# Patient Record
Sex: FEMALE | Race: WHITE | NOT HISPANIC OR LATINO | Employment: OTHER | URBAN - METROPOLITAN AREA
[De-identification: names, ages, dates, MRNs, and addresses within clinical notes are randomized per-mention and may not be internally consistent; named-entity substitution may affect disease eponyms.]

---

## 2017-01-03 ENCOUNTER — OFFICE VISIT (OUTPATIENT)
Dept: ELECTROPHYSIOLOGY | Facility: CLINIC | Age: 72
End: 2017-01-03
Payer: MEDICARE

## 2017-01-03 ENCOUNTER — HOSPITAL ENCOUNTER (OUTPATIENT)
Dept: CARDIOLOGY | Facility: CLINIC | Age: 72
Discharge: HOME | End: 2017-01-03
Payer: MEDICARE

## 2017-01-03 VITALS
SYSTOLIC BLOOD PRESSURE: 118 MMHG | HEIGHT: 66 IN | BODY MASS INDEX: 29.23 KG/M2 | HEART RATE: 70 BPM | WEIGHT: 181.88 LBS | DIASTOLIC BLOOD PRESSURE: 64 MMHG

## 2017-01-03 DIAGNOSIS — I49.3 VENTRICULAR PREMATURE BEATS: ICD-10-CM

## 2017-01-03 DIAGNOSIS — I42.9 CARDIOMYOPATHY: ICD-10-CM

## 2017-01-03 DIAGNOSIS — I50.22 CHRONIC SYSTOLIC HEART FAILURE: ICD-10-CM

## 2017-01-03 DIAGNOSIS — I10 ESSENTIAL HYPERTENSION: ICD-10-CM

## 2017-01-03 DIAGNOSIS — R07.9 CHEST PAIN, UNSPECIFIED TYPE: ICD-10-CM

## 2017-01-03 DIAGNOSIS — R00.1 BRADYCARDIA: Primary | ICD-10-CM

## 2017-01-03 DIAGNOSIS — I34.0 NON-RHEUMATIC MITRAL REGURGITATION: ICD-10-CM

## 2017-01-03 PROCEDURE — 99214 OFFICE O/P EST MOD 30 MIN: CPT | Mod: S$GLB,,, | Performed by: NURSE PRACTITIONER

## 2017-01-03 PROCEDURE — 3074F SYST BP LT 130 MM HG: CPT | Mod: S$GLB,,, | Performed by: NURSE PRACTITIONER

## 2017-01-03 PROCEDURE — 99999 PR PBB SHADOW E&M-EST. PATIENT-LVL III: CPT | Mod: PBBFAC,,, | Performed by: NURSE PRACTITIONER

## 2017-01-03 PROCEDURE — 1160F RVW MEDS BY RX/DR IN RCRD: CPT | Mod: S$GLB,,, | Performed by: NURSE PRACTITIONER

## 2017-01-03 PROCEDURE — 93000 ELECTROCARDIOGRAM COMPLETE: CPT | Mod: S$GLB,,, | Performed by: INTERNAL MEDICINE

## 2017-01-03 PROCEDURE — 1126F AMNT PAIN NOTED NONE PRSNT: CPT | Mod: S$GLB,,, | Performed by: NURSE PRACTITIONER

## 2017-01-03 PROCEDURE — 3078F DIAST BP <80 MM HG: CPT | Mod: S$GLB,,, | Performed by: NURSE PRACTITIONER

## 2017-01-03 PROCEDURE — 1157F ADVNC CARE PLAN IN RCRD: CPT | Mod: S$GLB,,, | Performed by: NURSE PRACTITIONER

## 2017-01-03 PROCEDURE — 1159F MED LIST DOCD IN RCRD: CPT | Mod: S$GLB,,, | Performed by: NURSE PRACTITIONER

## 2017-01-03 RX ORDER — VERAPAMIL HYDROCHLORIDE 100 MG/1
120 CAPSULE, EXTENDED RELEASE ORAL DAILY
COMMUNITY
End: 2017-05-24

## 2017-01-03 NOTE — MR AVS SNAPSHOT
[facility]  [address]  [facility] [address]  Phone: [phone]  Fax: [phone]                  Rizwana Block   1/3/2017 4:00 PM   Office Visit    Description:  Female : 1945   Provider:  SYLVIA Fraire   Department:  [facility]           Diagnoses this Visit        Comments    Bradycardia    -  Primary     Ventricular premature beats         Cardiomyopathy         Chronic systolic heart failure         Non-rheumatic mitral regurgitation         Essential hypertension         Chest pain, unspecified type                To Do List           Future Appointments        Provider Department Dept Phone    2017 1:00 PM TREADMILL, NUCLEAR [facility] - Echo/Stress Lab [phone]    2017 3:30 PM HOLTER, EKG [facility] Arrhythmia [phone]    2/15/2017 2:45 PM EKG, APPT [facility] - [phone]    2/15/2017 3:20 PM Norbert Lemons MD [facility] - Arrhythmia [phone]      Goals (5 Years of Data)     None      Follow-Up and Disposition     Return in about 1 month (around 2/3/2017).      [facility] On Call     [facility] On Call Nurse Care Line -  Assistance  Registered nurses in the [facility] On Call Center provide clinical advisement, health education, appointment booking, and other advisory services.  Call for this free service at [phone].             Medications           Message regarding Medications     Verify the changes and/or additions to your medication regime listed below are the same as discussed with your clinician today.  If any of these changes or additions are incorrect, please notify your healthcare provider.             Verify that the below list of medications is an accurate representation of the medications you are currently taking.  If none reported, the list may be blank. If incorrect, please contact your healthcare provider. Carry this list with you in case of emergency.           Current Medications     enalapril  "(VASOTEC) 5 MG tablet Take one tablet daily.    furosemide (LASIX) 40 MG tablet Take 1 tablet (40 mg total) by mouth daily as needed (weight gain).    meloxicam (MOBIC) 15 MG tablet Take 15 mg by mouth once daily.    verapamil (VERELAN) 100 MG CPCT Take 120 mg by mouth once daily.    aspirin (ECOTRIN) 81 MG EC tablet Take 81 mg by mouth once daily.           Clinical Reference Information           Vital Signs - Last Recorded  Most recent update: 1/3/2017  4:10 PM by Pastor Jenkins MA    BP Pulse Ht Wt LMP BMI    118/64 70 5' 6" (1.676 m) 82.5 kg (181 lb 14.1 oz) (LMP Unknown) 29.36 kg/m2      Blood Pressure          Most Recent Value    BP  118/64      Allergies as of 1/3/2017     Dilaudid [Hydromorphone (Bulk)]    Morphine      Immunizations Administered on Date of Encounter - 1/3/2017     None      Orders Placed During Today's Visit      Normal Orders This Visit    Holter monitor - 24 hour     Nuclear Stress Test - Treadmill - Interpreted by Cardiology ([facility])       "

## 2017-01-03 NOTE — PROGRESS NOTES
"Subjective:    Patient ID:  Rizwana Block is a 71 y.o. female who presents for follow-up of Bradycardia.     Ms. Block is a patient of Dr. Araceli Mccurdy.     HPI     Ms. Block is a 71 year old female with a hx of PVCs s/p recent RFA, bradycardia, CM, HFrEF (EF 40-45%), non-rheumatic MR, and HTN. Ms. Block has been on long-term amiodarone for PVCs (inferior-posterior-septal PVC morphology). Her PVC burden was thought to have been related to her DCM. Her EF improved to the 50s after amiodarone use, and she was subsequently switched from amiodarone to Verapamil.     Ms. Block underwent an EPS and PVC RFA via a retrograde transaortic approach (09/27/16); EPS revealed frequent PVCs originating from the aortomitral continuity; effective lesions were at 12 o'clock level in the [place] view. At her last office visit, Ms. Block reported that on some days she has noted less frequent palpitations, but on others, she experienced the same amount as she had pre-procedure. A Holter monitor was placed, and she was instructed to re-start verapamil.     Since her last office visit, Ms. Block reports experiencing severe back pain radiating to her LUE initially then to her RUE and into her jaw with associated SOB, nausea and "claminess;" lasting several hours. This occurred when she was in the kitchen baking, and she had to stop what she was doing and lay down. Additionally, She has resumed Verapamil and did well initially with a reduction in her symptoms, but now reports experiencing increased palpitations over the last month or so, occurring daily.     Recent cardiac studies:  Echo (06/22/16) revealed an EF of 40-45%, trivial-to-mild TR, trivial TN, upper limit of normal LVE, and a PASP of 26 mmHg.   24-Hour Holter (06/22/16) revealed a 13% PVC burden; 481 couplets, 73 triplets and short VTNS -- the large majority of the PVCs were MM. The triplets are dimorphic.  48-Hour Holter (11/09/16) revealed SR w/HRs varying between  bpm; " average 72 bpm. There were very frequent polymorphic PVCs (totaling 9,632; averaging 200 per hour), 133 couplets, and no episodes of VT. There were very rare PACs (totaling 20; averaging less than 1 per hour) and no episodes of sustained SVT. There was 1 episode of dizziness reported corresponding w/SR at 95 bpm.     I reviewed today's ECG which demonstrated SR w/occasional PVCs at 70 bpm; , , and QTc 473.    Review of Systems   Constitution: Positive for chills, diaphoresis and malaise/fatigue. Negative for decreased appetite and weakness.   HENT: Negative for headaches and nosebleeds.    Eyes: Negative for blurred vision and double vision.   Cardiovascular: Positive for chest pain, dyspnea on exertion and palpitations. Negative for claudication, cyanosis, irregular heartbeat, leg swelling, near-syncope, orthopnea, paroxysmal nocturnal dyspnea and syncope.   Respiratory: Positive for shortness of breath. Negative for cough, hemoptysis, sputum production and wheezing.    Skin: Negative.    Musculoskeletal: Positive for back pain and stiffness.   Gastrointestinal: Positive for nausea. Negative for abdominal pain, hematemesis, hematochezia and vomiting.   Neurological: Negative for dizziness and light-headedness.   Psychiatric/Behavioral: Negative for altered mental status.        Objective:    Physical Exam   Constitutional: She is oriented to person, place, and time. She appears well-developed and well-nourished.   HENT:   Head: Normocephalic and atraumatic.   Eyes: Pupils are equal, round, and reactive to light.   Neck: Normal range of motion. Neck supple.   Cardiovascular: Normal rate, regular rhythm, normal heart sounds and intact distal pulses.    Pulmonary/Chest: Effort normal and breath sounds normal.   Abdominal: Soft. Bowel sounds are normal.   Musculoskeletal: Normal range of motion.   Neurological: She is alert and oriented to person, place, and time.   Skin: She is not diaphoretic.   Vitals  "reviewed.        Assessment:       1. Bradycardia    2. Ventricular premature beats    3. Cardiomyopathy    4. Chronic systolic heart failure    5. Non-rheumatic mitral regurgitation    6. Essential hypertension         Plan:       In summary, Ms. Block is a 71 y.o. female with a hx of  PVCs s/p recent RFA, bradycardia, CM, HFrEF (EF 40-45%), non-rheumatic MR, and HTN.  Ms. Block continues to experience symptomatic PVCs s/p recent PVC RFA. Ms. Block has recently experienced atypical CP which was associated w/SOB/STALLWORTH, LUE, left shoulder, and jaw radiation, nausea, and diaphoresis/"clamminess" which caused her to stop her activity; lasted several hours despite rest. Although she has had a normal LHC in the past, she is very concerned about this episode. Additionally, she is having increased palpitations as of late despite resuming verapamil.     NM Exercise Stress Test now to evaluate atypical CP and associated symptoms.   Repeat 24-Hour Holter to evaluate increased palpitations.   For now, continue current medication regimen.   Follow up in EP clinic w/Dr. Araceli Mccurdy in 1 month (per pt request), sooner as needed.     Thania Santos, MN, APRN, FNP-C             "

## 2017-01-06 ENCOUNTER — HOSPITAL ENCOUNTER (OUTPATIENT)
Dept: CARDIOLOGY | Facility: CLINIC | Age: 72
Discharge: HOME | End: 2017-01-06
Payer: MEDICARE

## 2017-01-06 DIAGNOSIS — R06.02 SOB (SHORTNESS OF BREATH): ICD-10-CM

## 2017-01-06 DIAGNOSIS — I49.3 PVC'S (PREMATURE VENTRICULAR CONTRACTIONS): ICD-10-CM

## 2017-01-06 DIAGNOSIS — I10 HTN (HYPERTENSION): ICD-10-CM

## 2017-01-06 DIAGNOSIS — I34.0 NON-RHEUMATIC MITRAL REGURGITATION: ICD-10-CM

## 2017-01-06 DIAGNOSIS — R07.9 CHEST PAIN: ICD-10-CM

## 2017-01-06 DIAGNOSIS — R07.9 CHEST PAIN: Primary | ICD-10-CM

## 2017-01-06 DIAGNOSIS — I50.9 CHF (CONGESTIVE HEART FAILURE): ICD-10-CM

## 2017-01-06 LAB
DIASTOLIC DYSFUNCTION: NO
MITRAL VALVE MOBILITY: NORMAL
RETIRED EF AND QEF - SEE NOTES: 45 (ref 55–65)

## 2017-01-06 PROCEDURE — 93351 STRESS TTE COMPLETE: CPT | Mod: S$GLB,,, | Performed by: INTERNAL MEDICINE

## 2017-01-06 PROCEDURE — 93321 DOPPLER ECHO F-UP/LMTD STD: CPT | Mod: S$GLB,,, | Performed by: INTERNAL MEDICINE

## 2017-01-16 ENCOUNTER — TELEPHONE (OUTPATIENT)
Dept: ELECTROPHYSIOLOGY | Facility: CLINIC | Age: 72
End: 2017-01-16

## 2017-01-16 NOTE — TELEPHONE ENCOUNTER
----- Message from Zoey Gu RN sent at 1/16/2017  1:33 PM CST -----  Contact: patient      ----- Message -----     From: Aisha Rojas     Sent: 1/13/2017   9:46 AM       To: Danielle ROGER Staff    Please call pt at [phone]. Need to discuss the ECHO test done on 01/06/17    Thank you

## 2017-01-16 NOTE — TELEPHONE ENCOUNTER
Left message for pt that I am waiting for result note, and will call her back as soon as I have information.

## 2017-01-17 ENCOUNTER — TELEPHONE (OUTPATIENT)
Dept: ELECTROPHYSIOLOGY | Facility: CLINIC | Age: 72
End: 2017-01-17

## 2017-01-17 NOTE — TELEPHONE ENCOUNTER
----- Message from SYLVIA Fraire sent at 1/16/2017  8:26 PM CST -----  Contact: patient  Madeline,     Please let Ms. Block know that the test showed no evidence of ischemia (blockage). In addition her Echo portion of the study showed a slightly improved EF (heart function) of 45% (43% last year) otherwise stable.     Thanks so much,     CM   ----- Message -----     From: Madeline Mcelroy RN     Sent: 1/16/2017   1:43 PM       To: SYLVIA Fraire    Please review result and advise  ----- Message -----     From: Zoey Gu RN     Sent: 1/16/2017   1:33 PM       To: Madeline Mcelroy RN        ----- Message -----     From: Aisha Rojas     Sent: 1/13/2017   9:46 AM       To: Danielle ROGER Staff    Please call pt at [phone]. Need to discuss the ECHO test done on 01/06/17    Thank you

## 2017-01-17 NOTE — TELEPHONE ENCOUNTER
Notified pt that recent nuclear stress test did not show any ischemia, and heart function stable. Understanding verbalized.

## 2017-02-06 DIAGNOSIS — R00.1 BRADYCARDIA: Primary | ICD-10-CM

## 2017-02-06 RX ORDER — FUROSEMIDE 40 MG/1
TABLET ORAL
Qty: 30 TABLET | Refills: 11 | Status: SHIPPED | OUTPATIENT
Start: 2017-02-06 | End: 2018-04-24 | Stop reason: SDUPTHER

## 2017-03-08 ENCOUNTER — TELEPHONE (OUTPATIENT)
Dept: INTERNAL MEDICINE | Facility: CLINIC | Age: 72
End: 2017-03-08

## 2017-03-08 ENCOUNTER — OFFICE VISIT (OUTPATIENT)
Dept: INTERNAL MEDICINE | Facility: CLINIC | Age: 72
End: 2017-03-08
Payer: MEDICARE

## 2017-03-08 VITALS
HEART RATE: 93 BPM | DIASTOLIC BLOOD PRESSURE: 85 MMHG | TEMPERATURE: 98 F | SYSTOLIC BLOOD PRESSURE: 125 MMHG | WEIGHT: 179.69 LBS | OXYGEN SATURATION: 96 % | BODY MASS INDEX: 28.88 KG/M2 | HEIGHT: 66 IN

## 2017-03-08 DIAGNOSIS — R11.2 NAUSEA AND VOMITING, INTRACTABILITY OF VOMITING NOT SPECIFIED, UNSPECIFIED VOMITING TYPE: ICD-10-CM

## 2017-03-08 DIAGNOSIS — R19.7 DIARRHEA, UNSPECIFIED TYPE: ICD-10-CM

## 2017-03-08 DIAGNOSIS — R53.81 MALAISE: Primary | ICD-10-CM

## 2017-03-08 DIAGNOSIS — E89.40 ASYMPTOMATIC POSTSURGICAL MENOPAUSE: ICD-10-CM

## 2017-03-08 DIAGNOSIS — Z12.31 ENCOUNTER FOR SCREENING MAMMOGRAM FOR MALIGNANT NEOPLASM OF BREAST: ICD-10-CM

## 2017-03-08 LAB
BILIRUB SERPL-MCNC: NEGATIVE MG/DL
BLOOD URINE, POC: NEGATIVE
COLOR, POC UA: YELLOW
CTP QC/QA: YES
FLUAV AG NPH QL: NEGATIVE
FLUBV AG NPH QL: NEGATIVE
GLUCOSE UR QL STRIP: NORMAL
KETONES UR QL STRIP: NEGATIVE
LEUKOCYTE ESTERASE URINE, POC: NEGATIVE
NITRITE, POC UA: NEGATIVE
PH, POC UA: 5
PROTEIN, POC: NEGATIVE
SPECIFIC GRAVITY, POC UA: 1.01
UROBILINOGEN, POC UA: NORMAL

## 2017-03-08 PROCEDURE — 3079F DIAST BP 80-89 MM HG: CPT | Mod: S$GLB,,, | Performed by: NURSE PRACTITIONER

## 2017-03-08 PROCEDURE — 87804 INFLUENZA ASSAY W/OPTIC: CPT | Mod: QW,S$GLB,, | Performed by: NURSE PRACTITIONER

## 2017-03-08 PROCEDURE — 1125F AMNT PAIN NOTED PAIN PRSNT: CPT | Mod: S$GLB,,, | Performed by: NURSE PRACTITIONER

## 2017-03-08 PROCEDURE — 99204 OFFICE O/P NEW MOD 45 MIN: CPT | Mod: 25,S$GLB,, | Performed by: NURSE PRACTITIONER

## 2017-03-08 PROCEDURE — 1159F MED LIST DOCD IN RCRD: CPT | Mod: S$GLB,,, | Performed by: NURSE PRACTITIONER

## 2017-03-08 PROCEDURE — 1157F ADVNC CARE PLAN IN RCRD: CPT | Mod: S$GLB,,, | Performed by: NURSE PRACTITIONER

## 2017-03-08 PROCEDURE — 81002 URINALYSIS NONAUTO W/O SCOPE: CPT | Mod: S$GLB,,, | Performed by: NURSE PRACTITIONER

## 2017-03-08 PROCEDURE — 3074F SYST BP LT 130 MM HG: CPT | Mod: S$GLB,,, | Performed by: NURSE PRACTITIONER

## 2017-03-08 PROCEDURE — 1160F RVW MEDS BY RX/DR IN RCRD: CPT | Mod: S$GLB,,, | Performed by: NURSE PRACTITIONER

## 2017-03-08 PROCEDURE — 99999 PR PBB SHADOW E&M-EST. PATIENT-LVL V: CPT | Mod: PBBFAC,,, | Performed by: NURSE PRACTITIONER

## 2017-03-08 RX ORDER — PNEUMOCOCCAL 13-VALENT CONJUGATE VACCINE 2.2; 2.2; 2.2; 2.2; 2.2; 4.4; 2.2; 2.2; 2.2; 2.2; 2.2; 2.2; 2.2 UG/.5ML; UG/.5ML; UG/.5ML; UG/.5ML; UG/.5ML; UG/.5ML; UG/.5ML; UG/.5ML; UG/.5ML; UG/.5ML; UG/.5ML; UG/.5ML; UG/.5ML
INJECTION, SUSPENSION INTRAMUSCULAR
COMMUNITY
Start: 2016-11-30 | End: 2017-05-24 | Stop reason: ALTCHOICE

## 2017-03-08 RX ORDER — ONDANSETRON 4 MG/1
4-8 TABLET, ORALLY DISINTEGRATING ORAL EVERY 8 HOURS PRN
Qty: 30 TABLET | Refills: 0 | Status: SHIPPED | OUTPATIENT
Start: 2017-03-08 | End: 2017-05-24

## 2017-03-08 RX ORDER — ONDANSETRON 4 MG/1
4 TABLET, ORALLY DISINTEGRATING ORAL
Status: COMPLETED | OUTPATIENT
Start: 2017-03-08 | End: 2017-03-08

## 2017-03-08 RX ADMIN — ONDANSETRON 4 MG: 4 TABLET, ORALLY DISINTEGRATING ORAL at 06:03

## 2017-03-08 NOTE — MR AVS SNAPSHOT
[facility] Internal Medicine  [address]  [place] [address]  Phone: [phone]  Fax: [phone]                  Rizwana Block   3/8/2017 5:30 PM   Office Visit    Description:  Female : 1945   Provider:  Christie Hopkins NP   Department:  [facility] - Internal Medicine           Reason for Visit     Diarrhea           Diagnoses this Visit        Comments    Malaise    -  Primary     Diarrhea, unspecified type         Nausea and vomiting, intractability of vomiting not specified, unspecified vomiting type         Asymptomatic postsurgical menopause         Encounter for screening mammogram for malignant neoplasm of breast                To Do List           Future Appointments        Provider Department Dept Phone    2017 3:20 PM Ines Jimenez MD [facility] [phone]      Goals (5 Years of Data)     None       These Medications        Disp Refills Start End    ondansetron (ZOFRAN-ODT) 4 MG TbDL 30 tablet 0 3/8/2017     Take 1-2 tablets (4-8 mg total) by mouth every 8 (eight) hours as needed. - Oral    Pharmacy: [address] #: [phone]         [facility] On Call     [facility] On Call Nurse [facility] -  Assistance  Registered nurses in the [facility] On Call Center provide clinical advisement, health education, appointment booking, and other advisory services.  Call for this free service at [phone].             Medications           Message regarding Medications     Verify the changes and/or additions to your medication regime listed below are the same as discussed with your clinician today.  If any of these changes or additions are incorrect, please notify your healthcare provider.        START taking these NEW medications        Refills    ondansetron (ZOFRAN-ODT) 4 MG TbDL 0    Sig: Take 1-2 tablets (4-8 mg total) by mouth every 8 (eight) hours as needed.    Class: Normal    Route: Oral      These  "medications were administered today        Dose Freq    ondansetron disintegrating tablet 4 mg 4 mg Clinic/HOD 1 time    Sig: Take 1 tablet (4 mg total) by mouth one time.    Class: Normal    Route: Oral           Verify that the below list of medications is an accurate representation of the medications you are currently taking.  If none reported, the list may be blank. If incorrect, please contact your healthcare provider. Carry this list with you in case of emergency.           Current Medications     aspirin (ECOTRIN) 81 MG EC tablet Take 81 mg by mouth once daily.    enalapril (VASOTEC) 5 MG tablet Take one tablet daily.    furosemide (LASIX) 40 MG tablet TAKE ONE TABLET BY MOUTH ONCE DAILY AS NEEDED FOR weight gain    meloxicam (MOBIC) 15 MG tablet Take 15 mg by mouth once daily.    verapamil (VERELAN) 100 MG CPCT Take 120 mg by mouth once daily.    FLUZONE HIGH-DOSE 2016-17, PF, 180 mcg/0.5 mL Syrg     ondansetron (ZOFRAN-ODT) 4 MG TbDL Take 1-2 tablets (4-8 mg total) by mouth every 8 (eight) hours as needed.    PREVNAR 13, PF, 0.5 mL Syrg            Clinical Reference Information           Your Vitals Were     BP Pulse Temp Height Weight Last Period    125/85 93 98.2 °F (36.8 °C) (Oral) 5' 6" (1.676 m) 81.5 kg (179 lb 10.8 oz) (LMP Unknown)    SpO2 BMI             96% 29 kg/m2         Blood Pressure          Most Recent Value    BP  125/85      Allergies as of 3/8/2017     Dilaudid [Hydromorphone (Bulk)]    Morphine      Immunizations Administered on Date of Encounter - 3/8/2017     None      Orders Placed During Today's Visit      Normal Orders This Visit    POCT Influenza A/B     POCT urine dipstick without microscope     Future Labs/Procedures Expected by Expires    Amylase  3/8/2017 5/7/2018    CBC auto differential  3/8/2017 5/7/2018    Comprehensive metabolic panel  3/8/2017 3/8/2018    CULTURE, STOOL  3/8/2017 3/8/2018    DXA Bone Density Spine And Hip  3/8/2017 3/8/2018    Lipase  3/8/2017 5/7/2018    " Mammo Digital Screening Bilat with CAD  3/8/2017 5/8/2018         3/8/2017  6:15 PM - Ivan Valentin LPN      Component Results     Component Value Flag Ref Range Units Status    Rapid Influenza A Ag Negative  Negative  Final    Rapid Influenza B Ag Negative  Negative  Final     Acceptable Yes    Final            Language Assistance Services     ATTENTION: Language assistance services are available, free of charge. Please call [phone].      ATENCIÓN: Si habla español, tiene a brito disposición servicios gratuitos de asistencia lingüística. Llame al [phone].     CHÚ Ý: N?u b?n nói Ti?ng Vi?t, có các d?ch v? h? tr? ngôn ng? mi?n phí dành cho b?n. G?i s? [phone].         [facility] complies with applicable Federal civil rights laws and does not discriminate on the basis of race, color, national origin, age, disability, or sex.

## 2017-03-09 NOTE — PROGRESS NOTES
Subjective:       Patient ID: Rizwana Block is a 71 y.o. female.    Chief Complaint: Diarrhea    Ms Block presents today for malaise, fatigue, nausea, vomiting and diarrhea.    On Benjamin Gras day she was at a party and began feeling badly.  She went home and threw up several times. She has not thrown up since, but she has had persistent nausea.  Every time she eats, she has loose stools, averaging 3-4 BMs daily. She has had ongoing fatigue and epigastric discomfort since then.     Diarrhea    This is a new problem. The current episode started 1 to 4 weeks ago. The problem occurs 2 to 4 times per day. The problem has been unchanged. The stool consistency is described as watery. The patient states that diarrhea does not awaken her from sleep. Associated symptoms include abdominal pain, headaches, myalgias and vomiting. Pertinent negatives include no arthralgias, bloating, chills, coughing, fever, increased  flatus, sweats, URI or weight loss. There are no known risk factors. She has tried nothing for the symptoms.     Review of Systems   Constitutional: Negative for chills, fever and weight loss.   HENT: Negative for facial swelling.    Eyes: Negative for visual disturbance.   Respiratory: Negative for cough.    Cardiovascular: Negative for chest pain, palpitations and leg swelling.   Gastrointestinal: Positive for abdominal pain, diarrhea, nausea and vomiting. Negative for abdominal distention, bloating, blood in stool and flatus.   Genitourinary: Negative for dysuria.   Musculoskeletal: Positive for myalgias. Negative for arthralgias.   Skin: Negative for rash.   Neurological: Positive for headaches.   Psychiatric/Behavioral: Negative for confusion.       Objective:      Physical Exam   Constitutional: She is oriented to person, place, and time. She appears well-developed and well-nourished. She appears ill. No distress.   HENT:   Head: Atraumatic.   Eyes: Right eye exhibits no discharge. Left eye exhibits no  discharge. No scleral icterus.   Neck: Normal range of motion. Neck supple.   Cardiovascular: Normal rate, regular rhythm and normal heart sounds.    Pulmonary/Chest: Effort normal and breath sounds normal. No respiratory distress. She has no wheezes. She has no rales.   Abdominal: Soft. Normal appearance and bowel sounds are normal. There is tenderness in the epigastric area.   Lymphadenopathy:     She has no cervical adenopathy.   Neurological: She is alert and oriented to person, place, and time.   Skin: Skin is warm and dry. She is not diaphoretic.   Psychiatric: She has a normal mood and affect. Her behavior is normal.   Nursing note and vitals reviewed.      Assessment:       1. Malaise    2. Diarrhea, unspecified type    3. Nausea and vomiting, intractability of vomiting not specified, unspecified vomiting type    4. Asymptomatic postsurgical menopause    5. Encounter for screening mammogram for malignant neoplasm of breast         Plan:   1. Malaise  - POCT Influenza A/B  - CBC auto differential; Future  - Comprehensive metabolic panel; Future  - POCT urine dipstick without microscope    2. Diarrhea, unspecified type  - Lipase; Future  - Amylase; Future  - CBC auto differential; Future  - Comprehensive metabolic panel; Future  - CULTURE, STOOL; Future    3. Nausea and vomiting, intractability of vomiting not specified, unspecified vomiting type  - POCT Influenza A/B  - Lipase; Future  - Amylase; Future  - CBC auto differential; Future  - Comprehensive metabolic panel; Future  - POCT urine dipstick without microscope  - ondansetron disintegrating tablet 4 mg; Take 1 tablet (4 mg total) by mouth one time.  - ondansetron (ZOFRAN-ODT) 4 MG TbDL; Take 1-2 tablets (4-8 mg total) by mouth every 8 (eight) hours as needed.  Dispense: 30 tablet; Refill: 0    4. Asymptomatic postsurgical menopause  - DXA Bone Density Spine And Hip; Future    5. Encounter for screening mammogram for malignant neoplasm of breast   -  Mammo Digital Screening Bilat with CAD; Future      Pt has been given instructions populated from [employer] database and has verbalized understanding of the after visit summary and information contained wherein.    Follow up with a primary care provider. May go to ER for acute shortness of breath, lightheadedness, fever, or any other emergent complaints or changes in condition.

## 2017-04-06 ENCOUNTER — TELEPHONE (OUTPATIENT)
Dept: INTERNAL MEDICINE | Facility: CLINIC | Age: 72
End: 2017-04-06

## 2017-04-06 NOTE — TELEPHONE ENCOUNTER
Spoke to pt. She was really upset with the change in providers and addressed to me she no longer will like to be seen. Of course I called back to apologize and asked if there were anything I can do she suggested I give her the name of the Dr. I was referring her over to. I then gave her Dr. Gamez name to her pt states she will call when she has calmed down to make an appt.    Pt sounded calmer and nicer and appreciated the call that was given after the 1st call.

## 2017-04-18 ENCOUNTER — PATIENT MESSAGE (OUTPATIENT)
Dept: ELECTROPHYSIOLOGY | Facility: CLINIC | Age: 72
End: 2017-04-18

## 2017-04-21 ENCOUNTER — HOSPITAL ENCOUNTER (OUTPATIENT)
Dept: RADIOLOGY | Facility: CLINIC | Age: 72
Discharge: HOME | End: 2017-04-21
Attending: NURSE PRACTITIONER
Payer: MEDICARE

## 2017-04-21 ENCOUNTER — HOSPITAL ENCOUNTER (OUTPATIENT)
Dept: RADIOLOGY | Facility: HOSPITAL | Age: 72
Discharge: HOME | End: 2017-04-21
Attending: NURSE PRACTITIONER
Payer: MEDICARE

## 2017-04-21 DIAGNOSIS — E89.40 ASYMPTOMATIC POSTSURGICAL MENOPAUSE: ICD-10-CM

## 2017-04-21 DIAGNOSIS — Z12.31 ENCOUNTER FOR SCREENING MAMMOGRAM FOR MALIGNANT NEOPLASM OF BREAST: ICD-10-CM

## 2017-04-21 PROCEDURE — 77080 DXA BONE DENSITY AXIAL: CPT | Mod: 26,,, | Performed by: INTERNAL MEDICINE

## 2017-04-21 PROCEDURE — 77080 DXA BONE DENSITY AXIAL: CPT | Mod: TC

## 2017-04-21 PROCEDURE — 77067 SCR MAMMO BI INCL CAD: CPT | Mod: 26,,, | Performed by: RADIOLOGY

## 2017-04-21 PROCEDURE — 77067 SCR MAMMO BI INCL CAD: CPT | Mod: TC

## 2017-04-28 ENCOUNTER — PATIENT MESSAGE (OUTPATIENT)
Dept: INTERNAL MEDICINE | Facility: CLINIC | Age: 72
End: 2017-04-28

## 2017-04-28 ENCOUNTER — TELEPHONE (OUTPATIENT)
Dept: ELECTROPHYSIOLOGY | Facility: CLINIC | Age: 72
End: 2017-04-28

## 2017-04-28 NOTE — TELEPHONE ENCOUNTER
----- Message from Aisha Rojas sent at 4/27/2017 10:26 AM CDT -----  Contact: patient  Please call pt at [phone]. Had blood in her urine this morning for the first time. Not bleeding at present. Could this have anything to do with her heart medication? Dr Araceli Mccurdy    Thank you

## 2017-04-28 NOTE — TELEPHONE ENCOUNTER
Left message for pt to return call. Reassured heart meds likely not cause for blood in urine, unless she were on blood thinner. Instructed to call back Monday.

## 2017-05-24 ENCOUNTER — OFFICE VISIT (OUTPATIENT)
Dept: INTERNAL MEDICINE | Facility: CLINIC | Age: 72
End: 2017-05-24
Payer: MEDICARE

## 2017-05-24 VITALS
HEART RATE: 87 BPM | DIASTOLIC BLOOD PRESSURE: 72 MMHG | WEIGHT: 179 LBS | BODY MASS INDEX: 27.13 KG/M2 | HEIGHT: 68 IN | OXYGEN SATURATION: 96 % | SYSTOLIC BLOOD PRESSURE: 118 MMHG

## 2017-05-24 DIAGNOSIS — I50.22 CHRONIC SYSTOLIC HEART FAILURE: ICD-10-CM

## 2017-05-24 DIAGNOSIS — R53.83 FATIGUE, UNSPECIFIED TYPE: ICD-10-CM

## 2017-05-24 DIAGNOSIS — Z78.0 POST-MENOPAUSE: ICD-10-CM

## 2017-05-24 DIAGNOSIS — I10 ESSENTIAL HYPERTENSION: ICD-10-CM

## 2017-05-24 DIAGNOSIS — R00.1 BRADYCARDIA: ICD-10-CM

## 2017-05-24 DIAGNOSIS — I42.9 CARDIOMYOPATHY, UNSPECIFIED TYPE: ICD-10-CM

## 2017-05-24 DIAGNOSIS — M89.9 DISORDER OF BONE: ICD-10-CM

## 2017-05-24 DIAGNOSIS — Z98.890 S/P COLONOSCOPY: ICD-10-CM

## 2017-05-24 DIAGNOSIS — Z79.899 OTHER LONG TERM (CURRENT) DRUG THERAPY: ICD-10-CM

## 2017-05-24 DIAGNOSIS — M85.88 OSTEOPENIA OF SPINE: ICD-10-CM

## 2017-05-24 DIAGNOSIS — Z00.00 ANNUAL PHYSICAL EXAM: Primary | ICD-10-CM

## 2017-05-24 PROCEDURE — 99999 PR PBB SHADOW E&M-EST. PATIENT-LVL III: CPT | Mod: PBBFAC,,, | Performed by: FAMILY MEDICINE

## 2017-05-24 PROCEDURE — 3074F SYST BP LT 130 MM HG: CPT | Mod: S$GLB,,, | Performed by: FAMILY MEDICINE

## 2017-05-24 PROCEDURE — 99397 PER PM REEVAL EST PAT 65+ YR: CPT | Mod: S$GLB,,, | Performed by: FAMILY MEDICINE

## 2017-05-24 PROCEDURE — 3078F DIAST BP <80 MM HG: CPT | Mod: S$GLB,,, | Performed by: FAMILY MEDICINE

## 2017-05-24 RX ORDER — VERAPAMIL HYDROCHLORIDE 120 MG/1
CAPSULE, EXTENDED RELEASE ORAL
COMMUNITY
Start: 2017-03-29 | End: 2017-06-12 | Stop reason: ALTCHOICE

## 2017-05-24 RX ORDER — ROSUVASTATIN CALCIUM 5 MG/1
5 TABLET, COATED ORAL DAILY
Qty: 90 TABLET | Refills: 3 | Status: SHIPPED | OUTPATIENT
Start: 2017-05-24 | End: 2017-09-26

## 2017-05-24 NOTE — PROGRESS NOTES
"Subjective:       Patient ID: Rizwana Block is a 71 y.o. female.    Chief Complaint: Establish Care     HPI 71-year-old female with PVCsstatus post radiofrequency ablation ,cardiomyopathy,CHF ejection fraction 45% (echo 1/2017), hypertension is here to establish care, she reports she continues to have palpitations and fatigue, last ablation done around nov 2/16 she thinks  GYN: followed  ascvd risk discussed 11%  Denies f/n/v/d, has occasional constipation relieved with prunes, denies cp, has palpitations often, worse when lying alexey, denies sob/urinary sx.  Sleeping ok, appetite good, mood good.  Does not do dedicated exercise, but stays very active    Review of Systems   Constitutional: Negative for activity change and unexpected weight change.   HENT: Negative for hearing loss and trouble swallowing.    Eyes: Negative for discharge and visual disturbance.   Respiratory: Negative for chest tightness and wheezing.    Cardiovascular: Positive for palpitations. Negative for chest pain.   Gastrointestinal: Negative for blood in stool, constipation, diarrhea and vomiting.   Endocrine: Negative for polyuria.   Genitourinary: Negative for difficulty urinating, dysuria, hematuria and menstrual problem.   Musculoskeletal: Positive for arthralgias. Negative for joint swelling.   Neurological: Negative for weakness and headaches.   Psychiatric/Behavioral: Negative for confusion and dysphoric mood.       Objective:      /72   Pulse 87   Ht 5' 8" (1.727 m)   Wt 81.2 kg (179 lb 0.2 oz)   LMP  (LMP Unknown)   SpO2 96%   BMI 27.22 kg/m²   Physical Exam   Constitutional: She appears well-developed and well-nourished.   HENT:   Head: Normocephalic and atraumatic.   Mouth/Throat: No oropharyngeal exudate.   Neck: Normal range of motion. Neck supple. No thyromegaly present.   Cardiovascular: Normal rate, regular rhythm and normal heart sounds.    Pulmonary/Chest: Effort normal and breath sounds normal. No respiratory " distress.   Abdominal: Soft. Bowel sounds are normal. She exhibits no distension. There is no tenderness.   Musculoskeletal: She exhibits no edema.   Lymphadenopathy:     She has no cervical adenopathy.   Neurological: She is alert.   Skin: Skin is warm and dry.   Psychiatric: She has a normal mood and affect.   Nursing note and vitals reviewed.      Assessment:       1. Annual physical exam    2. Cardiomyopathy, unspecified type    3. Essential hypertension    4. Chronic systolic heart failure    5. Fatigue, unspecified type    6. Bradycardia     7. Other long term (current) drug therapy     8. S/P colonoscopy    9. Osteopenia of spine    10. Post-menopause    11. Disorder of bone         Plan:   Rizwana was seen today for establish care.    Diagnoses and all orders for this visit:    Annual physical exam    Cardiomyopathy, unspecified type  -     rosuvastatin (CRESTOR) 5 MG tablet; Take 1 tablet (5 mg total) by mouth once daily.  -     Magnesium; Future    Essential hypertension  -     rosuvastatin (CRESTOR) 5 MG tablet; Take 1 tablet (5 mg total) by mouth once daily.  -     Comprehensive metabolic panel; Future  -     TSH; Future  -     Magnesium; Future    Chronic systolic heart failure  -     rosuvastatin (CRESTOR) 5 MG tablet; Take 1 tablet (5 mg total) by mouth once daily.    Fatigue, unspecified type  -     Iron and TIBC; Future  -     Ferritin; Future  -     Vitamin B12; Future  -     Reticulocytes; Future  -     TSH; Future  -     Magnesium; Future  -     Vitamin D; Future    Bradycardia   -     Iron and TIBC; Future  -     Ferritin; Future    Other long term (current) drug therapy   -     Vitamin B12; Future    S/P colonoscopy    Osteopenia of spine  -     Vitamin D; Future    Post-menopause  -     Vitamin D; Future    Disorder of bone   -     Vitamin D; Future

## 2017-06-01 ENCOUNTER — OFFICE VISIT (OUTPATIENT)
Dept: ELECTROPHYSIOLOGY | Facility: CLINIC | Age: 72
End: 2017-06-01
Payer: MEDICARE

## 2017-06-01 ENCOUNTER — HOSPITAL ENCOUNTER (OUTPATIENT)
Dept: CARDIOLOGY | Facility: CLINIC | Age: 72
Discharge: HOME | End: 2017-06-01
Payer: MEDICARE

## 2017-06-01 VITALS
HEART RATE: 75 BPM | DIASTOLIC BLOOD PRESSURE: 82 MMHG | HEIGHT: 68 IN | WEIGHT: 181.44 LBS | SYSTOLIC BLOOD PRESSURE: 135 MMHG | BODY MASS INDEX: 27.5 KG/M2

## 2017-06-01 DIAGNOSIS — E78.00 PURE HYPERCHOLESTEROLEMIA: ICD-10-CM

## 2017-06-01 DIAGNOSIS — I49.3 VENTRICULAR PREMATURE BEATS: Primary | ICD-10-CM

## 2017-06-01 DIAGNOSIS — I10 ESSENTIAL HYPERTENSION: ICD-10-CM

## 2017-06-01 DIAGNOSIS — R00.1 BRADYCARDIA: ICD-10-CM

## 2017-06-01 DIAGNOSIS — I42.8 OTHER CARDIOMYOPATHY: ICD-10-CM

## 2017-06-01 PROCEDURE — 1159F MED LIST DOCD IN RCRD: CPT | Mod: S$GLB,,, | Performed by: INTERNAL MEDICINE

## 2017-06-01 PROCEDURE — 1126F AMNT PAIN NOTED NONE PRSNT: CPT | Mod: S$GLB,,, | Performed by: INTERNAL MEDICINE

## 2017-06-01 PROCEDURE — 93000 ELECTROCARDIOGRAM COMPLETE: CPT | Mod: S$GLB,,, | Performed by: INTERNAL MEDICINE

## 2017-06-01 PROCEDURE — 99999 PR PBB SHADOW E&M-EST. PATIENT-LVL III: CPT | Mod: PBBFAC,,, | Performed by: INTERNAL MEDICINE

## 2017-06-01 PROCEDURE — 99215 OFFICE O/P EST HI 40 MIN: CPT | Mod: S$GLB,,, | Performed by: INTERNAL MEDICINE

## 2017-06-01 RX ORDER — AMOXICILLIN 500 MG
2 CAPSULE ORAL 2 TIMES DAILY
COMMUNITY
End: 2019-10-30

## 2017-06-01 RX ORDER — UBIDECARENONE 30 MG
10 CAPSULE ORAL DAILY
COMMUNITY

## 2017-06-01 NOTE — PROGRESS NOTES
Subjective:   Patient ID:  Rizwana Block is a 71 y.o. female     Chief complaint:Irregular Heart Beat      HPI  From my last note (9/16/16):  71 woman  Long term Rx with amio for PVCs (Inferior-posterior septal PVC morphology as seen on Holter (06/22/16) and ECG (06/01/16)).     that were thought to have caused DCM . Her EF has improved into the 50s after Rx and we decided to switch from amio to Varapamil,  await full amio washout and proceed with RFA as needed. She has schedule the procedure for next week and she is here with a pre-op 48 hr holter     Holter 9/16/16:  13% PVCs 481 couplets, 73 triplets and short VTNS -- the large majority of the PVCs are MM. The triplets are dimorphic.     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 She walks up and down stairs a lot -- at times a bit tired after 2 flights.   She does feel her PVCs but this does not bother her a lot.  She has been recently started on crestor and despite CoQ 10 supplements, she says that her leg cramps have been   I have reviewed the actual image of the ECG tracing obtained today and it shows NSR with normal intervals. No PVCs noted.    Current Outpatient Prescriptions   Medication Sig    aspirin (ECOTRIN) 81 MG EC tablet Take 81 mg by mouth once daily.    co-enzyme Q-10 30 mg capsule Take 200 mg by mouth once daily.    enalapril (VASOTEC) 5 MG tablet Take one tablet daily.    fish oil-omega-3 fatty acids 300-1,000 mg capsule Take 2 g by mouth once daily.    furosemide (LASIX) 40 MG tablet TAKE ONE TABLET BY MOUTH ONCE DAILY AS NEEDED FOR weight gain    rosuvastatin (CRESTOR) 5 MG tablet Take 1 tablet (5 mg total) by mouth once daily.    verapamil (VERELAN) 120 MG C24P      No current facility-administered medications for this visit.      Review of Systems   Constitution: Negative for decreased appetite, weakness,  weight gain and weight loss.   HENT: Negative for headaches and nosebleeds.    Eyes: Negative for blurred vision and visual disturbance.   Cardiovascular: Positive for chest pain and irregular heartbeat. Negative for claudication, cyanosis, dyspnea on exertion, leg swelling, near-syncope, orthopnea, palpitations, paroxysmal nocturnal dyspnea and syncope.   Respiratory: Negative for cough, shortness of breath and wheezing.    Endocrine: Negative for heat intolerance.   Skin: Negative for rash.   Musculoskeletal: Positive for muscle cramps. Negative for muscle weakness and myalgias.   Gastrointestinal: Positive for nausea. Negative for abdominal pain, anorexia, melena and vomiting.   Genitourinary: Negative for menorrhagia.   Neurological: Positive for light-headedness and numbness. Negative for excessive daytime sleepiness, dizziness, loss of balance, seizures and vertigo.        Numbness in left arm   Psychiatric/Behavioral: Negative for altered mental status and depression. The patient is not nervous/anxious.        Objective:   Physical Exam   Constitutional: She is oriented to person, place, and time. She appears well-developed and well-nourished.   HENT:   Head: Normocephalic and atraumatic.   Right Ear: External ear normal.   Left Ear: External ear normal.   Neck: Normal range of motion. Neck supple. No thyromegaly present.   Cardiovascular: Normal rate, regular rhythm, normal heart sounds and intact distal pulses.  Exam reveals no gallop, no S3, no S4, no friction rub, no midsystolic click and no opening snap.    No murmur heard.  Pulses:       Carotid pulses are 2+ on the right side, and 2+ on the left side.       Radial pulses are 2+ on the right side, and 2+ on the left side.        Dorsalis pedis pulses are 2+ on the right side, and 2+ on the left side.        Posterior tibial pulses are 2+ on the right side, and 2+ on the left side.   Pulmonary/Chest: Effort normal and breath sounds normal.   Abdominal:  "Soft. She exhibits no distension. There is no tenderness.   Musculoskeletal:        Right ankle: She exhibits no swelling.        Left ankle: She exhibits no swelling.        Right lower leg: She exhibits no swelling.        Left lower leg: She exhibits no swelling.   Neurological: She is alert and oriented to person, place, and time. She has normal strength. No cranial nerve deficit. Gait normal.   Skin: Skin is warm. No rash noted. No cyanosis. Nails show no clubbing.   Psychiatric: She has a normal mood and affect. Her speech is normal and behavior is normal. Thought content normal. Cognition and memory are normal.   Nursing note and vitals reviewed.    /82   Pulse 75   Ht 5' 8" (1.727 m)   Wt 82.3 kg (181 lb 7 oz)   LMP  (LMP Unknown)   BMI 27.59 kg/m²      Assessment:      1. Ventricular premature beats    2. Other cardiomyopathy    3. Essential hypertension    4. Pure hypercholesterolemia        Plan:      Orders Placed This Encounter   Procedures    Lipid panel     Standing Status:   Future     Standing Expiration Date:   7/31/2018    2D Echo w/ Color Flow Doppler     Standing Status:   Future     Standing Expiration Date:   6/1/2018     Return in about 1 year (around 6/1/2018), or if symptoms worsen or fail to improve.  Medications Discontinued During This Encounter   Medication Reason    meloxicam (MOBIC) 15 MG tablet Patient no longer taking     New Prescriptions    No medications on file     Modified Medications    No medications on file              "

## 2017-06-05 ENCOUNTER — HOSPITAL ENCOUNTER (OUTPATIENT)
Dept: CARDIOLOGY | Facility: CLINIC | Age: 72
Discharge: HOME | End: 2017-06-05
Payer: MEDICARE

## 2017-06-05 DIAGNOSIS — I49.3 VENTRICULAR PREMATURE BEATS: ICD-10-CM

## 2017-06-05 DIAGNOSIS — I42.8 OTHER CARDIOMYOPATHY: ICD-10-CM

## 2017-06-05 LAB
DIASTOLIC DYSFUNCTION: YES
ESTIMATED PA SYSTOLIC PRESSURE: 18.68
MITRAL VALVE REGURGITATION: ABNORMAL
RETIRED EF AND QEF - SEE NOTES: 35 (ref 55–65)

## 2017-06-05 PROCEDURE — 93306 TTE W/DOPPLER COMPLETE: CPT | Mod: S$GLB,,, | Performed by: INTERNAL MEDICINE

## 2017-06-08 ENCOUNTER — PATIENT MESSAGE (OUTPATIENT)
Dept: ELECTROPHYSIOLOGY | Facility: CLINIC | Age: 72
End: 2017-06-08

## 2017-06-12 ENCOUNTER — PATIENT MESSAGE (OUTPATIENT)
Dept: ELECTROPHYSIOLOGY | Facility: CLINIC | Age: 72
End: 2017-06-12

## 2017-06-12 DIAGNOSIS — I50.22 CHRONIC SYSTOLIC HEART FAILURE: Primary | ICD-10-CM

## 2017-06-13 RX ORDER — AMIODARONE HYDROCHLORIDE 200 MG/1
TABLET ORAL
Qty: 34 TABLET | Refills: 11 | OUTPATIENT
Start: 2017-06-13

## 2017-06-20 ENCOUNTER — TELEPHONE (OUTPATIENT)
Dept: ELECTROPHYSIOLOGY | Facility: CLINIC | Age: 72
End: 2017-06-20

## 2017-06-20 NOTE — TELEPHONE ENCOUNTER
----- Message from Aisha Rojas sent at 6/20/2017  2:48 PM CDT -----  Contact: patient  Please call pt at [phone]. She has a questions about taking Mobic 7.5 mg given by Dr Jacob Dunbar/Ortho. Can she take this medication? Dr Araceli Mccurdy    Thank you

## 2017-06-20 NOTE — TELEPHONE ENCOUNTER
Pt calling to see if she can take Mobic (anti-inflammatory) with cardiac illness. Advised she is only taking aspirin, so no interference with meds. Understanding verbalized.

## 2017-06-26 ENCOUNTER — PATIENT MESSAGE (OUTPATIENT)
Dept: ELECTROPHYSIOLOGY | Facility: CLINIC | Age: 72
End: 2017-06-26

## 2017-06-26 DIAGNOSIS — R00.2 PALPITATIONS: Primary | ICD-10-CM

## 2017-06-27 DIAGNOSIS — R00.2 PALPITATIONS: Primary | ICD-10-CM

## 2017-06-27 RX ORDER — AMIODARONE HYDROCHLORIDE 200 MG/1
200 TABLET ORAL DAILY
Qty: 30 TABLET | Refills: 11 | Status: SHIPPED | OUTPATIENT
Start: 2017-06-27 | End: 2018-08-02 | Stop reason: SDUPTHER

## 2017-06-27 RX ORDER — AMIODARONE HYDROCHLORIDE 200 MG/1
200 TABLET ORAL DAILY
Qty: 30 TABLET | Refills: 11 | Status: SHIPPED | OUTPATIENT
Start: 2017-06-27 | End: 2018-03-26

## 2017-07-03 ENCOUNTER — LAB VISIT (OUTPATIENT)
Dept: LAB | Facility: HOSPITAL | Age: 72
End: 2017-07-03
Attending: INTERNAL MEDICINE
Payer: MEDICARE

## 2017-07-03 DIAGNOSIS — E78.00 PURE HYPERCHOLESTEROLEMIA: ICD-10-CM

## 2017-07-03 LAB
CHOLEST/HDLC SERPL: 2.9 {RATIO}
HDL/CHOLESTEROL RATIO: 33.9 %
HDLC SERPL-MCNC: 221 MG/DL
HDLC SERPL-MCNC: 75 MG/DL
LDLC SERPL CALC-MCNC: 130.8 MG/DL
NONHDLC SERPL-MCNC: 146 MG/DL
TRIGL SERPL-MCNC: 76 MG/DL

## 2017-07-03 PROCEDURE — 80061 LIPID PANEL: CPT

## 2017-07-03 PROCEDURE — 36415 COLL VENOUS BLD VENIPUNCTURE: CPT

## 2017-07-13 ENCOUNTER — PATIENT MESSAGE (OUTPATIENT)
Dept: ELECTROPHYSIOLOGY | Facility: CLINIC | Age: 72
End: 2017-07-13

## 2017-07-25 ENCOUNTER — OFFICE VISIT (OUTPATIENT)
Dept: PODIATRY | Facility: CLINIC | Age: 72
End: 2017-07-25
Payer: MEDICARE

## 2017-07-25 VITALS — BODY MASS INDEX: 27.13 KG/M2 | HEIGHT: 68 IN | WEIGHT: 179 LBS

## 2017-07-25 DIAGNOSIS — M19.079 ARTHRITIS OF FOOT: ICD-10-CM

## 2017-07-25 DIAGNOSIS — M20.40 HAMMER TOE, UNSPECIFIED LATERALITY: ICD-10-CM

## 2017-07-25 DIAGNOSIS — B35.1 ONYCHOMYCOSIS DUE TO DERMATOPHYTE: Primary | ICD-10-CM

## 2017-07-25 PROCEDURE — 1125F AMNT PAIN NOTED PAIN PRSNT: CPT | Mod: S$GLB,,, | Performed by: PODIATRIST

## 2017-07-25 PROCEDURE — 1159F MED LIST DOCD IN RCRD: CPT | Mod: S$GLB,,, | Performed by: PODIATRIST

## 2017-07-25 PROCEDURE — 99203 OFFICE O/P NEW LOW 30 MIN: CPT | Mod: S$GLB,,, | Performed by: PODIATRIST

## 2017-07-25 PROCEDURE — 99999 PR PBB SHADOW E&M-EST. PATIENT-LVL III: CPT | Mod: PBBFAC,,, | Performed by: PODIATRIST

## 2017-07-25 NOTE — PROGRESS NOTES
"Subjective:      Patient ID: Rizwana Block is a 72 y.o. female.    Chief Complaint: Foot Pain ( side of the rt foot,it is doing  slightly better) and Nail Problem (lt great toe nail slight discomfort)    Rizwana is a 72 y.o. female who presents to the clinic for evaluation and treatment of feet. Rizwana has a past medical history of Arrhythmia; Chest pain (5/27/2016); CHF (congestive heart failure); and Hypertension. The patient's chief complaint is long, thick toenails, especially L great toe. Previously saw Dr. Moore who has been trimming her nails routinely. Here for assessment and nail trimming. No other major complaints regarding the feet. Has hx of R ankle "fusion" however still has motion in the ankle. Now has worsening flat foot on the R compared to the left. Inquiring about shoe options.     PCP: Alia Trejo MD    Date Last Seen by PCP:   Chief Complaint   Patient presents with    Foot Pain      side of the rt foot,it is doing  slightly better    Nail Problem     lt great toe nail slight discomfort       Current shoe gear:   flat shoes         Last encounter in this department: Visit date not found    No results found for: HGBA1C    Past Medical History:   Diagnosis Date    Arrhythmia     Chest pain 5/27/2016    3/2016: Began experience chest discomfort.    CHF (congestive heart failure)     Hypertension        Past Surgical History:   Procedure Laterality Date    ANKLE FUSION      right    bladder surgery      with mesh    BLEPHAROPLASTY W/ LASER      HAND SURGERY      benign cyst on left    HYSTERECTOMY      heavy bleeding    PATELLA FRACTURE SURGERY      right    TONSILLECTOMY         Family History   Problem Relation Age of Onset    Heart disease Mother     Early death Mother     Stroke Father     Heart disease Father     Early death Father     Cancer Maternal Aunt      bone    Cancer Maternal Uncle      esophageal    Heart disease Paternal Uncle     Heart disease Paternal " Grandfather        Social History     Social History    Marital status:      Spouse name: N/A    Number of children: N/A    Years of education: N/A     Social History Main Topics    Smoking status: Former Smoker     Packs/day: 0.25     Years: 24.00     Types: Cigarettes     Start date: 1/1/1961     Quit date: 1/1/1985    Smokeless tobacco: Never Used    Alcohol use 1.2 oz/week     2 Glasses of wine per week      Comment: socially    Drug use: No    Sexual activity: Not on file     Other Topics Concern    Not on file     Social History Narrative    No narrative on file       Current Outpatient Prescriptions   Medication Sig Dispense Refill    amiodarone (PACERONE) 200 MG Tab Take 1 tablet (200 mg total) by mouth once daily. 30 tablet 11    amiodarone (PACERONE) 200 MG Tab Take 1 tablet (200 mg total) by mouth once daily. 30 tablet 11    aspirin (ECOTRIN) 81 MG EC tablet Take 81 mg by mouth once daily.      co-enzyme Q-10 30 mg capsule Take 200 mg by mouth once daily.      enalapril (VASOTEC) 5 MG tablet Take one tablet daily. 90 tablet 3    fish oil-omega-3 fatty acids 300-1,000 mg capsule Take 2 g by mouth once daily.      furosemide (LASIX) 40 MG tablet TAKE ONE TABLET BY MOUTH ONCE DAILY AS NEEDED FOR weight gain 30 tablet 11    rosuvastatin (CRESTOR) 5 MG tablet Take 1 tablet (5 mg total) by mouth once daily. 90 tablet 3     No current facility-administered medications for this visit.        Review of patient's allergies indicates:   Allergen Reactions    Dilaudid [hydromorphone (bulk)]      Severe nausea/vomiting    Morphine      Severe nausea/vomiting       Review of Systems   Constitution: Negative for chills and fever.   Cardiovascular: Positive for leg swelling. Negative for chest pain and claudication.   Respiratory: Negative for cough and shortness of breath.    Skin: Positive for dry skin and nail changes.   Musculoskeletal: Positive for arthritis, joint pain and stiffness  (ankle R).   Gastrointestinal: Negative for nausea and vomiting.   Neurological: Negative for numbness and paresthesias.   Psychiatric/Behavioral: Negative for altered mental status.           Objective:      Physical Exam   Constitutional: She is oriented to person, place, and time. She appears well-developed and well-nourished.   HENT:   Head: Normocephalic.   Cardiovascular: Intact distal pulses.    Pulses:       Dorsalis pedis pulses are 2+ on the right side, and 2+ on the left side.        Posterior tibial pulses are 2+ on the right side, and 2+ on the left side.   CRT < 3 sec to tips of toes. No edema noted to b/l LE. No vericosities noted to b/l LEs.      Pulmonary/Chest: No respiratory distress.   Musculoskeletal:   Gastrocnemius equinus noted to b/l ankles with decreased DF noted on exam. MMT 5/5 in DF/PF/Inv/Ev resistance with no reproduction of pain in any direction. Passive range of motion of ankle and pedal joints is painless. Adequate pedal joint ROM.     Hypermobility noted to 1st ray b/l with near complete collapse of medial longitudinal arch b/l (R>L) with forefoot loading. Functional pes planus foot type b/l.     Semi-reducible hammertoe contractures noted to toes 2-4 b/l-asymptomatic.     Mild stiffness noted to R ankle compared to L however still gets 5-7 deg of DF in R ankle.     Palpable bony prominence noted to dorsal and dorsal medial 1st met cuneiform joint, mild, asymptomatic.    Neurological: She is alert and oriented to person, place, and time. She has normal strength. No sensory deficit.   Light touch, proprioception, and sharp/dull sensation are all intact bilaterally.      Skin: Skin is warm, dry and intact. No erythema.   No open lesions, lacerations or wounds noted. Nails are thickened, elongated, discolored yellow/brown with subungual debris and brittleness to R 1-5 and L 1-5. Interdigital spaces clean, dry and intact b/l. No erythema noted to b/l foot. Skin texture normal. Pedal  hair adequate. Skin temperature normal b/l foot.      Psychiatric: She has a normal mood and affect. Her behavior is normal. Judgment and thought content normal.   Vitals reviewed.            Assessment:       Encounter Diagnoses   Name Primary?    Onychomycosis due to dermatophyte Yes    Hammer toe, unspecified laterality     Arthritis of foot          Plan:       Rizwana was seen today for foot pain and nail problem.    Diagnoses and all orders for this visit:    Onychomycosis due to dermatophyte    Hammer toe, unspecified laterality  -     Ambulatory Referral to Rheumatology    Arthritis of foot  -     Ambulatory Referral to Rheumatology      I counseled the patient on her conditions, their implications and medical management.    With patient's permission, nails were aggressively reduced and debrided 1,2,3,4, 5 R and 1,2, 3,4,5 L and filed to their soft tissue attachment mechanically and with electric , removing all offending nail and debris. Patient tolerated this well and no blood was drawn. Patient reports relief following the procedure.     Patient is aware that routine trimming of nails/calluses will not be covered service per insurance and he/she will fall under Proc B if this service is desired. Patient verbalized understanding of this. He will RTC as needed for Proc B or any other pedal complaint.     Discussed application of Boy's vaporub on affected toenails for up to 1 year for improvement in appearance and fungal infection.     Long discussion with patient regarding appropriate, supportive and comfortable shoes. Recommended SAS/Easy Spirit style shoe brands with adequate arch supports to alleviate abnormal pressure and improve stability of foot while walking. Avoid flat shoes and barefoot walking as these will exacerbate or worsen symptoms.     Discussed regular and routine moisturizer to skin of both feet to help improve dry skin. Advised to apply twice daily until resolution of symptoms.  Avoid between toes.     RTC 6-12 weeks for Proc B, sooner PRN

## 2017-08-02 ENCOUNTER — PATIENT MESSAGE (OUTPATIENT)
Dept: ELECTROPHYSIOLOGY | Facility: CLINIC | Age: 72
End: 2017-08-02

## 2017-08-24 ENCOUNTER — OFFICE VISIT (OUTPATIENT)
Dept: PODIATRY | Facility: CLINIC | Age: 72
End: 2017-08-24
Payer: MEDICARE

## 2017-08-24 VITALS
WEIGHT: 179 LBS | SYSTOLIC BLOOD PRESSURE: 119 MMHG | HEIGHT: 68 IN | DIASTOLIC BLOOD PRESSURE: 83 MMHG | HEART RATE: 68 BPM | BODY MASS INDEX: 27.13 KG/M2

## 2017-08-24 DIAGNOSIS — Z41.1 ENCOUNTER FOR COSMETIC PROCEDURE: ICD-10-CM

## 2017-08-24 DIAGNOSIS — B35.1 ONYCHOMYCOSIS DUE TO DERMATOPHYTE: Primary | ICD-10-CM

## 2017-08-24 PROCEDURE — 99499 UNLISTED E&M SERVICE: CPT | Mod: S$GLB,,, | Performed by: PODIATRIST

## 2017-08-24 PROCEDURE — 17999 UNLISTD PX SKN MUC MEMB SUBQ: CPT | Mod: CSM,,, | Performed by: PODIATRIST

## 2017-08-24 PROCEDURE — 99999 PR PBB SHADOW E&M-EST. PATIENT-LVL III: CPT | Mod: PBBFAC,,, | Performed by: PODIATRIST

## 2017-08-24 NOTE — PROGRESS NOTES
Patient presents to the clinic for non-covered routine foot care. Patient is not a high risk foot care patient. Patient understands this is not typically a covered service and patient is aware of responsibility of payment. Pedal pulses are palpable. No know risk factors requiring routine foot care. Nails are elongated and thickened on feet. Diagnosis is onychauxis. Nails were reduced and trimmed bilaterally. Patient tolerated well.

## 2017-09-13 ENCOUNTER — PATIENT MESSAGE (OUTPATIENT)
Dept: ELECTROPHYSIOLOGY | Facility: CLINIC | Age: 72
End: 2017-09-13

## 2017-09-13 DIAGNOSIS — I50.22 CHRONIC SYSTOLIC HEART FAILURE: ICD-10-CM

## 2017-09-13 RX ORDER — ENALAPRIL MALEATE 5 MG/1
TABLET ORAL
Qty: 30 TABLET | Refills: 0 | Status: SHIPPED | OUTPATIENT
Start: 2017-09-13 | End: 2017-09-13 | Stop reason: SDUPTHER

## 2017-09-13 RX ORDER — ENALAPRIL MALEATE 5 MG/1
TABLET ORAL
Qty: 30 TABLET | Refills: 11 | Status: SHIPPED | OUTPATIENT
Start: 2017-09-13 | End: 2018-03-26 | Stop reason: ALTCHOICE

## 2017-09-26 ENCOUNTER — OFFICE VISIT (OUTPATIENT)
Dept: PODIATRY | Facility: CLINIC | Age: 72
End: 2017-09-26
Payer: MEDICARE

## 2017-09-26 VITALS
SYSTOLIC BLOOD PRESSURE: 134 MMHG | BODY MASS INDEX: 27.05 KG/M2 | DIASTOLIC BLOOD PRESSURE: 87 MMHG | HEIGHT: 68 IN | WEIGHT: 178.5 LBS | HEART RATE: 79 BPM

## 2017-09-26 DIAGNOSIS — B35.1 ONYCHOMYCOSIS DUE TO DERMATOPHYTE: Primary | ICD-10-CM

## 2017-09-26 PROCEDURE — 99999 PR PBB SHADOW E&M-EST. PATIENT-LVL II: CPT | Mod: PBBFAC,,, | Performed by: PODIATRIST

## 2017-09-26 PROCEDURE — 17999 UNLISTD PX SKN MUC MEMB SUBQ: CPT | Mod: CSM,,, | Performed by: PODIATRIST

## 2017-09-26 PROCEDURE — 99499 UNLISTED E&M SERVICE: CPT | Mod: CSM,,, | Performed by: PODIATRIST

## 2017-09-26 NOTE — PROGRESS NOTES
Patient presents to the clinic for non-covered routine foot care. Patient is not a high risk foot care patient. Patient understands this is not typically a covered service and patient is aware of responsibility of payment. Pedal pulses are palpable. No know risk factors requiring routine foot care. Nails are elongated and thickened on feet. Diagnosis is onychauxis. Nails were reduced and trimmed bilaterally. Patient tolerated well.     RTC 5 weeks Proc B, sooner PRN

## 2017-10-23 ENCOUNTER — PATIENT MESSAGE (OUTPATIENT)
Dept: ELECTROPHYSIOLOGY | Facility: CLINIC | Age: 72
End: 2017-10-23

## 2017-10-31 ENCOUNTER — OFFICE VISIT (OUTPATIENT)
Dept: PODIATRY | Facility: CLINIC | Age: 72
End: 2017-10-31
Payer: MEDICARE

## 2017-10-31 ENCOUNTER — PATIENT MESSAGE (OUTPATIENT)
Dept: PODIATRY | Facility: CLINIC | Age: 72
End: 2017-10-31

## 2017-10-31 VITALS
HEART RATE: 63 BPM | HEIGHT: 68 IN | BODY MASS INDEX: 26.98 KG/M2 | WEIGHT: 178 LBS | SYSTOLIC BLOOD PRESSURE: 148 MMHG | DIASTOLIC BLOOD PRESSURE: 88 MMHG

## 2017-10-31 DIAGNOSIS — B35.1 ONYCHOMYCOSIS DUE TO DERMATOPHYTE: Primary | ICD-10-CM

## 2017-10-31 DIAGNOSIS — L84 CORN OR CALLUS: ICD-10-CM

## 2017-10-31 PROCEDURE — 17999 UNLISTD PX SKN MUC MEMB SUBQ: CPT | Mod: CSM,,, | Performed by: PODIATRIST

## 2017-10-31 PROCEDURE — 99999 PR PBB SHADOW E&M-EST. PATIENT-LVL III: CPT | Mod: PBBFAC,,, | Performed by: PODIATRIST

## 2017-10-31 PROCEDURE — 99499 UNLISTED E&M SERVICE: CPT | Mod: CSM,,, | Performed by: PODIATRIST

## 2017-10-31 RX ORDER — KETOCONAZOLE 20 MG/G
CREAM TOPICAL
Qty: 15 G | Refills: 3 | Status: SHIPPED | OUTPATIENT
Start: 2017-10-31 | End: 2017-11-14

## 2017-11-06 ENCOUNTER — PATIENT MESSAGE (OUTPATIENT)
Dept: ELECTROPHYSIOLOGY | Facility: CLINIC | Age: 72
End: 2017-11-06

## 2017-11-17 ENCOUNTER — PATIENT MESSAGE (OUTPATIENT)
Dept: PODIATRY | Facility: CLINIC | Age: 72
End: 2017-11-17

## 2017-12-05 ENCOUNTER — OFFICE VISIT (OUTPATIENT)
Dept: PODIATRY | Facility: CLINIC | Age: 72
End: 2017-12-05
Payer: MEDICARE

## 2017-12-05 VITALS
SYSTOLIC BLOOD PRESSURE: 150 MMHG | HEART RATE: 64 BPM | WEIGHT: 178 LBS | BODY MASS INDEX: 26.98 KG/M2 | DIASTOLIC BLOOD PRESSURE: 96 MMHG | HEIGHT: 68 IN

## 2017-12-05 DIAGNOSIS — B35.1 ONYCHOMYCOSIS DUE TO DERMATOPHYTE: Primary | ICD-10-CM

## 2017-12-05 PROCEDURE — 17999 UNLISTD PX SKN MUC MEMB SUBQ: CPT | Mod: CSM,,, | Performed by: PODIATRIST

## 2017-12-05 PROCEDURE — 99999 PR PBB SHADOW E&M-EST. PATIENT-LVL III: CPT | Mod: PBBFAC,,, | Performed by: PODIATRIST

## 2017-12-05 PROCEDURE — 99499 UNLISTED E&M SERVICE: CPT | Mod: CSM,,, | Performed by: PODIATRIST

## 2017-12-05 NOTE — PROGRESS NOTES
Patient presents to the clinic for non-covered routine foot care. Patient is not a high risk foot care patient. Patient understands this is not typically a covered service and patient is aware of responsibility of payment. Pedal pulses are palpable. No know risk factors requiring routine foot care. Nails are elongated and thickened on feet. Diagnosis is onychomycosis. Nails were reduced and trimmed bilaterally. Patient tolerated well.     RTC 5 weeks Proc B, sooner PRN

## 2018-01-19 ENCOUNTER — OFFICE VISIT (OUTPATIENT)
Dept: INTERNAL MEDICINE | Facility: CLINIC | Age: 73
End: 2018-01-19
Payer: MEDICARE

## 2018-01-19 ENCOUNTER — LAB VISIT (OUTPATIENT)
Dept: LAB | Facility: HOSPITAL | Age: 73
End: 2018-01-19
Attending: INTERNAL MEDICINE
Payer: MEDICARE

## 2018-01-19 VITALS
OXYGEN SATURATION: 95 % | HEIGHT: 68 IN | WEIGHT: 179.56 LBS | BODY MASS INDEX: 27.21 KG/M2 | DIASTOLIC BLOOD PRESSURE: 70 MMHG | HEART RATE: 74 BPM | SYSTOLIC BLOOD PRESSURE: 120 MMHG

## 2018-01-19 DIAGNOSIS — Z12.31 SCREENING MAMMOGRAM, ENCOUNTER FOR: ICD-10-CM

## 2018-01-19 DIAGNOSIS — I10 ESSENTIAL HYPERTENSION: ICD-10-CM

## 2018-01-19 DIAGNOSIS — M85.88 OSTEOPENIA OF SPINE: ICD-10-CM

## 2018-01-19 DIAGNOSIS — I49.3 VENTRICULAR PREMATURE BEATS: ICD-10-CM

## 2018-01-19 DIAGNOSIS — Z00.00 ROUTINE GENERAL MEDICAL EXAMINATION AT A HEALTH CARE FACILITY: Primary | ICD-10-CM

## 2018-01-19 DIAGNOSIS — E55.9 VITAMIN D DEFICIENCY DISEASE: ICD-10-CM

## 2018-01-19 DIAGNOSIS — I50.22 CHRONIC SYSTOLIC HEART FAILURE: ICD-10-CM

## 2018-01-19 DIAGNOSIS — E53.8 VITAMIN B12 DEFICIENCY: ICD-10-CM

## 2018-01-19 DIAGNOSIS — Z12.11 SCREENING FOR MALIGNANT NEOPLASM OF COLON: ICD-10-CM

## 2018-01-19 DIAGNOSIS — Z91.89 GYN EXAM FOR HIGH-RISK MEDICARE PATIENT: ICD-10-CM

## 2018-01-19 LAB
25(OH)D3+25(OH)D2 SERPL-MCNC: 23 NG/ML
ALBUMIN SERPL BCP-MCNC: 3.9 G/DL
ALP SERPL-CCNC: 69 U/L
ALT SERPL W/O P-5'-P-CCNC: 13 U/L
ANION GAP SERPL CALC-SCNC: 9 MMOL/L
AST SERPL-CCNC: 17 U/L
BASOPHILS # BLD AUTO: 0.02 K/UL
BASOPHILS NFR BLD: 0.3 %
BILIRUB SERPL-MCNC: 0.3 MG/DL
BUN SERPL-MCNC: 32 MG/DL
CALCIUM SERPL-MCNC: 9.1 MG/DL
CHLORIDE SERPL-SCNC: 103 MMOL/L
CO2 SERPL-SCNC: 28 MMOL/L
CREAT SERPL-MCNC: 0.9 MG/DL
DIFFERENTIAL METHOD: ABNORMAL
EOSINOPHIL # BLD AUTO: 0.1 K/UL
EOSINOPHIL NFR BLD: 1.9 %
ERYTHROCYTE [DISTWIDTH] IN BLOOD BY AUTOMATED COUNT: 14.2 %
EST. GFR  (AFRICAN AMERICAN): >60 ML/MIN/1.73 M^2
EST. GFR  (NON AFRICAN AMERICAN): >60 ML/MIN/1.73 M^2
GLUCOSE SERPL-MCNC: 87 MG/DL
HCT VFR BLD AUTO: 38 %
HGB BLD-MCNC: 12.1 G/DL
LYMPHOCYTES # BLD AUTO: 2.3 K/UL
LYMPHOCYTES NFR BLD: 33.9 %
MCH RBC QN AUTO: 30.3 PG
MCHC RBC AUTO-ENTMCNC: 31.8 G/DL
MCV RBC AUTO: 95 FL
MONOCYTES # BLD AUTO: 0.6 K/UL
MONOCYTES NFR BLD: 8.4 %
NEUTROPHILS # BLD AUTO: 3.7 K/UL
NEUTROPHILS NFR BLD: 55.4 %
PLATELET # BLD AUTO: 292 K/UL
PMV BLD AUTO: 11.2 FL
POTASSIUM SERPL-SCNC: 4.5 MMOL/L
PROT SERPL-MCNC: 6.9 G/DL
RBC # BLD AUTO: 4 M/UL
SODIUM SERPL-SCNC: 140 MMOL/L
TSH SERPL DL<=0.005 MIU/L-ACNC: 1.45 UIU/ML
VIT B12 SERPL-MCNC: 255 PG/ML
WBC # BLD AUTO: 6.76 K/UL

## 2018-01-19 PROCEDURE — 82607 VITAMIN B-12: CPT

## 2018-01-19 PROCEDURE — 99397 PER PM REEVAL EST PAT 65+ YR: CPT | Mod: S$GLB,,, | Performed by: INTERNAL MEDICINE

## 2018-01-19 PROCEDURE — 80053 COMPREHEN METABOLIC PANEL: CPT

## 2018-01-19 PROCEDURE — 84443 ASSAY THYROID STIM HORMONE: CPT

## 2018-01-19 PROCEDURE — 85025 COMPLETE CBC W/AUTO DIFF WBC: CPT

## 2018-01-19 PROCEDURE — 82306 VITAMIN D 25 HYDROXY: CPT

## 2018-01-19 PROCEDURE — 99999 PR PBB SHADOW E&M-EST. PATIENT-LVL III: CPT | Mod: PBBFAC,,, | Performed by: INTERNAL MEDICINE

## 2018-01-19 PROCEDURE — 36415 COLL VENOUS BLD VENIPUNCTURE: CPT

## 2018-01-19 RX ORDER — CALCIUM CARBONATE 600 MG
1200 TABLET ORAL 2 TIMES DAILY WITH MEALS
COMMUNITY
End: 2019-11-07

## 2018-01-19 RX ORDER — CHOLECALCIFEROL (VITAMIN D3) 25 MCG
5000 TABLET ORAL DAILY
COMMUNITY
End: 2018-11-26 | Stop reason: SDUPTHER

## 2018-01-19 NOTE — PROGRESS NOTES
Chief Complaint: [facility]    HPI: this is a 72year old woman who presents to [facility] for her annual exam.  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 She does have skipped beats. She takes lasix 40 mg every other day for fluid retention. She takes enalapril 5 mg daily for HTN. She has a slight cough. No chest pain, shortness of breath.  She has some mild CHF with an EF of 35% on echo 6/2017 and diastolic dysfunction.     Past Medical History:   Diagnosis Date    Arrhythmia     Chest pain 5/27/2016    3/2016: Began experience chest discomfort.    CHF (congestive heart failure)     Hypertension     Osteopenia     Reclast infusion 10/5/2010 and 10/20/2011       Past Surgical History:   Procedure Laterality Date    ANKLE FUSION      right    bladder surgery      with mesh    BLEPHAROPLASTY W/ LASER      HAND SURGERY      benign cyst on left    HYSTERECTOMY      heavy bleeding    PATELLA FRACTURE SURGERY      right    TONSILLECTOMY       Meds and allergies: updated on EPIC    Social History:    Family History:    Review of Systems:  General: No fever, chills, night sweats, weight loss, fatigue  HEENT: No visual changes, hearing loss, sinus congestion, sore throat, post nasal drip. She needs cataract surgery. She sees Dr Lopez.   Resp:  No cough, shortness of breath, dyspnea on exertion  Cardiovascular:  No chest pain, palpitations, orthopnea, PND, edema  GI: No vomiting, constipation, diarrhea, melana, bloody stools, heartburn. Occasional nausea  : No dysuria, hematuria, incontinence  Musculoskeletal: She has some achiness in her joints. She has arthritis of the hands.  She sees orthopedics at [facility]. She fell on 1/16/18 on her buttocks.  She thinks she has bruised a bone in her buttocks. Sitting is uncomfortable. Pain is worse with climbing steps on the left buttocks.   Neuro: No headaches, numbness  Endocrine: No polydipsia, polyuria, hot or  "cold intolerance.  Heme: No anemia, easy bruising.  Skin: No rashes, hairloss.  Psych: No anxiety, depression, or insomnia  Breasts: No abnormalities  GYN: No vaginal dryness, bleeding    Physical exam:  /70   Pulse 74   Ht 5' 8" (1.727 m)   Wt 81.4 kg (179 lb 9 oz)   LMP  (LMP Unknown)   SpO2 95%   BMI 27.30 kg/m²     General: alert, oriented x 3, no apparent distress.  Affect normal  HEENT: Conjunctivae: anicteric, PERRL, EOMI, TM clear, Oralpharynx clear  Neck: supple, no thyroid enlargement, no cervical lymphadenopathy  Resp: effort normal, lungs clear bilaterally  CV: Regular rate and rhythm without murmurs, gallops or rubs, no lower extremity edema, no carotid bruits, 2+ posterior tibial pulses  Abdomen: soft, non-distended, non-tender, bowel sounds present, no hepatosplenomegaly.  Breasts: No abnormalities seen, no nodules palpated, no axillary lymphadenopathy    Assessment/Plan:    Annual exam - discussed diet, exercise and safety issues.  HTN - controlled  arrythmia - controlled  Osteopenia - BMD due 4/2019  MMG 4/17 - ordered  Colonoscoy normal 4/2019 due 4/2019 - [employer]Kit was given to patient on 1/19/2018 1:46 PM   Labs today.      "

## 2018-01-23 ENCOUNTER — OFFICE VISIT (OUTPATIENT)
Dept: PODIATRY | Facility: CLINIC | Age: 73
End: 2018-01-23
Payer: MEDICARE

## 2018-01-23 VITALS
BODY MASS INDEX: 27.43 KG/M2 | DIASTOLIC BLOOD PRESSURE: 82 MMHG | WEIGHT: 181 LBS | HEART RATE: 66 BPM | HEIGHT: 68 IN | SYSTOLIC BLOOD PRESSURE: 132 MMHG

## 2018-01-23 DIAGNOSIS — B35.1 ONYCHOMYCOSIS DUE TO DERMATOPHYTE: Primary | ICD-10-CM

## 2018-01-23 PROCEDURE — 99499 UNLISTED E&M SERVICE: CPT | Mod: S$GLB,,, | Performed by: PODIATRIST

## 2018-01-23 PROCEDURE — 99999 PR PBB SHADOW E&M-EST. PATIENT-LVL III: CPT | Mod: PBBFAC,,, | Performed by: PODIATRIST

## 2018-01-23 PROCEDURE — 17999 UNLISTD PX SKN MUC MEMB SUBQ: CPT | Mod: CSM,S$GLB,, | Performed by: PODIATRIST

## 2018-02-15 ENCOUNTER — TELEPHONE (OUTPATIENT)
Dept: INTERNAL MEDICINE | Facility: CLINIC | Age: 73
End: 2018-02-15

## 2018-02-15 DIAGNOSIS — E53.8 VITAMIN B12 DEFICIENCY: Primary | ICD-10-CM

## 2018-02-15 NOTE — TELEPHONE ENCOUNTER
----- Message from Carolina Figueroa sent at 2/15/2018  3:00 PM CST -----  Contact: self/[phone]  Pt would like to know if b12 shot will help with deficiency.

## 2018-02-16 ENCOUNTER — CLINICAL SUPPORT (OUTPATIENT)
Dept: INTERNAL MEDICINE | Facility: CLINIC | Age: 73
End: 2018-02-16
Payer: MEDICARE

## 2018-02-16 DIAGNOSIS — E53.8 B12 DEFICIENCY: Primary | ICD-10-CM

## 2018-02-16 PROCEDURE — 96372 THER/PROPH/DIAG INJ SC/IM: CPT | Mod: S$GLB,,, | Performed by: INTERNAL MEDICINE

## 2018-02-16 RX ORDER — CYANOCOBALAMIN 1000 UG/ML
1000 INJECTION, SOLUTION INTRAMUSCULAR; SUBCUTANEOUS
Status: COMPLETED | OUTPATIENT
Start: 2018-02-16 | End: 2018-02-16

## 2018-02-16 RX ADMIN — CYANOCOBALAMIN 1000 MCG: 1000 INJECTION, SOLUTION INTRAMUSCULAR; SUBCUTANEOUS at 02:02

## 2018-02-16 NOTE — TELEPHONE ENCOUNTER
Spoke with patient request to come in today for the b12 and request instruction here after to give herself the inj. We could have Dr Gutierrez send a rx to her pharmacy Monday when she returns

## 2018-02-17 RX ORDER — CYANOCOBALAMIN 1000 UG/ML
INJECTION, SOLUTION INTRAMUSCULAR; SUBCUTANEOUS
Qty: 10 ML | Refills: 3 | Status: SHIPPED | OUTPATIENT
Start: 2018-02-17 | End: 2018-11-26 | Stop reason: SDUPTHER

## 2018-02-19 ENCOUNTER — TELEPHONE (OUTPATIENT)
Dept: INTERNAL MEDICINE | Facility: CLINIC | Age: 73
End: 2018-02-19

## 2018-02-20 ENCOUNTER — PATIENT MESSAGE (OUTPATIENT)
Dept: INTERNAL MEDICINE | Facility: CLINIC | Age: 73
End: 2018-02-20

## 2018-02-23 ENCOUNTER — CLINICAL SUPPORT (OUTPATIENT)
Dept: INTERNAL MEDICINE | Facility: CLINIC | Age: 73
End: 2018-02-23
Payer: MEDICARE

## 2018-02-23 DIAGNOSIS — E53.8 VITAMIN B12 DEFICIENCY: Primary | ICD-10-CM

## 2018-02-23 PROCEDURE — 96372 THER/PROPH/DIAG INJ SC/IM: CPT | Mod: S$GLB,,, | Performed by: INTERNAL MEDICINE

## 2018-02-23 RX ORDER — CYANOCOBALAMIN 1000 UG/ML
1000 INJECTION, SOLUTION INTRAMUSCULAR; SUBCUTANEOUS WEEKLY
Status: SHIPPED | OUTPATIENT
Start: 2018-02-23 | End: 2018-03-23

## 2018-02-23 RX ADMIN — CYANOCOBALAMIN 1000 MCG: 1000 INJECTION, SOLUTION INTRAMUSCULAR; SUBCUTANEOUS at 02:02

## 2018-03-01 ENCOUNTER — OFFICE VISIT (OUTPATIENT)
Dept: PODIATRY | Facility: CLINIC | Age: 73
End: 2018-03-01
Payer: MEDICARE

## 2018-03-01 ENCOUNTER — TELEPHONE (OUTPATIENT)
Dept: INTERNAL MEDICINE | Facility: CLINIC | Age: 73
End: 2018-03-01

## 2018-03-01 VITALS
WEIGHT: 179 LBS | DIASTOLIC BLOOD PRESSURE: 80 MMHG | BODY MASS INDEX: 27.13 KG/M2 | HEIGHT: 68 IN | SYSTOLIC BLOOD PRESSURE: 116 MMHG | HEART RATE: 72 BPM

## 2018-03-01 DIAGNOSIS — L84 CORN OR CALLUS: ICD-10-CM

## 2018-03-01 DIAGNOSIS — B35.1 ONYCHOMYCOSIS DUE TO DERMATOPHYTE: Primary | ICD-10-CM

## 2018-03-01 PROCEDURE — 17999 UNLISTD PX SKN MUC MEMB SUBQ: CPT | Mod: CSM,S$GLB,, | Performed by: PODIATRIST

## 2018-03-01 PROCEDURE — 99499 UNLISTED E&M SERVICE: CPT | Mod: S$GLB,,, | Performed by: PODIATRIST

## 2018-03-01 PROCEDURE — 99999 PR PBB SHADOW E&M-EST. PATIENT-LVL II: CPT | Mod: PBBFAC,,, | Performed by: PODIATRIST

## 2018-03-01 RX ORDER — MELOXICAM 7.5 MG/1
TABLET ORAL
COMMUNITY
Start: 2017-11-30 | End: 2018-03-26

## 2018-03-01 RX ORDER — KETOCONAZOLE 20 MG/G
CREAM TOPICAL
COMMUNITY
Start: 2018-01-08 | End: 2018-03-26

## 2018-03-01 RX ORDER — AMOXICILLIN 500 MG/1
CAPSULE ORAL
COMMUNITY
Start: 2017-12-19 | End: 2018-03-26

## 2018-03-01 NOTE — TELEPHONE ENCOUNTER
----- Message from Glory Mcarthur sent at 3/1/2018  8:14 AM CST -----  Contact: self/[phone]  Pt called in regards to can she come and get her b12 shot right now instead of tomorrow.      Please advise

## 2018-03-02 ENCOUNTER — CLINICAL SUPPORT (OUTPATIENT)
Dept: INTERNAL MEDICINE | Facility: CLINIC | Age: 73
End: 2018-03-02
Payer: MEDICARE

## 2018-03-02 ENCOUNTER — TELEPHONE (OUTPATIENT)
Dept: INTERNAL MEDICINE | Facility: CLINIC | Age: 73
End: 2018-03-02

## 2018-03-02 PROCEDURE — 96372 THER/PROPH/DIAG INJ SC/IM: CPT | Mod: S$GLB,,, | Performed by: INTERNAL MEDICINE

## 2018-03-02 RX ADMIN — CYANOCOBALAMIN 1000 MCG: 1000 INJECTION, SOLUTION INTRAMUSCULAR; SUBCUTANEOUS at 11:03

## 2018-03-02 NOTE — TELEPHONE ENCOUNTER
Pt came in for B12 injection today, she would like to know should she be taking iron supplements since she is anemic?    Please advise

## 2018-03-04 NOTE — TELEPHONE ENCOUNTER
Last blood counts were fine. She is not anemic. She can take a multivitamin with iron if she wishes

## 2018-03-05 ENCOUNTER — PATIENT MESSAGE (OUTPATIENT)
Dept: INTERNAL MEDICINE | Facility: CLINIC | Age: 73
End: 2018-03-05

## 2018-03-09 ENCOUNTER — CLINICAL SUPPORT (OUTPATIENT)
Dept: INTERNAL MEDICINE | Facility: CLINIC | Age: 73
End: 2018-03-09
Payer: MEDICARE

## 2018-03-09 PROCEDURE — 96372 THER/PROPH/DIAG INJ SC/IM: CPT | Mod: S$GLB,,, | Performed by: INTERNAL MEDICINE

## 2018-03-09 RX ADMIN — CYANOCOBALAMIN 1000 MCG: 1000 INJECTION, SOLUTION INTRAMUSCULAR; SUBCUTANEOUS at 03:03

## 2018-03-20 ENCOUNTER — TELEPHONE (OUTPATIENT)
Dept: ELECTROPHYSIOLOGY | Facility: CLINIC | Age: 73
End: 2018-03-20

## 2018-03-20 NOTE — TELEPHONE ENCOUNTER
Pt needing cataract surgery on 4/17/18, and requesting to see Dr. Lemons before then to get cardiac clearance. Advised pt that he will be out from 3/26/18 to 4/16/18. Instructed she should seek clearance from General Cardiology. Pt has been seen there before. Given phone number to clinic to call and arrange appt.

## 2018-03-20 NOTE — TELEPHONE ENCOUNTER
----- Message from Aisha Rojas sent at 3/20/2018  3:00 PM CDT -----  Contact: patient  Please call pt at [phone]. Patient would like to know if Dr Araceli Mccurdy will give her a cardiac clearance or does she need to schedule with a general cardiologist?     Thank you

## 2018-03-26 ENCOUNTER — OFFICE VISIT (OUTPATIENT)
Dept: CARDIOLOGY | Facility: CLINIC | Age: 73
End: 2018-03-26
Payer: MEDICARE

## 2018-03-26 ENCOUNTER — HOSPITAL ENCOUNTER (OUTPATIENT)
Dept: CARDIOLOGY | Facility: CLINIC | Age: 73
Discharge: HOME | End: 2018-03-26
Payer: MEDICARE

## 2018-03-26 ENCOUNTER — TELEPHONE (OUTPATIENT)
Dept: CARDIOLOGY | Facility: CLINIC | Age: 73
End: 2018-03-26

## 2018-03-26 VITALS
DIASTOLIC BLOOD PRESSURE: 74 MMHG | HEIGHT: 68 IN | SYSTOLIC BLOOD PRESSURE: 117 MMHG | WEIGHT: 176.13 LBS | HEART RATE: 71 BPM | OXYGEN SATURATION: 94 % | BODY MASS INDEX: 26.69 KG/M2

## 2018-03-26 DIAGNOSIS — I10 ESSENTIAL HYPERTENSION: ICD-10-CM

## 2018-03-26 DIAGNOSIS — R00.2 PALPITATION: Primary | ICD-10-CM

## 2018-03-26 DIAGNOSIS — R00.2 PALPITATION: ICD-10-CM

## 2018-03-26 DIAGNOSIS — I50.22 CHRONIC SYSTOLIC HEART FAILURE: ICD-10-CM

## 2018-03-26 DIAGNOSIS — I34.0 NON-RHEUMATIC MITRAL REGURGITATION: ICD-10-CM

## 2018-03-26 DIAGNOSIS — I42.8 OTHER CARDIOMYOPATHY: ICD-10-CM

## 2018-03-26 DIAGNOSIS — E78.00 PURE HYPERCHOLESTEROLEMIA: ICD-10-CM

## 2018-03-26 DIAGNOSIS — Z01.810 PREOP CARDIOVASCULAR EXAM: Primary | ICD-10-CM

## 2018-03-26 PROCEDURE — 99999 PR PBB SHADOW E&M-EST. PATIENT-LVL IV: CPT | Mod: PBBFAC,,, | Performed by: NURSE PRACTITIONER

## 2018-03-26 PROCEDURE — 3078F DIAST BP <80 MM HG: CPT | Mod: CPTII,S$GLB,, | Performed by: NURSE PRACTITIONER

## 2018-03-26 PROCEDURE — 93000 ELECTROCARDIOGRAM COMPLETE: CPT | Mod: S$GLB,,, | Performed by: INTERNAL MEDICINE

## 2018-03-26 PROCEDURE — 99214 OFFICE O/P EST MOD 30 MIN: CPT | Mod: S$GLB,,, | Performed by: NURSE PRACTITIONER

## 2018-03-26 PROCEDURE — 3074F SYST BP LT 130 MM HG: CPT | Mod: CPTII,S$GLB,, | Performed by: NURSE PRACTITIONER

## 2018-03-26 RX ORDER — CARVEDILOL 3.12 MG/1
3.12 TABLET ORAL 2 TIMES DAILY WITH MEALS
Qty: 60 TABLET | Refills: 11 | Status: SHIPPED | OUTPATIENT
Start: 2018-03-26 | End: 2018-06-04

## 2018-03-26 RX ORDER — MELOXICAM 15 MG/1
15 TABLET ORAL DAILY
COMMUNITY
End: 2018-11-26

## 2018-03-26 NOTE — LETTER
2018    Rizwana Block  [address]  [facility] [address]             [facility] - Cardiology  [address]  [place] [address]  Phone: [phone] Patient: Rizwana Block  : 1945  Date of Last Office Visit: 3/26/2018      Ms. Block has been evaluated for cardiac risk stratification prior to catarcat surgery. Patient has a Revised Cardiac Risk Index (RCRI) score of 1 with a 0.9% risk for major cardiac event.     She can achieve 4 METS.  Pt does not require further cardiac evaluation prior to undergoing cataract surgery.  Pt should remain on beta-blockers throughout the entire parag-procedure time period.  Aspirin therapy does not need to be held but  should be restarted as soon as safely possible if held. The remaining cardiac meds can be held as needed but should also be restarted after the procedure. These recommendations follow the most current Guideline on Perioperative Cardiovascular Evaluation and Management of Patients Undergoing Noncardiac Surgery released by the ACC/AHA. (JACC 2014.07.944).      If you have any questions regarding the above, please contact my office at [phone].    Sincerely,        INES Howard, NP-C  Consult Cardiology

## 2018-03-26 NOTE — TELEPHONE ENCOUNTER
----- Message from Eli Kapadia sent at 3/26/2018  9:00 AM CDT -----  Regarding: EKG ORDER  Please put in EKG order, thanks. Eli [phone]

## 2018-03-26 NOTE — PATIENT INSTRUCTIONS
No additional cardiac testing required prior to cataract procedure.  Stop enalapril. Start 1/2 tablet entresto 24-26 tomorrow (Tuesday) night.   Continue 1/2 tablet twice daily for 2 weeks. Then increase to full tablet twice daily.  Start carvedilol 3.125mg twice daily.  Blood test in 2 weeks.  Follow up with Dr. Mabry in 10-12 weeks.

## 2018-03-26 NOTE — PROGRESS NOTES
Ms. Block is a former patient of Dr. Go/Dr. Rubio and was last seen in [facility] 6/20/2016.      Subjective:   Patient ID:  Rizwana Block is a 72 y.o. female who presents for follow-up of Pre-op Exam (cataract surgery)  .   HPI:    Ms. Block is a 71yo female with a history of frequent PVCs (s/p RFA 9/27/2016), cardiomyopathy (EF 35%), HFrEF, mild MR, and HTN here for cardiac risk stratification prior to cataract surgery.   She reports feeling generally fatigued but denies CP or SOB. She continues to feel her PVCs especially at night but they are not as frequent as before. She says that she has nightly PND but says this is chronic for her.  Ms. Block denies dizziness, syncope, leg edema, claudication, orthopnea. She works out with a  twice a week, walking on the treadmill for about 20 minutes at a time. She is also up and down the stairs frequently working at her job and does not need to stop.     She is taking ASA 81mg, amiodarone 200mg, enalapril 5mg daily, and lasix 40mg PRN (takes about every other day). LDL is 130 on 7/3/2017. BPs are controlled. Creatinine is 0.9. K is 4.5. Hepatic transaminases are within normal limits. Her BB was stopped secondary to bradycardia in 2016.     Recent Cardiac Tests:    2D Echo (6/5/2017):  CONCLUSIONS     1 - Moderately depressed left ventricular systolic function (EF 35-40%).     2 - Impaired LV relaxation, normal LAP (grade 1 diastolic dysfunction).     3 - Normal right ventricular systolic function .     4 - The estimated PA systolic pressure is 19 mmHg.     5 - Trivial to mild mitral regurgitation.     6 - Mild left atrial enlargement.     Exercise Stress Echo (1/6/2017):  CONCLUSIONS     1 - Upper limit of normal left ventricular enlargement.     2 - Mildly depressed left ventricular systolic function (EF 45-50%).     3 - Normal left ventricular diastolic function.     4 - Normal right ventricular systolic function .   Positive stress echocardiographic  study demonstrating an ischemic response involving the mid anteroseptum, mid anterolateral wall. Non-specific finding demonstrating failure to augment involving the basal anteroseptum, mid inferolateral wall, basal   inferolateral wall, apical lateral wall, anterior wall, basal inferior wall which may be associated with ischemia; clinical correlation required.   The specificity of stress echo may be reduced by resting regional wall motion abnormalities.Although the study suggests CAD, it should be noted that no significant CAD was reported on recent coronary angiogram.    Current Outpatient Prescriptions   Medication Sig    amiodarone (PACERONE) 200 MG Tab Take 1 tablet (200 mg total) by mouth once daily.    aspirin (ECOTRIN) 81 MG EC tablet Take 81 mg by mouth once daily.    calcium carbonate (OS-HENNA) 600 mg calcium (1,500 mg) Tab Take 1,200 mg by mouth 2 (two) times daily with meals.     co-enzyme Q-10 30 mg capsule Take 10 mg by mouth once daily.     cyanocobalamin (VITAMIN B-12) 1,000 mcg/mL injection 1 ml IM weekly for 4 weeks then monthly.  Dispense #10 25 gague 1/2 inch needles and #10 1 ml syringes    enalapril (VASOTEC) 5 MG tablet Take one tablet daily.    fish oil-omega-3 fatty acids 300-1,000 mg capsule Take 2 g by mouth 2 (two) times daily.     furosemide (LASIX) 40 MG tablet TAKE ONE TABLET BY MOUTH ONCE DAILY AS NEEDED FOR weight gain    meloxicam (MOBIC) 15 MG tablet Take 15 mg by mouth once daily.    MULTIVITAMIN WITH MINERALS (HAIR,SKIN AND NAILS ORAL) Take by mouth once daily.    VIT A/VIT C/VIT E/ZINC/COPPER (PRESERVISION AREDS ORAL) Take by mouth 2 (two) times daily.    vitamin D 1000 units Tab Take 5,000 Units by mouth once daily.      No current facility-administered medications for this visit.      Review of Systems   Constitution: Negative for malaise/fatigue.   Eyes: Negative for blurred vision.   Cardiovascular: Positive for palpitations and paroxysmal nocturnal dyspnea.  "Negative for chest pain, claudication, dyspnea on exertion, irregular heartbeat, leg swelling, orthopnea and syncope.   Respiratory: Positive for cough. Negative for shortness of breath.    Hematologic/Lymphatic: Negative for bleeding problem.   Skin: Negative for rash.   Musculoskeletal: Negative for myalgias.   Gastrointestinal: Negative for abdominal pain, constipation, diarrhea and nausea.   Genitourinary: Negative for hematuria.   Neurological: Negative for headaches, loss of balance and numbness.   Psychiatric/Behavioral: Negative for altered mental status.   Allergic/Immunologic: Negative for persistent infections.     Objective:     Right Arm BP - Sittin/70 (18 0905)  Left Arm BP - Sittin/74 (18)    /74 (BP Location: Left arm, Patient Position: Sitting, BP Method: X-Large (Automatic))   Pulse 71   Ht 5' 7.5" (1.715 m)   Wt 79.9 kg (176 lb 2.4 oz)   LMP  (LMP Unknown)   SpO2 (!) 94%   BMI 27.18 kg/m²     Physical Exam   Constitutional: She is oriented to person, place, and time. She appears well-developed and well-nourished.   HENT:   Head: Normocephalic.   Nose: Nose normal.   Eyes: Pupils are equal, round, and reactive to light.   Neck: No JVD present. Carotid bruit is not present.   Cardiovascular: Normal rate, regular rhythm, S1 normal and S2 normal.  Exam reveals no gallop.    No murmur heard.  Pulses:       Carotid pulses are 2+ on the right side, and 2+ on the left side.       Radial pulses are 2+ on the right side, and 2+ on the left side.        Dorsalis pedis pulses are 2+ on the right side, and 2+ on the left side.   Pulmonary/Chest: Breath sounds normal. No respiratory distress.   Abdominal: Soft. Bowel sounds are normal. She exhibits no distension. There is no tenderness.   Musculoskeletal: Normal range of motion. She exhibits no edema.   Neurological: She is alert and oriented to person, place, and time.   Skin: Skin is warm and dry. No erythema. "   Psychiatric: She has a normal mood and affect. Her speech is normal and behavior is normal.   Nursing note and vitals reviewed.    Lab Results   Component Value Date     01/19/2018    K 4.5 01/19/2018    MG 2.1 06/02/2016     01/19/2018    CO2 28 01/19/2018    BUN 32 (H) 01/19/2018    CREATININE 0.9 01/19/2018    GLU 87 01/19/2018    AST 17 01/19/2018    ALT 13 01/19/2018    ALBUMIN 3.9 01/19/2018    PROT 6.9 01/19/2018    BILITOT 0.3 01/19/2018    WBC 6.76 01/19/2018    HGB 12.1 01/19/2018    HCT 38.0 01/19/2018    MCV 95 01/19/2018     01/19/2018    TSH 1.454 01/19/2018    CHOL 221 (H) 07/03/2017    HDL 75 07/03/2017    LDLCALC 130.8 07/03/2017    TRIG 76 07/03/2017         Recent Labs  Lab 06/01/16  0142 09/16/16  1344   INR 1.0 0.9      Test(s) Reviewed  I have reviewed the following in detail:  [] Stress test   [] Angiography   [] Echocardiogram   [x] Labs   [x] Other:  EKG       Assessment:         1. Preop cardiovascular exam. Ms. Block has been evaluated for cardiac risk stratification prior to catarcat surgery. Patient has a Revised Cardiac Risk Index (RCRI) score of 1 with a 0.9% risk for major cardiac event. EKG shows some new T wave inversion but LHC in 6/2016 showed normal coronary arteries and patient has no symptoms.   She can achieve 4 METS.  Pt does not require further cardiac evaluation prior to undergoing cataract surgery.  Pt should remain on beta-blockers throughout the entire parag-procedure time period.  Aspirin therapy does not need to be held but  should be restarted as soon as safely possible if held. The remaining cardiac meds can be held as needed but should also be restarted after the procedure. These recommendations follow the most current Guideline on Perioperative Cardiovascular Evaluation and Management of Patients Undergoing Noncardiac Surgery released by the ACC/AHA. (JACC 2014.07.944).        2. Chronic systolic heart failure. NYHA I-II. Patient currently  compensated. Will switch lisinopril to entresto and add carvedilol for more optimal management of cardiomyopathy.      3. Other cardiomyopathy. EF 35-40%. See above.      4. Essential hypertension. Controlled.      5. Pure hypercholesterolemia. .8. No CAD.      6. Non-rheumatic mitral regurgitation. Trivial-mild per echo.      Plan:     Rizwana was seen today for pre-op exam.    Diagnoses and all orders for this visit:    Preop cardiovascular exam    Chronic systolic heart failure  -     sacubitril-valsartan (ENTRESTO) 24-26 mg per tablet; Take 1 tablet by mouth 2 (two) times daily.  -     carvedilol (COREG) 3.125 MG tablet; Take 1 tablet (3.125 mg total) by mouth 2 (two) times daily with meals.    Other cardiomyopathy  -     sacubitril-valsartan (ENTRESTO) 24-26 mg per tablet; Take 1 tablet by mouth 2 (two) times daily.  -     carvedilol (COREG) 3.125 MG tablet; Take 1 tablet (3.125 mg total) by mouth 2 (two) times daily with meals.  -     Basic metabolic panel; Future; Expected date: 04/09/2018    Essential hypertension    Pure hypercholesterolemia    Non-rheumatic mitral regurgitation    Palpitation  -     SCHEDULED EKG 12-LEAD (to Muse)    No additional cardiac testing required prior to cataract procedure.  Stop enalapril. Start 1/2 tablet entresto 24-26 tomorrow (Tuesday) night.   Continue 1/2 tablet twice daily for 2 weeks. Then increase to full tablet twice daily.  Start carvedilol 3.125mg twice daily.  Blood test in 2 weeks.  Follow up with Dr. Mabry in 10-12 weeks.     Follow-up in about 10 weeks (around 6/4/2018).    ------------------------------------------------------------------    INES Howard, NP-C  Consult Cardiology

## 2018-03-27 ENCOUNTER — PATIENT MESSAGE (OUTPATIENT)
Dept: CARDIOLOGY | Facility: CLINIC | Age: 73
End: 2018-03-27

## 2018-03-29 ENCOUNTER — TELEPHONE (OUTPATIENT)
Dept: PHARMACY | Facility: CLINIC | Age: 73
End: 2018-03-29

## 2018-03-29 NOTE — TELEPHONE ENCOUNTER
Good Afternoon.    The prior authorization for Mrs. Block's Entresto prescription has been approved through 3/29/2020.    The pharmacy has been notified and will inform the patient.  If there are any additional questions please contact the pharmacy at, [phone].    Thanks.    Abida Hester CpT.   Patient Care Advocate  [facility]  [email or web address]

## 2018-04-05 ENCOUNTER — OFFICE VISIT (OUTPATIENT)
Dept: PODIATRY | Facility: CLINIC | Age: 73
End: 2018-04-05
Payer: MEDICARE

## 2018-04-05 VITALS
WEIGHT: 176 LBS | DIASTOLIC BLOOD PRESSURE: 77 MMHG | SYSTOLIC BLOOD PRESSURE: 114 MMHG | HEIGHT: 68 IN | HEART RATE: 66 BPM | BODY MASS INDEX: 26.67 KG/M2

## 2018-04-05 DIAGNOSIS — B35.1 ONYCHOMYCOSIS DUE TO DERMATOPHYTE: Primary | ICD-10-CM

## 2018-04-05 PROCEDURE — 17999 UNLISTD PX SKN MUC MEMB SUBQ: CPT | Mod: CSM,S$GLB,, | Performed by: PODIATRIST

## 2018-04-05 PROCEDURE — 99499 UNLISTED E&M SERVICE: CPT | Mod: S$GLB,,, | Performed by: PODIATRIST

## 2018-04-05 PROCEDURE — 99999 PR PBB SHADOW E&M-EST. PATIENT-LVL III: CPT | Mod: PBBFAC,,, | Performed by: PODIATRIST

## 2018-04-09 ENCOUNTER — LAB VISIT (OUTPATIENT)
Dept: LAB | Facility: HOSPITAL | Age: 73
End: 2018-04-09
Payer: MEDICARE

## 2018-04-09 DIAGNOSIS — I42.8 OTHER CARDIOMYOPATHY: ICD-10-CM

## 2018-04-09 LAB
ANION GAP SERPL CALC-SCNC: 4 MMOL/L
BUN SERPL-MCNC: 26 MG/DL
CALCIUM SERPL-MCNC: 9.4 MG/DL
CHLORIDE SERPL-SCNC: 104 MMOL/L
CO2 SERPL-SCNC: 34 MMOL/L
CREAT SERPL-MCNC: 0.9 MG/DL
EST. GFR  (AFRICAN AMERICAN): >60 ML/MIN/1.73 M^2
EST. GFR  (NON AFRICAN AMERICAN): >60 ML/MIN/1.73 M^2
GLUCOSE SERPL-MCNC: 102 MG/DL
POTASSIUM SERPL-SCNC: 4.4 MMOL/L
SODIUM SERPL-SCNC: 142 MMOL/L

## 2018-04-09 PROCEDURE — 36415 COLL VENOUS BLD VENIPUNCTURE: CPT

## 2018-04-09 PROCEDURE — 80048 BASIC METABOLIC PNL TOTAL CA: CPT

## 2018-04-13 ENCOUNTER — CLINICAL SUPPORT (OUTPATIENT)
Dept: INTERNAL MEDICINE | Facility: CLINIC | Age: 73
End: 2018-04-13
Payer: MEDICARE

## 2018-04-13 DIAGNOSIS — E53.8 B12 DEFICIENCY: Primary | ICD-10-CM

## 2018-04-13 PROCEDURE — 96372 THER/PROPH/DIAG INJ SC/IM: CPT | Mod: S$GLB,,, | Performed by: INTERNAL MEDICINE

## 2018-04-13 RX ORDER — CYANOCOBALAMIN 1000 UG/ML
1000 INJECTION, SOLUTION INTRAMUSCULAR; SUBCUTANEOUS
Status: SHIPPED | OUTPATIENT
Start: 2018-04-13

## 2018-04-13 RX ADMIN — CYANOCOBALAMIN 1000 MCG: 1000 INJECTION, SOLUTION INTRAMUSCULAR at 02:04

## 2018-04-23 ENCOUNTER — LAB VISIT (OUTPATIENT)
Dept: LAB | Facility: HOSPITAL | Age: 73
End: 2018-04-23
Attending: INTERNAL MEDICINE
Payer: MEDICARE

## 2018-04-23 DIAGNOSIS — Z12.11 SCREENING FOR MALIGNANT NEOPLASM OF COLON: ICD-10-CM

## 2018-04-23 LAB — HEMOCCULT STL QL IA: NEGATIVE

## 2018-04-23 PROCEDURE — 82274 ASSAY TEST FOR BLOOD FECAL: CPT

## 2018-04-24 ENCOUNTER — HOSPITAL ENCOUNTER (OUTPATIENT)
Dept: RADIOLOGY | Facility: HOSPITAL | Age: 73
Discharge: HOME | End: 2018-04-24
Attending: INTERNAL MEDICINE
Payer: MEDICARE

## 2018-04-24 VITALS — WEIGHT: 176 LBS | BODY MASS INDEX: 26.67 KG/M2 | HEIGHT: 68 IN

## 2018-04-24 DIAGNOSIS — Z12.31 SCREENING MAMMOGRAM, ENCOUNTER FOR: ICD-10-CM

## 2018-04-24 PROCEDURE — 77067 SCR MAMMO BI INCL CAD: CPT | Mod: 26,,, | Performed by: RADIOLOGY

## 2018-04-24 PROCEDURE — 77063 BREAST TOMOSYNTHESIS BI: CPT | Mod: 26,,, | Performed by: RADIOLOGY

## 2018-04-24 PROCEDURE — 77067 SCR MAMMO BI INCL CAD: CPT | Mod: TC

## 2018-04-24 RX ORDER — FUROSEMIDE 40 MG/1
TABLET ORAL
Qty: 30 TABLET | Refills: 11 | Status: SHIPPED | OUTPATIENT
Start: 2018-04-24 | End: 2019-04-29 | Stop reason: SDUPTHER

## 2018-05-10 ENCOUNTER — OFFICE VISIT (OUTPATIENT)
Dept: PODIATRY | Facility: CLINIC | Age: 73
End: 2018-05-10
Payer: MEDICARE

## 2018-05-10 VITALS
BODY MASS INDEX: 26.67 KG/M2 | HEART RATE: 67 BPM | SYSTOLIC BLOOD PRESSURE: 129 MMHG | DIASTOLIC BLOOD PRESSURE: 85 MMHG | HEIGHT: 68 IN | WEIGHT: 176 LBS

## 2018-05-10 DIAGNOSIS — L84 CORN OR CALLUS: ICD-10-CM

## 2018-05-10 DIAGNOSIS — B35.1 ONYCHOMYCOSIS DUE TO DERMATOPHYTE: Primary | ICD-10-CM

## 2018-05-10 PROCEDURE — 17999 UNLISTD PX SKN MUC MEMB SUBQ: CPT | Mod: CSM,S$GLB,, | Performed by: PODIATRIST

## 2018-05-10 PROCEDURE — 99999 PR PBB SHADOW E&M-EST. PATIENT-LVL III: CPT | Mod: PBBFAC,,, | Performed by: PODIATRIST

## 2018-05-10 PROCEDURE — 99499 UNLISTED E&M SERVICE: CPT | Mod: S$GLB,,, | Performed by: PODIATRIST

## 2018-05-11 ENCOUNTER — CLINICAL SUPPORT (OUTPATIENT)
Dept: INTERNAL MEDICINE | Facility: CLINIC | Age: 73
End: 2018-05-11
Payer: MEDICARE

## 2018-05-11 ENCOUNTER — TELEPHONE (OUTPATIENT)
Dept: INTERNAL MEDICINE | Facility: CLINIC | Age: 73
End: 2018-05-11

## 2018-05-11 DIAGNOSIS — M79.643 PAIN OF HAND, UNSPECIFIED LATERALITY: Primary | ICD-10-CM

## 2018-05-11 PROCEDURE — 96372 THER/PROPH/DIAG INJ SC/IM: CPT | Mod: S$GLB,,, | Performed by: INTERNAL MEDICINE

## 2018-05-11 RX ADMIN — CYANOCOBALAMIN 1000 MCG: 1000 INJECTION, SOLUTION INTRAMUSCULAR at 02:05

## 2018-05-11 NOTE — TELEPHONE ENCOUNTER
Pt having thumb pain, she would like to know who she can see for this. PCP or Dr. Javier. Please advise

## 2018-05-14 NOTE — TELEPHONE ENCOUNTER
She should see the hand clinic in orthopedics at [facility]. Referral placed. Will need xray prior

## 2018-05-25 ENCOUNTER — OFFICE VISIT (OUTPATIENT)
Dept: ORTHOPEDICS | Facility: CLINIC | Age: 73
End: 2018-05-25
Payer: MEDICARE

## 2018-05-25 ENCOUNTER — HOSPITAL ENCOUNTER (OUTPATIENT)
Dept: RADIOLOGY | Facility: OTHER | Age: 73
Discharge: HOME | End: 2018-05-25
Attending: INTERNAL MEDICINE
Payer: MEDICARE

## 2018-05-25 VITALS
RESPIRATION RATE: 18 BRPM | BODY MASS INDEX: 26.67 KG/M2 | HEIGHT: 68 IN | HEART RATE: 60 BPM | SYSTOLIC BLOOD PRESSURE: 161 MMHG | WEIGHT: 176 LBS | DIASTOLIC BLOOD PRESSURE: 93 MMHG

## 2018-05-25 DIAGNOSIS — G56.01 ACUTE CARPAL TUNNEL SYNDROME OF RIGHT WRIST: Primary | ICD-10-CM

## 2018-05-25 DIAGNOSIS — M65.311 TRIGGER THUMB OF RIGHT HAND: ICD-10-CM

## 2018-05-25 DIAGNOSIS — M79.643 PAIN OF HAND, UNSPECIFIED LATERALITY: ICD-10-CM

## 2018-05-25 DIAGNOSIS — G56.02 LEFT CARPAL TUNNEL SYNDROME: ICD-10-CM

## 2018-05-25 PROCEDURE — 99999 PR PBB SHADOW E&M-EST. PATIENT-LVL III: CPT | Mod: PBBFAC,,, | Performed by: PLASTIC SURGERY

## 2018-05-25 PROCEDURE — 3077F SYST BP >= 140 MM HG: CPT | Mod: CPTII,S$GLB,, | Performed by: PLASTIC SURGERY

## 2018-05-25 PROCEDURE — 20526 THER INJECTION CARP TUNNEL: CPT | Mod: 59,RT,S$GLB, | Performed by: PLASTIC SURGERY

## 2018-05-25 PROCEDURE — 73130 X-RAY EXAM OF HAND: CPT | Mod: 26,50,, | Performed by: RADIOLOGY

## 2018-05-25 PROCEDURE — 73130 X-RAY EXAM OF HAND: CPT | Mod: 50,TC,FY

## 2018-05-25 PROCEDURE — 20550 NJX 1 TENDON SHEATH/LIGAMENT: CPT | Mod: F5,S$GLB,, | Performed by: PLASTIC SURGERY

## 2018-05-25 PROCEDURE — 3080F DIAST BP >= 90 MM HG: CPT | Mod: CPTII,S$GLB,, | Performed by: PLASTIC SURGERY

## 2018-05-25 PROCEDURE — 99203 OFFICE O/P NEW LOW 30 MIN: CPT | Mod: 25,S$GLB,, | Performed by: PLASTIC SURGERY

## 2018-05-25 RX ORDER — LIDOCAINE HYDROCHLORIDE 10 MG/ML
1 INJECTION, SOLUTION EPIDURAL; INFILTRATION; INTRACAUDAL; PERINEURAL
Status: COMPLETED | OUTPATIENT
Start: 2018-05-25 | End: 2018-05-25

## 2018-05-25 RX ORDER — TRIAMCINOLONE ACETONIDE 40 MG/ML
40 INJECTION, SUSPENSION INTRA-ARTICULAR; INTRAMUSCULAR
Status: COMPLETED | OUTPATIENT
Start: 2018-05-25 | End: 2018-05-25

## 2018-05-25 RX ADMIN — TRIAMCINOLONE ACETONIDE 40 MG: 40 INJECTION, SUSPENSION INTRA-ARTICULAR; INTRAMUSCULAR at 03:05

## 2018-05-25 RX ADMIN — LIDOCAINE HYDROCHLORIDE 10 MG: 10 INJECTION, SOLUTION EPIDURAL; INFILTRATION; INTRACAUDAL; PERINEURAL at 03:05

## 2018-05-25 NOTE — PROGRESS NOTES
HISTORY OF PRESENT ILLNESS:  Ms. Block is a 72-year-old right-hand dominant   female, who presents today with a 4-week history of right hand pain.  The   patient complains of occasional pain throughout the thumb and fingers within the   median distribution.  She also complains of pain at the tip of the small   finger.  She states that this has slowly begun to occur over the last several   weeks.  She also has nocturnal symptoms and occasional numbness and tingling.    She denies any recent history of trauma and she has no other complaints.    Past Medical History:   Diagnosis Date    Arrhythmia     Chest pain 5/27/2016    3/2016: Began experience chest discomfort.    CHF (congestive heart failure)     Hypertension     Osteopenia     Reclast infusion 10/5/2010 and 10/20/2011       Past Surgical History:   Procedure Laterality Date    ANKLE FUSION      right    bladder surgery      with mesh    BLEPHAROPLASTY W/ LASER      HAND SURGERY      benign cyst on left    HYSTERECTOMY      heavy bleeding    PATELLA FRACTURE SURGERY      right- plate in tibial plateau    TONSILLECTOMY         Social History     Social History    Marital status:      Spouse name: N/A    Number of children: N/A    Years of education: N/A     Occupational History    Not on file.     Social History Main Topics    Smoking status: Former Smoker     Packs/day: 0.25     Years: 24.00     Types: Cigarettes     Start date: 1/1/1961     Quit date: 1/1/1985    Smokeless tobacco: Never Used    Alcohol use 3.0 oz/week     5 Glasses of wine per week      Comment: socially    Drug use: No    Sexual activity: Not on file     Other Topics Concern    Not on file     Social History Narrative    . 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 [employer] . She is an emigrant from [place].       Current Outpatient Prescriptions on File Prior to Visit   Medication Sig Dispense Refill    amiodarone (PACERONE) 200 MG Tab Take 1 tablet (200 mg  total) by mouth once daily. 30 tablet 11    aspirin (ECOTRIN) 81 MG EC tablet Take 81 mg by mouth once daily.      calcium carbonate (OS-HENNA) 600 mg calcium (1,500 mg) Tab Take 1,200 mg by mouth 2 (two) times daily with meals.       carvedilol (COREG) 3.125 MG tablet Take 1 tablet (3.125 mg total) by mouth 2 (two) times daily with meals. 60 tablet 11    co-enzyme Q-10 30 mg capsule Take 10 mg by mouth once daily.       cyanocobalamin (VITAMIN B-12) 1,000 mcg/mL injection 1 ml IM weekly for 4 weeks then monthly.  Dispense #10 25 gague 1/2 inch needles and #10 1 ml syringes 10 mL 3    fish oil-omega-3 fatty acids 300-1,000 mg capsule Take 2 g by mouth 2 (two) times daily.       furosemide (LASIX) 40 MG tablet TAKE ONE TABLET BY MOUTH ONCE DAILY AS NEEDED FOR weight gain 30 tablet 11    meloxicam (MOBIC) 15 MG tablet Take 15 mg by mouth once daily.      MULTIVITAMIN WITH MINERALS (HAIR,SKIN AND NAILS ORAL) Take by mouth once daily.      sacubitril-valsartan (ENTRESTO) 24-26 mg per tablet Take 1 tablet by mouth 2 (two) times daily. 60 tablet 2    VIT A/VIT C/VIT E/ZINC/COPPER (PRESERVISION AREDS ORAL) Take by mouth 2 (two) times daily.      vitamin D 1000 units Tab Take 5,000 Units by mouth once daily.        Current Facility-Administered Medications on File Prior to Visit   Medication Dose Route Frequency Provider Last Rate Last Dose    cyanocobalamin injection 1,000 mcg  1,000 mcg Intramuscular Q30 Days Sri Gutierrez MD   1,000 mcg at 05/11/18 1401       Review of patient's allergies indicates:   Allergen Reactions    Dilaudid [hydromorphone (bulk)]      Severe nausea/vomiting    Morphine      Severe nausea/vomiting       Review of Systems:  Constitutional: no fever or chills  ENT: no nasal congestion or sore throat  Respiratory: no cough or shortness of breath  Cardiovascular: no chest pain or palpitations  Gastrointestinal: no nausea or vomiting  Genitourinary: no hematuria or  "dysuria  Integument/Breast: no rash or pruritis  Hematologic/Lymphatic: no easy bruising or lymphadenopathy  Musculoskeletal: see HPI  Neurological: no seizures or tremors  Behavioral/Psych: no auditory or visual hallucinations      PHYSICAL EXAM    Vitals:    05/25/18 1329   BP: (!) 161/93   Pulse: 60   Resp: 18   Weight: 79.8 kg (176 lb)   Height: 5' 8" (1.727 m)   PainSc:   3   PainLoc: Hand         PHYSICAL EXAMINATION:    GENERAL:  No acute distress, alert and oriented x3, cooperative, well nourished.  LUNGS:  Nonlabored on room air.  CARDIOVASCULAR:  Distal pulses intact.  Good capillary refill.  No clubbing,   cyanosis or edema.  MUSCULOSKELETAL:  Right upper extremity, positive Tinel's and Durkan sign over   the carpal tunnel.  She has tenderness over the A1 pulley at the base of the   thumb.  No active triggering.  There are mild deformities of the MCP and IP   joints of the right thumb as well as the DIP joint of the small finger.  She is   able to make a composite fist with full flexion, extension, supination and   pronation of the wrist and normal sensation in the median, radial and ulnar   distributions.  Left upper extremity, positive Tinel's and Durkan sign over the   carpal tunnel.  Full active range of motion of all digits at all joints.  She is   neurovascularly intact to median, radial and ulnar distributions.    RADIOLOGY IMPRESSION:    EXAMINATION:  XR HAND COMPLETE 3 VIEWS BILATERAL    CLINICAL HISTORY:  . Pain in unspecified hand    TECHNIQUE:  PA, lateral, and oblique views of both hands were performed.    COMPARISON:  None    FINDINGS:  No acute fractures.  No radiopaque foreign bodies.  Scattered interphalangeal osteoarthritis.  This is most notable at the 1st IP and 3rd, 4th, and 5th DIP joints on the right and 2nd and 5th DIP joints on the left.   Impression       No acute fracture.  Interphalangeal joint osteoarthritis.         PROCEDURE:  1. I have explained the risks, benefits, and " alternatives of the procedure in detail.  The patient voices understanding and all questions have been answered.  The patient agrees to proceed as planned. After a sterile prep of the skin the right carpal tunnel is injected from the volar approach using a 25 gauge needle with a combination of 1cc 1% plain xylocaine and 40 mg of Kenalog.  The patient is cautioned and immediate relief of pain is secondary to the local anesthetic and will be temporary.  After the anesthetic wears off there may be a increase in pain that may last for a few hours or a few days and they should use ice to help alleviate this flair up of pain.     2.  PROCEDURE:  I have explained the risks, benefits, and alternatives of the procedure in detail.  The patient voices understanding and all questions have been answered. So after I performed a sterile prep of the skin, the level of there A-1 pulley of the right thumb is injected using a 25 gauge needle from the volar approach with a  combination of 1cc 1% plain Lidocaine and 20 mg of Kenalog.  The patient is cautioned and immediate relief of pain is secondary to the local anesthetic and will be temporary.  After the anesthetic wears off there may be a increase in pain that may last for a few hours or a few days and they should use ice to help alleviate this flair up of pain.         ASSESSMENT:  1.  Right hand pain.  2.  Right carpal tunnel syndrome.  3.  Right trigger thumb.  4.  Left carpal tunnel syndrome.    PLAN:  I discussed the pathophysiology, progression and treatment of carpal   tunnel and trigger digits with the patient in detail.  We discussed conservative   treatment options including a corticosteroid injection, which she received   today in both the right carpal tunnel and right trigger thumb.  We further   discussed obtaining an EMG to assess the severity and chronicity of her carpal   tunnel.  I discussed seeing her back in several weeks after she obtains the EMG   to discuss the  results.  All questions and concerns were addressed prior to   discharge.      [facility]  dd: 05/25/2018 16:05:59 (CDT)  td: 05/26/2018 03:30:34 (CDT)  Doc ID   #[id number]  Job ID #[id number]    CC:       Dictation Confirmation Code:  [id number]  Morgan Lopez Jr. MD  05/25/2018  3:05 PM

## 2018-05-25 NOTE — LETTER
May 25, 2018      Sri Gutierrez MD  [address]  [facility] [address]           [facility]  [address], [address]  [facility] [address]  Phone: [phone]          Patient: Rizwana Block   MR Number: [id number]   YOB: 1945   Date of Visit: 5/25/2018       Dear Dr. Sri Gutierrez:    Thank you for referring Rizwana Block to me for evaluation. Attached you will find relevant portions of my assessment and plan of care.    If you have questions, please do not hesitate to call me. I look forward to following Rizwana Block along with you.    Sincerely,    Morgan Lopez Jr., MD    Enclosure  CC:  No Recipients    If you would like to receive this communication electronically, please contact [email or web address] or [phone] to request more information on [employer] access.    For providers and/or their staff who would like to refer a patient to [facility], please contact us through our one-stop-shop provider referral line, [facility], at [phone].    If you feel you have received this communication in error or would no longer like to receive these types of communications, please e-mail [email or web address]

## 2018-06-04 ENCOUNTER — OFFICE VISIT (OUTPATIENT)
Dept: CARDIOLOGY | Facility: CLINIC | Age: 73
End: 2018-06-04
Payer: MEDICARE

## 2018-06-04 VITALS
HEART RATE: 67 BPM | DIASTOLIC BLOOD PRESSURE: 70 MMHG | BODY MASS INDEX: 27.27 KG/M2 | HEIGHT: 67 IN | WEIGHT: 173.75 LBS | SYSTOLIC BLOOD PRESSURE: 121 MMHG

## 2018-06-04 DIAGNOSIS — Z98.890 S/P RADIOFREQUENCY ABLATION OPERATION FOR ARRHYTHMIA: ICD-10-CM

## 2018-06-04 DIAGNOSIS — Z01.810 PREOP CARDIOVASCULAR EXAM: Primary | ICD-10-CM

## 2018-06-04 DIAGNOSIS — I34.0 NON-RHEUMATIC MITRAL REGURGITATION: ICD-10-CM

## 2018-06-04 DIAGNOSIS — I49.3 PVC (PREMATURE VENTRICULAR CONTRACTION): ICD-10-CM

## 2018-06-04 DIAGNOSIS — I42.8 NON-ISCHEMIC CARDIOMYOPATHY: ICD-10-CM

## 2018-06-04 DIAGNOSIS — Z86.79 S/P RADIOFREQUENCY ABLATION OPERATION FOR ARRHYTHMIA: ICD-10-CM

## 2018-06-04 PROCEDURE — 3078F DIAST BP <80 MM HG: CPT | Mod: CPTII,S$GLB,, | Performed by: INTERNAL MEDICINE

## 2018-06-04 PROCEDURE — 99214 OFFICE O/P EST MOD 30 MIN: CPT | Mod: S$GLB,,, | Performed by: INTERNAL MEDICINE

## 2018-06-04 PROCEDURE — 99999 PR PBB SHADOW E&M-EST. PATIENT-LVL III: CPT | Mod: PBBFAC,,, | Performed by: INTERNAL MEDICINE

## 2018-06-04 PROCEDURE — 3074F SYST BP LT 130 MM HG: CPT | Mod: CPTII,S$GLB,, | Performed by: INTERNAL MEDICINE

## 2018-06-04 RX ORDER — CARVEDILOL 6.25 MG/1
6.25 TABLET ORAL 2 TIMES DAILY WITH MEALS
Qty: 120 TABLET | Refills: 3 | Status: SHIPPED | OUTPATIENT
Start: 2018-06-04 | End: 2019-03-13 | Stop reason: SDUPTHER

## 2018-06-04 NOTE — PROGRESS NOTES
Subjective:   Patient ID:  Rizwana Block is a 72 y.o. female who presents for follow-up of Pre-op Exam (10 weeks fu, cataract surgery) and Chronic systolic heart failure.    Ms. Block is a 73yo female with a history of frequent PVCs (s/p RFA 9/27/2016), cardiomyopathy (EF 35%), HFrEF, mild MR, and HTN here for cardiac risk stratification prior to cataract surgery.   She reports feeling generally fatigued but denies CP or SOB. She continues to feel her PVCs especially at night but they are not as frequent as before. She says that she has nightly PND but says this is chronic for her.  Ms. Block denies dizziness, syncope, leg edema, claudication, orthopnea. She works out with a  twice a week, walking on the treadmill for about 20 minutes at a time. She is also up and down the stairs frequently working at her job and does not need to stop.     She is taking ASA 81mg, amiodarone 200mg, enalapril 5mg daily, and lasix 40mg PRN (takes about every other day). LDL is 130 on 7/3/2017. BPs are controlled. Creatinine is 0.9. K is 4.5. Hepatic transaminases are within normal limits. Her BB was stopped secondary to bradycardia in 2016.      Recent Cardiac Tests:     2D Echo (6/5/2017):  CONCLUSIONS     1 - Moderately depressed left ventricular systolic function (EF 35-40%).     2 - Impaired LV relaxation, normal LAP (grade 1 diastolic dysfunction).     3 - Normal right ventricular systolic function .     4 - The estimated PA systolic pressure is 19 mmHg.     5 - Trivial to mild mitral regurgitation.     6 - Mild left atrial enlargement.      Exercise Stress Echo (1/6/2017):  CONCLUSIONS     1 - Upper limit of normal left ventricular enlargement.     2 - Mildly depressed left ventricular systolic function (EF 45-50%).     3 - Normal left ventricular diastolic function.     4 - Normal right ventricular systolic function .   Positive stress echocardiographic study demonstrating an ischemic response involving the mid  "anteroseptum, mid anterolateral wall. Non-specific finding demonstrating failure to augment involving the basal anteroseptum, mid inferolateral wall, basal   inferolateral wall, apical lateral wall, anterior wall, basal inferior wall which may be associated with ischemia; clinical correlation required.   The specificity of stress echo may be reduced by resting regional wall motion abnormalities.Although the study suggests CAD, it should be noted that no significant CAD was reported on recent coronary angiogram.           Patient Active Problem List   Diagnosis    Ventricular premature beats    Hammertoe    Palpitations    Costochondritis    Heart failure, systolic    Precordial pain    Bradycardia    Cardiomyopathy    Non-rheumatic mitral regurgitation    Essential hypertension    S/P colonoscopy    Osteopenia of spine    Pure hypercholesterolemia     /70 (BP Location: Left arm, Patient Position: Sitting, BP Method: X-Large (Automatic))   Pulse 67   Ht 5' 7.25" (1.708 m)   Wt 78.8 kg (173 lb 11.6 oz)   LMP  (LMP Unknown)   BMI 27.01 kg/m²   Body mass index is 27.01 kg/m².  CrCl cannot be calculated (Patient's most recent lab result is older than the maximum 7 days allowed.).    Lab Results   Component Value Date     04/09/2018    K 4.4 04/09/2018     04/09/2018    CO2 34 (H) 04/09/2018    BUN 26 (H) 04/09/2018    CREATININE 0.9 04/09/2018     04/09/2018    MG 2.1 06/02/2016    AST 17 01/19/2018    ALT 13 01/19/2018    ALBUMIN 3.9 01/19/2018    PROT 6.9 01/19/2018    BILITOT 0.3 01/19/2018    WBC 6.76 01/19/2018    HGB 12.1 01/19/2018    HCT 38.0 01/19/2018    MCV 95 01/19/2018     01/19/2018    INR 0.9 09/16/2016    TSH 1.454 01/19/2018    CHOL 221 (H) 07/03/2017    HDL 75 07/03/2017    LDLCALC 130.8 07/03/2017    TRIG 76 07/03/2017       Current Outpatient Prescriptions   Medication Sig    amiodarone (PACERONE) 200 MG Tab Take 1 tablet (200 mg total) by mouth once " daily.    aspirin (ECOTRIN) 81 MG EC tablet Take 81 mg by mouth once daily.    calcium carbonate (OS-HENNA) 600 mg calcium (1,500 mg) Tab Take 1,200 mg by mouth 2 (two) times daily with meals.     co-enzyme Q-10 30 mg capsule Take 10 mg by mouth once daily.     cyanocobalamin (VITAMIN B-12) 1,000 mcg/mL injection 1 ml IM weekly for 4 weeks then monthly.  Dispense #10 25 gague 1/2 inch needles and #10 1 ml syringes    fish oil-omega-3 fatty acids 300-1,000 mg capsule Take 2 g by mouth 2 (two) times daily.     furosemide (LASIX) 40 MG tablet TAKE ONE TABLET BY MOUTH ONCE DAILY AS NEEDED FOR weight gain    meloxicam (MOBIC) 15 MG tablet Take 15 mg by mouth once daily.    MULTIVITAMIN WITH MINERALS (HAIR,SKIN AND NAILS ORAL) Take by mouth once daily.    sacubitril-valsartan (ENTRESTO) 24-26 mg per tablet Take 1 tablet by mouth 2 (two) times daily. (Patient taking differently: Take 0.5 tablets by mouth 2 (two) times daily. )    VIT A/VIT C/VIT E/ZINC/COPPER (PRESERVISION AREDS ORAL) Take by mouth 2 (two) times daily.    vitamin D 1000 units Tab Take 5,000 Units by mouth once daily.     carvedilol (COREG) 6.25 MG tablet Take 1 tablet (6.25 mg total) by mouth 2 (two) times daily with meals.     Current Facility-Administered Medications   Medication    cyanocobalamin injection 1,000 mcg       Review of Systems   Constitution: Negative for malaise/fatigue.   Eyes: Negative for blurred vision.   Cardiovascular: Positive for palpitations and paroxysmal nocturnal dyspnea. Negative for chest pain, claudication, dyspnea on exertion, irregular heartbeat, leg swelling, orthopnea and syncope.   Respiratory: Positive for cough. Negative for shortness of breath.    Hematologic/Lymphatic: Negative for bleeding problem.   Skin: Negative for rash.   Musculoskeletal: Negative for myalgias.   Gastrointestinal: Negative for abdominal pain, constipation, diarrhea and nausea.   Genitourinary: Negative for hematuria.   Neurological:  Negative for headaches, loss of balance and numbness.   Psychiatric/Behavioral: Negative for altered mental status.   Allergic/Immunologic: Negative for persistent infections.       Objective:   Physical Exam   Constitutional: She is oriented to person, place, and time. She appears well-developed and well-nourished. No distress.   HENT:   Head: Normocephalic and atraumatic.   Nose: Nose normal.   Mouth/Throat: Oropharynx is clear and moist. No oropharyngeal exudate.   Eyes: Conjunctivae and EOM are normal. Pupils are equal, round, and reactive to light. Right eye exhibits no discharge. Left eye exhibits no discharge. No scleral icterus.   Neck: Normal range of motion. Neck supple. No JVD present. No tracheal deviation present. No thyromegaly present.   Cardiovascular: Normal rate, regular rhythm, normal heart sounds and intact distal pulses.  Exam reveals no gallop and no friction rub.    No murmur heard.  Pulmonary/Chest: Effort normal and breath sounds normal. No stridor. No respiratory distress. She has no wheezes. She has no rales. She exhibits no tenderness.   Abdominal: Soft. Bowel sounds are normal. She exhibits no distension and no mass. There is no tenderness. There is no rebound and no guarding.   Musculoskeletal: Normal range of motion. She exhibits no edema or tenderness.   Lymphadenopathy:     She has no cervical adenopathy.   Neurological: She is alert and oriented to person, place, and time. She has normal reflexes. No cranial nerve deficit. She exhibits normal muscle tone. Coordination normal.   Skin: Skin is warm. No rash noted. She is not diaphoretic. No erythema. No pallor.   Psychiatric: She has a normal mood and affect. Her behavior is normal. Judgment and thought content normal.       Assessment:     1. Preop cardiovascular exam    2. Non-ischemic cardiomyopathy    3. PVC (premature ventricular contraction)    4. Non-rheumatic mitral regurgitation    5. S/P radiofrequency ablation operation for  arrhythmia        Plan:   Rizwana was seen today for pre-op exam and chronic systolic heart failure..    Diagnoses and all orders for this visit:    Preop cardiovascular exam    Non-ischemic cardiomyopathy  -     2D Echo w/ Color Flow Doppler; Future    PVC (premature ventricular contraction)    Non-rheumatic mitral regurgitation    S/P radiofrequency ablation operation for arrhythmia    Other orders  -     carvedilol (COREG) 6.25 MG tablet; Take 1 tablet (6.25 mg total) by mouth 2 (two) times daily with meals.      Patient is low risk and can proceed with cataract surgery.  uptitrate coreg. Increase Enteresto next visit  Re evaluate ECHO  Patient to discuss with Dr. Arriaga when to stop amiodarone.     Counseled on importance of heart healthy diet low in saturated and trans fat and salt as well gradually starting a regular aerobic exercise regimen with goal of 30min 5x/week. Recommend BP diary. Call if systolic BP > 130 mmHg on checking repeatedly  All meds reviewed and are appropriate  Patient is compliant with her medications.

## 2018-06-04 NOTE — PROGRESS NOTES
"Subjective:   Patient ID:  Rizwana Block is a 72 y.o. female who presents for follow-up of Pre-op Exam (10 weeks fu, cataract surgery) and Chronic systolic heart failure.  ERROR    Patient Active Problem List   Diagnosis    Ventricular premature beats    Hammertoe    Palpitations    Costochondritis    Heart failure, systolic    Precordial pain    Bradycardia    Cardiomyopathy    Non-rheumatic mitral regurgitation    Essential hypertension    S/P colonoscopy    Osteopenia of spine    Pure hypercholesterolemia     /70 (BP Location: Left arm, Patient Position: Sitting, BP Method: X-Large (Automatic))   Pulse 67   Ht 5' 7.25" (1.708 m)   Wt 78.8 kg (173 lb 11.6 oz)   LMP  (LMP Unknown)   BMI 27.01 kg/m²   Body mass index is 27.01 kg/m².  CrCl cannot be calculated (Patient's most recent lab result is older than the maximum 7 days allowed.).    Lab Results   Component Value Date     04/09/2018    K 4.4 04/09/2018     04/09/2018    CO2 34 (H) 04/09/2018    BUN 26 (H) 04/09/2018    CREATININE 0.9 04/09/2018     04/09/2018    MG 2.1 06/02/2016    AST 17 01/19/2018    ALT 13 01/19/2018    ALBUMIN 3.9 01/19/2018    PROT 6.9 01/19/2018    BILITOT 0.3 01/19/2018    WBC 6.76 01/19/2018    HGB 12.1 01/19/2018    HCT 38.0 01/19/2018    MCV 95 01/19/2018     01/19/2018    INR 0.9 09/16/2016    TSH 1.454 01/19/2018    CHOL 221 (H) 07/03/2017    HDL 75 07/03/2017    LDLCALC 130.8 07/03/2017    TRIG 76 07/03/2017       Current Outpatient Prescriptions   Medication Sig    amiodarone (PACERONE) 200 MG Tab Take 1 tablet (200 mg total) by mouth once daily.    aspirin (ECOTRIN) 81 MG EC tablet Take 81 mg by mouth once daily.    calcium carbonate (OS-HENNA) 600 mg calcium (1,500 mg) Tab Take 1,200 mg by mouth 2 (two) times daily with meals.     carvedilol (COREG) 3.125 MG tablet Take 1 tablet (3.125 mg total) by mouth 2 (two) times daily with meals.    co-enzyme Q-10 30 mg capsule Take " 10 mg by mouth once daily.     cyanocobalamin (VITAMIN B-12) 1,000 mcg/mL injection 1 ml IM weekly for 4 weeks then monthly.  Dispense #10 25 gague 1/2 inch needles and #10 1 ml syringes    fish oil-omega-3 fatty acids 300-1,000 mg capsule Take 2 g by mouth 2 (two) times daily.     furosemide (LASIX) 40 MG tablet TAKE ONE TABLET BY MOUTH ONCE DAILY AS NEEDED FOR weight gain    meloxicam (MOBIC) 15 MG tablet Take 15 mg by mouth once daily.    MULTIVITAMIN WITH MINERALS (HAIR,SKIN AND NAILS ORAL) Take by mouth once daily.    sacubitril-valsartan (ENTRESTO) 24-26 mg per tablet Take 1 tablet by mouth 2 (two) times daily. (Patient taking differently: Take 0.5 tablets by mouth 2 (two) times daily. )    VIT A/VIT C/VIT E/ZINC/COPPER (PRESERVISION AREDS ORAL) Take by mouth 2 (two) times daily.    vitamin D 1000 units Tab Take 5,000 Units by mouth once daily.      Current Facility-Administered Medications   Medication    cyanocobalamin injection 1,000 mcg       ROS    Objective:   Physical Exam    Assessment:     No diagnosis found.    Plan:

## 2018-06-08 ENCOUNTER — CLINICAL SUPPORT (OUTPATIENT)
Dept: INTERNAL MEDICINE | Facility: CLINIC | Age: 73
End: 2018-06-08
Payer: MEDICARE

## 2018-06-08 PROCEDURE — 96372 THER/PROPH/DIAG INJ SC/IM: CPT | Mod: S$GLB,,, | Performed by: INTERNAL MEDICINE

## 2018-06-08 RX ADMIN — CYANOCOBALAMIN 1000 MCG: 1000 INJECTION, SOLUTION INTRAMUSCULAR at 03:06

## 2018-06-19 ENCOUNTER — HOSPITAL ENCOUNTER (OUTPATIENT)
Dept: CARDIOLOGY | Facility: CLINIC | Age: 73
Discharge: HOME | End: 2018-06-19
Attending: INTERNAL MEDICINE
Payer: MEDICARE

## 2018-06-19 DIAGNOSIS — I42.8 NON-ISCHEMIC CARDIOMYOPATHY: ICD-10-CM

## 2018-06-19 LAB
DIASTOLIC DYSFUNCTION: YES
ESTIMATED PA SYSTOLIC PRESSURE: 28.81
RETIRED EF AND QEF - SEE NOTES: 43 (ref 55–65)
TRICUSPID VALVE REGURGITATION: ABNORMAL

## 2018-06-19 PROCEDURE — 93306 TTE W/DOPPLER COMPLETE: CPT | Mod: S$GLB,,, | Performed by: INTERNAL MEDICINE

## 2018-06-20 NOTE — PROGRESS NOTES
plz let pt. Know that heart function is much improved plz continue to take your medications and follow up in 3 months

## 2018-06-21 ENCOUNTER — LAB VISIT (OUTPATIENT)
Dept: LAB | Facility: HOSPITAL | Age: 73
End: 2018-06-21
Attending: INTERNAL MEDICINE
Payer: MEDICARE

## 2018-06-21 ENCOUNTER — OFFICE VISIT (OUTPATIENT)
Dept: ELECTROPHYSIOLOGY | Facility: CLINIC | Age: 73
End: 2018-06-21
Payer: MEDICARE

## 2018-06-21 VITALS
BODY MASS INDEX: 27.37 KG/M2 | SYSTOLIC BLOOD PRESSURE: 104 MMHG | WEIGHT: 174.38 LBS | HEIGHT: 67 IN | DIASTOLIC BLOOD PRESSURE: 62 MMHG | HEART RATE: 67 BPM

## 2018-06-21 DIAGNOSIS — Z91.89 AT RISK FOR AMIODARONE TOXICITY WITH LONG TERM USE: ICD-10-CM

## 2018-06-21 DIAGNOSIS — Z79.899 AT RISK FOR AMIODARONE TOXICITY WITH LONG TERM USE: ICD-10-CM

## 2018-06-21 DIAGNOSIS — I49.3 VPB (VENTRICULAR PREMATURE BEAT): ICD-10-CM

## 2018-06-21 DIAGNOSIS — R00.2 PALPITATIONS: ICD-10-CM

## 2018-06-21 DIAGNOSIS — I49.3 VENTRICULAR PREMATURE BEATS: Primary | ICD-10-CM

## 2018-06-21 DIAGNOSIS — I34.0 NON-RHEUMATIC MITRAL REGURGITATION: ICD-10-CM

## 2018-06-21 DIAGNOSIS — I42.0 DILATED CARDIOMYOPATHY: ICD-10-CM

## 2018-06-21 DIAGNOSIS — I49.3 VENTRICULAR PREMATURE BEATS: ICD-10-CM

## 2018-06-21 DIAGNOSIS — I10 ESSENTIAL HYPERTENSION: ICD-10-CM

## 2018-06-21 PROCEDURE — 36415 COLL VENOUS BLD VENIPUNCTURE: CPT

## 2018-06-21 PROCEDURE — 3078F DIAST BP <80 MM HG: CPT | Mod: CPTII,S$GLB,, | Performed by: INTERNAL MEDICINE

## 2018-06-21 PROCEDURE — 99999 PR PBB SHADOW E&M-EST. PATIENT-LVL III: CPT | Mod: PBBFAC,,, | Performed by: INTERNAL MEDICINE

## 2018-06-21 PROCEDURE — 93000 ELECTROCARDIOGRAM COMPLETE: CPT | Mod: S$GLB,,, | Performed by: INTERNAL MEDICINE

## 2018-06-21 PROCEDURE — 3074F SYST BP LT 130 MM HG: CPT | Mod: CPTII,S$GLB,, | Performed by: INTERNAL MEDICINE

## 2018-06-21 PROCEDURE — 80299 QUANTITATIVE ASSAY DRUG: CPT

## 2018-06-21 PROCEDURE — 99215 OFFICE O/P EST HI 40 MIN: CPT | Mod: S$GLB,,, | Performed by: INTERNAL MEDICINE

## 2018-06-21 NOTE — PROGRESS NOTES
Subjective:   Patient ID:  Rizwana Block is a 72 y.o. female     Chief complaint:No chief complaint on file.      HPI  Background:  72 woman  Hx of PVCs s/p recent RFA, bradycardia, CM, HFrEF (EF 40-45%), non-rheumatic MR, and HTN. Ms. Block has been on long-term amiodarone for PVCs (inferior-posterior-septal PVC morphology). Her PVC burden was thought to have been related to her DCM. Her EF improved to the 50s after amiodarone use, and she was subsequently switched from amiodarone to Verapamil.      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  She later restarted with palps and some PVCs (albeit in lesser frequency than pre RFA) and Rx was added -- Verapamil first then back on amio     Echo (06/22/16) >>  EF of 40-45%, trivial-to-mild TR, trivial AZ, upper limit of normal LVE, and a PASP of 26 mmHg.   24-Hour Holter (06/22/16) revealed a 13% PVC burden; 481 couplets, 73 triplets and short VTNS -- the large majority of the PVCs were MM. The triplets are dimorphic.  She had an RFA on 9/27/16  >>  1.  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  Effective lesions were at 12 o'clock level in the Arabic view.  2.  Note that the ascending aorta and aortic arch appeared to be very unfolded (unusually so for the  patient's frame).  This may need to be evaluated further.  48-Hour Holter (11/09/16) revealed SR w/HRs varying between  bpm; average 72 bpm. There were very frequent polymorphic PVCs (totaling 9,632; averaging 200 per hour, ~5%), 133 couplets, and no episodes of VT. There were very rare PACs (totaling 20; averaging less than 1 per hour) and no episodes of sustained SVT. There was 1 episode of dizziness reported corresponding w/SR at 95 bpm.       Update since then:  Since the RFA she has been feeling a  lot better with more energy, working full time,, back to gardening and swimming etc  Echo on 6/19/18    1 - Mildly to moderately depressed left ventricular systolic function (EF 40-45%).     2 - Wall motion abnormalities.     3 - Impaired LV relaxation, normal LAP (grade 1 diastolic dysfunction).     4 - Low normal right ventricular systolic function .     5 - Trivial to mild tricuspid regurgitation.     6 - Trivial pulmonic regurgitation.     7 - Atrial septal aneurysm .     8 - The estimated PA systolic pressure is 29 mmHg.   I have reviewed the actual image of the ECG tracing obtained today and it shows NSR with normal intervals. No PVCs noted        Current Outpatient Prescriptions   Medication Sig    amiodarone (PACERONE) 200 MG Tab Take 1 tablet (200 mg total) by mouth once daily.    aspirin (ECOTRIN) 81 MG EC tablet Take 81 mg by mouth once daily.    calcium carbonate (OS-HENNA) 600 mg calcium (1,500 mg) Tab Take 1,200 mg by mouth 2 (two) times daily with meals.     carvedilol (COREG) 6.25 MG tablet Take 1 tablet (6.25 mg total) by mouth 2 (two) times daily with meals.    co-enzyme Q-10 30 mg capsule Take 10 mg by mouth once daily.     cyanocobalamin (VITAMIN B-12) 1,000 mcg/mL injection 1 ml IM weekly for 4 weeks then monthly.  Dispense #10 25 gague 1/2 inch needles and #10 1 ml syringes    fish oil-omega-3 fatty acids 300-1,000 mg capsule Take 2 g by mouth 2 (two) times daily.     furosemide (LASIX) 40 MG tablet TAKE ONE TABLET BY MOUTH ONCE DAILY AS NEEDED FOR weight gain    MULTIVITAMIN WITH MINERALS (HAIR,SKIN AND NAILS ORAL) Take by mouth once daily.    sacubitril-valsartan (ENTRESTO) 24-26 mg per tablet Take 1 tablet by mouth 2 (two) times daily. (Patient taking differently: Take 0.5 tablets by mouth 2 (two) times daily. )    VIT A/VIT C/VIT E/ZINC/COPPER (PRESERVISION AREDS ORAL) Take by mouth 2 (two) times daily.    vit C/E/Zn/coppr/lutein/zeaxan (PRESERVISION AREDS 2 ORAL) Take by mouth  once daily.    vitamin D 1000 units Tab Take 5,000 Units by mouth once daily.     meloxicam (MOBIC) 15 MG tablet Take 15 mg by mouth once daily.     Current Facility-Administered Medications   Medication    cyanocobalamin injection 1,000 mcg     Review of Systems   Constitution: Positive for malaise/fatigue and weight gain. Negative for decreased appetite, weakness and weight loss.   HENT: Negative for nosebleeds.    Eyes: Negative for blurred vision and visual disturbance.   Cardiovascular: Negative for chest pain, claudication, cyanosis, dyspnea on exertion, irregular heartbeat, leg swelling, near-syncope, orthopnea, palpitations, paroxysmal nocturnal dyspnea and syncope.   Respiratory: Negative for cough, shortness of breath and wheezing.    Endocrine: Negative for heat intolerance.   Skin: Negative for rash.   Musculoskeletal: Positive for joint pain. Negative for muscle weakness and myalgias.   Gastrointestinal: Negative for abdominal pain, anorexia, melena, nausea and vomiting.   Genitourinary: Negative for menorrhagia.   Neurological: Negative for excessive daytime sleepiness, dizziness, headaches, loss of balance, seizures and vertigo.   Psychiatric/Behavioral: Negative for altered mental status and depression. The patient is not nervous/anxious.        Objective:   Physical Exam   Constitutional: She is oriented to person, place, and time. She appears well-developed and well-nourished.   HENT:   Head: Normocephalic and atraumatic.   Right Ear: External ear normal.   Left Ear: External ear normal.   Neck: Normal range of motion. Neck supple. No thyromegaly present.   Cardiovascular: Normal rate, regular rhythm, normal heart sounds and intact distal pulses.  Exam reveals no gallop, no S3, no S4, no friction rub, no midsystolic click and no opening snap.    No murmur heard.  Pulses:       Carotid pulses are 2+ on the right side, and 2+ on the left side.       Radial pulses are 2+ on the right side, and 2+  "on the left side.        Dorsalis pedis pulses are 2+ on the right side, and 2+ on the left side.        Posterior tibial pulses are 2+ on the right side, and 2+ on the left side.   Pulmonary/Chest: Effort normal and breath sounds normal.   Abdominal: Soft. She exhibits no distension. There is no tenderness.   Musculoskeletal:        Right ankle: She exhibits no swelling.        Left ankle: She exhibits no swelling.        Right lower leg: She exhibits no swelling.        Left lower leg: She exhibits no swelling.   Neurological: She is alert and oriented to person, place, and time. She has normal strength. No cranial nerve deficit. Gait normal.   Skin: Skin is warm. No rash noted. No cyanosis. Nails show no clubbing.   Psychiatric: She has a normal mood and affect. Her speech is normal and behavior is normal. Thought content normal. Cognition and memory are normal.   Nursing note and vitals reviewed.    /62   Pulse 67   Ht 5' 7" (1.702 m)   Wt 79.1 kg (174 lb 6.1 oz)   LMP  (LMP Unknown)   BMI 27.31 kg/m²      Assessment:    Doing very well   Keep same  1. Ventricular premature beats    2. Dilated cardiomyopathy    3. Essential hypertension    4. Non-rheumatic mitral regurgitation    5. Palpitations    6. At risk for amiodarone toxicity with long term use        Plan:      Orders Placed This Encounter   Procedures    Amiodarone level     Standing Status:   Future     Number of Occurrences:   1     Standing Expiration Date:   8/20/2019    Holter monitor - 48 hour     Standing Status:   Future     Number of Occurrences:   1     Standing Expiration Date:   6/21/2019     Follow-up in about 6 months (around 12/21/2018).  There are no discontinued medications.  New Prescriptions    No medications on file     Modified Medications    No medications on file              "

## 2018-06-22 ENCOUNTER — CLINICAL SUPPORT (OUTPATIENT)
Dept: ELECTROPHYSIOLOGY | Facility: CLINIC | Age: 73
End: 2018-06-22
Attending: INTERNAL MEDICINE
Payer: MEDICARE

## 2018-06-22 DIAGNOSIS — I49.3 VENTRICULAR PREMATURE BEATS: ICD-10-CM

## 2018-06-22 PROBLEM — Z79.899 AT RISK FOR AMIODARONE TOXICITY WITH LONG TERM USE: Status: ACTIVE | Noted: 2018-06-22

## 2018-06-22 PROBLEM — Z91.89 AT RISK FOR AMIODARONE TOXICITY WITH LONG TERM USE: Status: ACTIVE | Noted: 2018-06-22

## 2018-06-22 LAB
AMIODARONE FLD-MCNC: 0.9 UG/ML (ref 1–3)
N-DESETHYL-AMIODARONE: 0.7 UG/ML

## 2018-06-22 PROCEDURE — 93224 XTRNL ECG REC UP TO 48 HRS: CPT | Mod: S$GLB,,, | Performed by: INTERNAL MEDICINE

## 2018-06-26 ENCOUNTER — TELEPHONE (OUTPATIENT)
Dept: ELECTROPHYSIOLOGY | Facility: CLINIC | Age: 73
End: 2018-06-26

## 2018-06-26 ENCOUNTER — TELEPHONE (OUTPATIENT)
Dept: INTERNAL MEDICINE | Facility: CLINIC | Age: 73
End: 2018-06-26

## 2018-06-26 DIAGNOSIS — N39.0 URINARY TRACT INFECTION WITHOUT HEMATURIA, SITE UNSPECIFIED: Primary | ICD-10-CM

## 2018-06-26 NOTE — TELEPHONE ENCOUNTER
----- Message from Flora Sanderson sent at 6/26/2018  8:38 AM CDT -----  Contact: self / [phone]  Patient is requesting a call back from the office in regards to a bladder infection she thinks she may have.  Please advise, thanks.

## 2018-06-26 NOTE — TELEPHONE ENCOUNTER
Notified pt that amio level within acceptable range. No changes to dosage. Understanding verbalized.    ----- Message from Norbert Lemons MD sent at 6/22/2018  7:30 PM CDT -----  All results are OK. Please see comments below- if any- and call patient.  In general you do not need to route back to me.

## 2018-06-27 ENCOUNTER — LAB VISIT (OUTPATIENT)
Dept: LAB | Facility: HOSPITAL | Age: 73
End: 2018-06-27
Attending: INTERNAL MEDICINE
Payer: MEDICARE

## 2018-06-27 DIAGNOSIS — N39.0 URINARY TRACT INFECTION WITHOUT HEMATURIA, SITE UNSPECIFIED: ICD-10-CM

## 2018-06-27 LAB
BACTERIA #/AREA URNS AUTO: ABNORMAL /HPF
BILIRUB UR QL STRIP: NEGATIVE
CLARITY UR REFRACT.AUTO: CLEAR
COLOR UR AUTO: YELLOW
GLUCOSE UR QL STRIP: NEGATIVE
HGB UR QL STRIP: NEGATIVE
HYALINE CASTS UR QL AUTO: 3 /LPF
KETONES UR QL STRIP: NEGATIVE
LEUKOCYTE ESTERASE UR QL STRIP: ABNORMAL
MICROSCOPIC COMMENT: ABNORMAL
NITRITE UR QL STRIP: NEGATIVE
PH UR STRIP: 5 [PH] (ref 5–8)
PROT UR QL STRIP: NEGATIVE
SP GR UR STRIP: 1.01 (ref 1–1.03)
SQUAMOUS #/AREA URNS AUTO: 1 /HPF
URN SPEC COLLECT METH UR: ABNORMAL
UROBILINOGEN UR STRIP-ACNC: NEGATIVE EU/DL
WBC #/AREA URNS AUTO: 1 /HPF (ref 0–5)

## 2018-06-27 PROCEDURE — 87086 URINE CULTURE/COLONY COUNT: CPT

## 2018-06-27 PROCEDURE — 81001 URINALYSIS AUTO W/SCOPE: CPT

## 2018-06-28 ENCOUNTER — PATIENT MESSAGE (OUTPATIENT)
Dept: INTERNAL MEDICINE | Facility: CLINIC | Age: 73
End: 2018-06-28

## 2018-06-28 DIAGNOSIS — I49.3 VPB (VENTRICULAR PREMATURE BEAT): Primary | ICD-10-CM

## 2018-06-28 LAB
BACTERIA UR CULT: NORMAL
BACTERIA UR CULT: NORMAL

## 2018-06-29 ENCOUNTER — PATIENT MESSAGE (OUTPATIENT)
Dept: INTERNAL MEDICINE | Facility: CLINIC | Age: 73
End: 2018-06-29

## 2018-06-29 ENCOUNTER — TELEPHONE (OUTPATIENT)
Dept: INTERNAL MEDICINE | Facility: CLINIC | Age: 73
End: 2018-06-29

## 2018-06-29 RX ORDER — SULFAMETHOXAZOLE AND TRIMETHOPRIM 800; 160 MG/1; MG/1
1 TABLET ORAL 2 TIMES DAILY
Qty: 14 TABLET | Refills: 0 | Status: SHIPPED | OUTPATIENT
Start: 2018-06-29 | End: 2018-07-06

## 2018-06-29 NOTE — TELEPHONE ENCOUNTER
----- Message from Alpa Servin sent at 6/29/2018  9:48 AM CDT -----  Contact: self/[phone]      ----- Message -----  From: Kellie Porter  Sent: 6/29/2018   9:41 AM  To: Matt Grier    .1 Patient would like to get medical advice.  Symptoms (please be specific): Possible UTI  How long has patient had these symptoms: couple of weeks  Pharmacy name and phone#:[facility], [place] - [address] [place] [address] [phone] (Phone) [phone] (Fax)  Any drug allergies: Morphine  Comments: Patient would like to get medical advice. Thank you

## 2018-06-29 NOTE — TELEPHONE ENCOUNTER
----- Message from Candida Brantley sent at 6/29/2018  7:52 AM CDT -----  Contact: Patient [phone]      ----- Message -----  From: Alpa Servin  Sent: 6/29/2018   7:04 AM  To: Matt CASPER Staff    Patient saw her lab results on her [facility] and would like to speak to you asap. Stated that some of them are elevated.    Please call and advise.    Thank You

## 2018-07-02 ENCOUNTER — TELEPHONE (OUTPATIENT)
Dept: UROGYNECOLOGY | Facility: CLINIC | Age: 73
End: 2018-07-02

## 2018-07-02 ENCOUNTER — PATIENT MESSAGE (OUTPATIENT)
Dept: INTERNAL MEDICINE | Facility: CLINIC | Age: 73
End: 2018-07-02

## 2018-07-02 DIAGNOSIS — R32 URINARY INCONTINENCE, UNSPECIFIED TYPE: Primary | ICD-10-CM

## 2018-07-02 NOTE — TELEPHONE ENCOUNTER
Spoke to pt consult appointment scheduled 7/25/2018 at [facility] location. Pt aware and verbalizes understanding

## 2018-07-02 NOTE — TELEPHONE ENCOUNTER
----- Message from Mana Sheehan sent at 7/2/2018  3:47 PM CDT -----  Contact: self  Pt is wanting to schedule a consult appt. She is being referred by Sri Gutierrez MD. The pt stated shes been feeling bad for a couple months she had a bladder mesh in place since 2014. other doctors have prescribed her medications that have not helped. The pt can be reached at [phone].

## 2018-07-03 ENCOUNTER — OFFICE VISIT (OUTPATIENT)
Dept: PODIATRY | Facility: CLINIC | Age: 73
End: 2018-07-03
Payer: MEDICARE

## 2018-07-03 ENCOUNTER — PATIENT MESSAGE (OUTPATIENT)
Dept: ADMINISTRATIVE | Facility: OTHER | Age: 73
End: 2018-07-03

## 2018-07-03 ENCOUNTER — TELEPHONE (OUTPATIENT)
Dept: INTERNAL MEDICINE | Facility: CLINIC | Age: 73
End: 2018-07-03

## 2018-07-03 VITALS
WEIGHT: 177 LBS | HEART RATE: 59 BPM | BODY MASS INDEX: 27.78 KG/M2 | DIASTOLIC BLOOD PRESSURE: 82 MMHG | SYSTOLIC BLOOD PRESSURE: 119 MMHG | HEIGHT: 67 IN

## 2018-07-03 DIAGNOSIS — B35.1 ONYCHOMYCOSIS DUE TO DERMATOPHYTE: Primary | ICD-10-CM

## 2018-07-03 PROCEDURE — 99499 UNLISTED E&M SERVICE: CPT | Mod: S$GLB,,, | Performed by: PODIATRIST

## 2018-07-03 PROCEDURE — 17999 UNLISTD PX SKN MUC MEMB SUBQ: CPT | Mod: CSM,S$GLB,, | Performed by: PODIATRIST

## 2018-07-03 PROCEDURE — 99999 PR PBB SHADOW E&M-EST. PATIENT-LVL III: CPT | Mod: PBBFAC,,, | Performed by: PODIATRIST

## 2018-07-03 NOTE — TELEPHONE ENCOUNTER
----- Message from Michelle Garrido sent at 7/2/2018  4:28 PM CDT -----  Contact: self/[phone]  Pt would like to speak with the nurse in regards to her appt date with the Urologist/Gynocologist appt on 07/25.

## 2018-07-03 NOTE — TELEPHONE ENCOUNTER
If she is having a lot of pain, she could always be seen in gyn urgent care clinic for evaluation in the next several days.

## 2018-07-05 RX ORDER — SULFAMETHOXAZOLE AND TRIMETHOPRIM 800; 160 MG/1; MG/1
1 TABLET ORAL 2 TIMES DAILY
Qty: 14 TABLET | Refills: 0 | Status: SHIPPED | OUTPATIENT
Start: 2018-07-05 | End: 2018-07-12

## 2018-07-11 ENCOUNTER — PATIENT MESSAGE (OUTPATIENT)
Dept: CARDIOLOGY | Facility: CLINIC | Age: 73
End: 2018-07-11

## 2018-07-13 ENCOUNTER — CLINICAL SUPPORT (OUTPATIENT)
Dept: INTERNAL MEDICINE | Facility: CLINIC | Age: 73
End: 2018-07-13
Payer: MEDICARE

## 2018-07-13 ENCOUNTER — TELEPHONE (OUTPATIENT)
Dept: ELECTROPHYSIOLOGY | Facility: CLINIC | Age: 73
End: 2018-07-13

## 2018-07-13 PROCEDURE — 96372 THER/PROPH/DIAG INJ SC/IM: CPT | Mod: S$GLB,,, | Performed by: INTERNAL MEDICINE

## 2018-07-13 RX ADMIN — CYANOCOBALAMIN 1000 MCG: 1000 INJECTION, SOLUTION INTRAMUSCULAR at 02:07

## 2018-07-13 NOTE — TELEPHONE ENCOUNTER
----- Message from Norbert Lemons MD sent at 6/29/2018  6:20 PM CDT -----  Please see comments below and call patient.  In general you do not need to route back to me.  Good PVC control   Keep same

## 2018-07-16 ENCOUNTER — PATIENT MESSAGE (OUTPATIENT)
Dept: ELECTROPHYSIOLOGY | Facility: CLINIC | Age: 73
End: 2018-07-16

## 2018-07-16 ENCOUNTER — PATIENT MESSAGE (OUTPATIENT)
Dept: INTERNAL MEDICINE | Facility: CLINIC | Age: 73
End: 2018-07-16

## 2018-07-16 DIAGNOSIS — N39.0 URINARY TRACT INFECTION WITHOUT HEMATURIA, SITE UNSPECIFIED: Primary | ICD-10-CM

## 2018-07-17 ENCOUNTER — LAB VISIT (OUTPATIENT)
Dept: LAB | Facility: HOSPITAL | Age: 73
End: 2018-07-17
Attending: INTERNAL MEDICINE
Payer: MEDICARE

## 2018-07-17 DIAGNOSIS — N39.0 URINARY TRACT INFECTION WITHOUT HEMATURIA, SITE UNSPECIFIED: ICD-10-CM

## 2018-07-17 LAB
BACTERIA #/AREA URNS AUTO: ABNORMAL /HPF
BILIRUB UR QL STRIP: NEGATIVE
CAOX CRY UR QL COMP ASSIST: ABNORMAL
CLARITY UR REFRACT.AUTO: ABNORMAL
COLOR UR AUTO: YELLOW
GLUCOSE UR QL STRIP: NEGATIVE
HGB UR QL STRIP: NEGATIVE
HYALINE CASTS UR QL AUTO: 4 /LPF
KETONES UR QL STRIP: NEGATIVE
LEUKOCYTE ESTERASE UR QL STRIP: ABNORMAL
MICROSCOPIC COMMENT: ABNORMAL
NITRITE UR QL STRIP: NEGATIVE
PH UR STRIP: 5 [PH] (ref 5–8)
PROT UR QL STRIP: NEGATIVE
RBC #/AREA URNS AUTO: 0 /HPF (ref 0–4)
SP GR UR STRIP: 1.02 (ref 1–1.03)
SQUAMOUS #/AREA URNS AUTO: 0 /HPF
URN SPEC COLLECT METH UR: ABNORMAL
UROBILINOGEN UR STRIP-ACNC: NEGATIVE EU/DL
WBC #/AREA URNS AUTO: 0 /HPF (ref 0–5)

## 2018-07-17 PROCEDURE — 87086 URINE CULTURE/COLONY COUNT: CPT

## 2018-07-17 PROCEDURE — 81001 URINALYSIS AUTO W/SCOPE: CPT

## 2018-07-18 ENCOUNTER — TELEPHONE (OUTPATIENT)
Dept: ORTHOPEDICS | Facility: CLINIC | Age: 73
End: 2018-07-18

## 2018-07-18 LAB — BACTERIA UR CULT: NORMAL

## 2018-07-18 NOTE — TELEPHONE ENCOUNTER
Phone call to patient to cancel appt on 7/20 for EMG results - states that inj helped and does not want surgical intervention at this time. Will call if needs another injection in the future. Understands that injections cannot continue indefinitely and also that the surgery is done under local/MAC if she gets to that point.

## 2018-07-19 ENCOUNTER — PATIENT MESSAGE (OUTPATIENT)
Dept: INTERNAL MEDICINE | Facility: CLINIC | Age: 73
End: 2018-07-19

## 2018-07-25 ENCOUNTER — TELEPHONE (OUTPATIENT)
Dept: UROGYNECOLOGY | Facility: CLINIC | Age: 73
End: 2018-07-25

## 2018-07-25 ENCOUNTER — INITIAL CONSULT (OUTPATIENT)
Dept: UROGYNECOLOGY | Facility: CLINIC | Age: 73
End: 2018-07-25
Payer: MEDICARE

## 2018-07-25 VITALS
BODY MASS INDEX: 27.64 KG/M2 | DIASTOLIC BLOOD PRESSURE: 68 MMHG | HEIGHT: 67 IN | SYSTOLIC BLOOD PRESSURE: 102 MMHG | WEIGHT: 176.13 LBS

## 2018-07-25 DIAGNOSIS — N94.9 VAGINAL BURNING: ICD-10-CM

## 2018-07-25 DIAGNOSIS — R30.0 DYSURIA: Primary | ICD-10-CM

## 2018-07-25 DIAGNOSIS — N95.2 VAGINAL ATROPHY: ICD-10-CM

## 2018-07-25 PROCEDURE — 87086 URINE CULTURE/COLONY COUNT: CPT

## 2018-07-25 PROCEDURE — 99999 PR PBB SHADOW E&M-EST. PATIENT-LVL III: CPT | Mod: PBBFAC,,, | Performed by: OBSTETRICS & GYNECOLOGY

## 2018-07-25 PROCEDURE — 3078F DIAST BP <80 MM HG: CPT | Mod: CPTII,S$GLB,, | Performed by: OBSTETRICS & GYNECOLOGY

## 2018-07-25 PROCEDURE — 99203 OFFICE O/P NEW LOW 30 MIN: CPT | Mod: S$GLB,,, | Performed by: OBSTETRICS & GYNECOLOGY

## 2018-07-25 PROCEDURE — 3074F SYST BP LT 130 MM HG: CPT | Mod: CPTII,S$GLB,, | Performed by: OBSTETRICS & GYNECOLOGY

## 2018-07-25 NOTE — TELEPHONE ENCOUNTER
----- Message from Minda Hernandez MD sent at 7/25/2018  2:15 PM CDT -----  Regarding: please call patient to make 2-3 month follow up appt  Can you please call patient to make 2-3 month follow up appt?  Last visit 7/25/18.  Thanks!

## 2018-07-25 NOTE — LETTER
July 30, 2018      Sri Gutierrez MD  [address]  [facility] [address]           [facility] [address] [place] [address]  Phone: [phone]          Patient: Rizwana Block   MR Number: [id number]   YOB: 1945   Date of Visit: 7/25/2018       Dear Dr. Sri Gutierrez:    Thank you for referring Rizwana Block to me for evaluation. Attached you will find relevant portions of my assessment and plan of care.    If you have questions, please do not hesitate to call me. I look forward to following Rizwana Block along with you.    Sincerely,    Minda Hernandez MD    Enclosure  CC:  No Recipients    If you would like to receive this communication electronically, please contact [email or web address] or [phone] to request more information on better. Link access.    For providers and/or their staff who would like to refer a patient to [facility], please contact us through our one-stop-shop provider referral line, [facility], at [phone].    If you feel you have received this communication in error or would no longer like to receive these types of communications, please e-mail [email or web address]

## 2018-07-25 NOTE — PROGRESS NOTES
[facility] [address] [place] [address]    Rizwana Block  [id number]  1945 July 30, 2018    Consulting Physician: Sri Gutierrez MD   GYN: Dr. Deng  Primary M.D.: Sri Gutierrez MD    Chief Complaint   Patient presents with    Dysuria       HPI:     Patient complains of burning on urination that started 4 months ago. She was diagnosed with UTI. Completed 2 rounds of Cipro, which did not help. She then switched to another antibiotic which did not work either. She was finally placed 2 rounds of sulfa antibiotic, which resolved infection. She felt ill and had fever about 101-102 for about month during first courses of abx. Despite tests showing cleared infection, mild-moderate burning on urination persists.  Had a mesh sling place in 2014. Working very well, but patient is wondering whether sling vs HF medications that could be causing the problem.      Since resolution of UTI, no frequency, urgency, hesitancy, and no incontinence (since sling).     1)  UI:  (--) MIGUELITO  (--) UUI   (--) pads Daytime frequency: Q 2 hours. (on diuretics) Nocturia: Yes: (occasionally 1/night)  (+) dysuria,  (--) hematuria,  (--) frequent UTIs.  (+) complete bladder emptying.     2)  POP:  Absent. (--) vaginal bleeding. (--) vaginal discharge. (+) sexually active.  (--) dyspareunia.   (--)  Vaginal dryness.  (--) vaginal estrogen use.     3)  BM:  (--) constipation/straining.  (--) chronic diarrhea. (--) hematochezia.  (--) fecal incontinence.  (--) fecal smearing/urgency.  (--) complete evacuation.      Past Medical History  Past Medical History:   Diagnosis Date    Arrhythmia     Chest pain 5/27/2016    3/2016: Began experience chest discomfort.    CHF (congestive heart failure)     Hypertension     Osteopenia     Reclast infusion 10/5/2010 and 10/20/2011   CHF:  History of frequent PVCs (s/p RFA 9/27/2016), cardiomyopathy (EF 35%), HFrEF, mild MR, and HTN.     --ECHO  :     1 - Mildly to moderately depressed left ventricular systolic function (EF 40-45%).     2 - Wall motion abnormalities.     3 - Impaired LV relaxation, normal LAP (grade 1 diastolic dysfunction).     4 - Low normal right ventricular systolic function .     5 - Trivial to mild tricuspid regurgitation.     6 - Trivial pulmonic regurgitation.     7 - Atrial septal aneurysm .     8 - The estimated PA systolic pressure is 29 mmHg.     Past Surgical History  Past Surgical History:   Procedure Laterality Date    ANKLE FUSION      right    bladder surgery      with mesh    BLEPHAROPLASTY W/ LASER      CATARACT EXTRACTION, BILATERAL      HAND SURGERY      benign cyst on left    HYSTERECTOMY      heavy bleeding    PATELLA FRACTURE SURGERY      right- plate in tibial plateau    TONSILLECTOMY        Hysterectomy: Yes   Date:  approx.  Indication: embedded  IUD.    Type: vaginal  Cervix present: unknown  Ovaries present: Yes   Other procedures at time of hysterectomy:  no    Past Ob History      x 4.  C/s x 0  Largest infant weight: 8 lb 9 oz  no FAVD. no episiotomy.      Gynecologic History  LMP: No LMP recorded (lmp unknown). Patient has had a hysterectomy.  Age of menarche: 13  Age of menopause: hysterectomy  Menstrual history: unknown  Pap test: post-hyst.  History of abnormal paps: No.  History of STIs:  No  Mammogram: Date of last: .  Result: Normal  Colonoscopy: Date of last: .  Result:  normal.  Repeat due:  .    DEXA:  Date of last: .  Result:           Osteopenia of the femoral neck and total hip. Previous BMD is unavailable for comparison due to software change.   FRAX calculation does not support treatment for osteoporosis    RECOMMENDATIONS:  1) Adequate calcium and Vitamin D therapy  2) Appropriate exercise  3) Consider repeat BMD in 2- 4 years.   Repeat due:  .     Family History  Family History   Problem Relation Age of Onset    Heart disease Mother 63     Stroke Father 49    Heart disease Father     Early death Father     Cancer Maternal Aunt         bone    Cancer Maternal Uncle         esophageal    Heart disease Paternal Uncle     SIDS Daughter     Breast cancer Neg Hx     Ovarian cancer Neg Hx     Cervical cancer Neg Hx     Endometrial cancer Neg Hx     Vaginal cancer Neg Hx       Colon CA: No  Breast CA: No  GYN CA: No   CA: No    Social History  History   Smoking Status    Former Smoker    Packs/day: 0.25    Years: 24.00    Types: Cigarettes    Start date: 1/1/1961    Quit date: 1/1/1985   Smokeless Tobacco    Never Used   .  Cessation date: 1980s.  PPD:  10 cigs/day  x  24 years.   History   Alcohol Use    3.0 oz/week    5 Glasses of wine per week     Comment: socially   .    History   Drug Use No   .  The patient is .  Resides in [place] [address].  Employment status: currently employed.    [employer] , works at [employer]    Allergies  Review of patient's allergies indicates:   Allergen Reactions    Dilaudid [hydromorphone (bulk)]      Severe nausea/vomiting    Morphine      Severe nausea/vomiting       Medications  Current Outpatient Prescriptions on File Prior to Visit   Medication Sig Dispense Refill    amiodarone (PACERONE) 200 MG Tab Take 1 tablet (200 mg total) by mouth once daily. 30 tablet 11    aspirin (ECOTRIN) 81 MG EC tablet Take 81 mg by mouth once daily.      calcium carbonate (OS-HENNA) 600 mg calcium (1,500 mg) Tab Take 1,200 mg by mouth 2 (two) times daily with meals.       carvedilol (COREG) 6.25 MG tablet Take 1 tablet (6.25 mg total) by mouth 2 (two) times daily with meals. 120 tablet 3    co-enzyme Q-10 30 mg capsule Take 10 mg by mouth once daily.       cyanocobalamin (VITAMIN B-12) 1,000 mcg/mL injection 1 ml IM weekly for 4 weeks then monthly.  Dispense #10 25 gague 1/2 inch needles and #10 1 ml syringes 10 mL 3    fish oil-omega-3 fatty acids 300-1,000 mg capsule Take 2 g by  "mouth 2 (two) times daily.       furosemide (LASIX) 40 MG tablet TAKE ONE TABLET BY MOUTH ONCE DAILY AS NEEDED FOR weight gain 30 tablet 11    meloxicam (MOBIC) 15 MG tablet Take 15 mg by mouth once daily.      MULTIVITAMIN WITH MINERALS (HAIR,SKIN AND NAILS ORAL) Take by mouth once daily.      sacubitril-valsartan (ENTRESTO) 24-26 mg per tablet Take 1 tablet by mouth 2 (two) times daily. (Patient taking differently: Take 0.5 tablets by mouth 2 (two) times daily. ) 60 tablet 2    VIT A/VIT C/VIT E/ZINC/COPPER (PRESERVISION AREDS ORAL) Take by mouth 2 (two) times daily.      vit C/E/Zn/coppr/lutein/zeaxan (PRESERVISION AREDS 2 ORAL) Take by mouth once daily.      vitamin D 1000 units Tab Take 5,000 Units by mouth once daily.        Current Facility-Administered Medications on File Prior to Visit   Medication Dose Route Frequency Provider Last Rate Last Dose    cyanocobalamin injection 1,000 mcg  1,000 mcg Intramuscular Q30 Days Sri Gutierrez MD   1,000 mcg at 07/13/18 1420       Review of Systems A 14 point ROS was reviewed with pertinent positives as noted above in the history of present illness.      Constitutional: negative  Eyes: negative  Endocrine: negative  Gastrointestinal: negative  Cardiovascular: negative  Respiratory: negative  Allergic/Immunologic: negative  Integumentary: negative  Psychiatric: negative  Musculoskeletal: negative   Ear/Nose/Throat: negative  Neurologic: negative  Genitourinary: SEE HPI  Hematologic/Lymphatic: negative   Breast: negative    Urogynecologic Exam  /68   Ht 5' 7" (1.702 m)   Wt 79.9 kg (176 lb 2.4 oz)   LMP  (LMP Unknown)   BMI 27.59 kg/m²     GENERAL APPEARANCE:  The patient is well-developed, well-nourished.  Neck:  Supple with no thyromegaly, no carotid bruits.  Heart:  Regular rate and rhythm, no murmurs, rubs or gallops.  Lungs:  Clear.  No CVA tenderness.  Abdomen:  Soft, nontender, nondistended, no hepatosplenomegaly.  Remainder of PE deferred, " as patient had to leave before appointment was complete.     Impression    1. Dysuria    2. Vaginal atrophy    3. Vaginal burning        Initial Plan  The patient was counseled regarding these issues. The patient was given a summary sheet containing each of these issues with possible options for evaluation and management. When appropriate, we also reviewed computer-generated diagrams specific to their diagnoses..  All questions were addressed to the patient's satisfaction.    1)  Burning with urination:  --patient had to leave before exam  --since UTI onset recently  --urine C&S today to make sure UTI cleared  --PVR: not measured; assess on RTC  --sling: not assessed; assess on RTC  --vaginal dryness: start vaginal estrogen (patient states she might have some dryness  --explained hyaline casts (non-specific, diuretic) and calcium oxalate crystals (pH); increase hydration (not hydrating well, was not given CHF fluid restriction)    2)  RTC 2-3 months to recheck and do exam.     Approximately 30 min were spent in consult, 100 % in discussion.     Thank you for requesting consultation of your patient.  I look forward to participating in their care.    Minda Hernandez  Female Pelvic Medicine and Reconstructive Surgery  [facility] [place], [place]

## 2018-07-26 ENCOUNTER — TELEPHONE (OUTPATIENT)
Dept: UROGYNECOLOGY | Facility: CLINIC | Age: 73
End: 2018-07-26

## 2018-07-27 LAB — BACTERIA UR CULT: NO GROWTH

## 2018-07-30 ENCOUNTER — TELEPHONE (OUTPATIENT)
Dept: UROGYNECOLOGY | Facility: CLINIC | Age: 73
End: 2018-07-30

## 2018-07-30 PROBLEM — N95.2 VAGINAL ATROPHY: Status: ACTIVE | Noted: 2018-07-30

## 2018-07-30 PROBLEM — N94.9 VAGINAL BURNING: Status: ACTIVE | Noted: 2018-07-30

## 2018-07-30 PROBLEM — R30.0 DYSURIA: Status: ACTIVE | Noted: 2018-07-30

## 2018-07-30 NOTE — TELEPHONE ENCOUNTER
----- Message from Minda Hernandez MD sent at 7/27/2018  8:27 PM CDT -----  Please let the patient know urine C&S was negative for infection.  Thanks!

## 2018-08-02 DIAGNOSIS — I42.9 CARDIOMYOPATHY, UNSPECIFIED TYPE: Primary | ICD-10-CM

## 2018-08-02 DIAGNOSIS — R00.2 PALPITATIONS: ICD-10-CM

## 2018-08-02 RX ORDER — AMIODARONE HYDROCHLORIDE 200 MG/1
200 TABLET ORAL DAILY
Qty: 30 TABLET | Refills: 6 | Status: SHIPPED | OUTPATIENT
Start: 2018-08-02 | End: 2019-03-13 | Stop reason: SDUPTHER

## 2018-08-08 ENCOUNTER — OFFICE VISIT (OUTPATIENT)
Dept: PODIATRY | Facility: CLINIC | Age: 73
End: 2018-08-08

## 2018-08-08 ENCOUNTER — PATIENT MESSAGE (OUTPATIENT)
Dept: PODIATRY | Facility: CLINIC | Age: 73
End: 2018-08-08

## 2018-08-08 VITALS
BODY MASS INDEX: 27.62 KG/M2 | HEIGHT: 67 IN | DIASTOLIC BLOOD PRESSURE: 78 MMHG | HEART RATE: 68 BPM | WEIGHT: 176 LBS | SYSTOLIC BLOOD PRESSURE: 121 MMHG

## 2018-08-08 DIAGNOSIS — B35.1 ONYCHOMYCOSIS DUE TO DERMATOPHYTE: ICD-10-CM

## 2018-08-08 DIAGNOSIS — L84 CORN OR CALLUS: Primary | ICD-10-CM

## 2018-08-08 PROCEDURE — 99499 UNLISTED E&M SERVICE: CPT | Mod: CSM,,, | Performed by: PODIATRIST

## 2018-08-08 PROCEDURE — 99999 PR PBB SHADOW E&M-EST. PATIENT-LVL III: CPT | Mod: PBBFAC,,, | Performed by: PODIATRIST

## 2018-08-08 PROCEDURE — 17999 UNLISTD PX SKN MUC MEMB SUBQ: CPT | Mod: CSM,,, | Performed by: PODIATRIST

## 2018-08-08 NOTE — PROGRESS NOTES
Patient presents to the clinic for non-covered routine foot care. Patient is not a high risk foot care patient. Patient understands this is not typically a covered service and patient is aware of responsibility of payment. Pedal pulses are palpable. No know risk factors requiring routine foot care. Nails are elongated and thickened on feet, calluses/corns present on both feet. Diagnosis is corn/callus and onychomycosis. Nails and calluses/corns were reduced and trimmed bilaterally. Patient tolerated well.     Gentian violet and lambs wool applied to R 4th interdigital space. Continue daily.     I warned patient of signs and symptoms of infection including redness, drainage, purulence, odor, streaking, fever, chills, etc and I advised her to seek medical attention (ER or urgent car) if these symptoms arise.     RTC 5 weeks Proc B, sooner PRN

## 2018-08-09 ENCOUNTER — TELEPHONE (OUTPATIENT)
Dept: PODIATRY | Facility: CLINIC | Age: 73
End: 2018-08-09

## 2018-08-09 DIAGNOSIS — M79.671 FOOT PAIN, BILATERAL: Primary | ICD-10-CM

## 2018-08-09 DIAGNOSIS — M79.672 FOOT PAIN, BILATERAL: Primary | ICD-10-CM

## 2018-08-09 NOTE — TELEPHONE ENCOUNTER
called patient gave patient rheumatology  clinic direct number I left a message no answer patient is aware that a referral is put in for patient

## 2018-08-09 NOTE — TELEPHONE ENCOUNTER
----- Message from Marychuy Moser DPM sent at 8/9/2018 11:55 AM CDT -----  This patient asked for a rheumatology referral. I just put this in. Can we help her schedule this. Thanks.

## 2018-08-10 ENCOUNTER — PATIENT MESSAGE (OUTPATIENT)
Dept: PODIATRY | Facility: CLINIC | Age: 73
End: 2018-08-10

## 2018-08-10 ENCOUNTER — TELEPHONE (OUTPATIENT)
Dept: WOUND CARE | Facility: CLINIC | Age: 73
End: 2018-08-10

## 2018-08-10 ENCOUNTER — CLINICAL SUPPORT (OUTPATIENT)
Dept: INTERNAL MEDICINE | Facility: CLINIC | Age: 73
End: 2018-08-10
Payer: MEDICARE

## 2018-08-10 DIAGNOSIS — I42.8 OTHER CARDIOMYOPATHY: ICD-10-CM

## 2018-08-10 DIAGNOSIS — I50.22 CHRONIC SYSTOLIC HEART FAILURE: ICD-10-CM

## 2018-08-10 PROCEDURE — 96372 THER/PROPH/DIAG INJ SC/IM: CPT | Mod: S$GLB,,, | Performed by: INTERNAL MEDICINE

## 2018-08-10 RX ADMIN — CYANOCOBALAMIN 1000 MCG: 1000 INJECTION, SOLUTION INTRAMUSCULAR at 02:08

## 2018-08-15 ENCOUNTER — TELEPHONE (OUTPATIENT)
Dept: CARDIOLOGY | Facility: CLINIC | Age: 73
End: 2018-08-15

## 2018-08-15 NOTE — TELEPHONE ENCOUNTER
----- Message from Jing Prabhakar sent at 8/15/2018  8:29 AM CDT -----  Contact: pt  Pt need clarification on her directions for Entresto 24-26 mg  LOV 6/4/18 Dr. Mabry    Thanks

## 2018-08-23 ENCOUNTER — OFFICE VISIT (OUTPATIENT)
Dept: WOUND CARE | Facility: CLINIC | Age: 73
End: 2018-08-23
Payer: MEDICARE

## 2018-08-23 ENCOUNTER — HOSPITAL ENCOUNTER (OUTPATIENT)
Dept: RADIOLOGY | Facility: HOSPITAL | Age: 73
Discharge: HOME | End: 2018-08-23
Attending: NURSE PRACTITIONER
Payer: MEDICARE

## 2018-08-23 VITALS
HEART RATE: 67 BPM | SYSTOLIC BLOOD PRESSURE: 140 MMHG | HEIGHT: 67 IN | BODY MASS INDEX: 27.57 KG/M2 | TEMPERATURE: 98 F | DIASTOLIC BLOOD PRESSURE: 86 MMHG

## 2018-08-23 DIAGNOSIS — M79.671 FOOT PAIN, RIGHT: Primary | ICD-10-CM

## 2018-08-23 DIAGNOSIS — S91.301A OPEN WOUND OF RIGHT FOOT, INITIAL ENCOUNTER: ICD-10-CM

## 2018-08-23 DIAGNOSIS — M79.671 FOOT PAIN, RIGHT: ICD-10-CM

## 2018-08-23 PROCEDURE — 3079F DIAST BP 80-89 MM HG: CPT | Mod: CPTII,S$GLB,, | Performed by: NURSE PRACTITIONER

## 2018-08-23 PROCEDURE — 99203 OFFICE O/P NEW LOW 30 MIN: CPT | Mod: S$GLB,,, | Performed by: NURSE PRACTITIONER

## 2018-08-23 PROCEDURE — 73630 X-RAY EXAM OF FOOT: CPT | Mod: TC,RT

## 2018-08-23 PROCEDURE — 73630 X-RAY EXAM OF FOOT: CPT | Mod: 26,RT,, | Performed by: RADIOLOGY

## 2018-08-23 PROCEDURE — 99999 PR PBB SHADOW E&M-EST. PATIENT-LVL III: CPT | Mod: PBBFAC,,, | Performed by: NURSE PRACTITIONER

## 2018-08-23 PROCEDURE — 3077F SYST BP >= 140 MM HG: CPT | Mod: CPTII,S$GLB,, | Performed by: NURSE PRACTITIONER

## 2018-08-23 NOTE — PROGRESS NOTES
Subjective:       Patient ID: Rizwana Block is a 73 y.o. female.    Chief Complaint: No chief complaint on file.    Patient presents to the clinic for a wound evaluation. She routinely seeks care from podiatry for nail care. During her last podiatry visit, she was noted to have a small wound to the right 4th and 5th interdigital space. Gentian violet was applied and lambs wool was recommended to the area daily for control of moisture to the area. She rates her pain as a four and voices concern that she has osteomyelitis. She feels swelling in the 4th toe and feels it has a bruised appearance.       Review of Systems   Constitutional: Negative.  Negative for chills, diaphoresis, fatigue and fever.   HENT: Negative.  Negative for ear pain, nosebleeds, sinus pain, sore throat and trouble swallowing.    Eyes: Negative.  Negative for pain and redness.   Respiratory: Negative.  Negative for cough, shortness of breath and wheezing.    Cardiovascular: Negative.  Negative for chest pain, palpitations and leg swelling.        Irregular rhythm  CHF   Gastrointestinal: Negative.  Negative for abdominal distention, abdominal pain, blood in stool, nausea and vomiting.   Endocrine: Negative.  Negative for polydipsia, polyphagia and polyuria.   Genitourinary: Negative.  Negative for flank pain and hematuria.   Musculoskeletal: Positive for arthralgias (hands, feet) and back pain. Negative for joint swelling and myalgias.   Skin: Positive for wound (Right toes).   Allergic/Immunologic: Negative.    Neurological: Negative.  Negative for seizures, facial asymmetry, weakness and headaches.   Hematological: Negative.  Negative for adenopathy. Does not bruise/bleed easily.   Psychiatric/Behavioral: Negative.  Negative for agitation, dysphoric mood and sleep disturbance. The patient is not nervous/anxious.        Objective:      Physical Exam   Constitutional: She is oriented to person, place, and time. Vital signs are normal. She  appears well-developed. No distress.   HENT:   Head: Normocephalic.   Mouth/Throat: Oropharynx is clear and moist.   Eyes: Conjunctivae, EOM and lids are normal. Pupils are equal, round, and reactive to light.   Neck: Trachea normal and normal range of motion. Carotid bruit is not present. No thyromegaly present.   Cardiovascular: Normal rate and intact distal pulses. An irregularly irregular rhythm present.   Murmur heard.   Systolic murmur is present.  Pulses:       Dorsalis pedis pulses are 2+ on the right side, and 2+ on the left side.        Posterior tibial pulses are 1+ on the right side, and 1+ on the left side.   Pulmonary/Chest: Effort normal and breath sounds normal. No respiratory distress. She has no wheezes. She has no rales.   Abdominal: Soft. Bowel sounds are normal. She exhibits no distension. There is no tenderness.   Musculoskeletal: Normal range of motion.        Feet:    Lymphadenopathy:     She has no cervical adenopathy.   Neurological: She is alert and oriented to person, place, and time.   Skin: Skin is warm and dry. Capillary refill takes 2 to 3 seconds. She is not diaphoretic. There is erythema.   Psychiatric: She has a normal mood and affect. Thought content normal.   Vitals reviewed.            Assessment:       1. Foot pain, right    2. Open wound of right foot, initial encounter        Plan:          Keep are between toes clean and dry  Wash with dove soap and water, dry well.   Apply a small piece of silver alginate between 4th and 5th toe to open wound  Change daily  Foot xray for foot pain and open wound

## 2018-08-23 NOTE — LETTER
August 23, 2018      Marychuy Moser DPM  [address]  [place] [address]           [facility]  [address]  [place] [address]  Phone: [phone]          Patient: Rizwana Block   MR Number: [id number]   YOB: 1945   Date of Visit: 8/23/2018       Dear Dr. Marychuy Moser:    Thank you for referring Rizwana Block to me for evaluation. Attached you will find relevant portions of my assessment and plan of care.    If you have questions, please do not hesitate to call me. I look forward to following Rizwana Block along with you.    Sincerely,    Laney Wood, ROSEY    Enclosure  CC:  No Recipients    If you would like to receive this communication electronically, please contact [email or web address] or [phone] to request more information on [employer] Link access.    For providers and/or their staff who would like to refer a patient to [facility], please contact us through our one-stop-shop provider referral line, [facility], at [phone].    If you feel you have received this communication in error or would no longer like to receive these types of communications, please e-mail [email or web address]

## 2018-08-23 NOTE — PATIENT INSTRUCTIONS
Keep are between toes clean and dry  Wash with dove soap and water, dry well.   Apply a small piece of silver alginate between 4th and 5th toe to open wound  Change daily  Foot xray to rule out osteomyelitits    Return to clinic or urgent care for signs and symptoms of infection including redness, drainage, purulence, odor, streaking, fever, chills    Follow up with podiatry as advised    Recognizing and Treating Wound Infection  Wounds can become infected with harmful bacteria. This prevents healing and increases the risk of scars. In some cases, the infection may spread to other parts of the body. And infection with the bacteria that cause tetanus can be fatal. Know what to watch for and get prompt treatment for infection.    Risk Factors  A wound is more likely to become infected if it:  · Results from a puncture, such as from a nail or piece of glass.  · Results from a human or animal bite.  · Isn't cleaned or treated within 8 hours.  · Occurs in your hand, foot, leg, armpit, or groin.  · Contains dirt or saliva.  · Heals very slowly.  · Occurs in a person with diabetes, alcoholism, or a compromised immune system.    Symptoms of Infection  Call your healthcare provider at the first sign of infection, such as:  · Yellow, yellow-green, or foul-smelling drainage from a wound  · Increased pain, swelling, or redness in or near a wound  · A change in the color or size of a wound  · Red streaks in the skin around the wound  · Fever  Preventing Wound Infection   Follow these steps to help keep wounds from developing infection:  · Wash the wound right away with soap and water.  · Apply a small amount of antibiotic ointment. You can buy this at the drugstore without a prescription.  · Cover wounds with a bandage or gauze dressing.  · Keep the wound clean and dry for the first 24 hours.  · Change the dressing daily using sterile gloves.    Treatment  Treatment is likely to depend on the type and extent of your infection.  Your healthcare provider may prescribe oral antibiotics to help fight bacteria. He or she may also flush the wound with an antibiotic solution or apply an antibiotic ointment. Sometimes an abscess (a pocket of pus) may form. In that case, the abscess will be opened and the fluid drained. You may need hospital care if the infection is very severe.  © 8265-3473 [employer], [address] [place] [address], [place] [address]. All rights reserved. This information is not intended as a substitute for professional medical care. Always follow your healthcare professional's instructions.

## 2018-08-24 ENCOUNTER — TELEPHONE (OUTPATIENT)
Dept: WOUND CARE | Facility: CLINIC | Age: 73
End: 2018-08-24

## 2018-09-12 ENCOUNTER — OFFICE VISIT (OUTPATIENT)
Dept: PODIATRY | Facility: CLINIC | Age: 73
End: 2018-09-12
Payer: MEDICARE

## 2018-09-12 VITALS
DIASTOLIC BLOOD PRESSURE: 79 MMHG | HEART RATE: 66 BPM | BODY MASS INDEX: 27.62 KG/M2 | HEIGHT: 67 IN | SYSTOLIC BLOOD PRESSURE: 112 MMHG | WEIGHT: 176 LBS

## 2018-09-12 DIAGNOSIS — B35.1 ONYCHOMYCOSIS DUE TO DERMATOPHYTE: ICD-10-CM

## 2018-09-12 DIAGNOSIS — L84 CORN OR CALLUS: Primary | ICD-10-CM

## 2018-09-12 PROCEDURE — 99213 OFFICE O/P EST LOW 20 MIN: CPT | Mod: PBBFAC | Performed by: PODIATRIST

## 2018-09-12 PROCEDURE — 99499 UNLISTED E&M SERVICE: CPT | Mod: S$PBB,,, | Performed by: PODIATRIST

## 2018-09-12 PROCEDURE — 17999 UNLISTD PX SKN MUC MEMB SUBQ: CPT | Mod: ,,, | Performed by: PODIATRIST

## 2018-09-12 PROCEDURE — 99999 PR PBB SHADOW E&M-EST. PATIENT-LVL III: CPT | Mod: PBBFAC,,, | Performed by: PODIATRIST

## 2018-09-12 NOTE — PROGRESS NOTES
Patient presents to the clinic for non-covered routine foot care. Patient is not a high risk foot care patient. Patient understands this is not typically a covered service and patient is aware of responsibility of payment. Pedal pulses are palpable. No know risk factors requiring routine foot care. Nails are elongated and thickened on feet, calluses/corns present on both feet. Diagnosis is corn/callus and onychomycosis. Nails and calluses/corns were reduced and trimmed bilaterally. Patient tolerated well.     Continue lambs wool as needed R 4th interdigital space. Continue daily.     I warned patient of signs and symptoms of infection including redness, drainage, purulence, odor, streaking, fever, chills, etc and I advised her to seek medical attention (ER or urgent car) if these symptoms arise.     RTC 5 weeks Proc B, sooner PRN

## 2018-09-14 ENCOUNTER — CLINICAL SUPPORT (OUTPATIENT)
Dept: INTERNAL MEDICINE | Facility: CLINIC | Age: 73
End: 2018-09-14
Payer: MEDICARE

## 2018-09-14 PROCEDURE — 96372 THER/PROPH/DIAG INJ SC/IM: CPT | Mod: PBBFAC

## 2018-09-14 RX ADMIN — CYANOCOBALAMIN 1000 MCG: 1000 INJECTION, SOLUTION INTRAMUSCULAR at 02:09

## 2018-09-20 DIAGNOSIS — I50.22 CHRONIC SYSTOLIC HEART FAILURE: ICD-10-CM

## 2018-09-20 DIAGNOSIS — I42.8 OTHER CARDIOMYOPATHY: ICD-10-CM

## 2018-09-20 NOTE — TELEPHONE ENCOUNTER
----- Message from Desi Joseph sent at 9/20/2018  8:41 AM CDT -----  Contact: Pt called   Pt need a refill on medication sacubitril-valsartan (ENTRESTO) 24-26 mg per tablet and send to [facility] [phone] (Phone)[phone] (Fax). Also pt has enuff for her last dose for tonight. Last visit 6/4/18 Dr. Mabry. Thank you.

## 2018-09-24 ENCOUNTER — HOSPITAL ENCOUNTER (OUTPATIENT)
Dept: RADIOLOGY | Facility: HOSPITAL | Age: 73
Discharge: HOME | End: 2018-09-24
Attending: INTERNAL MEDICINE
Payer: MEDICARE

## 2018-09-24 ENCOUNTER — IMMUNIZATION (OUTPATIENT)
Dept: INTERNAL MEDICINE | Facility: CLINIC | Age: 73
End: 2018-09-24
Payer: MEDICARE

## 2018-09-24 ENCOUNTER — OFFICE VISIT (OUTPATIENT)
Dept: INTERNAL MEDICINE | Facility: CLINIC | Age: 73
End: 2018-09-24
Payer: MEDICARE

## 2018-09-24 VITALS
SYSTOLIC BLOOD PRESSURE: 110 MMHG | BODY MASS INDEX: 28.16 KG/M2 | HEIGHT: 67 IN | HEART RATE: 60 BPM | DIASTOLIC BLOOD PRESSURE: 70 MMHG | WEIGHT: 179.38 LBS

## 2018-09-24 DIAGNOSIS — M25.521 RIGHT ELBOW PAIN: Primary | ICD-10-CM

## 2018-09-24 DIAGNOSIS — I42.0 DILATED CARDIOMYOPATHY: ICD-10-CM

## 2018-09-24 DIAGNOSIS — M25.521 RIGHT ELBOW PAIN: ICD-10-CM

## 2018-09-24 DIAGNOSIS — I10 ESSENTIAL HYPERTENSION: ICD-10-CM

## 2018-09-24 PROCEDURE — 1101F PT FALLS ASSESS-DOCD LE1/YR: CPT | Mod: CPTII,,, | Performed by: INTERNAL MEDICINE

## 2018-09-24 PROCEDURE — 99213 OFFICE O/P EST LOW 20 MIN: CPT | Mod: PBBFAC,25 | Performed by: INTERNAL MEDICINE

## 2018-09-24 PROCEDURE — 73080 X-RAY EXAM OF ELBOW: CPT | Mod: 26,RT,, | Performed by: RADIOLOGY

## 2018-09-24 PROCEDURE — 99214 OFFICE O/P EST MOD 30 MIN: CPT | Mod: S$PBB,,, | Performed by: INTERNAL MEDICINE

## 2018-09-24 PROCEDURE — 90662 IIV NO PRSV INCREASED AG IM: CPT | Mod: PBBFAC

## 2018-09-24 PROCEDURE — 3074F SYST BP LT 130 MM HG: CPT | Mod: CPTII,,, | Performed by: INTERNAL MEDICINE

## 2018-09-24 PROCEDURE — 73080 X-RAY EXAM OF ELBOW: CPT | Mod: TC,RT

## 2018-09-24 PROCEDURE — 99999 PR PBB SHADOW E&M-EST. PATIENT-LVL III: CPT | Mod: PBBFAC,,, | Performed by: INTERNAL MEDICINE

## 2018-09-24 PROCEDURE — 3078F DIAST BP <80 MM HG: CPT | Mod: CPTII,,, | Performed by: INTERNAL MEDICINE

## 2018-09-24 RX ORDER — VIT C/E/ZN/COPPR/LUTEIN/ZEAXAN 250MG-90MG
1000 CAPSULE ORAL DAILY
COMMUNITY

## 2018-09-24 NOTE — PROGRESS NOTES
"Chief Complaint: right elbow pain     HPI: this is a 72year old woman who presents to right elbow pain and tenderness for the last month. Area is swollen and she has a nodule in the area that she is worried about. She has tingling in the right hand at times. .  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  She is now taking coreg 6.25.mg twice daily and entresto 24/26 twice daily for her heart. She feels much better since starting ton the Entresto.  She no longer has skipped beats. She now takes lasix 40 mg very sporadically.  No chest pain, shortness of breath.  She has some mild CHF with an EF of 40-45% on echo 6/2018 and diastolic dysfunction.     She takes vitamin D 1000 units daily.      She gets a vitamin B12 injection monthly  And energy level is better.           Past Medical History:   Diagnosis Date    Arrhythmia      Chest pain 5/27/2016     3/2016: Began experience chest discomfort.    CHF (congestive heart failure)      Hypertension      Osteopenia       Reclast infusion 10/5/2010 and 10/20/2011               Past Surgical History:   Procedure Laterality Date    ANKLE FUSION         right    bladder surgery         with mesh    BLEPHAROPLASTY W/ LASER        HAND SURGERY         benign cyst on left    HYSTERECTOMY         heavy bleeding    PATELLA FRACTURE SURGERY         right    TONSILLECTOMY          Meds and allergies: updated on [facility]        Physical exam:  /70 (BP Location: Right arm, Patient Position: Sitting, BP Method: Medium (Manual))   Pulse 60   Ht 5' 7" (1.702 m)   Wt 81.4 kg (179 lb 6.4 oz)   LMP  (LMP Unknown) Comment: refused weight   BMI 28.10 kg/m²       General: alert, oriented x 3, no apparent distress.  Affect normal  HEENT: Conjunctivae: anicteric, PERRL, EOMI, TM clear, Oralpharynx clear  Neck: supple, no thyroid enlargement, no cervical lymphadenopathy  Resp: effort normal, lungs clear bilaterally  CV: Regular " rate and rhythm without murmurs, gallops or rubs, no lower extremity edema,  Calcified nodule of the lateral aspect of the right elbow     Assessment/Plan:  Right elbow nodule and tenderness - suspect calcified tendon.  Xray right elbow and to ortho  chf - controlled  HTN - controlled  arrythmia - controlled  Osteopenia - BMD due 4/2019  MMG 4/18  Colonoscoy normal 4/2019 due 4/2019 - [employer] negative 4/23/18     She is to let me know if no improvement or worsen.

## 2018-10-04 ENCOUNTER — OFFICE VISIT (OUTPATIENT)
Dept: ORTHOPEDICS | Facility: CLINIC | Age: 73
End: 2018-10-04
Payer: MEDICARE

## 2018-10-04 VITALS
SYSTOLIC BLOOD PRESSURE: 135 MMHG | HEIGHT: 67 IN | HEART RATE: 71 BPM | WEIGHT: 179.44 LBS | BODY MASS INDEX: 28.16 KG/M2 | DIASTOLIC BLOOD PRESSURE: 96 MMHG

## 2018-10-04 DIAGNOSIS — R22.31 ELBOW MASS, RIGHT: Primary | ICD-10-CM

## 2018-10-04 PROCEDURE — 99203 OFFICE O/P NEW LOW 30 MIN: CPT | Mod: S$PBB,,, | Performed by: ORTHOPAEDIC SURGERY

## 2018-10-04 PROCEDURE — 3080F DIAST BP >= 90 MM HG: CPT | Mod: CPTII,,, | Performed by: ORTHOPAEDIC SURGERY

## 2018-10-04 PROCEDURE — 3075F SYST BP GE 130 - 139MM HG: CPT | Mod: CPTII,,, | Performed by: ORTHOPAEDIC SURGERY

## 2018-10-04 PROCEDURE — 1101F PT FALLS ASSESS-DOCD LE1/YR: CPT | Mod: CPTII,,, | Performed by: ORTHOPAEDIC SURGERY

## 2018-10-04 PROCEDURE — 99213 OFFICE O/P EST LOW 20 MIN: CPT | Mod: PBBFAC | Performed by: ORTHOPAEDIC SURGERY

## 2018-10-04 PROCEDURE — 99999 PR PBB SHADOW E&M-EST. PATIENT-LVL III: CPT | Mod: PBBFAC,,, | Performed by: ORTHOPAEDIC SURGERY

## 2018-10-04 NOTE — PROGRESS NOTES
[facility]  History & Physical  Orthopedics    SUBJECTIVE:      Chief Complaint: Right elbow mass    Referring Provider: Self, Aaareferral     History of Present Illness:  Patient is a 73 y.o. ambidextrous female who presents today with complaints of right elbow mass with some associated mild pain.  The mass has enlarged since that time and is somewhat painful.    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    Onset of symptoms/DOI was 1 month ago.    Symptoms are aggravated by palpation.    Symptoms are alleviated by rest.    Symptoms consist of pain.    The patient rates their pain as a mild.    Attempted treatment(s) and/or interventions include anti-inflammatory medications.     The patient denies any fevers, chills, N/V, D/C and presents for evaluation.       Past Medical History:   Diagnosis Date    Arrhythmia     Chest pain 5/27/2016    3/2016: Began experience chest discomfort.    CHF (congestive heart failure)     Hypertension     Osteopenia     Reclast infusion 10/5/2010 and 10/20/2011     Past Surgical History:   Procedure Laterality Date    ABLATION N/A 9/27/2016    Performed by Norbert Lemons [facility] at [facility] CATH LAB    ANKLE FUSION      right    bladder surgery      with mesh    BLEPHAROPLASTY W/ LASER      CATARACT EXTRACTION, BILATERAL      CORRECTION, HAMMER TOE Right 8/30/2013    Performed by Hiren Moore DPM at [facility]    HAND SURGERY      benign cyst on left    HEART CATH-LEFT AND Right Heart cath Left 6/1/2016    Performed by Jet Jacinto MD at [facility] CATH LAB    HYSTERECTOMY      heavy bleeding    PATELLA FRACTURE SURGERY      right- plate in tibial plateau    TONSILLECTOMY       Review of patient's allergies indicates:   Allergen Reactions    Dilaudid [hydromorphone (bulk)]      Severe nausea/vomiting    Morphine      Severe nausea/vomiting     Social History     Social History Narrative    . Runs 's  law firm. Federal . She is an emigrant from [place].     Family History   Problem Relation Age of Onset    Heart disease Mother 63    Stroke Father 49    Heart disease Father     Early death Father     Cancer Maternal Aunt         bone    Cancer Maternal Uncle         esophageal    Heart disease Paternal Uncle     SIDS Daughter     Breast cancer Neg Hx     Ovarian cancer Neg Hx     Cervical cancer Neg Hx     Endometrial cancer Neg Hx     Vaginal cancer Neg Hx          Current Outpatient Medications:     amiodarone (PACERONE) 200 MG Tab, Take 1 tablet (200 mg total) by mouth once daily., Disp: 30 tablet, Rfl: 6    aspirin (ECOTRIN) 81 MG EC tablet, Take 81 mg by mouth once daily., Disp: , Rfl:     calcium carbonate (OS-HENNA) 600 mg calcium (1,500 mg) Tab, Take 1,200 mg by mouth 2 (two) times daily with meals. , Disp: , Rfl:     carvedilol (COREG) 6.25 MG tablet, Take 1 tablet (6.25 mg total) by mouth 2 (two) times daily with meals., Disp: 120 tablet, Rfl: 3    cholecalciferol, vitamin D3, (VITAMIN D3) 1,000 unit capsule, Take 1,000 Units by mouth once daily., Disp: , Rfl:     co-enzyme Q-10 30 mg capsule, Take 10 mg by mouth once daily. , Disp: , Rfl:     conjugated estrogens (PREMARIN) vaginal cream, Dime-sized amount with finger inside vagina (to knuckle)/around opening/inner lips.  Do nightly x 2 weeks, then 2x/week thereafter., Disp: 30 g, Rfl: 11    cyanocobalamin (VITAMIN B-12) 1,000 mcg/mL injection, 1 ml IM weekly for 4 weeks then monthly.  Dispense #10 25 gague 1/2 inch needles and #10 1 ml syringes, Disp: 10 mL, Rfl: 3    fish oil-omega-3 fatty acids 300-1,000 mg capsule, Take 2 g by mouth 2 (two) times daily. , Disp: , Rfl:     furosemide (LASIX) 40 MG tablet, TAKE ONE TABLET BY MOUTH ONCE DAILY AS NEEDED FOR weight gain, Disp: 30 tablet, Rfl: 11    meloxicam (MOBIC) 15 MG tablet, Take 15 mg by mouth once daily., Disp: , Rfl:     MULTIVITAMIN WITH MINERALS (HAIR,SKIN  "AND NAILS ORAL), Take by mouth once daily., Disp: , Rfl:     sacubitril-valsartan (ENTRESTO) 24-26 mg per tablet, Take 1 tablet by mouth 2 (two) times daily., Disp: 60 tablet, Rfl: 6    VIT A/VIT C/VIT E/ZINC/COPPER (PRESERVISION AREDS ORAL), Take by mouth 2 (two) times daily., Disp: , Rfl:     vit C/E/Zn/coppr/lutein/zeaxan (PRESERVISION AREDS 2 ORAL), Take by mouth once daily., Disp: , Rfl:     vitamin D 1000 units Tab, Take 5,000 Units by mouth once daily. , Disp: , Rfl:     Current Facility-Administered Medications:     cyanocobalamin injection 1,000 mcg, 1,000 mcg, Intramuscular, Q30 Days, Sri Gutierrez MD, 1,000 mcg at 09/14/18 1417      Review of Systems:  Constitutional: no fever or chills  Eyes: no visual changes  ENT: no nasal congestion or sore throat  Respiratory: no cough or shortness of breath  Cardiovascular: no chest pain  Gastrointestinal: no nausea or vomiting, tolerating diet  Musculoskeletal: arthralgias, myalgias, pain and mass    OBJECTIVE:      Vital Signs (Most Recent):  Vitals:    10/04/18 0814   BP: (!) 135/96   Pulse: 71   Weight: 81.4 kg (179 lb 7.3 oz)   Height: 5' 7" (1.702 m)     Body mass index is 28.11 kg/m².      Physical Exam:  Constitutional: The patient appears well-developed and well-nourished. No distress.   Head: Normocephalic and atraumatic.   Nose: Nose normal.   Eyes: Conjunctivae and EOM are normal.   Neck: No tracheal deviation present.   Cardiovascular: Normal rate and intact distal pulses.    Pulmonary/Chest: Effort normal. No respiratory distress.   Abdominal: There is no guarding.   Neurological: The patient is alert.   Psychiatric: The patient has a normal mood and affect.     Right Hand/Wrist Examination:    Observation/Inspection:  Swelling  mild    Deformity  none  Discoloration  none     Scars   none    Atrophy  None  Mass - ~2cm mass over lateral elbow, no overlying skin changes, mobile    HAND/WRIST EXAMINATION:  Finkelstein's Test   Neg  Snuff box " tenderness   Neg  Snow's Test    Neg  Hook of Hamate Tenderness  Neg  CMC grind    Mild pos  Circumduction test   Mild pos    Neurovascular Exam:  Digits WWP, brisk CR < 3s throughout  NVI motor/LTS to M/R/U nerves, radial pulse 2+  Tinel's Test - Carpal Tunnel  Neg  Tinel's Test - Cubital Tunnel  Neg  Phalen's Test    Neg  Median Nerve Compression Test Neg    ROM hand/wrist/elbow full    TENDERNESS / CREPITUS (T / C):           T / C        Medial epicondyle   - / -    Med. (Ulnar) collateral ligament  - / -    Flexor pronator Musculature   - / -   Biceps tendon    - / -   Head of radius    - / -    Lateral epicondyle   + / -    Extensor Musculature   - / -   Brachioradialis   - / -   Triceps tendon   - / -   Triceps muscle   - / -   Olecranon    - / -   Olecranon bursa   - / -   Cubital fossa    - / -   Anterior jointline   - / -   Radial tunnel    - / -             ROM: ('*' = with pain)    Right Elbow  AROM (PROM)     Extension   0 deg  (5 deg)   Flexion   145 deg (145 deg)         Pronation  90 deg  (90 deg)      Supination   80 deg  (80 deg)                 Left Elbow  AROM (PROM)     Extension   0 deg  (5 deg)   Flexion   145 deg (145 deg)         Pronation  90 deg  (90 deg)      Supination   80 deg  (80 deg)      ROM: ('*' = with pain)   Elbow Flexion:   5/5  Elbow Extension:  5/5    ELBOW EXAMINATION:  Tinel's (Percussion) Test - Cubital  neg  Tennis Elbow Test    +  Golfer's Elbow Test    neg  Resisted Long Finger Extension Pain +      Diagnostic Results:     Xray - no soft tissue calcification, no acute findings      ASSESSMENT/PLAN:      73 y.o. yo female with right elbow mass and lateral epicondylitis  1) Stretches for lateral epicondylitis advised  2) MRI right elbow for further evaluation of mass  3) NSAIDs prn pain  4) Does not want OT referral or tennis elbow strap today  5) F/U after MRI for reevaluation        Cameron Warner M.D.

## 2018-10-12 ENCOUNTER — CLINICAL SUPPORT (OUTPATIENT)
Dept: INTERNAL MEDICINE | Facility: CLINIC | Age: 73
End: 2018-10-12
Payer: MEDICARE

## 2018-10-12 ENCOUNTER — TELEPHONE (OUTPATIENT)
Dept: INTERNAL MEDICINE | Facility: CLINIC | Age: 73
End: 2018-10-12

## 2018-10-12 DIAGNOSIS — I10 ESSENTIAL HYPERTENSION: Primary | ICD-10-CM

## 2018-10-12 DIAGNOSIS — E55.9 VITAMIN D DEFICIENCY DISEASE: ICD-10-CM

## 2018-10-12 DIAGNOSIS — E53.8 VITAMIN B12 DEFICIENCY: ICD-10-CM

## 2018-10-12 PROCEDURE — 96372 THER/PROPH/DIAG INJ SC/IM: CPT | Mod: PBBFAC

## 2018-10-12 RX ADMIN — CYANOCOBALAMIN 1000 MCG: 1000 INJECTION, SOLUTION INTRAMUSCULAR at 02:10

## 2018-10-12 NOTE — TELEPHONE ENCOUNTER
Vitamin D level was mildly low at 23.   Vitamin D 1000 units once daily should be sufficient  Will recheck vitamin D level with her next routine blood work    She is due to see me in January with labs prior - please book

## 2018-10-12 NOTE — TELEPHONE ENCOUNTER
Pt came in for nurse visit, had question about vitamin D levels, remembers PCP saying something about vitamin D levels being low. Last blood work 1/2018. Thinks she takes 1000 vitamin D. Would like to know if repeat vitamin D lab needs to be done to check levels. Please advise

## 2018-10-24 ENCOUNTER — OFFICE VISIT (OUTPATIENT)
Dept: PODIATRY | Facility: CLINIC | Age: 73
End: 2018-10-24
Payer: MEDICARE

## 2018-10-24 VITALS
DIASTOLIC BLOOD PRESSURE: 77 MMHG | HEIGHT: 67 IN | WEIGHT: 179 LBS | SYSTOLIC BLOOD PRESSURE: 113 MMHG | BODY MASS INDEX: 28.09 KG/M2 | HEART RATE: 68 BPM

## 2018-10-24 DIAGNOSIS — B35.1 ONYCHOMYCOSIS DUE TO DERMATOPHYTE: ICD-10-CM

## 2018-10-24 DIAGNOSIS — L84 CORN OR CALLUS: ICD-10-CM

## 2018-10-24 PROCEDURE — 99999 PR PBB SHADOW E&M-EST. PATIENT-LVL III: CPT | Mod: PBBFAC,,, | Performed by: PODIATRIST

## 2018-10-24 PROCEDURE — 17999 UNLISTD PX SKN MUC MEMB SUBQ: CPT | Mod: CSM,,, | Performed by: PODIATRIST

## 2018-10-24 PROCEDURE — 99213 OFFICE O/P EST LOW 20 MIN: CPT | Mod: PBBFAC | Performed by: PODIATRIST

## 2018-10-24 NOTE — PROGRESS NOTES
Patient presents to the clinic for non-covered routine foot care. Patient is not a high risk foot care patient. Patient understands this is not typically a covered service and patient is aware of responsibility of payment. Pedal pulses are palpable. No know risk factors requiring routine foot care. Nails are elongated and thickened on feet, calluses/corns present on both feet. Diagnosis is corn/callus and onychomycosis. Nails and calluses/corns were reduced and trimmed bilaterally. Patient tolerated well.     RTC 5 weeks Proc B, sooner PRN

## 2018-10-29 ENCOUNTER — OFFICE VISIT (OUTPATIENT)
Dept: CARDIOLOGY | Facility: CLINIC | Age: 73
End: 2018-10-29
Payer: MEDICARE

## 2018-10-29 VITALS
DIASTOLIC BLOOD PRESSURE: 80 MMHG | BODY MASS INDEX: 28.58 KG/M2 | HEART RATE: 70 BPM | WEIGHT: 182.13 LBS | HEIGHT: 67 IN | SYSTOLIC BLOOD PRESSURE: 124 MMHG

## 2018-10-29 DIAGNOSIS — Z86.79 S/P RADIOFREQUENCY ABLATION OPERATION FOR ARRHYTHMIA: Primary | ICD-10-CM

## 2018-10-29 DIAGNOSIS — I34.0 NON-RHEUMATIC MITRAL REGURGITATION: ICD-10-CM

## 2018-10-29 DIAGNOSIS — I10 ESSENTIAL HYPERTENSION: ICD-10-CM

## 2018-10-29 DIAGNOSIS — I42.8 NON-ISCHEMIC CARDIOMYOPATHY: ICD-10-CM

## 2018-10-29 DIAGNOSIS — I49.3 VPB (VENTRICULAR PREMATURE BEAT): ICD-10-CM

## 2018-10-29 DIAGNOSIS — Z98.890 S/P RADIOFREQUENCY ABLATION OPERATION FOR ARRHYTHMIA: Primary | ICD-10-CM

## 2018-10-29 PROCEDURE — 3074F SYST BP LT 130 MM HG: CPT | Mod: CPTII,,, | Performed by: INTERNAL MEDICINE

## 2018-10-29 PROCEDURE — 99214 OFFICE O/P EST MOD 30 MIN: CPT | Mod: PBBFAC | Performed by: INTERNAL MEDICINE

## 2018-10-29 PROCEDURE — 99999 PR PBB SHADOW E&M-EST. PATIENT-LVL IV: CPT | Mod: PBBFAC,,, | Performed by: INTERNAL MEDICINE

## 2018-10-29 PROCEDURE — 99214 OFFICE O/P EST MOD 30 MIN: CPT | Mod: S$PBB,,, | Performed by: INTERNAL MEDICINE

## 2018-10-29 PROCEDURE — 1101F PT FALLS ASSESS-DOCD LE1/YR: CPT | Mod: CPTII,,, | Performed by: INTERNAL MEDICINE

## 2018-10-29 PROCEDURE — 3079F DIAST BP 80-89 MM HG: CPT | Mod: CPTII,,, | Performed by: INTERNAL MEDICINE

## 2018-10-29 NOTE — PROGRESS NOTES
"Subjective:   Patient ID:  Rizwana Block is a 73 y.o. female who presents for follow-up of Hypertension  Ms. Block is a 71yo female with a history of frequent PVCs (s/p RFA 9/27/2016), cardiomyopathy (EF 35%) now  normalized, HFrEF, mild MR, and HTN      Echo 2018:    1 - Mildly to moderately depressed left ventricular systolic function (EF 40-45%).     2 - Wall motion abnormalities.     3 - Impaired LV relaxation, normal LAP (grade 1 diastolic dysfunction).     4 - Low normal right ventricular systolic function .     5 - Trivial to mild tricuspid regurgitation.     6 - Trivial pulmonic regurgitation.     7 - Atrial septal aneurysm .     8 - The estimated PA systolic pressure is 29 mmHg.      HPI:   Feels well. No complaints  No chest pain, Orthopnea, PND of heart failure symptoms.   Denies palpitations or fluttering in the chest      Patient Active Problem List   Diagnosis    Ventricular premature beats    Hammertoe    Palpitations    Costochondritis    Heart failure, systolic    Precordial pain    Bradycardia    Cardiomyopathy    Non-rheumatic mitral regurgitation    Essential hypertension    S/P colonoscopy    Osteopenia of spine    Pure hypercholesterolemia    At risk for amiodarone toxicity with long term use    Dysuria    Vaginal atrophy    Vaginal burning    Open wound of right foot     /80   Pulse 70   Ht 5' 7" (1.702 m)   Wt 82.6 kg (182 lb 1.6 oz)   LMP  (LMP Unknown) Comment: refused weight   BMI 28.52 kg/m²   Body mass index is 28.52 kg/m².  CrCl cannot be calculated (Patient's most recent lab result is older than the maximum 7 days allowed.).    Lab Results   Component Value Date     04/09/2018    K 4.4 04/09/2018     04/09/2018    CO2 34 (H) 04/09/2018    BUN 26 (H) 04/09/2018    CREATININE 0.9 04/09/2018     04/09/2018    MG 2.1 06/02/2016    AST 17 01/19/2018    ALT 13 01/19/2018    ALBUMIN 3.9 01/19/2018    PROT 6.9 01/19/2018    BILITOT 0.3 " 01/19/2018    WBC 6.76 01/19/2018    HGB 12.1 01/19/2018    HCT 38.0 01/19/2018    MCV 95 01/19/2018     01/19/2018    INR 0.9 09/16/2016    TSH 1.454 01/19/2018    CHOL 221 (H) 07/03/2017    HDL 75 07/03/2017    LDLCALC 130.8 07/03/2017    TRIG 76 07/03/2017       Current Outpatient Medications   Medication Sig    amiodarone (PACERONE) 200 MG Tab Take 1 tablet (200 mg total) by mouth once daily.    aspirin (ECOTRIN) 81 MG EC tablet Take 81 mg by mouth once daily.    calcium carbonate (OS-HENNA) 600 mg calcium (1,500 mg) Tab Take 1,200 mg by mouth 2 (two) times daily with meals.     carvedilol (COREG) 6.25 MG tablet Take 1 tablet (6.25 mg total) by mouth 2 (two) times daily with meals.    cholecalciferol, vitamin D3, (VITAMIN D3) 1,000 unit capsule Take 1,000 Units by mouth once daily.    co-enzyme Q-10 30 mg capsule Take 10 mg by mouth once daily.     conjugated estrogens (PREMARIN) vaginal cream Dime-sized amount with finger inside vagina (to knuckle)/around opening/inner lips.  Do nightly x 2 weeks, then 2x/week thereafter.    cyanocobalamin (VITAMIN B-12) 1,000 mcg/mL injection 1 ml IM weekly for 4 weeks then monthly.  Dispense #10 25 gague 1/2 inch needles and #10 1 ml syringes    fish oil-omega-3 fatty acids 300-1,000 mg capsule Take 2 g by mouth 2 (two) times daily.     furosemide (LASIX) 40 MG tablet TAKE ONE TABLET BY MOUTH ONCE DAILY AS NEEDED FOR weight gain    MULTIVITAMIN WITH MINERALS (HAIR,SKIN AND NAILS ORAL) Take by mouth once daily.    sacubitril-valsartan (ENTRESTO) 24-26 mg per tablet Take 1 tablet by mouth 2 (two) times daily.    VIT A/VIT C/VIT E/ZINC/COPPER (PRESERVISION AREDS ORAL) Take by mouth 2 (two) times daily.    vit C/E/Zn/coppr/lutein/zeaxan (PRESERVISION AREDS 2 ORAL) Take by mouth once daily.    vitamin D 1000 units Tab Take 5,000 Units by mouth once daily.     meloxicam (MOBIC) 15 MG tablet Take 15 mg by mouth once daily.     Current Facility-Administered  Medications   Medication    cyanocobalamin injection 1,000 mcg       Review of Systems   Constitution: Positive for malaise/fatigue and weight gain. Negative for decreased appetite, weakness and weight loss.   HENT: Negative for nosebleeds.    Eyes: Negative for blurred vision and visual disturbance.   Cardiovascular: Negative for chest pain, claudication, cyanosis, dyspnea on exertion, irregular heartbeat, leg swelling, near-syncope, orthopnea, palpitations, paroxysmal nocturnal dyspnea and syncope.   Respiratory: Negative for cough, shortness of breath and wheezing.    Endocrine: Negative for heat intolerance.   Skin: Negative for rash.   Musculoskeletal: Positive for joint pain. Negative for muscle weakness and myalgias.   Gastrointestinal: Negative for abdominal pain, anorexia, melena, nausea and vomiting.   Genitourinary: Negative for menorrhagia.   Neurological: Negative for excessive daytime sleepiness, dizziness, headaches, loss of balance, seizures and vertigo.   Psychiatric/Behavioral: Negative for altered mental status and depression. The patient is not nervous/anxious.        Objective:   Physical Exam   Constitutional: She is oriented to person, place, and time. She appears well-developed and well-nourished.   HENT:   Head: Normocephalic and atraumatic.   Right Ear: External ear normal.   Left Ear: External ear normal.   Neck: Normal range of motion. Neck supple. No thyromegaly present.   Cardiovascular: Normal rate, regular rhythm, normal heart sounds and intact distal pulses. Exam reveals no gallop, no S3, no S4, no friction rub, no midsystolic click and no opening snap.   No murmur heard.  Pulses:       Carotid pulses are 2+ on the right side, and 2+ on the left side.       Radial pulses are 2+ on the right side, and 2+ on the left side.        Dorsalis pedis pulses are 2+ on the right side, and 2+ on the left side.        Posterior tibial pulses are 2+ on the right side, and 2+ on the left side.    Pulmonary/Chest: Effort normal and breath sounds normal.   Abdominal: Soft. She exhibits no distension. There is no tenderness.   Musculoskeletal:        Right ankle: She exhibits no swelling.        Left ankle: She exhibits no swelling.        Right lower leg: She exhibits no swelling.        Left lower leg: She exhibits no swelling.   Neurological: She is alert and oriented to person, place, and time. She has normal strength. No cranial nerve deficit. Gait normal.   Skin: Skin is warm. No rash noted. No cyanosis. Nails show no clubbing.   Psychiatric: She has a normal mood and affect. Her speech is normal and behavior is normal. Thought content normal. Cognition and memory are normal.   Nursing note and vitals reviewed.      Assessment:     1. S/P radiofrequency ablation operation for arrhythmia    2. Non-rheumatic mitral regurgitation    3. Essential hypertension    4. VPB (ventricular premature beat)    5. Non-ischemic cardiomyopathy        Plan:   Continue medical treatment. BP and HR well controlled. Per Dr. Arriaga amiodarone is to be continued. My plan is to increase Coreg and then entresto on the next visit.  Counseled on importance of heart healthy diet low in saturated and trans fat and salt as well gradually starting a regular aerobic exercise regimen with goal of 30min 5x/week. Recommend BP diary. Call if systolic BP > 130 mmHg on checking repeatedly  We discussed weight loss and balance diet and recommended AHA website on food sources and balance diet.     RTC 6 months.

## 2018-10-30 ENCOUNTER — PATIENT MESSAGE (OUTPATIENT)
Dept: ADMINISTRATIVE | Facility: OTHER | Age: 73
End: 2018-10-30

## 2018-11-08 ENCOUNTER — PATIENT MESSAGE (OUTPATIENT)
Dept: CARDIOLOGY | Facility: CLINIC | Age: 73
End: 2018-11-08

## 2018-11-13 ENCOUNTER — PATIENT MESSAGE (OUTPATIENT)
Dept: ORTHOPEDICS | Facility: CLINIC | Age: 73
End: 2018-11-13

## 2018-11-13 ENCOUNTER — TELEPHONE (OUTPATIENT)
Dept: ORTHOPEDICS | Facility: CLINIC | Age: 73
End: 2018-11-13

## 2018-11-13 NOTE — TELEPHONE ENCOUNTER
Attempted to reschedule MRI. Order . Informed patient that I will request reorder from Dr. Warner then call her to reschedule. Patient verbalized understanding.

## 2018-11-16 ENCOUNTER — CLINICAL SUPPORT (OUTPATIENT)
Dept: INTERNAL MEDICINE | Facility: CLINIC | Age: 73
End: 2018-11-16
Payer: MEDICARE

## 2018-11-16 ENCOUNTER — PATIENT MESSAGE (OUTPATIENT)
Dept: INTERNAL MEDICINE | Facility: CLINIC | Age: 73
End: 2018-11-16

## 2018-11-16 ENCOUNTER — PATIENT MESSAGE (OUTPATIENT)
Dept: ORTHOPEDICS | Facility: CLINIC | Age: 73
End: 2018-11-16

## 2018-11-16 PROCEDURE — 96372 THER/PROPH/DIAG INJ SC/IM: CPT | Mod: HCNC,S$GLB,, | Performed by: INTERNAL MEDICINE

## 2018-11-16 RX ADMIN — CYANOCOBALAMIN 1000 MCG: 1000 INJECTION, SOLUTION INTRAMUSCULAR at 02:11

## 2018-11-19 ENCOUNTER — PATIENT MESSAGE (OUTPATIENT)
Dept: PODIATRY | Facility: CLINIC | Age: 73
End: 2018-11-19

## 2018-11-19 ENCOUNTER — PATIENT OUTREACH (OUTPATIENT)
Dept: OTHER | Facility: OTHER | Age: 73
End: 2018-11-19

## 2018-11-19 DIAGNOSIS — R22.31 ELBOW MASS, RIGHT: Primary | ICD-10-CM

## 2018-11-19 NOTE — PROGRESS NOTES
Last 5 Patient Entered Readings                                      Current 30 Day Average: 132/84     Recent Readings 11/10/2018 11/10/2018 11/7/2018 11/6/2018 11/2/2018    SBP (mmHg) 155 161 147 136 133    DBP (mmHg) 84 90 87 85 77    Pulse 60 60 66 70 66            Digital Medicine: Health  Follow Up    Left voicemail to follow up with Mrs. Rizwana VILLAGRAN Umair.  Current BP average 132/84 mmHg is not at goal, <130/80.  Called to introduce myself to the patient as her HC and to review the HTN program with her.

## 2018-11-19 NOTE — PROGRESS NOTES
Last 5 Patient Entered Readings                                      Current 30 Day Average: 132/84     Recent Readings 11/10/2018 11/10/2018 11/7/2018 11/6/2018 11/2/2018    SBP (mmHg) 155 161 147 136 133    DBP (mmHg) 84 90 87 85 77    Pulse 60 60 66 70 66          Digital Medicine: Health  Introduction    Introduced Mrs. Rizwana Block to Digital Medicine. Discussed health  role and recommended lifestyle modifications.    Lifestyle Assessment:  Current Dietary Habits(i.e. low sodium, food labels, dining out): Patient and I spoke briefly about her low sodium diet but she stated that she tries to eat very healthy and eats a lot of veggies but her weakness is bread. She does read food labels when shopping and found a lower sodium wheat bread option. Will continue to discuss this with her and review brand swapping etc.   Exercise: Deferred - will discuss during our next call.   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 She stated that we can call any time of the day and if she does not answer, to [facility] and she will call back.   We reviewed as much information as we could before the patient had to hang up.     Patient has been taking her readings before taking her BP medication in the morning. I requested that she take her BP medication and then wait at least one hour or more before taking her BP reading. Patient will start taking her reading when she gets home from work. (She takes her medication in the morning with breakfast and before bed).     Patient mentioned that she does experience some heart palpitations throughout the day, mainly when her BP is higher then normal. She is thinking that it may be time for a medication adjustment.     Reviewed AHA/AACE recommendations:  Limit sodium intake to <2000mg/day  Perform 150 minutes of physical activity per week    Reviewed the importance of  self-monitoring, medication adherence, and that the health  can be used as a resource for lifestyle modifications to help reduce or maintain a healthy lifestyle.  Reviewed that the Digital Medicine team is not available for emergencies and instructed the patient to call 911 or [facility] ([phone] or [phone]) if one arises.

## 2018-11-19 NOTE — LETTER
Mayelin Pierre, PharmD  [address] [place] [address]     Dear Rizwana Block,    Welcome to the [facility]!           My name is Mayelin Pierre PharmD and I am your dedicated Digital Medicine clinician.  As an expert in medication management, I will help ensure that the medications you are taking continue to provide you with the intended benefits.        I am Brenna Shaffer and I will be your health  for the duration of the program.  My  job is to help you identify lifestyle changes to improve your blood pressure control.  We will talk about nutrition, exercise, and other ways that you may be able to adjust your current habits to better your health. Together, we will work to improve your overall health and encourage you to meet your goals for a healthier lifestyle.    What we expect from YOU:    You will need to take blood pressure readings multiple times a week and no less than one reading per week.   It is important that you take your measurements at different times during the day, when possible.     What you should expect from your Digital Medicine Care Team:   We will provide you with education about high blood pressure, including lifestyle changes that could help you to control your blood pressure.   We will review your weekly readings and provide you with monthly blood pressure progress reports after you have been in the program for more than 30 days.   We will send monthly progress reports on your blood pressure control to your physician so they can follow along with your progress as well.    You will be able to reach me by phone at [phone] or through your [facility] account by clicking my name under Care Team on the right side of the home screen.    I look forward to working with you to achieve your blood pressure goals!    Sincerely,    Mayelin Pierre PharmD  Your personal clinician    Please visit  [email or web address] to learn more about high blood pressure and what you can do lower your blood pressure.                                                                                           Rizwana Block  [address] [place] [id number]

## 2018-11-20 ENCOUNTER — PATIENT MESSAGE (OUTPATIENT)
Dept: ORTHOPEDICS | Facility: CLINIC | Age: 73
End: 2018-11-20

## 2018-11-20 ENCOUNTER — PATIENT MESSAGE (OUTPATIENT)
Dept: PODIATRY | Facility: CLINIC | Age: 73
End: 2018-11-20

## 2018-11-20 RX ORDER — GABAPENTIN 100 MG/1
100 CAPSULE ORAL NIGHTLY
Qty: 30 CAPSULE | Refills: 3 | Status: SHIPPED | OUTPATIENT
Start: 2018-11-20 | End: 2019-01-02 | Stop reason: SDUPTHER

## 2018-11-26 ENCOUNTER — PATIENT OUTREACH (OUTPATIENT)
Dept: OTHER | Facility: OTHER | Age: 73
End: 2018-11-26

## 2018-11-26 NOTE — PROGRESS NOTES
Last 5 Patient Entered Readings                                      Current 30 Day Average: 130/83     Recent Readings 11/24/2018 11/20/2018 11/20/2018 11/19/2018 11/10/2018    SBP (mmHg) 134 123 126 120 155    DBP (mmHg) 87 81 88 71 84    Pulse 70 76 76 69 60          Patient's BP average is slightly above goal of <130/80.     Patient denies s/s of hypotension (lightheadedness, dizziness, nausea, fatigue) associated with low readings. Instructed patient to inform me if this occurs, patient confirms understanding.      Patient denies s/s of hypertension (SOB, CP, severe headaches, changes in vision) associated with high readings. Instructed patient to go to the ED if BP > 180/110 and accompanied by hypertensive s/s, patient confirms understanding.    Completed pharmacist introduction call. Patient continues to work on proper measurement technique with regards to timing after medication and having time to rest. Pertinent PMH includes ventricular premature beats, palpitations, heart failure, bradycardia, cardiomyopathy and htn. Of note, she is followed by Jayme Lemons and Aileen in cardiology.    Will continue to monitor regularly. Will follow up in 4-6 weeks, sooner if BP begins to trend upward or downward.    Patient has my contact information and knows to call with any concerns or clinical changes.     Current HTN regimen:  Hypertension Medications             carvedilol (COREG) 6.25 MG tablet Take 1 tablet (6.25 mg total) by mouth 2 (two) times daily with meals.    furosemide (LASIX) 40 MG tablet TAKE ONE TABLET BY MOUTH ONCE DAILY AS NEEDED FOR weight gain    sacubitril-valsartan (ENTRESTO) 24-26 mg per tablet Take 1 tablet by mouth 2 (two) times daily.

## 2018-11-28 ENCOUNTER — OFFICE VISIT (OUTPATIENT)
Dept: PODIATRY | Facility: CLINIC | Age: 73
End: 2018-11-28
Payer: MEDICARE

## 2018-11-28 VITALS
SYSTOLIC BLOOD PRESSURE: 117 MMHG | BODY MASS INDEX: 28.56 KG/M2 | HEIGHT: 67 IN | HEART RATE: 65 BPM | DIASTOLIC BLOOD PRESSURE: 70 MMHG | WEIGHT: 182 LBS

## 2018-11-28 DIAGNOSIS — L60.3 ONYCHODYSTROPHY: Primary | ICD-10-CM

## 2018-11-28 DIAGNOSIS — L84 CORN OR CALLUS: ICD-10-CM

## 2018-11-28 PROCEDURE — 99499 UNLISTED E&M SERVICE: CPT | Mod: HCNC,,, | Performed by: PODIATRIST

## 2018-11-28 PROCEDURE — 99999 PR PBB SHADOW E&M-EST. PATIENT-LVL III: CPT | Mod: PBBFAC,HCNC,, | Performed by: PODIATRIST

## 2018-11-28 PROCEDURE — 17999 UNLISTD PX SKN MUC MEMB SUBQ: CPT | Mod: CSM,HCNC,S$GLB, | Performed by: PODIATRIST

## 2018-12-12 ENCOUNTER — PATIENT OUTREACH (OUTPATIENT)
Dept: OTHER | Facility: OTHER | Age: 73
End: 2018-12-12

## 2018-12-12 NOTE — PROGRESS NOTES
Last 5 Patient Entered Readings                                      Current 30 Day Average: 123/80     Recent Readings 12/10/2018 12/3/2018 11/27/2018 11/24/2018 11/20/2018    SBP (mmHg) 120 117 124 134 123    DBP (mmHg) 78 73 80 87 81    Pulse 58 65 72 70 76          Digital Medicine: Health  Follow Up    Lifestyle Modifications:    1.Dietary Modifications (Sodium intake <2,000mg/day, food labels, dining out): Deferred    2.Physical Activity: Patient works out with a  twice a week.     3.Medication Therapy: Patient has been compliant with the medication regimen. Patient is doing well on her current regimen. She denies symptoms/side effects.  Patient is still not waiting a full hour or more, after taking her BP medication, before taking her BP reading but she stated that she will continue to work on this. Right now, her morning reading is before her medication and after a shower.     4.Patient has the following medication side effects/concerns:   (Frequency/Alleviating factors/Precipitating factors, etc.)     Follow up with Mrs. Rizwana Block completed. No further questions or concerns. Will continue to follow up to achieve health goals.

## 2018-12-14 ENCOUNTER — CLINICAL SUPPORT (OUTPATIENT)
Dept: INTERNAL MEDICINE | Facility: CLINIC | Age: 73
End: 2018-12-14
Payer: MEDICARE

## 2018-12-14 PROCEDURE — 96372 THER/PROPH/DIAG INJ SC/IM: CPT | Mod: HCNC,S$GLB,, | Performed by: INTERNAL MEDICINE

## 2018-12-14 RX ADMIN — CYANOCOBALAMIN 1000 MCG: 1000 INJECTION, SOLUTION INTRAMUSCULAR at 02:12

## 2019-01-02 ENCOUNTER — OFFICE VISIT (OUTPATIENT)
Dept: PODIATRY | Facility: CLINIC | Age: 74
End: 2019-01-02
Payer: MEDICARE

## 2019-01-02 VITALS
HEART RATE: 62 BPM | DIASTOLIC BLOOD PRESSURE: 76 MMHG | WEIGHT: 182 LBS | SYSTOLIC BLOOD PRESSURE: 108 MMHG | BODY MASS INDEX: 28.56 KG/M2 | HEIGHT: 67 IN

## 2019-01-02 DIAGNOSIS — R20.2 PARESTHESIA OF BOTH LOWER EXTREMITIES: ICD-10-CM

## 2019-01-02 DIAGNOSIS — B35.1 ONYCHOMYCOSIS DUE TO DERMATOPHYTE: ICD-10-CM

## 2019-01-02 DIAGNOSIS — L84 CORN OR CALLUS: ICD-10-CM

## 2019-01-02 DIAGNOSIS — G60.9 IDIOPATHIC PERIPHERAL NEUROPATHY: Primary | ICD-10-CM

## 2019-01-02 PROCEDURE — 99499 NO LOS: ICD-10-PCS | Mod: HCNC,,, | Performed by: PODIATRIST

## 2019-01-02 PROCEDURE — 17999 PR NON-COVERED FOOT CARE: ICD-10-PCS | Mod: CSM,HCNC,S$GLB, | Performed by: PODIATRIST

## 2019-01-02 PROCEDURE — 17999 UNLISTD PX SKN MUC MEMB SUBQ: CPT | Mod: CSM,HCNC,S$GLB, | Performed by: PODIATRIST

## 2019-01-02 PROCEDURE — 99999 PR PBB SHADOW E&M-EST. PATIENT-LVL III: CPT | Mod: PBBFAC,HCNC,, | Performed by: PODIATRIST

## 2019-01-02 PROCEDURE — 99999 PR PBB SHADOW E&M-EST. PATIENT-LVL III: ICD-10-PCS | Mod: PBBFAC,HCNC,, | Performed by: PODIATRIST

## 2019-01-02 PROCEDURE — 99499 UNLISTED E&M SERVICE: CPT | Mod: HCNC,,, | Performed by: PODIATRIST

## 2019-01-02 RX ORDER — GABAPENTIN 100 MG/1
100 CAPSULE ORAL NIGHTLY
Qty: 30 CAPSULE | Refills: 6 | Status: SHIPPED | OUTPATIENT
Start: 2019-01-02 | End: 2019-08-13

## 2019-01-04 ENCOUNTER — TELEPHONE (OUTPATIENT)
Dept: ELECTROPHYSIOLOGY | Facility: CLINIC | Age: 74
End: 2019-01-04

## 2019-01-04 DIAGNOSIS — I42.9 CARDIOMYOPATHY, UNSPECIFIED TYPE: Primary | ICD-10-CM

## 2019-01-04 NOTE — TELEPHONE ENCOUNTER
----- Message from Adri Sánchez MA sent at 1/4/2019  3:58 PM CST -----  Please I need orders/Holer 48.hr/ Syed wiseman/  Norbert/  Cardiomyop/  thanks

## 2019-01-07 ENCOUNTER — TELEPHONE (OUTPATIENT)
Dept: INTERNAL MEDICINE | Facility: CLINIC | Age: 74
End: 2019-01-07

## 2019-01-07 ENCOUNTER — PATIENT MESSAGE (OUTPATIENT)
Dept: INTERNAL MEDICINE | Facility: CLINIC | Age: 74
End: 2019-01-07

## 2019-01-07 ENCOUNTER — OFFICE VISIT (OUTPATIENT)
Dept: INTERNAL MEDICINE | Facility: CLINIC | Age: 74
End: 2019-01-07
Payer: MEDICARE

## 2019-01-07 VITALS
SYSTOLIC BLOOD PRESSURE: 110 MMHG | HEIGHT: 67 IN | WEIGHT: 182.13 LBS | OXYGEN SATURATION: 97 % | TEMPERATURE: 99 F | DIASTOLIC BLOOD PRESSURE: 74 MMHG | BODY MASS INDEX: 28.58 KG/M2 | HEART RATE: 75 BPM

## 2019-01-07 DIAGNOSIS — J06.9 UPPER RESPIRATORY TRACT INFECTION, UNSPECIFIED TYPE: ICD-10-CM

## 2019-01-07 DIAGNOSIS — R05.9 COUGH: ICD-10-CM

## 2019-01-07 DIAGNOSIS — R52 GENERALIZED BODY ACHES: ICD-10-CM

## 2019-01-07 DIAGNOSIS — R50.9 FEVER, UNSPECIFIED FEVER CAUSE: ICD-10-CM

## 2019-01-07 LAB
INFLUENZA A, MOLECULAR: POSITIVE
INFLUENZA B, MOLECULAR: NEGATIVE
SPECIMEN SOURCE: ABNORMAL

## 2019-01-07 PROCEDURE — 99999 PR PBB SHADOW E&M-EST. PATIENT-LVL IV: CPT | Mod: PBBFAC,HCNC,, | Performed by: FAMILY MEDICINE

## 2019-01-07 PROCEDURE — 1101F PR PT FALLS ASSESS DOC 0-1 FALLS W/OUT INJ PAST YR: ICD-10-PCS | Mod: CPTII,HCNC,S$GLB, | Performed by: FAMILY MEDICINE

## 2019-01-07 PROCEDURE — 87502 INFLUENZA DNA AMP PROBE: CPT | Mod: HCNC

## 2019-01-07 PROCEDURE — 99213 PR OFFICE/OUTPT VISIT, EST, LEVL III, 20-29 MIN: ICD-10-PCS | Mod: HCNC,S$GLB,, | Performed by: FAMILY MEDICINE

## 2019-01-07 PROCEDURE — 3078F DIAST BP <80 MM HG: CPT | Mod: CPTII,HCNC,S$GLB, | Performed by: FAMILY MEDICINE

## 2019-01-07 PROCEDURE — 3078F PR MOST RECENT DIASTOLIC BLOOD PRESSURE < 80 MM HG: ICD-10-PCS | Mod: CPTII,HCNC,S$GLB, | Performed by: FAMILY MEDICINE

## 2019-01-07 PROCEDURE — 99213 OFFICE O/P EST LOW 20 MIN: CPT | Mod: HCNC,S$GLB,, | Performed by: FAMILY MEDICINE

## 2019-01-07 PROCEDURE — 3074F PR MOST RECENT SYSTOLIC BLOOD PRESSURE < 130 MM HG: ICD-10-PCS | Mod: CPTII,HCNC,S$GLB, | Performed by: FAMILY MEDICINE

## 2019-01-07 PROCEDURE — 1101F PT FALLS ASSESS-DOCD LE1/YR: CPT | Mod: CPTII,HCNC,S$GLB, | Performed by: FAMILY MEDICINE

## 2019-01-07 PROCEDURE — 3074F SYST BP LT 130 MM HG: CPT | Mod: CPTII,HCNC,S$GLB, | Performed by: FAMILY MEDICINE

## 2019-01-07 PROCEDURE — 99999 PR PBB SHADOW E&M-EST. PATIENT-LVL IV: ICD-10-PCS | Mod: PBBFAC,HCNC,, | Performed by: FAMILY MEDICINE

## 2019-01-07 NOTE — PROGRESS NOTES
"Subjective:      Patient ID: Rizwana Block is a 73 y.o. female.    Chief Complaint: Chest Congestion (x4 days); Sore Throat; Cough; Fatigue; Nasal Congestion; and Fever ("100.3")      HPI:  Rizwana Block is a 73 year old female with arrhythmia, congestive heart failure, hypertension, and osteopenia who presents to clinic today with a chief complaint of fever, sore throat, congestion, and cough.    Symptoms began approximately 3 days ago.  Began with sore throat, headache, and nasal congestion.  Developed cough, productive of yellow mucous.  Taking Claritin without significant improvement.  Endorses some difficulty swallowing; denies associated choking or vomiting.  States someone at her office is sick too.  Felt feverish and chilled around 11:30 AM, tempt to 101 F; took aspirin.  Had flu shot this season.  Endorses associated arthralgias and myalgias.  Appetite decreased as well; eating soup and toast.  Denies associated diarrhea.  Urinating normally.      Past Medical History:   Diagnosis Date    Arrhythmia     Chest pain 5/27/2016    3/2016: Began experience chest discomfort.    CHF (congestive heart failure)     Hypertension     Osteopenia     Reclast infusion 10/5/2010 and 10/20/2011       Past Surgical History:   Procedure Laterality Date    ABLATION N/A 9/27/2016    Performed by Norbert Lemons MD at [facility] CATH LAB    ANKLE FUSION      right    bladder surgery      with mesh    BLEPHAROPLASTY W/ LASER      CATARACT EXTRACTION, BILATERAL      CORRECTION, HAMMER TOE Right 8/30/2013    Performed by Hiren Moore DPM at [facility], [place], operated by [facility] OR    HAND SURGERY      benign cyst on left    HEART CATH-LEFT AND Right Heart cath Left 6/1/2016    Performed by Jet Jacinto MD at [facility] CATH LAB    HYSTERECTOMY      heavy bleeding    PATELLA FRACTURE SURGERY      right- plate in tibial plateau    TONSILLECTOMY         Family History   Problem Relation Age of Onset    Heart disease Mother 63    Stroke Father 49 "    Heart disease Father     Early death Father     Cancer Maternal Aunt         bone    Cancer Maternal Uncle         esophageal    Heart disease Paternal Uncle     SIDS Daughter     Breast cancer Neg Hx     Ovarian cancer Neg Hx     Cervical cancer Neg Hx     Endometrial cancer Neg Hx     Vaginal cancer Neg Hx        Social History     Socioeconomic History    Marital status:      Spouse name: None    Number of children: None    Years of education: None    Highest education level: None   Social Needs    Financial resource strain: None    Food insecurity - worry: None    Food insecurity - inability: None    Transportation needs - medical: None    Transportation needs - non-medical: None   Occupational History    None   Tobacco Use    Smoking status: Former Smoker     Packs/day: 0.25     Years: 24.00     Pack years: 6.00     Types: Cigarettes     Start date: 1961     Last attempt to quit: 1985     Years since quittin.0    Smokeless tobacco: Never Used   Substance and Sexual Activity    Alcohol use: Yes     Alcohol/week: 3.0 oz     Types: 5 Glasses of wine per week     Comment: socially    Drug use: No    Sexual activity: Yes     Partners: Male     Birth control/protection: None   Other Topics Concern    None   Social History Narrative    . 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 [employer] . She is an emigrant from [place].       Review of Systems   Constitutional: Positive for chills and fever. Negative for fatigue.   HENT: Positive for congestion, ear pain, rhinorrhea, sneezing, sore throat and trouble swallowing. Negative for ear discharge, hearing loss, nosebleeds and sinus pressure.    Eyes: Negative for pain and visual disturbance.   Respiratory: Positive for cough and wheezing. Negative for shortness of breath.    Cardiovascular: Negative for chest pain and palpitations.   Gastrointestinal: Negative for abdominal distention, abdominal pain, constipation,  "diarrhea, nausea and vomiting.   Genitourinary: Negative for decreased urine volume, difficulty urinating, dysuria, hematuria and urgency.   Musculoskeletal: Positive for arthralgias and myalgias.   Skin: Negative for color change and rash.   Neurological: Negative for dizziness, tremors, weakness, light-headedness, numbness and headaches.   Psychiatric/Behavioral: Negative for agitation, behavioral problems and confusion. The patient is not nervous/anxious.      Objective:     Vitals:    01/07/19 1351   BP: 110/74   BP Location: Left arm   Patient Position: Sitting   BP Method: Large (Manual)   Pulse: 75   Temp: 99.2 °F (37.3 °C)   TempSrc: Oral   SpO2: 97%   Weight: 82.6 kg (182 lb 1.6 oz)   Height: 5' 7" (1.702 m)       Physical Exam   Constitutional: She is oriented to person, place, and time. She appears well-developed and well-nourished.   HENT:   Head: Normocephalic and atraumatic.   Right Ear: Hearing, tympanic membrane, external ear and ear canal normal.   Left Ear: Hearing, tympanic membrane, external ear and ear canal normal.   Nose: Right sinus exhibits maxillary sinus tenderness. Right sinus exhibits no frontal sinus tenderness. Left sinus exhibits maxillary sinus tenderness. Left sinus exhibits no frontal sinus tenderness.   Mouth/Throat: Posterior oropharyngeal erythema present. No oropharyngeal exudate, posterior oropharyngeal edema or tonsillar abscesses.   Eyes: Conjunctivae and EOM are normal. Pupils are equal, round, and reactive to light.   Neck: Normal range of motion. Neck supple. No tracheal deviation present.   Cardiovascular: Normal rate, regular rhythm and normal heart sounds. Exam reveals no gallop and no friction rub.   No murmur heard.  Pulmonary/Chest: Effort normal and breath sounds normal. No respiratory distress. She has no wheezes. She has no rales.   Abdominal: Soft. Bowel sounds are normal. She exhibits no distension. There is no tenderness. There is no rebound and no guarding. "   Musculoskeletal: Normal range of motion. She exhibits no edema or deformity.   Lymphadenopathy:     She has cervical adenopathy.        Right cervical: Superficial cervical adenopathy present.        Left cervical: Superficial cervical adenopathy present.   Neurological: She is alert and oriented to person, place, and time. Coordination normal.   Skin: Skin is warm and dry.   Psychiatric: She has a normal mood and affect. Her behavior is normal.   Nursing note and vitals reviewed.     Assessment:      1. Upper respiratory tract infection, unspecified type    2. Fever, unspecified fever cause    3. Cough    4. Generalized body aches      Plan:   Rizwana was seen today for chest congestion, sore throat, cough, fatigue, nasal congestion and fever.    Diagnoses and all orders for this visit:    Upper respiratory tract infection, unspecified type        -     Recommended Mucinex to aid with mucolysis and expectoration.  Continue Claritin.  Tylenol PRN fevers/myalgias.  Counseled patient on the importance of adequate rest and hydration.  Call or return to clinic in 2-3 days if no improvement.    Fever, unspecified fever cause  -     POCT Influenza A/B Molecular    Cough  -     POCT Influenza A/B Molecular    Generalized body aches  -     POCT Influenza A/B Molecular

## 2019-01-07 NOTE — TELEPHONE ENCOUNTER
Good morning,   I am hoping there is medication that can be prescribed for the symptoms I am experiencing.   Heavy caugh, extremely sore throat (maybe exacerbated by the coughing), slight fever, neck  glands swollen   and unable to sleep.   I have been in bed since Friday feeling very run down.   Thank you,   Mechelle Block       Pt states that she has fever, and body aches. Offered an appointment, pt refused.

## 2019-01-07 NOTE — PROGRESS NOTES
Last 5 Patient Entered Readings                                      Current 30 Day Average: 127/77     Recent Readings 12/25/2018 12/13/2018 12/10/2018 12/3/2018 11/27/2018    SBP (mmHg) 131 130 120 117 124    DBP (mmHg) 81 71 78 73 80    Pulse 58 60 58 65 72          Chart reviewed. BP remains at goal. No medication recommendations at this time.

## 2019-01-07 NOTE — TELEPHONE ENCOUNTER
----- Message from Jovanni Acevedo sent at 1/7/2019 11:46 AM CST -----  Contact: Patient [phone]  Patient stating feels very bad and not sure if have a flu or what,    Requesting a call back to get some advise from the office.    Please call an advise  Thank you

## 2019-01-07 NOTE — TELEPHONE ENCOUNTER
----- Message from Ruben Stubbs MD sent at 1/7/2019  3:51 PM CST -----  Influenza A positive.  Please notify patient.  As stated at time of visit, outside of window for Tamiflu (this needs to be started within 48 hours of onset of symptoms).  Recommend adequate rest, hydration, Mucinex PRN, Tylenol PRN body aches/fevers.  Can use tea with honey as a natural cough supressent.  RTC in 7-10 days if no improvement.

## 2019-01-09 ENCOUNTER — OFFICE VISIT (OUTPATIENT)
Dept: ELECTROPHYSIOLOGY | Facility: CLINIC | Age: 74
End: 2019-01-09
Payer: MEDICARE

## 2019-01-09 ENCOUNTER — HOSPITAL ENCOUNTER (OUTPATIENT)
Dept: CARDIOLOGY | Facility: CLINIC | Age: 74
Discharge: HOME | End: 2019-01-09
Payer: MEDICARE

## 2019-01-09 VITALS
HEIGHT: 67 IN | SYSTOLIC BLOOD PRESSURE: 90 MMHG | DIASTOLIC BLOOD PRESSURE: 64 MMHG | HEART RATE: 65 BPM | BODY MASS INDEX: 27.94 KG/M2 | WEIGHT: 178 LBS

## 2019-01-09 DIAGNOSIS — I42.9 CARDIOMYOPATHY, UNSPECIFIED TYPE: ICD-10-CM

## 2019-01-09 DIAGNOSIS — I10 ESSENTIAL HYPERTENSION: ICD-10-CM

## 2019-01-09 DIAGNOSIS — Z91.89 AT RISK FOR AMIODARONE TOXICITY WITH LONG TERM USE: ICD-10-CM

## 2019-01-09 DIAGNOSIS — I49.3 VENTRICULAR PREMATURE BEATS: ICD-10-CM

## 2019-01-09 DIAGNOSIS — I50.22 CHRONIC SYSTOLIC HEART FAILURE: Primary | ICD-10-CM

## 2019-01-09 DIAGNOSIS — Z79.899 AT RISK FOR AMIODARONE TOXICITY WITH LONG TERM USE: ICD-10-CM

## 2019-01-09 PROCEDURE — 3074F SYST BP LT 130 MM HG: CPT | Mod: CPTII,HCNC,S$GLB, | Performed by: INTERNAL MEDICINE

## 2019-01-09 PROCEDURE — 99215 OFFICE O/P EST HI 40 MIN: CPT | Mod: HCNC,S$GLB,, | Performed by: INTERNAL MEDICINE

## 2019-01-09 PROCEDURE — 93005 ELECTROCARDIOGRAM TRACING: CPT | Mod: HCNC,S$GLB,, | Performed by: INTERNAL MEDICINE

## 2019-01-09 PROCEDURE — 3078F PR MOST RECENT DIASTOLIC BLOOD PRESSURE < 80 MM HG: ICD-10-PCS | Mod: CPTII,HCNC,S$GLB, | Performed by: INTERNAL MEDICINE

## 2019-01-09 PROCEDURE — 99999 PR PBB SHADOW E&M-EST. PATIENT-LVL III: ICD-10-PCS | Mod: PBBFAC,HCNC,, | Performed by: INTERNAL MEDICINE

## 2019-01-09 PROCEDURE — 93010 ELECTROCARDIOGRAM REPORT: CPT | Mod: HCNC,S$GLB,, | Performed by: INTERNAL MEDICINE

## 2019-01-09 PROCEDURE — 93005 RHYTHM STRIP: ICD-10-PCS | Mod: HCNC,S$GLB,, | Performed by: INTERNAL MEDICINE

## 2019-01-09 PROCEDURE — 1101F PT FALLS ASSESS-DOCD LE1/YR: CPT | Mod: CPTII,HCNC,S$GLB, | Performed by: INTERNAL MEDICINE

## 2019-01-09 PROCEDURE — 99999 PR PBB SHADOW E&M-EST. PATIENT-LVL III: CPT | Mod: PBBFAC,HCNC,, | Performed by: INTERNAL MEDICINE

## 2019-01-09 PROCEDURE — 3078F DIAST BP <80 MM HG: CPT | Mod: CPTII,HCNC,S$GLB, | Performed by: INTERNAL MEDICINE

## 2019-01-09 PROCEDURE — 93010 RHYTHM STRIP: ICD-10-PCS | Mod: HCNC,S$GLB,, | Performed by: INTERNAL MEDICINE

## 2019-01-09 PROCEDURE — 99215 PR OFFICE/OUTPT VISIT, EST, LEVL V, 40-54 MIN: ICD-10-PCS | Mod: HCNC,S$GLB,, | Performed by: INTERNAL MEDICINE

## 2019-01-09 PROCEDURE — 3074F PR MOST RECENT SYSTOLIC BLOOD PRESSURE < 130 MM HG: ICD-10-PCS | Mod: CPTII,HCNC,S$GLB, | Performed by: INTERNAL MEDICINE

## 2019-01-09 PROCEDURE — 1101F PR PT FALLS ASSESS DOC 0-1 FALLS W/OUT INJ PAST YR: ICD-10-PCS | Mod: CPTII,HCNC,S$GLB, | Performed by: INTERNAL MEDICINE

## 2019-01-09 NOTE — PROGRESS NOTES
Subjective:   Patient ID:  Rizwana Block is a 73 y.o. female     Chief complaint:Ventricular premature beats      HPI  Background as recorded in my last note ( ):  Background:  72 woman  Hx of PVCs s/p recent RFA, bradycardia, CM, HFrEF (EF 40-45%), non-rheumatic MR, and HTN. Ms. Block has been on long-term amiodarone for PVCs (inferior-posterior-septal PVC morphology). Her PVC burden was thought to have been related to her DCM. Her EF improved to the 50s after amiodarone use, and she was subsequently switched from amiodarone to Verapamil.      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  She later restarted with palps and some PVCs (albeit in lesser frequency than pre RFA) and Rx was added -- Verapamil first then back on amio      Echo (06/22/16) >>  EF of 40-45%, trivial-to-mild TR, trivial TX, upper limit of normal LVE, and a PASP of 26 mmHg.   24-Hour Holter (06/22/16) revealed a 13% PVC burden; 481 couplets, 73 triplets and short VTNS -- the large majority of the PVCs were MM. The triplets are dimorphic.  She had an RFA on 9/27/16  >>  1.  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  Effective lesions were at 12 o'clock level in the [place] view.  2.  Note that the ascending aorta and aortic arch appeared to be very unfolded (unusually so for the  patient's frame).  This may need to be evaluated further.    48-Hour Holter (11/09/16) revealed SR w/HRs varying between  bpm; average 72 bpm. There were very frequent polymorphic PVCs (totaling 9,632; averaging 200 per hour, ~5%), 133 couplets, and no episodes of VT. There were very rare PACs (totaling 20; averaging less than 1 per hour) and no episodes of sustained SVT. There was 1 episode of dizziness reported corresponding w/SR at 95 bpm.       Update  June 2018:  Since the RFA she has been feeling a lot better with more energy, working full time,, back to gardening and swimming etc  Echo on 6/19/18    1 - Mildly to moderately depressed left ventricular systolic function (EF 40-45%).     2 - Wall motion abnormalities.     3 - Impaired LV relaxation, normal LAP (grade 1 diastolic dysfunction).     4 - Low normal right ventricular systolic function .     5 - Trivial to mild tricuspid regurgitation.     6 - Trivial pulmonic regurgitation.     7 - Atrial septal aneurysm .     8 - The estimated PA systolic pressure is 29 mmHg.        Update since then:  She feels well -- her only c/o is hair loss -- she thinks it's the amio  Takes lasix when her rings get tight -- @ once a week or so.   I have reviewed the actual image of the ECG tracing obtained today and it shows NSR with normal intervals    Current Outpatient Medications   Medication Sig    amiodarone (PACERONE) 200 MG Tab Take 1 tablet (200 mg total) by mouth once daily.    aspirin (ECOTRIN) 81 MG EC tablet Take 81 mg by mouth once daily.    calcium carbonate (OS-HENNA) 600 mg calcium (1,500 mg) Tab Take 1,200 mg by mouth 2 (two) times daily with meals.     carvedilol (COREG) 6.25 MG tablet Take 1 tablet (6.25 mg total) by mouth 2 (two) times daily with meals.    cholecalciferol, vitamin D3, (VITAMIN D3) 1,000 unit capsule Take 1,000 Units by mouth once daily.    co-enzyme Q-10 30 mg capsule Take 10 mg by mouth once daily.     conjugated estrogens (PREMARIN) vaginal cream Dime-sized amount with finger inside vagina (to knuckle)/around opening/inner lips.  Do nightly x 2 weeks, then 2x/week thereafter.    fish oil-omega-3 fatty acids 300-1,000 mg capsule Take 2 g by mouth 2 (two) times daily.     furosemide (LASIX) 40 MG tablet TAKE ONE TABLET BY MOUTH ONCE DAILY AS NEEDED FOR weight gain    gabapentin (NEURONTIN) 100 MG capsule Take 1 capsule (100 mg total) by mouth every evening.    MULTIVITAMIN WITH  MINERALS (HAIR,SKIN AND NAILS ORAL) Take by mouth once daily.    sacubitril-valsartan (ENTRESTO) 24-26 mg per tablet Take 1 tablet by mouth 2 (two) times daily.    vit C/E/Zn/coppr/lutein/zeaxan (PRESERVISION AREDS 2 ORAL) Take by mouth once daily.     Current Facility-Administered Medications   Medication    cyanocobalamin injection 1,000 mcg     Review of Systems   Constitution: Positive for malaise/fatigue. Negative for decreased appetite, weakness, weight gain and weight loss.   HENT: Negative for nosebleeds.    Eyes: Negative for blurred vision and visual disturbance.   Cardiovascular: Negative for chest pain, claudication, cyanosis, dyspnea on exertion, irregular heartbeat, leg swelling, near-syncope, orthopnea, palpitations, paroxysmal nocturnal dyspnea and syncope.   Respiratory: Negative for cough, shortness of breath and wheezing.    Endocrine: Negative for heat intolerance.   Skin: Negative for rash.   Musculoskeletal: Negative for muscle weakness and myalgias.   Gastrointestinal: Negative for abdominal pain, anorexia, melena, nausea and vomiting.   Genitourinary: Negative for menorrhagia.   Neurological: Negative for excessive daytime sleepiness, dizziness, headaches, loss of balance, seizures and vertigo.   Psychiatric/Behavioral: Negative for altered mental status and depression. The patient is not nervous/anxious.        Objective:   Physical Exam   Constitutional: She is oriented to person, place, and time. She appears well-developed and well-nourished.   HENT:   Head: Normocephalic and atraumatic.   Right Ear: External ear normal.   Left Ear: External ear normal.   Neck: Normal range of motion. Neck supple. No thyromegaly present.   Cardiovascular: Normal rate, regular rhythm, normal heart sounds and intact distal pulses. Exam reveals no gallop, no S3, no S4, no friction rub, no midsystolic click and no opening snap.   No murmur heard.  Pulses:       Carotid pulses are 2+ on the right side, and  "2+ on the left side.       Radial pulses are 2+ on the right side, and 2+ on the left side.        Dorsalis pedis pulses are 2+ on the right side, and 2+ on the left side.        Posterior tibial pulses are 2+ on the right side, and 2+ on the left side.   Pulmonary/Chest: Effort normal and breath sounds normal.   Abdominal: Soft. She exhibits no distension. There is no tenderness.   Musculoskeletal:        Right ankle: She exhibits no swelling.        Left ankle: She exhibits no swelling.        Right lower leg: She exhibits no swelling.        Left lower leg: She exhibits no swelling.   Neurological: She is alert and oriented to person, place, and time. She has normal strength. No cranial nerve deficit. Gait normal.   Skin: Skin is warm. No rash noted. No cyanosis. Nails show no clubbing.   Psychiatric: She has a normal mood and affect. Her speech is normal and behavior is normal. Thought content normal. Cognition and memory are normal.   Nursing note and vitals reviewed.    BP 90/64   Pulse 65   Ht 5' 7" (1.702 m)   Wt 80.7 kg (178 lb)   LMP  (LMP Unknown) Comment: refused weight   BMI 27.88 kg/m²      Assessment:      1. Chronic systolic heart failure    2. Ventricular premature beats    3. Cardiomyopathy, unspecified type    4. Essential hypertension    5. At risk for amiodarone toxicity with long term use        Plan:      Orders Placed This Encounter   Procedures    Brain natriuretic peptide     Standing Status:   Future     Standing Expiration Date:   3/9/2020    Amiodarone level     Standing Status:   Future     Standing Expiration Date:   3/9/2020    Transthoracic echo (TTE) complete (Cupid Only)     Standing Status:   Future     Standing Expiration Date:   1/9/2020     Follow-up in about 6 months (around 7/9/2019), or if symptoms worsen or fail to improve.  There are no discontinued medications.     This SmartLink is deprecated. Use NukonaEDLIST instead to display the medication list for a patient.   "

## 2019-01-10 ENCOUNTER — HOSPITAL ENCOUNTER (OUTPATIENT)
Dept: CARDIOLOGY | Facility: CLINIC | Age: 74
Discharge: HOME | End: 2019-01-10
Attending: INTERNAL MEDICINE
Payer: MEDICARE

## 2019-01-10 ENCOUNTER — PATIENT MESSAGE (OUTPATIENT)
Dept: OTHER | Facility: OTHER | Age: 74
End: 2019-01-10

## 2019-01-10 VITALS
HEIGHT: 67 IN | SYSTOLIC BLOOD PRESSURE: 114 MMHG | DIASTOLIC BLOOD PRESSURE: 56 MMHG | WEIGHT: 178 LBS | BODY MASS INDEX: 27.94 KG/M2 | HEART RATE: 68 BPM

## 2019-01-10 DIAGNOSIS — Z91.89 AT RISK FOR AMIODARONE TOXICITY WITH LONG TERM USE: ICD-10-CM

## 2019-01-10 DIAGNOSIS — I42.9 CARDIOMYOPATHY, UNSPECIFIED TYPE: Primary | ICD-10-CM

## 2019-01-10 DIAGNOSIS — Z79.899 AT RISK FOR AMIODARONE TOXICITY WITH LONG TERM USE: ICD-10-CM

## 2019-01-10 LAB
ASCENDING AORTA: 3.61 CM
BSA FOR ECHO PROCEDURE: 1.95 M2
CV ECHO LV RWT: 0.4 CM
DOP CALC LVOT AREA: 3.05 CM2
DOP CALC LVOT DIAMETER: 1.97 CM
DOP CALC LVOT STROKE VOLUME: 46 CM3
DOP CALCLVOT PEAK VEL VTI: 15.1 CM
E WAVE DECELERATION TIME: 181.02 MSEC
E/A RATIO: 0.61
E/E' RATIO: 6.27
ECHO LV POSTERIOR WALL: 0.97 CM (ref 0.6–1.1)
FRACTIONAL SHORTENING: 38 % (ref 28–44)
INTERVENTRICULAR SEPTUM: 1.01 CM (ref 0.6–1.1)
IVRT: 0.1 MSEC
LA MAJOR: 5.31 CM
LA MINOR: 5.63 CM
LA WIDTH: 3.74 CM
LEFT ATRIUM SIZE: 4.3 CM
LEFT ATRIUM VOLUME INDEX: 38.8 ML/M2
LEFT ATRIUM VOLUME: 74.71 CM3
LEFT INTERNAL DIMENSION IN SYSTOLE: 2.98 CM (ref 2.1–4)
LEFT VENTRICLE DIASTOLIC VOLUME INDEX: 56.66 ML/M2
LEFT VENTRICLE DIASTOLIC VOLUME: 109.03 ML
LEFT VENTRICLE MASS INDEX: 88.1 G/M2
LEFT VENTRICLE SYSTOLIC VOLUME INDEX: 18 ML/M2
LEFT VENTRICLE SYSTOLIC VOLUME: 34.56 ML
LEFT VENTRICULAR INTERNAL DIMENSION IN DIASTOLE: 4.83 CM (ref 3.5–6)
LEFT VENTRICULAR MASS: 169.61 G
LV LATERAL E/E' RATIO: 4.7
LV SEPTAL E/E' RATIO: 9.4
MV PEAK A VEL: 0.77 M/S
MV PEAK E VEL: 0.47 M/S
PISA TR MAX VEL: 1.74 M/S
PULM VEIN S/D RATIO: 1.85
PV PEAK D VEL: 0.2 M/S
PV PEAK S VEL: 0.37 M/S
RA MAJOR: 4.89 CM
RA WIDTH: 3.05 CM
RETIRED EF AND QEF - SEE NOTES: 50 %
RIGHT VENTRICULAR END-DIASTOLIC DIMENSION: 3.41 CM
RV TISSUE DOPPLER FREE WALL SYSTOLIC VELOCITY 1 (APICAL 4 CHAMBER VIEW): 7.87 M/S
SINUS: 3.38 CM
STJ: 3.37 CM
TDI LATERAL: 0.1
TDI SEPTAL: 0.05
TDI: 0.08
TR MAX PG: 12.11 MMHG
TRICUSPID ANNULAR PLANE SYSTOLIC EXCURSION: 1.98 CM

## 2019-01-10 PROCEDURE — 93321 DOPPLER ECHO F-UP/LMTD STD: CPT | Mod: HCNC,S$GLB,, | Performed by: INTERNAL MEDICINE

## 2019-01-10 PROCEDURE — 93321 PR DOPPLER ECHO HEART,LIMITED,F/U: ICD-10-PCS | Mod: HCNC,S$GLB,, | Performed by: INTERNAL MEDICINE

## 2019-01-10 PROCEDURE — 93325 PR DOPPLER COLOR FLOW VELOCITY MAP: ICD-10-PCS | Mod: HCNC,S$GLB,, | Performed by: INTERNAL MEDICINE

## 2019-01-10 PROCEDURE — 93308 TTE F-UP OR LMTD: CPT | Mod: HCNC,S$GLB,, | Performed by: INTERNAL MEDICINE

## 2019-01-10 PROCEDURE — 93308 TRANSTHORACIC ECHO (TTE) COMPLETE (CUPID ONLY): ICD-10-PCS | Mod: HCNC,S$GLB,, | Performed by: INTERNAL MEDICINE

## 2019-01-10 PROCEDURE — 93325 DOPPLER ECHO COLOR FLOW MAPG: CPT | Mod: HCNC,S$GLB,, | Performed by: INTERNAL MEDICINE

## 2019-01-11 ENCOUNTER — LAB VISIT (OUTPATIENT)
Dept: LAB | Facility: HOSPITAL | Age: 74
End: 2019-01-11
Attending: INTERNAL MEDICINE
Payer: MEDICARE

## 2019-01-11 ENCOUNTER — CLINICAL SUPPORT (OUTPATIENT)
Dept: INTERNAL MEDICINE | Facility: CLINIC | Age: 74
End: 2019-01-11
Payer: MEDICARE

## 2019-01-11 DIAGNOSIS — E53.8 VITAMIN B12 DEFICIENCY: ICD-10-CM

## 2019-01-11 DIAGNOSIS — E55.9 VITAMIN D DEFICIENCY DISEASE: ICD-10-CM

## 2019-01-11 DIAGNOSIS — Z79.899 AT RISK FOR AMIODARONE TOXICITY WITH LONG TERM USE: ICD-10-CM

## 2019-01-11 DIAGNOSIS — I10 ESSENTIAL HYPERTENSION: ICD-10-CM

## 2019-01-11 DIAGNOSIS — Z91.89 AT RISK FOR AMIODARONE TOXICITY WITH LONG TERM USE: ICD-10-CM

## 2019-01-11 DIAGNOSIS — I50.22 CHRONIC SYSTOLIC HEART FAILURE: ICD-10-CM

## 2019-01-11 LAB
25(OH)D3+25(OH)D2 SERPL-MCNC: 63 NG/ML
ALBUMIN SERPL BCP-MCNC: 3.7 G/DL
ALP SERPL-CCNC: 58 U/L
ALT SERPL W/O P-5'-P-CCNC: 11 U/L
ANION GAP SERPL CALC-SCNC: 6 MMOL/L
AST SERPL-CCNC: 17 U/L
BASOPHILS # BLD AUTO: 0.02 K/UL
BASOPHILS NFR BLD: 0.4 %
BILIRUB SERPL-MCNC: 0.4 MG/DL
BNP SERPL-MCNC: 35 PG/ML
BUN SERPL-MCNC: 24 MG/DL
CALCIUM SERPL-MCNC: 9.2 MG/DL
CHLORIDE SERPL-SCNC: 104 MMOL/L
CHOLEST SERPL-MCNC: 198 MG/DL
CHOLEST/HDLC SERPL: 2.4 {RATIO}
CO2 SERPL-SCNC: 29 MMOL/L
CREAT SERPL-MCNC: 0.9 MG/DL
DIFFERENTIAL METHOD: ABNORMAL
EOSINOPHIL # BLD AUTO: 0.1 K/UL
EOSINOPHIL NFR BLD: 2.2 %
ERYTHROCYTE [DISTWIDTH] IN BLOOD BY AUTOMATED COUNT: 14.1 %
EST. GFR  (AFRICAN AMERICAN): >60 ML/MIN/1.73 M^2
EST. GFR  (NON AFRICAN AMERICAN): >60 ML/MIN/1.73 M^2
GLUCOSE SERPL-MCNC: 83 MG/DL
HCT VFR BLD AUTO: 39.7 %
HDLC SERPL-MCNC: 81 MG/DL
HDLC SERPL: 40.9 %
HGB BLD-MCNC: 12.2 G/DL
LDLC SERPL CALC-MCNC: 102.2 MG/DL
LYMPHOCYTES # BLD AUTO: 2.3 K/UL
LYMPHOCYTES NFR BLD: 42.2 %
MCH RBC QN AUTO: 30 PG
MCHC RBC AUTO-ENTMCNC: 30.7 G/DL
MCV RBC AUTO: 98 FL
MONOCYTES # BLD AUTO: 0.4 K/UL
MONOCYTES NFR BLD: 7.1 %
NEUTROPHILS # BLD AUTO: 2.6 K/UL
NEUTROPHILS NFR BLD: 47.9 %
NONHDLC SERPL-MCNC: 117 MG/DL
PLATELET # BLD AUTO: 223 K/UL
PMV BLD AUTO: 11.8 FL
POTASSIUM SERPL-SCNC: 4.4 MMOL/L
PROT SERPL-MCNC: 6.9 G/DL
RBC # BLD AUTO: 4.06 M/UL
SODIUM SERPL-SCNC: 139 MMOL/L
TRIGL SERPL-MCNC: 74 MG/DL
TSH SERPL DL<=0.005 MIU/L-ACNC: 1.51 UIU/ML
VIT B12 SERPL-MCNC: 699 PG/ML
WBC # BLD AUTO: 5.48 K/UL

## 2019-01-11 PROCEDURE — 96372 THER/PROPH/DIAG INJ SC/IM: CPT | Mod: HCNC,S$GLB,, | Performed by: INTERNAL MEDICINE

## 2019-01-11 PROCEDURE — 96372 PR INJECTION,THERAP/PROPH/DIAG2ST, IM OR SUBCUT: ICD-10-PCS | Mod: HCNC,S$GLB,, | Performed by: INTERNAL MEDICINE

## 2019-01-11 PROCEDURE — 80053 COMPREHEN METABOLIC PANEL: CPT | Mod: HCNC

## 2019-01-11 PROCEDURE — 36415 COLL VENOUS BLD VENIPUNCTURE: CPT | Mod: HCNC

## 2019-01-11 PROCEDURE — 85025 COMPLETE CBC W/AUTO DIFF WBC: CPT | Mod: HCNC

## 2019-01-11 PROCEDURE — 83880 ASSAY OF NATRIURETIC PEPTIDE: CPT | Mod: HCNC

## 2019-01-11 PROCEDURE — 80061 LIPID PANEL: CPT | Mod: HCNC

## 2019-01-11 PROCEDURE — 80299 QUANTITATIVE ASSAY DRUG: CPT | Mod: HCNC

## 2019-01-11 PROCEDURE — 84443 ASSAY THYROID STIM HORMONE: CPT | Mod: HCNC

## 2019-01-11 PROCEDURE — 82306 VITAMIN D 25 HYDROXY: CPT | Mod: HCNC

## 2019-01-11 PROCEDURE — 82607 VITAMIN B-12: CPT | Mod: HCNC

## 2019-01-11 RX ADMIN — CYANOCOBALAMIN 1000 MCG: 1000 INJECTION, SOLUTION INTRAMUSCULAR at 12:01

## 2019-01-12 ENCOUNTER — PATIENT MESSAGE (OUTPATIENT)
Dept: INTERNAL MEDICINE | Facility: CLINIC | Age: 74
End: 2019-01-12

## 2019-01-14 LAB
AMIODARONE SERPL-SCNC: 0.8 UG/ML (ref 1–3)
N-DESETHYL-AMIODARONE: 0.8 UG/ML

## 2019-01-15 RX ORDER — BENZONATATE 100 MG/1
100 CAPSULE ORAL 3 TIMES DAILY PRN
Qty: 30 CAPSULE | Refills: 0 | Status: SHIPPED | OUTPATIENT
Start: 2019-01-15 | End: 2019-01-25

## 2019-01-17 ENCOUNTER — TELEPHONE (OUTPATIENT)
Dept: ELECTROPHYSIOLOGY | Facility: CLINIC | Age: 74
End: 2019-01-17

## 2019-01-17 NOTE — TELEPHONE ENCOUNTER
I called patient to Relay lab results of Amiodarone & BNP .  Patient not home ,I left V/Message to call for those results.

## 2019-01-22 ENCOUNTER — TELEPHONE (OUTPATIENT)
Dept: ELECTROPHYSIOLOGY | Facility: CLINIC | Age: 74
End: 2019-01-22

## 2019-01-22 ENCOUNTER — PATIENT MESSAGE (OUTPATIENT)
Dept: PODIATRY | Facility: CLINIC | Age: 74
End: 2019-01-22

## 2019-01-22 NOTE — TELEPHONE ENCOUNTER
As per /Chanell ROBBINS  I called and spoke to patient , and relayed  Lab test results to her.  Patient verbalized understanding .  No further questions at this time.      Dionne mir                  Received: Yesterday   Message Contents   RIKKI Wilson MA          Previous Messages      ----- Message -----   From: Norbert Lemons MD   Sent: 1/16/2019  11:53 PM   To: Chanell Shukla RN     Please see comments below and call patient.   In general you do not need to route back to me.   This is OK    Result Notes for Amiodarone level     Notes recorded by Norbert Lemons MD on 1/16/2019 at 11:53 PM CST  Please see comments below and call patient.  In general you do not need to route back to me.  This is OK

## 2019-01-25 ENCOUNTER — PATIENT OUTREACH (OUTPATIENT)
Dept: OTHER | Facility: OTHER | Age: 74
End: 2019-01-25

## 2019-01-25 NOTE — PROGRESS NOTES
Last 5 Patient Entered Readings                                      Current 30 Day Average: 126/78     Recent Readings 1/20/2019 1/11/2019 12/25/2018 12/13/2018 12/10/2018    SBP (mmHg) 122 130 131 130 120    DBP (mmHg) 70 85 81 71 78    Pulse 61 66 58 60 58          Digital Medicine: Health  Follow Up    Lifestyle Modifications:    1.Dietary Modifications (Sodium intake <2,000mg/day, food labels, dining out): Patient continues monitoring her sodium intake. She is not adding salt to meals but she does eat a lot of bread. We reviewed the sodium content in bread and I encouraged her to continue reading food labels and make brand swaps. I will continue to review this with her.     2.Physical Activity: Patient plans to get back to the gym next week.     3.Medication Therapy: Patient has been compliant with the medication regimen. Patient is doing well on her current regimen. She denies symptoms/side effects. Her cardiologist did not see any reason to adjust her BP medication based off of her readings.  Patient started this past Sunday with taking her BP medication, waiting one full hour or more, and then taking her BP reading. Sunday is the only day she can do this accurately.     4.Patient has the following medication side effects/concerns:   (Frequency/Alleviating factors/Precipitating factors, etc.)     Follow up with Mrs. Rizwana VILLAGRAN Umair completed. No further questions or concerns. Will continue to follow up to achieve health goals.

## 2019-02-05 ENCOUNTER — PATIENT MESSAGE (OUTPATIENT)
Dept: OTHER | Facility: OTHER | Age: 74
End: 2019-02-05

## 2019-02-07 ENCOUNTER — PATIENT MESSAGE (OUTPATIENT)
Dept: OTHER | Facility: OTHER | Age: 74
End: 2019-02-07

## 2019-02-13 ENCOUNTER — OFFICE VISIT (OUTPATIENT)
Dept: PODIATRY | Facility: CLINIC | Age: 74
End: 2019-02-13
Payer: MEDICARE

## 2019-02-13 VITALS — WEIGHT: 178 LBS | BODY MASS INDEX: 27.94 KG/M2 | HEIGHT: 67 IN

## 2019-02-13 DIAGNOSIS — B35.1 DERMATOPHYTOSIS OF NAIL: Primary | ICD-10-CM

## 2019-02-13 PROCEDURE — 1101F PT FALLS ASSESS-DOCD LE1/YR: CPT | Mod: HCNC,CPTII,S$GLB, | Performed by: PODIATRIST

## 2019-02-13 PROCEDURE — 99999 PR PBB SHADOW E&M-EST. PATIENT-LVL II: CPT | Mod: PBBFAC,HCNC,, | Performed by: PODIATRIST

## 2019-02-13 PROCEDURE — 99999 PR PBB SHADOW E&M-EST. PATIENT-LVL II: ICD-10-PCS | Mod: PBBFAC,HCNC,, | Performed by: PODIATRIST

## 2019-02-13 PROCEDURE — 99214 OFFICE O/P EST MOD 30 MIN: CPT | Mod: HCNC,S$GLB,, | Performed by: PODIATRIST

## 2019-02-13 PROCEDURE — 1101F PR PT FALLS ASSESS DOC 0-1 FALLS W/OUT INJ PAST YR: ICD-10-PCS | Mod: HCNC,CPTII,S$GLB, | Performed by: PODIATRIST

## 2019-02-13 PROCEDURE — 99214 PR OFFICE/OUTPT VISIT, EST, LEVL IV, 30-39 MIN: ICD-10-PCS | Mod: HCNC,S$GLB,, | Performed by: PODIATRIST

## 2019-02-13 NOTE — PROGRESS NOTES
"Subjective:      Patient ID: Rizwana Block is a 73 y.o. female.    Chief Complaint: Ingrown Toenail (right great toe left great toe) and Foot Problem (Between 4th and 5th toe)    Rizwana is a 73 y.o. female who presents to the clinic for evaluation and treatment of feet. Rizwana has a past medical history of Arrhythmia, Chest pain (5/27/2016), CHF (congestive heart failure), Hypertension, and Osteopenia. The patient's chief complaint is long, thick toenails, especially L great toe. Previously saw Dr. Moore who has been trimming her nails routinely. Here for assessment and nail trimming. No other major complaints regarding the feet. Has hx of R ankle "fusion" however still has motion in the ankle.           PCP: Sri Gutierrez MD    Date Last Seen by PCP:   Chief Complaint   Patient presents with    Ingrown Toenail     right great toe left great toe    Foot Problem     Between 4th and 5th toe       Current shoe gear:   flat shoes         No results found for: HGBA1C    Past Medical History:   Diagnosis Date    Arrhythmia     Chest pain 5/27/2016    3/2016: Began experience chest discomfort.    CHF (congestive heart failure)     Hypertension     Osteopenia     Reclast infusion 10/5/2010 and 10/20/2011       Past Surgical History:   Procedure Laterality Date    ABLATION N/A 9/27/2016    Performed by Norbert Lemons MD at [facility] C[facility]    ANKLE FUSION      right    bladder surgery      with mesh    BLEPHAROPLASTY W/ LASER      CATARACT EXTRACTION, BILATERAL      CORRECTION, HAMMER TOE Right 8/30/2013    Performed by [facility]ren [facility] at [facility]    HAND SURGERY      benign cyst on left    HEART CATH-LEFT AND Right Heart cath Left 6/1/2016    Performed by Jet Jacinto MD at [facility]    HYSTERECTOMY      heavy bleeding    PATELLA FRACTURE SURGERY      right- plate in tibial plateau    TONSILLECTOMY         Family History   Problem Relation Age of Onset    Heart disease Mother 63    " Stroke Father 49    Heart disease Father     Early death Father     Cancer Maternal Aunt         bone    Cancer Maternal Uncle         esophageal    Heart disease Paternal Uncle     SIDS Daughter     Breast cancer Neg Hx     Ovarian cancer Neg Hx     Cervical cancer Neg Hx     Endometrial cancer Neg Hx     Vaginal cancer Neg Hx        Social History     Socioeconomic History    Marital status:      Spouse name: None    Number of children: None    Years of education: None    Highest education level: None   Social Needs    Financial resource strain: None    Food insecurity - worry: None    Food insecurity - inability: None    Transportation needs - medical: None    Transportation needs - non-medical: None   Occupational History    None   Tobacco Use    Smoking status: Former Smoker     Packs/day: 0.25     Years: 24.00     Pack years: 6.00     Types: Cigarettes     Start date: 1961     Last attempt to quit: 1985     Years since quittin.1    Smokeless tobacco: Never Used   Substance and Sexual Activity    Alcohol use: Yes     Alcohol/week: 3.0 oz     Types: 5 Glasses of wine per week     Comment: socially    Drug use: No    Sexual activity: Yes     Partners: Male     Birth control/protection: None   Other Topics Concern    None   Social History Narrative    . 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 [employer] . She is an emigrant from [place].       Current Outpatient Medications   Medication Sig Dispense Refill    amiodarone (PACERONE) 200 MG Tab Take 1 tablet (200 mg total) by mouth once daily. 30 tablet 6    aspirin (ECOTRIN) 81 MG EC tablet Take 81 mg by mouth once daily.      calcium carbonate (OS-HENNA) 600 mg calcium (1,500 mg) Tab Take 1,200 mg by mouth 2 (two) times daily with meals.       carvedilol (COREG) 6.25 MG tablet Take 1 tablet (6.25 mg total) by mouth 2 (two) times daily with meals. 120 tablet 3    cholecalciferol, vitamin D3, (VITAMIN D3) 1,000  "unit capsule Take 1,000 Units by mouth once daily.      co-enzyme Q-10 30 mg capsule Take 10 mg by mouth once daily.       conjugated estrogens (PREMARIN) vaginal cream Dime-sized amount with finger inside vagina (to knuckle)/around opening/inner lips.  Do nightly x 2 weeks, then 2x/week thereafter. 30 g 11    fish oil-omega-3 fatty acids 300-1,000 mg capsule Take 2 g by mouth 2 (two) times daily.       furosemide (LASIX) 40 MG tablet TAKE ONE TABLET BY MOUTH ONCE DAILY AS NEEDED FOR weight gain 30 tablet 11    gabapentin (NEURONTIN) 100 MG capsule Take 1 capsule (100 mg total) by mouth every evening. 30 capsule 6    MULTIVITAMIN WITH MINERALS (HAIR,SKIN AND NAILS ORAL) Take by mouth once daily.      sacubitril-valsartan (ENTRESTO) 24-26 mg per tablet Take 1 tablet by mouth 2 (two) times daily. 60 tablet 6    vit C/E/Zn/coppr/lutein/zeaxan (PRESERVISION AREDS 2 ORAL) Take by mouth once daily.       Current Facility-Administered Medications   Medication Dose Route Frequency Provider Last Rate Last Dose    cyanocobalamin injection 1,000 mcg  1,000 mcg Intramuscular Q30 Days Sri Gutierrez MD   1,000 mcg at 01/11/19 1201       Review of patient's allergies indicates:   Allergen Reactions    Dilaudid [hydromorphone (bulk)]      Severe nausea/vomiting    Morphine      Severe nausea/vomiting       Review of Systems   Constitution: Negative for chills and fever.   Cardiovascular: Positive for leg swelling. Negative for chest pain and claudication.   Respiratory: Negative for cough and shortness of breath.    Skin: Positive for dry skin and nail changes.   Musculoskeletal: Positive for arthritis, joint pain and stiffness (ankle R).   Gastrointestinal: Negative for nausea and vomiting.   Neurological: Negative for numbness and paresthesias.   Psychiatric/Behavioral: Negative for altered mental status.           Objective:         Vitals:    02/13/19 1604   Weight: 80.7 kg (178 lb)   Height: 5' 7" (1.702 m) "           Physical Exam   Constitutional: She is oriented to person, place, and time. She appears well-developed and well-nourished.   HENT:   Head: Normocephalic.   Cardiovascular: Intact distal pulses.    Pulses:       Dorsalis pedis pulses are 2+ on the right side, and 2+ on the left side.        Posterior tibial pulses are 2+ on the right side, and 2+ on the left side.   CRT < 3 sec to tips of toes. No edema noted to b/l LE. No vericosities noted to b/l LEs.      Pulmonary/Chest: No respiratory distress.   Musculoskeletal:   Gastrocnemius equinus noted to b/l ankles with decreased DF noted on exam. MMT 5/5 in DF/PF/Inv/Ev resistance with no reproduction of pain in any direction. Passive range of motion of ankle and pedal joints is painless. Adequate pedal joint ROM.     Hypermobility noted to 1st ray b/l with near complete collapse of medial longitudinal arch b/l (R>L) with forefoot loading. Functional pes planus foot type b/l.     Semi-reducible hammertoe contractures noted to toes 2-4 b/l-asymptomatic.     Mild stiffness noted to R ankle compared to L however still gets 5-7 deg of DF in R ankle.     Palpable bony prominence noted to dorsal and dorsal medial 1st met cuneiform joint, mild, asymptomatic.    Neurological: She is alert and oriented to person, place, and time. She has normal strength. No sensory deficit.   Light touch, proprioception, and sharp/dull sensation are all intact bilaterally.      Skin: Skin is warm, dry and intact. No erythema.   No open lesions, lacerations or wounds noted. Nails are thickened, elongated, discolored yellow/brown with subungual debris and brittleness to R 1-5 and L 1-5. Interdigital spaces clean, dry and intact b/l. No erythema noted to b/l foot. Skin texture normal. Pedal hair adequate. Skin temperature normal b/l foot.      Psychiatric: She has a normal mood and affect. Her behavior is normal. Judgment and thought content normal.   Vitals reviewed.             Assessment:       Encounter Diagnosis   Name Primary?    Dermatophytosis of nail Yes         Plan:       Rizwana was seen today for ingrown toenail and foot problem.    Diagnoses and all orders for this visit:    Dermatophytosis of nail      I counseled the patient on her conditions, their implications and medical management.  New to me.  Nails trimmed as a courtesy.  Consider vicks or tea tree oil for fungal nails.  Continue custom foot orthotics   follow up in 5 weeks Proc B.

## 2019-02-26 ENCOUNTER — PATIENT OUTREACH (OUTPATIENT)
Dept: OTHER | Facility: OTHER | Age: 74
End: 2019-02-26

## 2019-02-26 NOTE — PROGRESS NOTES
Last 5 Patient Entered Readings                                      Current 30 Day Average: 130/88     Recent Readings 2/17/2019 2/15/2019 2/10/2019 2/10/2019 2/4/2019    SBP (mmHg) 109 133 146 164 139    DBP (mmHg) 75 84 77 90 87    Pulse 70 75 56 59 61            Digital Medicine: Health  Follow Up    Left voicemail to follow up with Mrs. Rizwana VILLAGRAN Umair.  Current BP average 130/88 mmHg is not at goal, <130/80.

## 2019-02-26 NOTE — PROGRESS NOTES
Last 5 Patient Entered Readings                                      Current 30 Day Average: 130/88     Recent Readings 2/17/2019 2/15/2019 2/10/2019 2/10/2019 2/4/2019    SBP (mmHg) 109 133 146 164 139    DBP (mmHg) 75 84 77 90 87    Pulse 70 75 56 59 61          Digital Medicine: Health  Follow Up    Lifestyle Modifications:    1.Dietary Modifications (Sodium intake <2,000mg/day, food labels, dining out): Patient is working on maintaining a low sodium diet. She like Walnuts but gets the unsalted version. We did review sodium guidelines and the affects of highly salted foods. Patient is going to focus on her sodium intake more over the next few weeks to see if she can notice a specific culprit.     2.Physical Activity: Deferred    3.Medication Therapy: Patient has been compliant with the medication regimen. Patient is doing well on her current regimen. She did have a few questions about her BP medication so I will direct those questions to her PharmD, ACACIA Pierre.     4.Patient has the following medication side effects/concerns:   (Frequency/Alleviating factors/Precipitating factors, etc.)     Follow up with Mrs. Rizwana VILLAGRAN [place] completed. No further questions or concerns. Will continue to follow up to achieve health goals.

## 2019-02-28 ENCOUNTER — PATIENT OUTREACH (OUTPATIENT)
Dept: OTHER | Facility: OTHER | Age: 74
End: 2019-02-28

## 2019-02-28 NOTE — PROGRESS NOTES
Last 5 Patient Entered Readings                                      Current 30 Day Average: 127/85     Recent Readings 2/28/2019 2/28/2019 2/17/2019 2/15/2019 2/10/2019    SBP (mmHg) 107 114 109 133 146    DBP (mmHg) 66 76 75 84 77    Pulse 62 63 70 75 56          HPI:  Called patient to follow up. Patient endorses adherence to medication regimen. She has noticed decreased urine output. Patient denies hypotensive s/sx (lightheadedness, dizziness, nausea, fatigue); patient denies hypertensive s/sx (SOB, CP, severe headaches, changes in vision).     Assessment:  Reviewed recent readings. BP readings typically at goal. Average skewed by 1 outlier.    Plan:  Continue current medication regimen. Discussed slightly increasing water intake to monitor effect on urine, but reviewed importance of avoiding excess fluid intake with HF. I will continue to monitor regularly and will follow-up in 4 to 6 weeks, sooner if blood pressure begins to trend upward or downward.     Current medication regimen:  Hypertension Medications             carvedilol (COREG) 6.25 MG tablet Take 1 tablet (6.25 mg total) by mouth 2 (two) times daily with meals.    furosemide (LASIX) 40 MG tablet TAKE ONE TABLET BY MOUTH ONCE DAILY AS NEEDED FOR weight gain    sacubitril-valsartan (ENTRESTO) 24-26 mg per tablet Take 1 tablet by mouth 2 (two) times daily.          Patient denies having questions or concerns. Patient has my contact information and knows to call with any concerns or clinical changes.

## 2019-03-08 ENCOUNTER — CLINICAL SUPPORT (OUTPATIENT)
Dept: INTERNAL MEDICINE | Facility: CLINIC | Age: 74
End: 2019-03-08
Payer: MEDICARE

## 2019-03-08 PROCEDURE — 96372 PR INJECTION,THERAP/PROPH/DIAG2ST, IM OR SUBCUT: ICD-10-PCS | Mod: HCNC,S$GLB,, | Performed by: INTERNAL MEDICINE

## 2019-03-08 PROCEDURE — 96372 THER/PROPH/DIAG INJ SC/IM: CPT | Mod: HCNC,S$GLB,, | Performed by: INTERNAL MEDICINE

## 2019-03-08 RX ADMIN — CYANOCOBALAMIN 1000 MCG: 1000 INJECTION, SOLUTION INTRAMUSCULAR at 04:03

## 2019-03-13 DIAGNOSIS — R00.2 PALPITATIONS: ICD-10-CM

## 2019-03-13 DIAGNOSIS — I42.9 CARDIOMYOPATHY, UNSPECIFIED TYPE: ICD-10-CM

## 2019-03-13 RX ORDER — CARVEDILOL 6.25 MG/1
6.25 TABLET ORAL 2 TIMES DAILY WITH MEALS
Qty: 120 TABLET | Refills: 3 | Status: SHIPPED | OUTPATIENT
Start: 2019-03-13 | End: 2019-11-18 | Stop reason: SDUPTHER

## 2019-03-13 RX ORDER — AMIODARONE HYDROCHLORIDE 200 MG/1
TABLET ORAL
Qty: 30 TABLET | Refills: 6 | Status: SHIPPED | OUTPATIENT
Start: 2019-03-13 | End: 2019-10-17 | Stop reason: SDUPTHER

## 2019-03-27 ENCOUNTER — PATIENT MESSAGE (OUTPATIENT)
Dept: OTHER | Facility: OTHER | Age: 74
End: 2019-03-27

## 2019-03-27 ENCOUNTER — PATIENT OUTREACH (OUTPATIENT)
Dept: OTHER | Facility: OTHER | Age: 74
End: 2019-03-27

## 2019-03-27 NOTE — PROGRESS NOTES
"Last 5 Patient Entered Readings                                      Current 30 Day Average: 119/74     Recent Readings 3/10/2019 3/3/2019 2/28/2019 2/28/2019 2/17/2019    SBP (mmHg) 129 121 107 114 109    DBP (mmHg) 78 74 66 76 75    Pulse 60 67 62 63 70            Digital Medicine: Health  Follow Up    Sent [employer] message to follow up with Mrs. Rizwana Block.  Current BP average 119/74 mmHg is at goal, <130/80.  Requesting more BP readings.     *Patient messaged back the following:  "I wanted to let you know I am walking between 2 to 2.5 miles in the morning and working again with a  2 to 3 times per week. No bread.   I think blood pressure reading are better. Felling good. "    Encouraged her to continue submitted BP readings at least once per week and to keep up the great work with her exercise regimen.       "

## 2019-03-28 ENCOUNTER — OFFICE VISIT (OUTPATIENT)
Dept: PODIATRY | Facility: CLINIC | Age: 74
End: 2019-03-28
Payer: MEDICARE

## 2019-03-28 VITALS — BODY MASS INDEX: 27.94 KG/M2 | HEIGHT: 67 IN | WEIGHT: 178 LBS

## 2019-03-28 DIAGNOSIS — L60.0 INGROWN NAIL: Primary | ICD-10-CM

## 2019-03-28 PROCEDURE — 17999 UNLISTD PX SKN MUC MEMB SUBQ: CPT | Mod: CSM,HCNC,S$GLB, | Performed by: PODIATRIST

## 2019-03-28 PROCEDURE — 99999 PR PBB SHADOW E&M-EST. PATIENT-LVL III: ICD-10-PCS | Mod: PBBFAC,HCNC,, | Performed by: PODIATRIST

## 2019-03-28 PROCEDURE — 17999 PR NON-COVERED FOOT CARE: ICD-10-PCS | Mod: CSM,HCNC,S$GLB, | Performed by: PODIATRIST

## 2019-03-28 PROCEDURE — 99499 NO LOS: ICD-10-PCS | Mod: HCNC,,, | Performed by: PODIATRIST

## 2019-03-28 PROCEDURE — 99999 PR PBB SHADOW E&M-EST. PATIENT-LVL III: CPT | Mod: PBBFAC,HCNC,, | Performed by: PODIATRIST

## 2019-03-28 PROCEDURE — 99499 UNLISTED E&M SERVICE: CPT | Mod: HCNC,,, | Performed by: PODIATRIST

## 2019-03-28 NOTE — PROGRESS NOTES
"Vitals:    03/28/19 1602   Weight: 80.7 kg (178 lb)   Height: 5' 7" (1.702 m)       Problem List Items Addressed This Visit     None      Visit Diagnoses     Ingrown nail    -  Primary            Patient presents to the clinic for non-covered routine foot care. Patient is not a high risk foot care patient. Patient understands this is not typically a covered service and patient is aware of responsibility of payment. Pedal pulses are palpable. No know risk factors requiring routine foot care. Nails are elongated and ingrown.  Nails were reduced and trimmed bilaterally. Patient tolerated well.     RTC 2 weeks for ingrown nail procedure.           "

## 2019-03-29 ENCOUNTER — PATIENT MESSAGE (OUTPATIENT)
Dept: PODIATRY | Facility: CLINIC | Age: 74
End: 2019-03-29

## 2019-04-01 ENCOUNTER — OFFICE VISIT (OUTPATIENT)
Dept: CARDIOLOGY | Facility: CLINIC | Age: 74
End: 2019-04-01
Payer: MEDICARE

## 2019-04-01 ENCOUNTER — TELEPHONE (OUTPATIENT)
Dept: PODIATRY | Facility: CLINIC | Age: 74
End: 2019-04-01

## 2019-04-01 VITALS
WEIGHT: 180.31 LBS | BODY MASS INDEX: 28.3 KG/M2 | HEART RATE: 76 BPM | DIASTOLIC BLOOD PRESSURE: 74 MMHG | SYSTOLIC BLOOD PRESSURE: 128 MMHG | HEIGHT: 67 IN | OXYGEN SATURATION: 96 %

## 2019-04-01 DIAGNOSIS — M79.671 FOOT PAIN, BILATERAL: ICD-10-CM

## 2019-04-01 DIAGNOSIS — E78.00 HYPERCHOLESTEREMIA: ICD-10-CM

## 2019-04-01 DIAGNOSIS — M79.672 FOOT PAIN, BILATERAL: ICD-10-CM

## 2019-04-01 DIAGNOSIS — I10 ESSENTIAL HYPERTENSION: ICD-10-CM

## 2019-04-01 DIAGNOSIS — M20.42 HAMMER TOES OF BOTH FEET: Primary | ICD-10-CM

## 2019-04-01 DIAGNOSIS — Z98.890 S/P RADIOFREQUENCY ABLATION OPERATION FOR ARRHYTHMIA: ICD-10-CM

## 2019-04-01 DIAGNOSIS — E78.00 PURE HYPERCHOLESTEROLEMIA: ICD-10-CM

## 2019-04-01 DIAGNOSIS — I42.8 OTHER CARDIOMYOPATHY: Primary | ICD-10-CM

## 2019-04-01 DIAGNOSIS — I34.0 NON-RHEUMATIC MITRAL REGURGITATION: ICD-10-CM

## 2019-04-01 DIAGNOSIS — R20.2 PARESTHESIA OF BOTH LOWER EXTREMITIES: ICD-10-CM

## 2019-04-01 DIAGNOSIS — Z86.79 S/P RADIOFREQUENCY ABLATION OPERATION FOR ARRHYTHMIA: ICD-10-CM

## 2019-04-01 DIAGNOSIS — M20.41 HAMMER TOES OF BOTH FEET: Primary | ICD-10-CM

## 2019-04-01 DIAGNOSIS — I49.3 VENTRICULAR PREMATURE BEATS: ICD-10-CM

## 2019-04-01 PROCEDURE — 99214 OFFICE O/P EST MOD 30 MIN: CPT | Mod: HCNC,S$GLB,, | Performed by: INTERNAL MEDICINE

## 2019-04-01 PROCEDURE — 3078F PR MOST RECENT DIASTOLIC BLOOD PRESSURE < 80 MM HG: ICD-10-PCS | Mod: HCNC,CPTII,S$GLB, | Performed by: INTERNAL MEDICINE

## 2019-04-01 PROCEDURE — 1101F PR PT FALLS ASSESS DOC 0-1 FALLS W/OUT INJ PAST YR: ICD-10-PCS | Mod: HCNC,CPTII,S$GLB, | Performed by: INTERNAL MEDICINE

## 2019-04-01 PROCEDURE — 1101F PT FALLS ASSESS-DOCD LE1/YR: CPT | Mod: HCNC,CPTII,S$GLB, | Performed by: INTERNAL MEDICINE

## 2019-04-01 PROCEDURE — 99214 PR OFFICE/OUTPT VISIT, EST, LEVL IV, 30-39 MIN: ICD-10-PCS | Mod: HCNC,S$GLB,, | Performed by: INTERNAL MEDICINE

## 2019-04-01 PROCEDURE — 99999 PR PBB SHADOW E&M-EST. PATIENT-LVL IV: ICD-10-PCS | Mod: PBBFAC,HCNC,, | Performed by: INTERNAL MEDICINE

## 2019-04-01 PROCEDURE — 3078F DIAST BP <80 MM HG: CPT | Mod: HCNC,CPTII,S$GLB, | Performed by: INTERNAL MEDICINE

## 2019-04-01 PROCEDURE — 3074F SYST BP LT 130 MM HG: CPT | Mod: HCNC,CPTII,S$GLB, | Performed by: INTERNAL MEDICINE

## 2019-04-01 PROCEDURE — 99999 PR PBB SHADOW E&M-EST. PATIENT-LVL IV: CPT | Mod: PBBFAC,HCNC,, | Performed by: INTERNAL MEDICINE

## 2019-04-01 PROCEDURE — 3074F PR MOST RECENT SYSTOLIC BLOOD PRESSURE < 130 MM HG: ICD-10-PCS | Mod: HCNC,CPTII,S$GLB, | Performed by: INTERNAL MEDICINE

## 2019-04-01 RX ORDER — POTASSIUM CHLORIDE 600 MG/1
8 TABLET, FILM COATED, EXTENDED RELEASE ORAL ONCE
Qty: 90 TABLET | Refills: 3 | Status: SHIPPED | OUTPATIENT
Start: 2019-04-01 | End: 2019-04-01

## 2019-04-01 RX ORDER — ATORVASTATIN CALCIUM 10 MG/1
10 TABLET, FILM COATED ORAL DAILY
Qty: 30 TABLET | Refills: 6 | Status: SHIPPED | OUTPATIENT
Start: 2019-04-01 | End: 2019-11-18 | Stop reason: SDUPTHER

## 2019-04-01 RX ORDER — POTASSIUM CHLORIDE 600 MG/1
8 TABLET, FILM COATED, EXTENDED RELEASE ORAL ONCE
Qty: 1 TABLET | Refills: 0 | Status: SHIPPED | OUTPATIENT
Start: 2019-04-01 | End: 2019-04-01 | Stop reason: SDUPTHER

## 2019-04-01 NOTE — PROGRESS NOTES
"Subjective:   Patient ID:  Rizwana Block is a 73 y.o. female who presents for follow-up of Reduction in Urine Output and Results (ECHO)    Ms. Block is a 73yo female with a history of frequent PVCs (s/p RFA 9/27/2016), cardiomyopathy (EF 35%) now  normalized, HFrEF, mild MR, and HTN       Echo 2019:  ·   Low normal left ventricular systolic function. The estimated ejection fraction is 50%, Quantitative LVEF 49% by 2D measurement.  · Grade I (mild) left ventricular diastolic dysfunction consistent with impaired relaxation.  · Mild left atrial enlargement.  · Septal wall has abnormal motion consistent with left bundle branch block.  · Mild mitral sclerosis.  · Normal right ventricular systolic function.  · Normal left atrial pressure.     HPI:   Feels well. No complaints  No chest pain, Orthopnea, PND of heart failure symptoms.   Denies palpitations or fluttering in the chest  Patient is walking 2.5 miles a day.    Non smoker  Mother had MI at age 68- sudden death. Cousin had MI at 33. Grandfather had MI in 40s. Father had CVA.     The 10-year ASCVD risk score (Wittman PANFILO Jr., et al., 2013) is: 14.9%    Values used to calculate the score:      Age: 73 years      Sex: Female      Is Non- : No      Diabetic: No      Tobacco smoker: No      Systolic Blood Pressure: 128 mmHg      Is BP treated: Yes      HDL Cholesterol: 81 mg/dL      Total Cholesterol: 198 mg/dL      Patient Active Problem List   Diagnosis    Ventricular premature beats    Hammertoe    Palpitations    Costochondritis    Heart failure, systolic    Precordial pain    Bradycardia    Cardiomyopathy    Non-rheumatic mitral regurgitation    Essential hypertension    S/P colonoscopy    Osteopenia of spine    Pure hypercholesterolemia    At risk for amiodarone toxicity with long term use    Dysuria    Vaginal atrophy    Vaginal burning    Open wound of right foot     /74   Pulse 76   Ht 5' 7" (1.702 m)   Wt 81.8 " kg (180 lb 5.4 oz)   LMP  (LMP Unknown) Comment: refused weight   SpO2 96%   BMI 28.24 kg/m²   Body mass index is 28.24 kg/m².  CrCl cannot be calculated (Patient's most recent lab result is older than the maximum 7 days allowed.).    Lab Results   Component Value Date     01/11/2019    K 4.4 01/11/2019     01/11/2019    CO2 29 01/11/2019    BUN 24 (H) 01/11/2019    CREATININE 0.9 01/11/2019    GLU 83 01/11/2019    MG 2.1 06/02/2016    AST 17 01/11/2019    ALT 11 01/11/2019    ALBUMIN 3.7 01/11/2019    PROT 6.9 01/11/2019    BILITOT 0.4 01/11/2019    WBC 5.48 01/11/2019    HGB 12.2 01/11/2019    HCT 39.7 01/11/2019    MCV 98 01/11/2019     01/11/2019    INR 0.9 09/16/2016    TSH 1.509 01/11/2019    CHOL 198 01/11/2019    HDL 81 (H) 01/11/2019    LDLCALC 102.2 01/11/2019    TRIG 74 01/11/2019       Current Outpatient Medications   Medication Sig    amiodarone (PACERONE) 200 MG Tab TAKE ONE TABLET BY MOUTH ONCE DAILY    calcium carbonate (OS-HENNA) 600 mg calcium (1,500 mg) Tab Take 1,200 mg by mouth 2 (two) times daily with meals.     carvedilol (COREG) 6.25 MG tablet Take 1 tablet (6.25 mg total) by mouth 2 (two) times daily with meals.    cholecalciferol, vitamin D3, (VITAMIN D3) 1,000 unit capsule Take 1,000 Units by mouth once daily.    co-enzyme Q-10 30 mg capsule Take 10 mg by mouth once daily.     conjugated estrogens (PREMARIN) vaginal cream Dime-sized amount with finger inside vagina (to knuckle)/around opening/inner lips.  Do nightly x 2 weeks, then 2x/week thereafter.    fish oil-omega-3 fatty acids 300-1,000 mg capsule Take 2 g by mouth 2 (two) times daily.     furosemide (LASIX) 40 MG tablet TAKE ONE TABLET BY MOUTH ONCE DAILY AS NEEDED FOR weight gain    gabapentin (NEURONTIN) 100 MG capsule Take 1 capsule (100 mg total) by mouth every evening.    MULTIVITAMIN WITH MINERALS (HAIR,SKIN AND NAILS ORAL) Take by mouth once daily.    sacubitril-valsartan (ENTRESTO) 24-26 mg per  tablet Take 1 tablet by mouth 2 (two) times daily.    vit C/E/Zn/coppr/lutein/zeaxan (PRESERVISION AREDS 2 ORAL) Take by mouth once daily.    aspirin (ECOTRIN) 81 MG EC tablet Take 81 mg by mouth once daily.    atorvastatin (LIPITOR) 10 MG tablet Take 1 tablet (10 mg total) by mouth once daily.    potassium chloride (KLOR-CON) 8 MEQ TbSR Take 1 tablet (8 mEq total) by mouth once. for 1 dose     Current Facility-Administered Medications   Medication    cyanocobalamin injection 1,000 mcg       Review of Systems   Constitution: Negative for decreased appetite, malaise/fatigue, weight gain and weight loss.        Energy levels have improved   HENT: Negative for nosebleeds.    Eyes: Negative for blurred vision and visual disturbance.   Cardiovascular: Negative for chest pain, claudication, cyanosis, dyspnea on exertion, irregular heartbeat, leg swelling, near-syncope, orthopnea, palpitations, paroxysmal nocturnal dyspnea and syncope.   Respiratory: Negative for cough, shortness of breath and wheezing.    Endocrine: Negative for heat intolerance.   Skin: Negative for rash.   Musculoskeletal: Positive for joint pain. Negative for muscle weakness and myalgias.   Gastrointestinal: Negative for abdominal pain, anorexia, melena, nausea and vomiting.   Genitourinary: Negative for menorrhagia.   Neurological: Negative for excessive daytime sleepiness, dizziness, headaches, loss of balance, seizures, vertigo and weakness.   Psychiatric/Behavioral: Negative for altered mental status and depression. The patient is not nervous/anxious.        Objective:   Physical Exam   Constitutional: She is oriented to person, place, and time. She appears well-developed and well-nourished. No distress.   HENT:   Head: Normocephalic and atraumatic.   Nose: Nose normal.   Mouth/Throat: Oropharynx is clear and moist. No oropharyngeal exudate.   Eyes: Pupils are equal, round, and reactive to light. Conjunctivae and EOM are normal. Right eye  exhibits no discharge. Left eye exhibits no discharge. No scleral icterus.   Neck: Normal range of motion. Neck supple. No JVD present. No tracheal deviation present. No thyromegaly present.   Cardiovascular: Normal rate, regular rhythm, normal heart sounds and intact distal pulses. Exam reveals no gallop and no friction rub.   No murmur heard.  Pulmonary/Chest: Effort normal. No stridor. No respiratory distress. She has no wheezes. She has rales (few at the basis). She exhibits no tenderness.   Abdominal: Soft. Bowel sounds are normal. She exhibits no distension and no mass. There is no tenderness. There is no rebound and no guarding.   Musculoskeletal: Normal range of motion. She exhibits no edema or tenderness.   Lymphadenopathy:     She has no cervical adenopathy.   Neurological: She is alert and oriented to person, place, and time. She has normal reflexes. No cranial nerve deficit. She exhibits normal muscle tone. Coordination normal.   Skin: Skin is warm. No rash noted. She is not diaphoretic. No erythema. No pallor.   Psychiatric: She has a normal mood and affect. Her behavior is normal. Judgment and thought content normal.       Assessment:     1. Other cardiomyopathy    2. Hypercholesteremia    3. Essential hypertension    4. Non-rheumatic mitral regurgitation    5. Pure hypercholesterolemia    6. Ventricular premature beats    7. S/P radiofrequency ablation operation for arrhythmia        Plan:   Rizwana was seen today for reduction in urine output and results.    Diagnoses and all orders for this visit:    Other cardiomyopathy    Hypercholesteremia  -     Lipid panel; Future  -     Hepatic function panel; Future    Essential hypertension    Non-rheumatic mitral regurgitation    Pure hypercholesterolemia    Ventricular premature beats    S/P radiofrequency ablation operation for arrhythmia    Other orders  -     atorvastatin (LIPITOR) 10 MG tablet; Take 1 tablet (10 mg total) by mouth once daily.  -      Discontinue: potassium chloride (KLOR-CON) 8 MEQ TbSR; Take 1 tablet (8 mEq total) by mouth once. for 1 dose  -     potassium chloride (KLOR-CON) 8 MEQ TbSR; Take 1 tablet (8 mEq total) by mouth once. for 1 dose      Will talk to Abisamra if patient can stop amiodarone.   Continue medications with no change recommend take ASA due to family history.

## 2019-04-01 NOTE — TELEPHONE ENCOUNTER
Clarified Potassium Rx with  that the pt is to take 8 meq 1 tab QD .The pharmacist reports understanding of these instructions.

## 2019-04-01 NOTE — TELEPHONE ENCOUNTER
----- Message from Jing Prabhakar sent at 4/1/2019  4:35 PM CDT -----  Contact: [facility]   Pharmacist need to verify dosage on the pt Potassium Chloride 8 meq TBsr  LOV 4/1/49 Dr. Mabry    Thanks

## 2019-04-01 NOTE — TELEPHONE ENCOUNTER
----- Message from Karen Lou sent at 4/1/2019  2:36 PM CDT -----  Contact: pt  Name of Who is Calling: AMY MANLEY [[id number]]    What is the request in detail: Patient is calling to provide a fax number for prescription [phone]..... Please contact to further discuss and advise      Can the clinic reply by [facility]:     What [facility] [phone]    Spoke with patient informing her that orthotic orders were faxed.

## 2019-04-02 RX ORDER — POTASSIUM CHLORIDE 600 MG/1
TABLET, FILM COATED, EXTENDED RELEASE ORAL
Qty: 90 TABLET | Refills: 3 | Status: SHIPPED | OUTPATIENT
Start: 2019-04-02 | End: 2019-11-18 | Stop reason: SDUPTHER

## 2019-04-04 ENCOUNTER — TELEPHONE (OUTPATIENT)
Dept: PODIATRY | Facility: CLINIC | Age: 74
End: 2019-04-04

## 2019-04-05 ENCOUNTER — PATIENT MESSAGE (OUTPATIENT)
Dept: OTHER | Facility: OTHER | Age: 74
End: 2019-04-05

## 2019-04-05 ENCOUNTER — CLINICAL SUPPORT (OUTPATIENT)
Dept: INTERNAL MEDICINE | Facility: CLINIC | Age: 74
End: 2019-04-05
Payer: MEDICARE

## 2019-04-05 ENCOUNTER — PATIENT MESSAGE (OUTPATIENT)
Dept: CARDIOLOGY | Facility: CLINIC | Age: 74
End: 2019-04-05

## 2019-04-05 PROCEDURE — 96372 THER/PROPH/DIAG INJ SC/IM: CPT | Mod: HCNC,S$GLB,, | Performed by: INTERNAL MEDICINE

## 2019-04-05 PROCEDURE — 96372 PR INJECTION,THERAP/PROPH/DIAG2ST, IM OR SUBCUT: ICD-10-PCS | Mod: HCNC,S$GLB,, | Performed by: INTERNAL MEDICINE

## 2019-04-05 RX ADMIN — CYANOCOBALAMIN 1000 MCG: 1000 INJECTION, SOLUTION INTRAMUSCULAR at 02:04

## 2019-04-08 ENCOUNTER — PATIENT MESSAGE (OUTPATIENT)
Dept: ADMINISTRATIVE | Facility: OTHER | Age: 74
End: 2019-04-08

## 2019-04-11 ENCOUNTER — OFFICE VISIT (OUTPATIENT)
Dept: PODIATRY | Facility: CLINIC | Age: 74
End: 2019-04-11
Payer: MEDICARE

## 2019-04-11 VITALS — BODY MASS INDEX: 28.25 KG/M2 | HEIGHT: 67 IN | WEIGHT: 180 LBS

## 2019-04-11 DIAGNOSIS — L60.0 INGROWN NAIL: Primary | ICD-10-CM

## 2019-04-11 DIAGNOSIS — M79.675 PAIN IN TOES OF BOTH FEET: ICD-10-CM

## 2019-04-11 DIAGNOSIS — M79.674 PAIN IN TOES OF BOTH FEET: ICD-10-CM

## 2019-04-11 PROCEDURE — 99499 NO LOS: ICD-10-PCS | Mod: HCNC,S$GLB,, | Performed by: PODIATRIST

## 2019-04-11 PROCEDURE — 11750 PR REMOVAL OF NAIL BED: ICD-10-PCS | Mod: T5,HCNC,51,S$GLB | Performed by: PODIATRIST

## 2019-04-11 PROCEDURE — 99999 PR PBB SHADOW E&M-EST. PATIENT-LVL III: ICD-10-PCS | Mod: PBBFAC,HCNC,, | Performed by: PODIATRIST

## 2019-04-11 PROCEDURE — 99999 PR PBB SHADOW E&M-EST. PATIENT-LVL III: CPT | Mod: PBBFAC,HCNC,, | Performed by: PODIATRIST

## 2019-04-11 PROCEDURE — 11750 EXCISION NAIL&NAIL MATRIX: CPT | Mod: TA,HCNC,S$GLB, | Performed by: PODIATRIST

## 2019-04-11 PROCEDURE — 99499 UNLISTED E&M SERVICE: CPT | Mod: HCNC,S$GLB,, | Performed by: PODIATRIST

## 2019-04-11 PROCEDURE — 11750 EXCISION NAIL&NAIL MATRIX: CPT | Mod: T5,HCNC,51,S$GLB | Performed by: PODIATRIST

## 2019-04-11 NOTE — PROGRESS NOTES
Chief Complaint   Patient presents with    Ingrown Toenail     Precedure Bilateral Great               HPI:   Patient is a 73 y.o. female with complaints of painful toenail on the bilateral hallux.  The pain started on and off for a while.  Patient denies acute trauma to the toe.   Usually gets nail care but without significant lasting relief.   Home treatment:  clipping        Past Medical History:   Diagnosis Date    Arrhythmia     Chest pain 5/27/2016    3/2016: Began experience chest discomfort.    CHF (congestive heart failure)     Hypertension     Osteopenia     Reclast infusion 10/5/2010 and 10/20/2011       Current Outpatient Medications on File Prior to Visit   Medication Sig Dispense Refill    amiodarone (PACERONE) 200 MG Tab TAKE ONE TABLET BY MOUTH ONCE DAILY 30 tablet 6    aspirin (ECOTRIN) 81 MG EC tablet Take 81 mg by mouth once daily.      atorvastatin (LIPITOR) 10 MG tablet Take 1 tablet (10 mg total) by mouth once daily. 30 tablet 6    calcium carbonate (OS-HENNA) 600 mg calcium (1,500 mg) Tab Take 1,200 mg by mouth 2 (two) times daily with meals.       carvedilol (COREG) 6.25 MG tablet Take 1 tablet (6.25 mg total) by mouth 2 (two) times daily with meals. 120 tablet 3    cholecalciferol, vitamin D3, (VITAMIN D3) 1,000 unit capsule Take 1,000 Units by mouth once daily.      co-enzyme Q-10 30 mg capsule Take 10 mg by mouth once daily.       conjugated estrogens (PREMARIN) vaginal cream Dime-sized amount with finger inside vagina (to knuckle)/around opening/inner lips.  Do nightly x 2 weeks, then 2x/week thereafter. 30 g 11    fish oil-omega-3 fatty acids 300-1,000 mg capsule Take 2 g by mouth 2 (two) times daily.       furosemide (LASIX) 40 MG tablet TAKE ONE TABLET BY MOUTH ONCE DAILY AS NEEDED FOR weight gain 30 tablet 11    gabapentin (NEURONTIN) 100 MG capsule Take 1 capsule (100 mg total) by mouth every evening. 30 capsule 6    MULTIVITAMIN WITH MINERALS (HAIR,SKIN AND NAILS  ORAL) Take by mouth once daily.      potassium chloride (KLOR-CON) 8 MEQ TbSR Take 1 tab (8 meq) oral by mouth daily. 90 tablet 3    sacubitril-valsartan (ENTRESTO) 24-26 mg per tablet Take 1 tablet by mouth 2 (two) times daily. 60 tablet 6    vit C/E/Zn/coppr/lutein/zeaxan (PRESERVISION AREDS 2 ORAL) Take by mouth once daily.       Current Facility-Administered Medications on File Prior to Visit   Medication Dose Route Frequency Provider Last Rate Last Dose    cyanocobalamin injection 1,000 mcg  1,000 mcg Intramuscular Q30 Days Sri Gutierrez MD   1,000 mcg at 19 1413        ALLG:  Review of patient's allergies indicates:   Allergen Reactions    Dilaudid [hydromorphone (bulk)]      Severe nausea/vomiting    Morphine      Severe nausea/vomiting           Social History     Socioeconomic History    Marital status:      Spouse name: Not on file    Number of children: Not on file    Years of education: Not on file    Highest education level: Not on file   Occupational History    Not on file   Social Needs    Financial resource strain: Not on file    Food insecurity:     Worry: Not on file     Inability: Not on file    Transportation needs:     Medical: Not on file     Non-medical: Not on file   Tobacco Use    Smoking status: Former Smoker     Packs/day: 0.25     Years: 24.00     Pack years: 6.00     Types: Cigarettes     Start date: 1961     Last attempt to quit: 1985     Years since quittin.2    Smokeless tobacco: Never Used   Substance and Sexual Activity    Alcohol use: Yes     Alcohol/week: 3.0 oz     Types: 5 Glasses of wine per week     Comment: socially    Drug use: No    Sexual activity: Yes     Partners: Male     Birth control/protection: None   Lifestyle    Physical activity:     Days per week: Not on file     Minutes per session: Not on file    Stress: Not on file   Relationships    Social connections:     Talks on phone: Not on file     Gets together: Not  "on file     Attends Worship service: Not on file     Active member of club or organization: Not on file     Attends meetings of clubs or organizations: Not on file     Relationship status: Not on file   Other Topics Concern    Not on file   Social History Narrative    . 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 [employer] . She is an emigrant from [place].             ROS:  General ROS: negative for chills, fatigue or fever  Cardiovascular ROS: no chest pain or dyspnea on exertion  Musculoskeletal ROS: negative for - joint pain or joint stiffness.  Negative for loss of strength  Neuro ROS: Negative for syncope, numbness, or muscle weakness  Skin ROS: Negative for rash, itching or hair changes.  +Toenail changes      EXAM:   Vitals:    04/11/19 1445   Weight: 81.6 kg (180 lb)   Height: 5' 7" (1.702 m)       Bilateral LOWER EXTREMITY EXAM:    Vascular:  Dorsalis pedis and posterior tibial pulses are palpable. capillary refill time is within normal limits and toes are warm to touch.  There is  presence of digital hair.  There is  mild localized edema to the affected area.     Neurological:  Light touch, proprioception, and sharp/dull sensation are all intact.     Dermatological:  The toenail is incurvated, Medial border of the Bilateral hallux.    no erythema noted to the affected area.     Absent paronychia.     Absent abscess    Musculoskeletal:  Muscle strength is 5/5 in all groups .    hammer toes        ASSESSMENT/PLAN     Problem List Items Addressed This Visit     None      Visit Diagnoses     Ingrown nail    -  Primary    Relevant Orders    Nail Removal    Pain in toes of both feet        Relevant Orders    Nail Removal            I counseled the patient on the patient's  conditions, their implications and medical management.       Nail Removal  Date/Time: 4/11/2019 3:27 PM  Performed by: Gail Walker DPM  Authorized by: Gail Walker DPM     Consent Done?:  Yes (Written)    Location:  Left foot (bilateral " (LEFT AND RIGHT) hallux toe)  Location detail:  Left big toe  Anesthesia:  Local infiltration  Local anesthetic: lidocaine 1% without epinephrine and bupivacaine 0.5% without epinephrine  Anesthetic total (ml):  5  Preparation:  Skin prepped with Betadine    Amount removed:  Partial  Nail removed location: medial.  Wedge excision of skin of nail fold: No    Nail bed sutured?: No    Nail matrix removed:  Partial  Dressing applied:  Antibiotic ointment and dressing applied  Patient tolerance:  Patient tolerated the procedure well with no immediate complications     Bilateral hallux toenail, medial borders          Verbal and written post operative instructions were provided to the patient.     Patient to monitor for any adverse reactions and follow up in 10-14 days post op            Gail Walker DPM  NPI: [id number]         [facility] - PODIATRY  [address] [place] [id number]  Dept: [phone]  Dept Fax: [phone]

## 2019-04-16 DIAGNOSIS — I42.8 OTHER CARDIOMYOPATHY: ICD-10-CM

## 2019-04-16 DIAGNOSIS — I50.22 CHRONIC SYSTOLIC HEART FAILURE: ICD-10-CM

## 2019-04-16 NOTE — TELEPHONE ENCOUNTER
----- Message from Rosalee Álvarez sent at 4/16/2019 11:29 AM CDT -----  Contact: Self  .Rx Refill/Request     Is this a Refill or New Rx:  Refill if can change the directions as was was change to taking 1am & 1 pm. Double dosage. Thanks  Rx Name and Strength:  sacubitril-valsartan (ENTRESTO) 24-26 mg per tablet,   Preferred Pharmacy with phone number: [facility], [phone] Fax: [phone]   Communication Preference: [phone]  Additional Information: Dr Mabry

## 2019-04-17 ENCOUNTER — TELEPHONE (OUTPATIENT)
Dept: PODIATRY | Facility: CLINIC | Age: 74
End: 2019-04-17

## 2019-04-17 NOTE — TELEPHONE ENCOUNTER
----- Message from Gail Walker DPM sent at 4/17/2019 12:05 PM CDT -----  Contact: self@home  A mild pain is normal after a procedure.   Keep toe clean and dry.  Do not pick at the scab.  Wide toe box shoes.       ----- Message -----  From: Mita Colorado MA  Sent: 4/17/2019  10:50 AM  To: Gail Walker DPM    Ingrown was removed on 4/11/19. What would you like me to do. I can get her in for tomorrow if that's ok with you  ----- Message -----  From: Zoie Oconnor  Sent: 4/17/2019  10:05 AM  To: Denise Reynolds Staff    Needs Advice    Reason for call:patient states she is still having pain in her toe        Communication Preference:    Additional Information:    Spoke with patient about rescheduling her a sooner appt. Date didn't work out

## 2019-04-22 ENCOUNTER — PATIENT MESSAGE (OUTPATIENT)
Dept: ADMINISTRATIVE | Facility: OTHER | Age: 74
End: 2019-04-22

## 2019-04-23 ENCOUNTER — PATIENT MESSAGE (OUTPATIENT)
Dept: INTERNAL MEDICINE | Facility: CLINIC | Age: 74
End: 2019-04-23

## 2019-04-23 DIAGNOSIS — G47.30 SLEEP APNEA, UNSPECIFIED TYPE: Primary | ICD-10-CM

## 2019-04-24 ENCOUNTER — PATIENT MESSAGE (OUTPATIENT)
Dept: CARDIOLOGY | Facility: CLINIC | Age: 74
End: 2019-04-24

## 2019-04-25 ENCOUNTER — OFFICE VISIT (OUTPATIENT)
Dept: SLEEP MEDICINE | Facility: CLINIC | Age: 74
End: 2019-04-25
Payer: MEDICARE

## 2019-04-25 ENCOUNTER — PATIENT MESSAGE (OUTPATIENT)
Dept: SLEEP MEDICINE | Facility: CLINIC | Age: 74
End: 2019-04-25

## 2019-04-25 ENCOUNTER — OFFICE VISIT (OUTPATIENT)
Dept: PODIATRY | Facility: CLINIC | Age: 74
End: 2019-04-25
Payer: MEDICARE

## 2019-04-25 VITALS
DIASTOLIC BLOOD PRESSURE: 76 MMHG | BODY MASS INDEX: 28.58 KG/M2 | HEART RATE: 64 BPM | SYSTOLIC BLOOD PRESSURE: 113 MMHG | HEIGHT: 67 IN | WEIGHT: 182.13 LBS

## 2019-04-25 VITALS — WEIGHT: 182 LBS | HEIGHT: 67 IN | BODY MASS INDEX: 28.56 KG/M2

## 2019-04-25 DIAGNOSIS — G47.30 SLEEP APNEA, UNSPECIFIED TYPE: Primary | ICD-10-CM

## 2019-04-25 DIAGNOSIS — M79.671 RIGHT FOOT PAIN: ICD-10-CM

## 2019-04-25 DIAGNOSIS — Z09 FOLLOW-UP EXAM AFTER TREATMENT: Primary | ICD-10-CM

## 2019-04-25 DIAGNOSIS — B35.1 DERMATOPHYTOSIS OF NAIL: ICD-10-CM

## 2019-04-25 DIAGNOSIS — L60.0 INGROWN NAIL: ICD-10-CM

## 2019-04-25 PROCEDURE — 99213 OFFICE O/P EST LOW 20 MIN: CPT | Mod: 25,,, | Performed by: PODIATRIST

## 2019-04-25 PROCEDURE — 3074F PR MOST RECENT SYSTOLIC BLOOD PRESSURE < 130 MM HG: ICD-10-PCS | Mod: CPTII,,, | Performed by: PODIATRIST

## 2019-04-25 PROCEDURE — 99213 PR OFFICE/OUTPT VISIT, EST, LEVL III, 20-29 MIN: ICD-10-PCS | Mod: 25,,, | Performed by: PODIATRIST

## 2019-04-25 PROCEDURE — 17999 PR NON-COVERED FOOT CARE: ICD-10-PCS | Mod: CSM,S$GLB,, | Performed by: PODIATRIST

## 2019-04-25 PROCEDURE — 99203 OFFICE O/P NEW LOW 30 MIN: CPT | Mod: S$GLB,,, | Performed by: NURSE PRACTITIONER

## 2019-04-25 PROCEDURE — 3074F PR MOST RECENT SYSTOLIC BLOOD PRESSURE < 130 MM HG: ICD-10-PCS | Mod: CPTII,S$GLB,, | Performed by: NURSE PRACTITIONER

## 2019-04-25 PROCEDURE — 99999 PR PBB SHADOW E&M-EST. PATIENT-LVL II: CPT | Mod: PBBFAC,,, | Performed by: PODIATRIST

## 2019-04-25 PROCEDURE — 1101F PR PT FALLS ASSESS DOC 0-1 FALLS W/OUT INJ PAST YR: ICD-10-PCS | Mod: CPTII,S$GLB,, | Performed by: NURSE PRACTITIONER

## 2019-04-25 PROCEDURE — 99999 PR PBB SHADOW E&M-EST. PATIENT-LVL IV: ICD-10-PCS | Mod: PBBFAC,,, | Performed by: NURSE PRACTITIONER

## 2019-04-25 PROCEDURE — 1101F PT FALLS ASSESS-DOCD LE1/YR: CPT | Mod: CPTII,,, | Performed by: PODIATRIST

## 2019-04-25 PROCEDURE — 99999 PR PBB SHADOW E&M-EST. PATIENT-LVL II: ICD-10-PCS | Mod: PBBFAC,,, | Performed by: PODIATRIST

## 2019-04-25 PROCEDURE — 3078F PR MOST RECENT DIASTOLIC BLOOD PRESSURE < 80 MM HG: ICD-10-PCS | Mod: CPTII,S$GLB,, | Performed by: NURSE PRACTITIONER

## 2019-04-25 PROCEDURE — 99999 PR PBB SHADOW E&M-EST. PATIENT-LVL IV: CPT | Mod: PBBFAC,,, | Performed by: NURSE PRACTITIONER

## 2019-04-25 PROCEDURE — 3078F PR MOST RECENT DIASTOLIC BLOOD PRESSURE < 80 MM HG: ICD-10-PCS | Mod: CPTII,,, | Performed by: PODIATRIST

## 2019-04-25 PROCEDURE — 1101F PT FALLS ASSESS-DOCD LE1/YR: CPT | Mod: CPTII,S$GLB,, | Performed by: NURSE PRACTITIONER

## 2019-04-25 PROCEDURE — 3074F SYST BP LT 130 MM HG: CPT | Mod: CPTII,,, | Performed by: PODIATRIST

## 2019-04-25 PROCEDURE — 99203 PR OFFICE/OUTPT VISIT, NEW, LEVL III, 30-44 MIN: ICD-10-PCS | Mod: S$GLB,,, | Performed by: NURSE PRACTITIONER

## 2019-04-25 PROCEDURE — 1101F PR PT FALLS ASSESS DOC 0-1 FALLS W/OUT INJ PAST YR: ICD-10-PCS | Mod: CPTII,,, | Performed by: PODIATRIST

## 2019-04-25 PROCEDURE — 17999 UNLISTD PX SKN MUC MEMB SUBQ: CPT | Mod: CSM,S$GLB,, | Performed by: PODIATRIST

## 2019-04-25 PROCEDURE — 3074F SYST BP LT 130 MM HG: CPT | Mod: CPTII,S$GLB,, | Performed by: NURSE PRACTITIONER

## 2019-04-25 PROCEDURE — 3078F DIAST BP <80 MM HG: CPT | Mod: CPTII,,, | Performed by: PODIATRIST

## 2019-04-25 PROCEDURE — 3078F DIAST BP <80 MM HG: CPT | Mod: CPTII,S$GLB,, | Performed by: NURSE PRACTITIONER

## 2019-04-25 RX ORDER — ZOLPIDEM TARTRATE 5 MG/1
TABLET ORAL
Qty: 2 TABLET | Refills: 0 | Status: SHIPPED | OUTPATIENT
Start: 2019-04-25 | End: 2019-05-06 | Stop reason: SDUPTHER

## 2019-04-25 NOTE — PATIENT INSTRUCTIONS
Tonio or Zoey will contact you to schedule your sleep study. Their number is [phone] (ext 2). The [facility] is located on 7th floor of the [facility].    If you have not been contacted regarding scheduling your sleep test after one week from today, please notify me via [facility] Portal or by phone by calling [phone] (ext 1).     We will call you when the sleep study results are ready - if you have not heard from us by 2 weeks from the date of the study, please call [phone] (ext 1).    You are advised to abstain from driving should you feel sleepy or drowsy.

## 2019-04-25 NOTE — PROGRESS NOTES
Rizwana Block  was seen as a new patient at the request of  Sri Gutierrez MD for the evaluation of obstructive sleep apnea.    CHIEF COMPLAINT:  Snoring     04/25/219 JACQUELINE Abreu NP: Initial HISTORY OF PRESENT ILLNESS: Rizwana Block is a 73 y.o. female is here for sleep evaluation.      Patient complaints include: snoring, interrupted sleep, and air gasping  Reports difficulty falling and staying asleep. Sometimes takes up to one hour to fall asleep  Sleep is interrupted, typically after a couple of hours of sleep she wakes up and some nights unable to fall back asleep  Takes up to 30 mg of melatonin most nights; helps initiate sleep but not with fragmented sleep  Denies symptoms of restless legs or kicking during sleep. Takes gabapentin for nerve pain in feet.     Occupation: works in [employer] firm     [place] Sleepiness Scale score during initial sleep evaluation was 4.    SLEEP ROUTINE:    Bed partner:     Time to bed: 10 pm  Sleep onset latency:  Up to 1 hour     Disruptions or awakenings: 1-3  Wakeup time:  7 am  Perceived sleep quality:  Poor to fair          PAST MEDICAL HISTORY:    Active Ambulatory Problems     Diagnosis Date Noted    Ventricular premature beats 06/03/2013    Hammertoe 08/30/2013    Palpitations 07/31/2015    Costochondritis 03/16/2016    Heart failure, systolic 05/18/2016    Precordial pain 05/27/2016    Bradycardia 06/01/2016    Cardiomyopathy 06/01/2016    Non-rheumatic mitral regurgitation     Essential hypertension     S/P colonoscopy 05/24/2017    Osteopenia of spine 05/24/2017    Pure hypercholesterolemia 06/01/2017    At risk for amiodarone toxicity with long term use 06/22/2018    Dysuria 07/30/2018    Vaginal atrophy 07/30/2018    Vaginal burning 07/30/2018    Open wound of right foot 08/23/2018     Resolved Ambulatory Problems     Diagnosis Date Noted    Shortness of breath 03/16/2016    Unstable angina     PVC (premature ventricular  contraction) 2016     Past Medical History:   Diagnosis Date    Arrhythmia     Chest pain 2016    CHF (congestive heart failure)     Hypertension     Osteopenia                 PAST SURGICAL HISTORY:    Past Surgical History:   Procedure Laterality Date    ABLATION N/A 2016    [facility] C[facility]    ANKLE FUSION      right    bladder surgery      with mesh    BLEPHAROPLASTY W/ LASER      CATARACT EXTRACTION, BILATERAL      CORRECTION, HAMMER TOE Right 2013    [facility]    HAND SURGERY      benign cyst on left    HEART CATH-LEFT AND Right Heart cath Left 2016    [facility] by Jet Jacinto [facility] at [facility] CATH LAB    HYSTERECTOMY      heavy bleeding    PATELLA FRACTURE SURGERY      right- plate in tibial plateau    TONSILLECTOMY           FAMILY HISTORY:                Family History   Problem Relation Age of Onset    Heart disease Mother 63    Stroke Father 49    Heart disease Father     Early death Father     Cancer Maternal Aunt         bone    Cancer Maternal Uncle         esophageal    Heart disease Paternal Uncle     SIDS Daughter     Breast cancer Neg Hx     Ovarian cancer Neg Hx     Cervical cancer Neg Hx     Endometrial cancer Neg Hx     Vaginal cancer Neg Hx        SOCIAL HISTORY:          Tobacco:   Social History     Tobacco Use   Smoking Status Former Smoker    Packs/day: 0.25    Years: 24.00    Pack years: 6.00    Types: Cigarettes    Start date: 1961    Last attempt to quit: 1985    Years since quittin.3   Smokeless Tobacco Never Used       Alcohol use:    Social History     Substance and Sexual Activity   Alcohol Use Yes    Alcohol/week: 3.0 oz    Types: 5 Glasses of wine per week    Comment: socially                 ALLERGIES:    Review of patient's allergies indicates:   Allergen Reactions    Dilaudid [hydromorphone (bulk)]      Severe nausea/vomiting     Morphine      Severe nausea/vomiting       CURRENT MEDICATIONS:    Current Outpatient Medications   Medication Sig Dispense Refill    amiodarone (PACERONE) 200 MG Tab TAKE ONE TABLET BY MOUTH ONCE DAILY 30 tablet 6    aspirin (ECOTRIN) 81 MG EC tablet Take 81 mg by mouth once daily.      atorvastatin (LIPITOR) 10 MG tablet Take 1 tablet (10 mg total) by mouth once daily. 30 tablet 6    calcium carbonate (OS-HENNA) 600 mg calcium (1,500 mg) Tab Take 1,200 mg by mouth 2 (two) times daily with meals.       carvedilol (COREG) 6.25 MG tablet Take 1 tablet (6.25 mg total) by mouth 2 (two) times daily with meals. 120 tablet 3    cholecalciferol, vitamin D3, (VITAMIN D3) 1,000 unit capsule Take 1,000 Units by mouth once daily.      co-enzyme Q-10 30 mg capsule Take 10 mg by mouth once daily.       conjugated estrogens (PREMARIN) vaginal cream Dime-sized amount with finger inside vagina (to knuckle)/around opening/inner lips.  Do nightly x 2 weeks, then 2x/week thereafter. 30 g 11    fish oil-omega-3 fatty acids 300-1,000 mg capsule Take 2 g by mouth 2 (two) times daily.       furosemide (LASIX) 40 MG tablet TAKE ONE TABLET BY MOUTH ONCE DAILY AS NEEDED FOR weight gain 30 tablet 11    gabapentin (NEURONTIN) 100 MG capsule Take 1 capsule (100 mg total) by mouth every evening. 30 capsule 6    MULTIVITAMIN WITH MINERALS (HAIR,SKIN AND NAILS ORAL) Take by mouth once daily.      potassium chloride (KLOR-CON) 8 MEQ TbSR Take 1 tab (8 meq) oral by mouth daily. 90 tablet 3    sacubitril-valsartan (ENTRESTO) 24-26 mg per tablet Take 1 tablet by mouth 2 (two) times daily. 60 tablet 6    vit C/E/Zn/coppr/lutein/zeaxan (PRESERVISION AREDS 2 ORAL) Take by mouth once daily.       Current Facility-Administered Medications   Medication Dose Route Frequency Provider Last Rate Last Dose    cyanocobalamin injection 1,000 mcg  1,000 mcg Intramuscular Q30 Days Sri Gutierrez MD   1,000 mcg at 04/05/19 8999               "    REVIEW OF SYSTEMS:     Sleep related symptoms as per HPI.  CONST:Denies weight gain    HEENT: Denies sinus congestion  PULM: Denies dyspnea  CARD:  Denies palpitations   GI:  Denies acid reflux  : Denies polyuria  NEURO: Denies headaches  PSYCH: Denies mood disturbance  HEME: Denies anemia    Otherwise, a balance of 10 systems reviewed is negative.          PHYSICAL EXAM:  /76   Pulse 64   Ht 5' 7" (1.702 m)   Wt 82.6 kg (182 lb 1.6 oz)   LMP  (LMP Unknown) Comment: refused weight   BMI 28.52 kg/m²   GENERAL: Overweight development, well groomed  HEENT:  Conjunctivae are non-erythematous; Pupils equal, round, and reactive to light; Nose is symmetrical; Nasal mucosa is pink and moist; Septum is midline; Inferior turbinates are normal; Nasal airflow is normal; Posterior pharynx is pink; Modified Mallampati: IV; Posterior palate is normal; Tonsils surgically absent; Uvula is normal and pink;Tongue is normal; Dentition is fair; No TMJ tenderness; Jaw opening and protrusion without click and without discomfort.  NECK: Supple. Neck circumference is 12 inches. No thyromegaly. No palpable nodes.    SKIN: On face and neck: No abrasions, no rashes, no lesions.  No subcutaneous nodules are palpable.  RESPIRATORY: Chest is clear to auscultation.  Normal chest expansion and non-labored breathing at rest.  CARDIOVASCULAR: Normal S1, S2.  No murmurs, gallops or rubs. No carotid bruits bilaterally.  EXTREMITIES: No edema. No clubbing. No cyanosis. Station normal. Gait normal.        NEURO/PSYCH: Oriented to time, place and person. Normal attention span and concentration. Affect is full. Mood is normal.              01/10/2019 TTE  · Low normal left ventricular systolic function. The estimated ejection fraction is 50%, Quantitative LVEF 49% by 2D measurement.  · Grade I (mild) left ventricular diastolic dysfunction consistent with impaired relaxation.  · Mild left atrial enlargement.  · Septal wall has abnormal " motion consistent with left bundle branch block.  · Mild mitral sclerosis.  · Normal right ventricular systolic function.  Normal left atrial pressure.                                    ASSESSMENT:    Sleep apnea, unspecified. The patient symptomatically has snoring and air gasping with findings of crowded oral airway and elevated body mass index. Medical co-morbidities: cardiomyopathy, systemic HTN, and overweight BMI.  This warrants further investigation for possible obstructive sleep apnea.      PLAN:    Diagnostic: Polysomnogram. The nature of this procedure and its indication was discussed with the patient. Patient will be contacted after sleep study is done. Email results, RTC prn.   Reduce melatonin to 10 mg maximum nightly   Ambien 5 mg to be taken during sleep study to improve sleep efficiency. Proper use, desired effect, and side effects discussed.     Education: During our discussion today, we talked about the etiology of obstructive sleep apnea as well as the potential ramifications of untreated sleep apnea, which could include daytime sleepiness, hypertension, heart disease and/or stroke. We discussed potential treatment options, which could include weight loss, body positioning, continuous positive airway pressure (CPAP), OA, EPAP, or referral for surgical consideration.     Precautions: The patient was advised to abstain from driving should they feel sleepy  or drowsy.     Thank you for allowing me the opportunity to participate in the care of your patient.

## 2019-04-25 NOTE — LETTER
April 25, 2019      Sri Gutierrez MD  [address]  [facility] [address]           [facility] [place] [facility]  [address] [place] [address]  [facility] [address]  Phone: [phone]          Patient: Rizwana Block   MR Number: [id number]   YOB: 1945   Date of Visit: 4/25/2019       Dear Dr. Sri Gutierrez:    Thank you for referring Rizwana Block to me for evaluation. Attached you will find relevant portions of my assessment and plan of care.    If you have questions, please do not hesitate to call me. I look forward to following Rizwana Block along with you.    Sincerely,    Harvey Abreu, ROSEY    Enclosure  CC:  No Recipients    If you would like to receive this communication electronically, please contact [email or web address] or [phone] to request more information on [employer] access.    For providers and/or their staff who would like to refer a patient to [facility], please contact us through our one-stop-shop provider referral line, [facility], at [phone].    If you feel you have received this communication in error or would no longer like to receive these types of communications, please e-mail [email or web address]

## 2019-04-25 NOTE — PROGRESS NOTES
Chief Complaint   Patient presents with    Post-op Evaluation     Ingrown removal    Nail Care     PROC B       HPI:   73 y.o. female returns for follow up s/p ingrown toenail procedure - bilateral hallux .  Patient is healing well, no complaints. Has been keeping the toe covered as instructed.    Patient denies constitutional symptoms.   Also complains of chronic right medial foot pain.  She has custom foot orthotics and is getting them adjusted tomorrow ([place].         Past Medical History:   Diagnosis Date    Arrhythmia     Chest pain 5/27/2016    3/2016: Began experience chest discomfort.    CHF (congestive heart failure)     Hypertension     Osteopenia     Reclast infusion 10/5/2010 and 10/20/2011         Current Outpatient Medications on File Prior to Visit   Medication Sig Dispense Refill    amiodarone (PACERONE) 200 MG Tab TAKE ONE TABLET BY MOUTH ONCE DAILY 30 tablet 6    aspirin (ECOTRIN) 81 MG EC tablet Take 81 mg by mouth once daily.      atorvastatin (LIPITOR) 10 MG tablet Take 1 tablet (10 mg total) by mouth once daily. 30 tablet 6    calcium carbonate (OS-HENNA) 600 mg calcium (1,500 mg) Tab Take 1,200 mg by mouth 2 (two) times daily with meals.       carvedilol (COREG) 6.25 MG tablet Take 1 tablet (6.25 mg total) by mouth 2 (two) times daily with meals. 120 tablet 3    cholecalciferol, vitamin D3, (VITAMIN D3) 1,000 unit capsule Take 1,000 Units by mouth once daily.      co-enzyme Q-10 30 mg capsule Take 10 mg by mouth once daily.       conjugated estrogens (PREMARIN) vaginal cream Dime-sized amount with finger inside vagina (to knuckle)/around opening/inner lips.  Do nightly x 2 weeks, then 2x/week thereafter. 30 g 11    fish oil-omega-3 fatty acids 300-1,000 mg capsule Take 2 g by mouth 2 (two) times daily.       furosemide (LASIX) 40 MG tablet TAKE ONE TABLET BY MOUTH ONCE DAILY AS NEEDED FOR weight gain 30 tablet 11    gabapentin (NEURONTIN) 100 MG capsule Take 1 capsule (100  mg total) by mouth every evening. 30 capsule 6    MULTIVITAMIN WITH MINERALS (HAIR,SKIN AND NAILS ORAL) Take by mouth once daily.      potassium chloride (KLOR-CON) 8 MEQ TbSR Take 1 tab (8 meq) oral by mouth daily. 90 tablet 3    sacubitril-valsartan (ENTRESTO) 24-26 mg per tablet Take 1 tablet by mouth 2 (two) times daily. 60 tablet 6    vit C/E/Zn/coppr/lutein/zeaxan (PRESERVISION AREDS 2 ORAL) Take by mouth once daily.       Current Facility-Administered Medications on File Prior to Visit   Medication Dose Route Frequency Provider Last Rate Last Dose    cyanocobalamin injection 1,000 mcg  1,000 mcg Intramuscular Q30 Days Sri Gutierrez MD   1,000 mcg at 19 1413         Review of patient's allergies indicates:   Allergen Reactions    Dilaudid [hydromorphone (bulk)]      Severe nausea/vomiting    Morphine      Severe nausea/vomiting         Social History     Socioeconomic History    Marital status:      Spouse name: Not on file    Number of children: Not on file    Years of education: Not on file    Highest education level: Not on file   Occupational History    Not on file   Social Needs    Financial resource strain: Not on file    Food insecurity:     Worry: Not on file     Inability: Not on file    Transportation needs:     Medical: Not on file     Non-medical: Not on file   Tobacco Use    Smoking status: Former Smoker     Packs/day: 0.25     Years: 24.00     Pack years: 6.00     Types: Cigarettes     Start date: 1961     Last attempt to quit: 1985     Years since quittin.3    Smokeless tobacco: Never Used   Substance and Sexual Activity    Alcohol use: Yes     Alcohol/week: 3.0 oz     Types: 5 Glasses of wine per week     Comment: socially    Drug use: No    Sexual activity: Yes     Partners: Male     Birth control/protection: None   Lifestyle    Physical activity:     Days per week: Not on file     Minutes per session: Not on file    Stress: Not on file  "  Relationships    Social connections:     Talks on phone: Not on file     Gets together: Not on file     Attends Sabianism service: Not on file     Active member of club or organization: Not on file     Attends meetings of clubs or organizations: Not on file     Relationship status: Not on file   Other Topics Concern    Not on file   Social History Narrative    . 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 [employer] . She is an emigrant from [place].           O:   Vitals:    04/25/19 1501   Weight: 82.6 kg (182 lb)   Height: 5' 7" (1.702 m)      S/p  bilateral hallux ingrown toenail matrixectomy. minimal erythema;  no ascending cellulitis. no swelling. No drainage.  The site is healing well. No signs of infection. Pedal pulses are palpable. Range of motion intact. minimal tenderness to palpation.      right flat foot s/p arthrodesis (Dr. Rmoero at [facility] many years ago)  There is tenderness to palpation at the right medial foot at the navicular where the screws are  range of motion is limited      A/P:   1. Follow-up exam after treatment     2. Ingrown nail     3. Dermatophytosis of nail     4. Right foot pain          S/p bilateral hallux ingrown toenail chemical matrixectomy. Patient is healing well.  Return to regular activities as tolerated.     Nails trimmed (Proc B)    For right foot pain, we discussed treatment options including adjusting custom foot orthotics vs. Exostectomy and hardware removal.  She will reconsider.           "

## 2019-04-26 ENCOUNTER — PATIENT MESSAGE (OUTPATIENT)
Dept: PODIATRY | Facility: CLINIC | Age: 74
End: 2019-04-26

## 2019-04-27 DIAGNOSIS — L98.9 SKIN LESION OF FOOT: Primary | ICD-10-CM

## 2019-04-29 ENCOUNTER — PATIENT OUTREACH (OUTPATIENT)
Dept: OTHER | Facility: OTHER | Age: 74
End: 2019-04-29

## 2019-04-29 RX ORDER — FUROSEMIDE 40 MG/1
TABLET ORAL
Qty: 30 TABLET | Refills: 11 | Status: SHIPPED | OUTPATIENT
Start: 2019-04-29 | End: 2019-10-30

## 2019-04-29 NOTE — PROGRESS NOTES
Last 5 Patient Entered Readings                                      Current 30 Day Average: 112/71     Recent Readings 4/21/2019 4/13/2019 4/7/2019 4/7/2019 4/6/2019    SBP (mmHg) 121 114 116 107 102    DBP (mmHg) 81 78 75 68 61    Pulse 59 60 69 68 73            Digital Medicine: Health  Follow Up    Left voicemail to follow up with Mrs. Rizwana Block.  Current BP average 112/71 mmHg is at goal, <130/80.  Requesting 1-2 BP readings each week.

## 2019-05-01 ENCOUNTER — TELEPHONE (OUTPATIENT)
Dept: SLEEP MEDICINE | Facility: OTHER | Age: 74
End: 2019-05-01

## 2019-05-03 ENCOUNTER — LAB VISIT (OUTPATIENT)
Dept: LAB | Facility: HOSPITAL | Age: 74
End: 2019-05-03
Attending: INTERNAL MEDICINE
Payer: MEDICARE

## 2019-05-03 ENCOUNTER — TELEPHONE (OUTPATIENT)
Dept: CARDIOLOGY | Facility: CLINIC | Age: 74
End: 2019-05-03

## 2019-05-03 DIAGNOSIS — E78.00 HYPERCHOLESTEREMIA: ICD-10-CM

## 2019-05-03 LAB
ALBUMIN SERPL BCP-MCNC: 3.9 G/DL (ref 3.5–5.2)
ALP SERPL-CCNC: 65 U/L (ref 55–135)
ALT SERPL W/O P-5'-P-CCNC: 12 U/L (ref 10–44)
AST SERPL-CCNC: 17 U/L (ref 10–40)
BILIRUB DIRECT SERPL-MCNC: 0.2 MG/DL (ref 0.1–0.3)
BILIRUB SERPL-MCNC: 0.5 MG/DL (ref 0.1–1)
CHOLEST SERPL-MCNC: 168 MG/DL (ref 120–199)
CHOLEST/HDLC SERPL: 2 {RATIO} (ref 2–5)
HDLC SERPL-MCNC: 84 MG/DL (ref 40–75)
HDLC SERPL: 50 % (ref 20–50)
LDLC SERPL CALC-MCNC: 72.2 MG/DL (ref 63–159)
NONHDLC SERPL-MCNC: 84 MG/DL
PROT SERPL-MCNC: 6.7 G/DL (ref 6–8.4)
TRIGL SERPL-MCNC: 59 MG/DL (ref 30–150)

## 2019-05-03 PROCEDURE — 80076 HEPATIC FUNCTION PANEL: CPT

## 2019-05-03 PROCEDURE — 80061 LIPID PANEL: CPT

## 2019-05-03 PROCEDURE — 36415 COLL VENOUS BLD VENIPUNCTURE: CPT

## 2019-05-04 ENCOUNTER — HOSPITAL ENCOUNTER (OUTPATIENT)
Dept: SLEEP MEDICINE | Facility: OTHER | Age: 74
Discharge: HOME | End: 2019-05-04
Attending: NURSE PRACTITIONER
Payer: MEDICARE

## 2019-05-04 DIAGNOSIS — G47.30 SLEEP APNEA, UNSPECIFIED TYPE: ICD-10-CM

## 2019-05-04 DIAGNOSIS — G47.33 OSA (OBSTRUCTIVE SLEEP APNEA): ICD-10-CM

## 2019-05-04 PROCEDURE — 95810 POLYSOM 6/> YRS 4/> PARAM: CPT

## 2019-05-04 PROCEDURE — 95810 PR POLYSOMNOGRAPHY, 4 OR MORE: ICD-10-PCS | Mod: 26,,, | Performed by: PSYCHIATRY & NEUROLOGY

## 2019-05-04 PROCEDURE — 95810 POLYSOM 6/> YRS 4/> PARAM: CPT | Mod: 26,,, | Performed by: PSYCHIATRY & NEUROLOGY

## 2019-05-05 NOTE — PROGRESS NOTES
Rizwana Block to [facility] on 5/4/19 for an overnight baseline study. Pt educated on setup and CPAP and answered all of patients questions prior to the start of the study. Pt took and Ambien after the set up was done, around 22:45 pt requested to take a second Ambien stating that she was not feeling tired, and did not think the first one was working. Pt did sleep some supine, but did request to sleep on her side to help her fall asleep.     Soft snoring observed. Events observed, but pt did not meet criteria for CPAP treatment tonight.    EKG Lead II appears in a bradycardia rhythm with PAC's and frequent PVC's.    Post study information given to pt in AM.

## 2019-05-06 ENCOUNTER — PATIENT MESSAGE (OUTPATIENT)
Dept: SLEEP MEDICINE | Facility: CLINIC | Age: 74
End: 2019-05-06

## 2019-05-06 ENCOUNTER — TELEPHONE (OUTPATIENT)
Dept: CARDIOLOGY | Facility: CLINIC | Age: 74
End: 2019-05-06

## 2019-05-06 DIAGNOSIS — I42.9 CARDIOMYOPATHY, UNSPECIFIED TYPE: Primary | ICD-10-CM

## 2019-05-06 DIAGNOSIS — G47.30 SLEEP APNEA, UNSPECIFIED TYPE: ICD-10-CM

## 2019-05-06 RX ORDER — ZOLPIDEM TARTRATE 5 MG/1
TABLET ORAL
Qty: 2 TABLET | Refills: 0 | Status: SHIPPED | OUTPATIENT
Start: 2019-05-06 | End: 2019-08-13

## 2019-05-07 ENCOUNTER — PATIENT MESSAGE (OUTPATIENT)
Dept: OTHER | Facility: OTHER | Age: 74
End: 2019-05-07

## 2019-05-07 ENCOUNTER — PATIENT MESSAGE (OUTPATIENT)
Dept: SLEEP MEDICINE | Facility: CLINIC | Age: 74
End: 2019-05-07

## 2019-05-07 ENCOUNTER — PATIENT MESSAGE (OUTPATIENT)
Dept: CARDIOLOGY | Facility: CLINIC | Age: 74
End: 2019-05-07

## 2019-05-07 DIAGNOSIS — G47.33 OSA (OBSTRUCTIVE SLEEP APNEA): Primary | ICD-10-CM

## 2019-05-07 NOTE — PROCEDURES
"Dear Referring Provider,    The sleep study that you ordered is complete. You have ordered sleep LAB services to perform the sleep study for Rizwana Block.     Please find Sleep Study result in "Chart Review" under the "Media tab."     As the ordering provider, you are responsible for reviewing the results and implementing a treatment plan with your patient. If you need a Sleep Medicine provider to explain the sleep study findings and arrange treatment for the patient, please refer patient for consultation to our Sleep Clinic via [facility] with Ambulatory Consult Sleep.    To do that please place an order for an "Ambulatory Consult Sleep" - it will go to our clinic work queue for our Medical Assistant to contact the patient for an appointment.     For any questions, please contact our clinic staff at [phone] to talk to clinical staff.      "

## 2019-05-07 NOTE — TELEPHONE ENCOUNTER
05/04/2019  lb. The overall AHI was 28.0 with an oxygen talib of 73.0%. The supine AHI was 36.9 and the REM AHI was 62.9.     Plan:  APAP as this may better address varying degrees of GAURAV through out the night. If patient has difficulty with CPAP adherence or ongoing GAURAV symptoms despite CPAP adherence, then consider an in-lab titration sleep study in order to determine optimal fixed CPAP setting.

## 2019-05-09 ENCOUNTER — PATIENT MESSAGE (OUTPATIENT)
Dept: INTERNAL MEDICINE | Facility: CLINIC | Age: 74
End: 2019-05-09

## 2019-05-09 ENCOUNTER — PATIENT MESSAGE (OUTPATIENT)
Dept: SLEEP MEDICINE | Facility: CLINIC | Age: 74
End: 2019-05-09

## 2019-05-14 ENCOUNTER — TELEPHONE (OUTPATIENT)
Dept: SLEEP MEDICINE | Facility: OTHER | Age: 74
End: 2019-05-14

## 2019-05-22 ENCOUNTER — PATIENT OUTREACH (OUTPATIENT)
Dept: OTHER | Facility: OTHER | Age: 74
End: 2019-05-22

## 2019-05-22 NOTE — PROGRESS NOTES
Last 5 Patient Entered Readings                                      Current 30 Day Average: 99/67     Recent Readings 5/10/2019 5/8/2019 5/1/2019 4/21/2019 4/13/2019    SBP (mmHg) 80 98 120 121 114    DBP (mmHg) 52 70 79 81 78    Pulse 62 69 72 59 60          Left voicemail.  Follow up on low BP readings and GAURAV questionnaire.

## 2019-05-22 NOTE — PROGRESS NOTES
Last 5 Patient Entered Readings                                      Current 30 Day Average: 99/67     Recent Readings 5/22/2019 5/15/2019 5/10/2019 5/8/2019 5/1/2019    SBP (mmHg) 123 95 80 98 120    DBP (mmHg) 71 65 52 70 79    Pulse 62 64 62 69 72          Received call from patient's family member. [place] able to text but not make phone calls. She has been checking her BP. Reports reading today ~ 123/70 mmHg. She returns to [facility] 5/25. Follow up prior low BP readings and GAURAV questionnaires on follow up.

## 2019-05-27 ENCOUNTER — PATIENT MESSAGE (OUTPATIENT)
Dept: PODIATRY | Facility: CLINIC | Age: 74
End: 2019-05-27

## 2019-05-27 ENCOUNTER — PATIENT MESSAGE (OUTPATIENT)
Dept: SLEEP MEDICINE | Facility: CLINIC | Age: 74
End: 2019-05-27

## 2019-05-28 DIAGNOSIS — Z12.11 ENCOUNTER FOR FIT (FECAL IMMUNOCHEMICAL TEST) SCREENING: Primary | ICD-10-CM

## 2019-05-29 ENCOUNTER — PATIENT MESSAGE (OUTPATIENT)
Dept: SLEEP MEDICINE | Facility: CLINIC | Age: 74
End: 2019-05-29

## 2019-05-30 ENCOUNTER — OFFICE VISIT (OUTPATIENT)
Dept: PODIATRY | Facility: CLINIC | Age: 74
End: 2019-05-30
Payer: MEDICARE

## 2019-05-30 VITALS — WEIGHT: 182 LBS | HEIGHT: 67 IN | BODY MASS INDEX: 28.56 KG/M2

## 2019-05-30 DIAGNOSIS — L60.2 LONG TOENAIL: ICD-10-CM

## 2019-05-30 DIAGNOSIS — L60.0 INGROWN NAIL: Primary | ICD-10-CM

## 2019-05-30 PROCEDURE — 99999 PR PBB SHADOW E&M-EST. PATIENT-LVL III: CPT | Mod: PBBFAC,HCNC,, | Performed by: PODIATRIST

## 2019-05-30 PROCEDURE — 99499 UNLISTED E&M SERVICE: CPT | Mod: HCNC,,, | Performed by: PODIATRIST

## 2019-05-30 PROCEDURE — 99999 PR PBB SHADOW E&M-EST. PATIENT-LVL III: ICD-10-PCS | Mod: PBBFAC,HCNC,, | Performed by: PODIATRIST

## 2019-05-30 PROCEDURE — 99499 NO LOS: ICD-10-PCS | Mod: HCNC,,, | Performed by: PODIATRIST

## 2019-05-30 PROCEDURE — 17999 UNLISTD PX SKN MUC MEMB SUBQ: CPT | Mod: CSM,HCNC,S$GLB, | Performed by: PODIATRIST

## 2019-05-30 PROCEDURE — 17999 PR NON-COVERED FOOT CARE: ICD-10-PCS | Mod: CSM,HCNC,S$GLB, | Performed by: PODIATRIST

## 2019-05-30 NOTE — PROGRESS NOTES
"Vitals:    05/30/19 1442   Weight: 82.6 kg (182 lb)   Height: 5' 7" (1.702 m)       Problem List Items Addressed This Visit     None      Visit Diagnoses     Ingrown nail    -  Primary    Long toenail                Patient presents to the clinic for non-covered routine foot care. Patient is not a high risk foot care patient. Patient understands this is not typically a covered service and patient is aware of responsibility of payment. Pedal pulses are palpable. No know risk factors requiring routine foot care. Nails are elongated and ingrown.  Nails were reduced and trimmed bilaterally. Patient tolerated well.       RTC 5 weeks for proc B.   "

## 2019-06-03 ENCOUNTER — TELEPHONE (OUTPATIENT)
Dept: INTERNAL MEDICINE | Facility: CLINIC | Age: 74
End: 2019-06-03

## 2019-06-03 NOTE — TELEPHONE ENCOUNTER
----- Message from Mayelin Pierre PharmD sent at 6/3/2019  4:22 PM CDT -----  Mrs. Block has a b12 injection this Friday, 6/7. She is reporting fluid retention from increased sodium intake this past weekend and requesting a BP check and to have her lungs listened to at the appointment

## 2019-06-03 NOTE — PROGRESS NOTES
Last 5 Patient Entered Readings                                      Current 30 Day Average: 104/67     Recent Readings 5/24/2019 5/23/2019 5/22/2019 5/15/2019 5/10/2019    SBP (mmHg) 113 113 123 95 80    DBP (mmHg) 74 71 71 65 52    Pulse 71 78 62 64 62          HPI:  Called patient to follow up. Patient endorses adherence to medication regimen. Patient reports she did experience fatigue with low BP readings. Patient denies hypertensive s/sx (SOB, CP, severe headaches, changes in vision).      Patient reports fatigue today and endorses eating more sodium over the weekend. She reports taking a furosemide today and intends to take another one today. She recently returned from [facility] about 5/25.    She started using an APAP about 1 week ago for GAURAV. She intends to follow up with sleep clinic to find better fitting mask.    Assessment:  Reviewed recent readings. Per 2017 ACC/ AHA HTN guidelines (goal of BP < 130/80), current 30-day average needs to be addressed more thoroughly today.     Plan:  Continue current medication regimen. Per her request, sent message to Dr. Gutierrez's staff to request BP and lung/fluid check at upcoming b12 appointment 6/7. I will continue to monitor regularly and will follow-up in 6 to 8 weeks, sooner if blood pressure begins to trend upward or downward.     Current medication regimen:  Hypertension Medications             carvedilol (COREG) 6.25 MG tablet Take 1 tablet (6.25 mg total) by mouth 2 (two) times daily with meals.    furosemide (LASIX) 40 MG tablet TAKE ONE TABLET BY MOUTH ONCE DAILY AS NEEDED FOR weight gain    sacubitril-valsartan (ENTRESTO) 24-26 mg per tablet Take 1 tablet by mouth 2 (two) times daily.          Patient denies having questions or concerns. Patient has my contact information and knows to call with any concerns or clinical changes.

## 2019-06-04 NOTE — PROGRESS NOTES
Last 5 Patient Entered Readings                                      Current 30 Day Average: 104/67     Recent Readings 5/24/2019 5/23/2019 5/22/2019 5/15/2019 5/10/2019    SBP (mmHg) 113 113 123 95 80    DBP (mmHg) 74 71 71 65 52    Pulse 71 78 62 64 62          PharmD reached out to the patient yesterday. I will push my call back.

## 2019-06-07 ENCOUNTER — CLINICAL SUPPORT (OUTPATIENT)
Dept: INTERNAL MEDICINE | Facility: CLINIC | Age: 74
End: 2019-06-07
Payer: MEDICARE

## 2019-06-07 VITALS — DIASTOLIC BLOOD PRESSURE: 70 MMHG | HEART RATE: 57 BPM | SYSTOLIC BLOOD PRESSURE: 134 MMHG | OXYGEN SATURATION: 97 %

## 2019-06-07 PROCEDURE — 96372 PR INJECTION,THERAP/PROPH/DIAG2ST, IM OR SUBCUT: ICD-10-PCS | Mod: HCNC,S$GLB,, | Performed by: INTERNAL MEDICINE

## 2019-06-07 PROCEDURE — 99999 PR PBB SHADOW E&M-EST. PATIENT-LVL II: CPT | Mod: PBBFAC,HCNC,,

## 2019-06-07 PROCEDURE — 99999 PR PBB SHADOW E&M-EST. PATIENT-LVL II: ICD-10-PCS | Mod: PBBFAC,HCNC,,

## 2019-06-07 PROCEDURE — 96372 THER/PROPH/DIAG INJ SC/IM: CPT | Mod: HCNC,S$GLB,, | Performed by: INTERNAL MEDICINE

## 2019-06-07 RX ADMIN — CYANOCOBALAMIN 1000 MCG: 1000 INJECTION, SOLUTION INTRAMUSCULAR at 02:06

## 2019-06-07 NOTE — PROGRESS NOTES
Used 2 pt identifiers and reviewed allergies prior to giving B12 injection in R/arm per written order, then asked pt to wait 15 mins post injection for s/sx of adverse reactions.

## 2019-06-10 ENCOUNTER — LAB VISIT (OUTPATIENT)
Dept: LAB | Facility: HOSPITAL | Age: 74
End: 2019-06-10
Attending: INTERNAL MEDICINE
Payer: MEDICARE

## 2019-06-10 DIAGNOSIS — Z12.11 ENCOUNTER FOR FIT (FECAL IMMUNOCHEMICAL TEST) SCREENING: ICD-10-CM

## 2019-06-10 LAB — HEMOCCULT STL QL IA: NEGATIVE

## 2019-06-10 PROCEDURE — 82274 ASSAY TEST FOR BLOOD FECAL: CPT | Mod: HCNC

## 2019-06-17 ENCOUNTER — PATIENT MESSAGE (OUTPATIENT)
Dept: SLEEP MEDICINE | Facility: CLINIC | Age: 74
End: 2019-06-17

## 2019-06-17 DIAGNOSIS — G47.33 OSA (OBSTRUCTIVE SLEEP APNEA): Primary | ICD-10-CM

## 2019-06-26 ENCOUNTER — TELEPHONE (OUTPATIENT)
Dept: PODIATRY | Facility: CLINIC | Age: 74
End: 2019-06-26

## 2019-07-01 ENCOUNTER — OFFICE VISIT (OUTPATIENT)
Dept: PODIATRY | Facility: CLINIC | Age: 74
End: 2019-07-01
Payer: MEDICARE

## 2019-07-01 VITALS — HEIGHT: 67 IN | WEIGHT: 182 LBS | BODY MASS INDEX: 28.56 KG/M2

## 2019-07-01 DIAGNOSIS — L60.0 INGROWN NAIL: ICD-10-CM

## 2019-07-01 DIAGNOSIS — L60.2 LONG TOENAIL: Primary | ICD-10-CM

## 2019-07-01 PROCEDURE — 99499 NO LOS: ICD-10-PCS | Mod: ,,, | Performed by: PODIATRIST

## 2019-07-01 PROCEDURE — 99999 PR PBB SHADOW E&M-EST. PATIENT-LVL III: ICD-10-PCS | Mod: PBBFAC,,, | Performed by: PODIATRIST

## 2019-07-01 PROCEDURE — 99999 PR PBB SHADOW E&M-EST. PATIENT-LVL III: CPT | Mod: PBBFAC,,, | Performed by: PODIATRIST

## 2019-07-01 PROCEDURE — 17999 UNLISTD PX SKN MUC MEMB SUBQ: CPT | Mod: CSM,S$GLB,, | Performed by: PODIATRIST

## 2019-07-01 PROCEDURE — 99499 UNLISTED E&M SERVICE: CPT | Mod: ,,, | Performed by: PODIATRIST

## 2019-07-01 PROCEDURE — 17999 PR NON-COVERED FOOT CARE: ICD-10-PCS | Mod: CSM,S$GLB,, | Performed by: PODIATRIST

## 2019-07-01 NOTE — PROGRESS NOTES
"Vitals:    07/01/19 1453   Weight: 82.6 kg (182 lb)   Height: 5' 7" (1.702 m)       Long toenail    Ingrown nail        Patient presents to the clinic for non-covered routine foot care. Patient is not a high risk foot care patient. Patient understands this is not typically a covered service and patient is aware of responsibility of payment. Pedal pulses are palpable. No known risk factors requiring routine foot care.  nails  were reduced and trimmed bilaterally. Patient tolerated well.     follow up  4 weeks Proc B  "

## 2019-07-02 ENCOUNTER — PATIENT OUTREACH (OUTPATIENT)
Dept: OTHER | Facility: OTHER | Age: 74
End: 2019-07-02

## 2019-07-02 NOTE — PROGRESS NOTES
Last 5 Patient Entered Readings                                      Current 30 Day Average: 130/76     Recent Readings 7/1/2019 6/24/2019 6/12/2019 6/5/2019 5/24/2019    SBP (mmHg) 135 134 129 121 113    DBP (mmHg) 75 76 78 74 74    Pulse 62 59 60 62 71          Digital Medicine: Health  Follow Up    Lifestyle Modifications:    1.Dietary Modifications (Sodium intake <2,000mg/day, food labels, dining out): Deferred    2.Physical Activity: Deferred    3.Medication Therapy: Patient has been compliant with the medication regimen. Patient is doing well on her current BP medication regimen. She denies symptoms/side effects related to her BP.     4.Patient has the following medication side effects/concerns: None  (Frequency/Alleviating factors/Precipitating factors, etc.)     Patient is now wearing a CPAP every night to sleep.   She is going to see Dr. Lemons on the 11th to address some issues with chest pain that seems to happen randomly here and there.     Patient had a great time in [place] visiting her granddaughter who was studying abroad.     Follow up with Mrs. Stoutam SPARKLE Block completed. No further questions or concerns. Will continue to follow up to achieve health goals.

## 2019-07-09 DIAGNOSIS — I49.3 VENTRICULAR PREMATURE BEATS: Primary | ICD-10-CM

## 2019-07-10 ENCOUNTER — TELEPHONE (OUTPATIENT)
Dept: CARDIOLOGY | Facility: CLINIC | Age: 74
End: 2019-07-10

## 2019-07-10 NOTE — TELEPHONE ENCOUNTER
Attempted to return call to patient regarding appointments and weather noted she was rescheduling with another Staff member.
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
## 2019-07-18 ENCOUNTER — PATIENT OUTREACH (OUTPATIENT)
Dept: ADMINISTRATIVE | Facility: OTHER | Age: 74
End: 2019-07-18

## 2019-07-22 ENCOUNTER — OFFICE VISIT (OUTPATIENT)
Dept: PODIATRY | Facility: CLINIC | Age: 74
End: 2019-07-22
Payer: MEDICARE

## 2019-07-22 VITALS — HEIGHT: 67 IN | BODY MASS INDEX: 28.56 KG/M2 | WEIGHT: 182 LBS

## 2019-07-22 DIAGNOSIS — L60.2 LONG TOENAIL: Primary | ICD-10-CM

## 2019-07-22 DIAGNOSIS — L60.0 INGROWN NAIL: ICD-10-CM

## 2019-07-22 PROCEDURE — 99999 PR PBB SHADOW E&M-EST. PATIENT-LVL III: ICD-10-PCS | Mod: PBBFAC,HCNC,, | Performed by: PODIATRIST

## 2019-07-22 PROCEDURE — 17999 UNLISTD PX SKN MUC MEMB SUBQ: CPT | Mod: CSM,HCNC,S$GLB, | Performed by: PODIATRIST

## 2019-07-22 PROCEDURE — 99499 UNLISTED E&M SERVICE: CPT | Mod: HCNC,,, | Performed by: PODIATRIST

## 2019-07-22 PROCEDURE — 99499 NO LOS: ICD-10-PCS | Mod: HCNC,,, | Performed by: PODIATRIST

## 2019-07-22 PROCEDURE — 99999 PR PBB SHADOW E&M-EST. PATIENT-LVL III: CPT | Mod: PBBFAC,HCNC,, | Performed by: PODIATRIST

## 2019-07-22 PROCEDURE — 17999 PR NON-COVERED FOOT CARE: ICD-10-PCS | Mod: CSM,HCNC,S$GLB, | Performed by: PODIATRIST

## 2019-07-22 NOTE — PROGRESS NOTES
"Vitals:    07/22/19 1602   Weight: 82.6 kg (182 lb)   Height: 5' 7" (1.702 m)       Long toenail    Ingrown nail        Patient presents to the clinic for non-covered routine foot care. Patient is not a high risk foot care patient. Patient understands this is not typically a covered service and patient is aware of responsibility of payment. Pedal pulses are palpable. No known risk factors requiring routine foot care.  nails  were reduced and trimmed bilaterally. Patient tolerated well.     follow up  4 weeks Proc B  "

## 2019-07-29 ENCOUNTER — OFFICE VISIT (OUTPATIENT)
Dept: RHEUMATOLOGY | Facility: CLINIC | Age: 74
End: 2019-07-29
Payer: MEDICARE

## 2019-07-29 VITALS
DIASTOLIC BLOOD PRESSURE: 76 MMHG | HEART RATE: 60 BPM | WEIGHT: 186.75 LBS | SYSTOLIC BLOOD PRESSURE: 125 MMHG | BODY MASS INDEX: 29.31 KG/M2 | HEIGHT: 67 IN

## 2019-07-29 DIAGNOSIS — M06.9 RHEUMATOID ARTHRITIS INVOLVING MULTIPLE SITES, UNSPECIFIED RHEUMATOID FACTOR PRESENCE: Primary | ICD-10-CM

## 2019-07-29 DIAGNOSIS — M06.042 RHEUMATOID ARTHRITIS INVOLVING BOTH HANDS WITH NEGATIVE RHEUMATOID FACTOR: ICD-10-CM

## 2019-07-29 DIAGNOSIS — M06.041 RHEUMATOID ARTHRITIS INVOLVING BOTH HANDS WITH NEGATIVE RHEUMATOID FACTOR: ICD-10-CM

## 2019-07-29 DIAGNOSIS — Z11.4 ENCOUNTER FOR SCREENING FOR HIV: ICD-10-CM

## 2019-07-29 DIAGNOSIS — M19.041 PRIMARY OSTEOARTHRITIS OF RIGHT HAND: ICD-10-CM

## 2019-07-29 DIAGNOSIS — M15.9 PRIMARY OSTEOARTHRITIS INVOLVING MULTIPLE JOINTS: ICD-10-CM

## 2019-07-29 PROBLEM — M19.049 OSTEOARTHRITIS OF HAND: Status: ACTIVE | Noted: 2019-07-29

## 2019-07-29 PROCEDURE — 99999 PR PBB SHADOW E&M-EST. PATIENT-LVL V: ICD-10-PCS | Mod: PBBFAC,HCNC,, | Performed by: INTERNAL MEDICINE

## 2019-07-29 PROCEDURE — 1101F PT FALLS ASSESS-DOCD LE1/YR: CPT | Mod: HCNC,CPTII,S$GLB, | Performed by: INTERNAL MEDICINE

## 2019-07-29 PROCEDURE — 99203 PR OFFICE/OUTPT VISIT, NEW, LEVL III, 30-44 MIN: ICD-10-PCS | Mod: HCNC,S$GLB,, | Performed by: INTERNAL MEDICINE

## 2019-07-29 PROCEDURE — 3078F DIAST BP <80 MM HG: CPT | Mod: HCNC,CPTII,S$GLB, | Performed by: INTERNAL MEDICINE

## 2019-07-29 PROCEDURE — 1101F PR PT FALLS ASSESS DOC 0-1 FALLS W/OUT INJ PAST YR: ICD-10-PCS | Mod: HCNC,CPTII,S$GLB, | Performed by: INTERNAL MEDICINE

## 2019-07-29 PROCEDURE — 3074F SYST BP LT 130 MM HG: CPT | Mod: HCNC,CPTII,S$GLB, | Performed by: INTERNAL MEDICINE

## 2019-07-29 PROCEDURE — 3074F PR MOST RECENT SYSTOLIC BLOOD PRESSURE < 130 MM HG: ICD-10-PCS | Mod: HCNC,CPTII,S$GLB, | Performed by: INTERNAL MEDICINE

## 2019-07-29 PROCEDURE — 3078F PR MOST RECENT DIASTOLIC BLOOD PRESSURE < 80 MM HG: ICD-10-PCS | Mod: HCNC,CPTII,S$GLB, | Performed by: INTERNAL MEDICINE

## 2019-07-29 PROCEDURE — 99203 OFFICE O/P NEW LOW 30 MIN: CPT | Mod: HCNC,S$GLB,, | Performed by: INTERNAL MEDICINE

## 2019-07-29 PROCEDURE — 99999 PR PBB SHADOW E&M-EST. PATIENT-LVL V: CPT | Mod: PBBFAC,HCNC,, | Performed by: INTERNAL MEDICINE

## 2019-07-29 RX ORDER — DICLOFENAC SODIUM 10 MG/G
2 GEL TOPICAL 4 TIMES DAILY PRN
Qty: 3 TUBE | Refills: 3 | Status: SHIPPED | OUTPATIENT
Start: 2019-07-29 | End: 2019-10-30

## 2019-07-29 ASSESSMENT — ROUTINE ASSESSMENT OF PATIENT INDEX DATA (RAPID3)
AM STIFFNESS SCORE: 0, NO
PAIN SCORE: .5
MDHAQ FUNCTION SCORE: 0
PSYCHOLOGICAL DISTRESS SCORE: 0
PATIENT GLOBAL ASSESSMENT SCORE: .5
FATIGUE SCORE: .5
TOTAL RAPID3 SCORE: .33

## 2019-07-29 ASSESSMENT — DISEASE ACTIVITY SCORE (DAS28)
ESR_MM_PER_HR: 24
TOTAL_SCORE_ESR: 3.77
SWOLLEN_JOINTS_COUNT: 6
TOTAL_SCORE_CRP: 3.8
GLOBAL_HEALTH_SCORE: 5
CRP_MG_PER_LITER: 34.9
TENDER_JOINTS_COUNT: 2

## 2019-07-29 NOTE — PROGRESS NOTES
"Subjective:       Patient ID: Rizwana Block is a 74 y.o. female.    Chief Complaint: Hand pain  HPI pain 0.5/10  Global 0.5/10. Pt last seen pre-Carisa, h/o seronegative RA v. Peripheral spondyloarthritis(chart notes in paper chart, requested) labs 2004 in Epic with RF- ACPA- CATHIE-. X-rays hands 5/25/18 with scattered IPJ OA harpreet right 1,3,4,5  DIPJ and left 2,5 DIPJ. Currently pain and bony enlargement mainly right little DIPJ and right thumb IPJ. Also left index and little DIPJ. No am stiffness. No personal or family history of psoriasis. No nail changes. Occ "aches and pains" elsewhere.   Review of Systems   Constitutional: Positive for fatigue (0.5/10). Negative for appetite change, fever and unexpected weight change.   HENT: Negative for mouth sores.    Eyes: Negative for visual disturbance.   Respiratory: Negative for cough, shortness of breath and wheezing.    Cardiovascular: Negative for chest pain and palpitations.   Gastrointestinal: Negative for abdominal pain, anal bleeding, blood in stool, constipation, diarrhea, nausea and vomiting.   Genitourinary: Negative for dysuria, frequency and urgency.   Musculoskeletal: Positive for arthralgias and joint swelling. Negative for back pain, gait problem, myalgias, neck pain and neck stiffness.   Skin: Negative for rash.   Neurological: Negative for weakness, numbness and headaches.   Hematological: Negative for adenopathy. Does not bruise/bleed easily.   Psychiatric/Behavioral: Negative for sleep disturbance. The patient is not nervous/anxious.          Objective:   /76   Pulse 60   Ht 5' 7" (1.702 m)   Wt 84.7 kg (186 lb 11.7 oz)   LMP  (LMP Unknown) Comment: refused weight   BMI 29.25 kg/m²      Physical Exam   Constitutional: She is oriented to person, place, and time and well-developed, well-nourished, and in no distress.   HENT:   Head: Normocephalic and atraumatic.   Mouth/Throat: Oropharynx is clear and moist.   Eyes: Conjunctivae and EOM are " normal.   Neck: Normal range of motion. Neck supple. No thyromegaly present.   Cardiovascular: Normal rate, regular rhythm, normal heart sounds and intact distal pulses.  Exam reveals no gallop and no friction rub.    No murmur heard.  Pulmonary/Chest: Breath sounds normal. She has no wheezes. She has no rales. She exhibits no tenderness.   Abdominal: Soft. She exhibits no distension and no mass. There is no tenderness.       Right Side Rheumatological Exam     Examination finds the shoulder, elbow, wrist and knee normal.    The patient is tender to palpation of the 1st PIP, 1st MCP and 5th DIP    She has swelling of the 1st PIP, 1st MCP and 5th DIP    The patient has an enlarged 1st PIP, 1st MCP, 2nd PIP, 2nd MCP, 3rd PIP, 3rd MCP, 4th PIP, 5th PIP, 1st CMC, 2nd DIP, 3rd DIP, 4th DIP and 5th DIP    Left Side Rheumatological Exam     Examination finds the shoulder, elbow, wrist and knee normal.    The patient is tender to palpation of the 2nd DIP and 5th DIP.    She has swelling of the 2nd MCP, 3rd MCP, 4th PIP, 5th PIP, 2nd DIP and 5th DIP    The patient has an enlarged 1st PIP, 1st MCP, 2nd PIP, 2nd MCP, 3rd PIP, 3rd MCP, 4th PIP, 5th PIP, 1st CMC, 2nd DIP, 3rd DIP, 4th DIP and 5th DIP.      Lymphadenopathy:     She has no cervical adenopathy.   Neurological: She is alert and oriented to person, place, and time. She displays normal reflexes. Gait normal.   Nl motor strength UE and LE bilateral   Skin: No rash noted. No erythema. No pallor.     Psychiatric: Mood, memory, affect and judgment normal.   Musculoskeletal: She exhibits no edema.           Assessment/Plan         Problem List Items Addressed This Visit     Osteoarthritis of hand    Overview     CLINICAL HISTORY:  . Pain in unspecified hand    TECHNIQUE:  PA, lateral, and oblique views of both hands were performed.    COMPARISON:  None    FINDINGS:  No acute fractures.  No radiopaque foreign bodies.  Scattered interphalangeal osteoarthritis.  This is most  notable at the 1st IP and 3rd, 4th, and 5th DIP joints on the right and 2nd and 5th DIP joints on the left.      Impression       No acute fracture.  Interphalangeal joint osteoarthritis.      Electronically signed by: Xavi Stein MD  Date: 05/25/2018  Time: 15:21     Results for AMY MANLEY (MRN [id number]) as of 7/29/2019 09:28   Ref. 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 Avoid anti-TNF with low normal EF and h/o cardiomyopathy.  Consider abatacept or tofacitinib.           Other Visit Diagnoses     Rheumatoid arthritis involving multiple sites, unspecified rheumatoid factor presence    -  Primary    Relevant Orders    NANDO Screen w/Reflex    Rheumatoid factor (Completed)    Cyclic citrul peptide antibody, IgG    Sedimentation rate (Completed)    C-reactive protein (Completed)    CBC auto differential (Completed)    Comprehensive metabolic panel (Completed)    Ambulatory Referral to Physical/Occupational Therapy    HIV-1 and HIV-2 antibodies    Hepatitis B surface antigen    HBcAB    Hepatitis B surface antibody    Hepatitis C antibody    Hepatitis A antibody, IgG     Strongyloides IgG Antibodies    Quantiferon Gold TB    RPR    Varicella zoster antibody, IgG    Ambulatory referral to Infectious Disease    Primary osteoarthritis involving multiple joints        Relevant Orders    CATHIE Screen w/Reflex    Rheumatoid factor (Completed)    Cyclic citrul peptide antibody, IgG    Sedimentation rate (Completed)    C-reactive protein (Completed)    CBC auto differential (Completed)    Comprehensive metabolic panel (Completed)    Ambulatory Referral to Physical/Occupational Therapy    Encounter for screening for HIV         Relevant Orders    HIV-1 and HIV-2 antibodies

## 2019-07-29 NOTE — PATIENT INSTRUCTIONS
No Advil(ibuprofen)  Acetaminophen(Tylenol) ES 500mg 2 up to three times daily  Turmeric 500-1000mg daily with Boswellia  Glucosamine sulfate 1500mg daily  Blood today 2nd floor  Ref to OT  Coban tape  Trial of diclofenac gel 1% up to 4 times daily

## 2019-07-29 NOTE — ASSESSMENT & PLAN NOTE
TJ 2 SJ 6 Pt global 5 ESR 24  CRP 34.9   DAS28   [id number]  DAPSA: TJ 5  SJ 9 Pt global 0.5  Pt pain 0.5 CRP 3.49  =18.49(MDA)    Paper chart requested from [facility]   acpa, nando  Ref to OT  Pre-DMARD labs, FAST TRACK to ID  RTC 2 wks to decide on DMARD: intolerant of methotrexate in past. Avoid anti-TNF with low normal EF and h/o cardiomyopathy.  Consider abatacept or tofacitinib.

## 2019-07-29 NOTE — ASSESSMENT & PLAN NOTE
Cbc, cmp, esr, crp , rf, acpa, nando  Ref to OT  Diclofenac gel 1% four times daily prn, avoid oral nsaids with h/o cardiomyopathy  Turmeric 500-1000 mg with Boswella daily  Glucosamine sulfate 1500mg daily  Coban tape, provided

## 2019-08-02 ENCOUNTER — CLINICAL SUPPORT (OUTPATIENT)
Dept: INTERNAL MEDICINE | Facility: CLINIC | Age: 74
End: 2019-08-02
Payer: MEDICARE

## 2019-08-02 ENCOUNTER — LAB VISIT (OUTPATIENT)
Dept: LAB | Facility: HOSPITAL | Age: 74
End: 2019-08-02
Attending: INTERNAL MEDICINE
Payer: MEDICARE

## 2019-08-02 ENCOUNTER — PATIENT MESSAGE (OUTPATIENT)
Dept: RHEUMATOLOGY | Facility: CLINIC | Age: 74
End: 2019-08-02

## 2019-08-02 DIAGNOSIS — M06.9 RHEUMATOID ARTHRITIS INVOLVING MULTIPLE SITES, UNSPECIFIED RHEUMATOID FACTOR PRESENCE: ICD-10-CM

## 2019-08-02 DIAGNOSIS — Z11.4 ENCOUNTER FOR SCREENING FOR HIV: ICD-10-CM

## 2019-08-02 PROCEDURE — 86682 HELMINTH ANTIBODY: CPT | Mod: HCNC

## 2019-08-02 PROCEDURE — 86703 HIV-1/HIV-2 1 RESULT ANTBDY: CPT | Mod: HCNC

## 2019-08-02 PROCEDURE — 87340 HEPATITIS B SURFACE AG IA: CPT | Mod: HCNC

## 2019-08-02 PROCEDURE — 86790 VIRUS ANTIBODY NOS: CPT | Mod: HCNC

## 2019-08-02 PROCEDURE — 86704 HEP B CORE ANTIBODY TOTAL: CPT | Mod: HCNC

## 2019-08-02 PROCEDURE — 96372 PR INJECTION,THERAP/PROPH/DIAG2ST, IM OR SUBCUT: ICD-10-PCS | Mod: HCNC,S$GLB,, | Performed by: INTERNAL MEDICINE

## 2019-08-02 PROCEDURE — 86803 HEPATITIS C AB TEST: CPT | Mod: HCNC

## 2019-08-02 PROCEDURE — 86787 VARICELLA-ZOSTER ANTIBODY: CPT | Mod: HCNC

## 2019-08-02 PROCEDURE — 96372 THER/PROPH/DIAG INJ SC/IM: CPT | Mod: HCNC,S$GLB,, | Performed by: INTERNAL MEDICINE

## 2019-08-02 PROCEDURE — 86592 SYPHILIS TEST NON-TREP QUAL: CPT | Mod: HCNC

## 2019-08-02 PROCEDURE — 86706 HEP B SURFACE ANTIBODY: CPT | Mod: HCNC

## 2019-08-02 RX ADMIN — CYANOCOBALAMIN 1000 MCG: 1000 INJECTION, SOLUTION INTRAMUSCULAR at 02:08

## 2019-08-02 NOTE — PROGRESS NOTES
Pt 2 identifiers used, given cyanocobalamin 1000 mcg pt tolerated well and asked to wait 15 minutes.Paulette Staton LPN

## 2019-08-03 LAB
RPR SER QL: NORMAL
VARICELLA INTERPRETATION: POSITIVE
VARICELLA ZOSTER IGG: 4.07 ISR (ref 0–0.9)

## 2019-08-05 ENCOUNTER — PATIENT MESSAGE (OUTPATIENT)
Dept: RHEUMATOLOGY | Facility: CLINIC | Age: 74
End: 2019-08-05

## 2019-08-05 LAB
HBV CORE AB SERPL QL IA: NEGATIVE
HBV SURFACE AB SER-ACNC: NEGATIVE M[IU]/ML
HBV SURFACE AG SERPL QL IA: NEGATIVE
HCV AB SERPL QL IA: NEGATIVE
HEPATITIS A ANTIBODY, IGG: POSITIVE
HIV 1+2 AB+HIV1 P24 AG SERPL QL IA: NEGATIVE
STRONGYLOIDES ANTIBODY IGG: NEGATIVE

## 2019-08-08 ENCOUNTER — PATIENT OUTREACH (OUTPATIENT)
Dept: ADMINISTRATIVE | Facility: OTHER | Age: 74
End: 2019-08-08

## 2019-08-09 ENCOUNTER — PATIENT MESSAGE (OUTPATIENT)
Dept: SLEEP MEDICINE | Facility: CLINIC | Age: 74
End: 2019-08-09

## 2019-08-13 ENCOUNTER — OFFICE VISIT (OUTPATIENT)
Dept: SLEEP MEDICINE | Facility: CLINIC | Age: 74
End: 2019-08-13
Payer: MEDICARE

## 2019-08-13 VITALS
HEIGHT: 67 IN | BODY MASS INDEX: 29.25 KG/M2 | DIASTOLIC BLOOD PRESSURE: 70 MMHG | HEART RATE: 61 BPM | SYSTOLIC BLOOD PRESSURE: 101 MMHG

## 2019-08-13 DIAGNOSIS — G47.33 OBSTRUCTIVE SLEEP APNEA: Primary | ICD-10-CM

## 2019-08-13 PROCEDURE — 99214 PR OFFICE/OUTPT VISIT, EST, LEVL IV, 30-39 MIN: ICD-10-PCS | Mod: HCNC,S$GLB,, | Performed by: NURSE PRACTITIONER

## 2019-08-13 PROCEDURE — 99999 PR PBB SHADOW E&M-EST. PATIENT-LVL IV: ICD-10-PCS | Mod: PBBFAC,HCNC,, | Performed by: NURSE PRACTITIONER

## 2019-08-13 PROCEDURE — 3074F SYST BP LT 130 MM HG: CPT | Mod: HCNC,CPTII,S$GLB, | Performed by: NURSE PRACTITIONER

## 2019-08-13 PROCEDURE — 99214 OFFICE O/P EST MOD 30 MIN: CPT | Mod: HCNC,S$GLB,, | Performed by: NURSE PRACTITIONER

## 2019-08-13 PROCEDURE — 1101F PR PT FALLS ASSESS DOC 0-1 FALLS W/OUT INJ PAST YR: ICD-10-PCS | Mod: HCNC,CPTII,S$GLB, | Performed by: NURSE PRACTITIONER

## 2019-08-13 PROCEDURE — 3074F PR MOST RECENT SYSTOLIC BLOOD PRESSURE < 130 MM HG: ICD-10-PCS | Mod: HCNC,CPTII,S$GLB, | Performed by: NURSE PRACTITIONER

## 2019-08-13 PROCEDURE — 3078F PR MOST RECENT DIASTOLIC BLOOD PRESSURE < 80 MM HG: ICD-10-PCS | Mod: HCNC,CPTII,S$GLB, | Performed by: NURSE PRACTITIONER

## 2019-08-13 PROCEDURE — 3078F DIAST BP <80 MM HG: CPT | Mod: HCNC,CPTII,S$GLB, | Performed by: NURSE PRACTITIONER

## 2019-08-13 PROCEDURE — 99999 PR PBB SHADOW E&M-EST. PATIENT-LVL IV: CPT | Mod: PBBFAC,HCNC,, | Performed by: NURSE PRACTITIONER

## 2019-08-13 PROCEDURE — 1101F PT FALLS ASSESS-DOCD LE1/YR: CPT | Mod: HCNC,CPTII,S$GLB, | Performed by: NURSE PRACTITIONER

## 2019-08-13 NOTE — PROGRESS NOTES
Rizwana Block  was seen in follow-up for GAURAV management and CPAP equipment check.     CHIEF COMPLAINT:  F/u CPAP     INTERVAL HISTORY:    08/13/2019 JACQUELINE Abreu NP: Since last clinic visit, pt has completed PSG and has been set up with APAP at [facility] on 05/27/2019. Pt sleep complaints of  snoring, interrupted sleep, and air gasping now resolved with PAP use. Occasional oral drying with Eson nasal mask. Using heated tubing. Uses chinstrap regularly.   Denies mask leaks. Denies rainout.  Denies pressure intolerance or air hunger. Denies congestion. Denies aerophagia.     Of note, initially tried Dreamwear nasal pillows but had difficulty with use once side-lying. Eson nasal mask marginally better, but finds headgear cumbersome.     Dreamstation APAP 6 - 20 cm, 30 days, > 4 hours: 96.7%, Predicted AHI: 3.4, 90%tile pressure: 7.9 cm    05/04/2019  lb. The overall AHI was 28.0 with an oxygen talib of 73.0%. The supine AHI was 36.9 and the REM AHI was 62.9.       04/25/219 JACQUELINE Abreu NP: Initial HISTORY OF PRESENT ILLNESS: Rizwana Block is a 74 y.o. female is here for sleep evaluation.      Patient complaints include: snoring, interrupted sleep, and air gasping  Reports difficulty falling and staying asleep. Sometimes takes up to one hour to fall asleep  Sleep is interrupted, typically after a couple of hours of sleep she wakes up and some nights unable to fall back asleep  Takes up to 30 mg of melatonin most nights; helps initiate sleep but not with fragmented sleep  Denies symptoms of restless legs or kicking during sleep. Takes gabapentin for nerve pain in feet.     Occupation: works in  law firm     [place] Sleepiness Scale score during initial sleep evaluation was 4.    SLEEP ROUTINE:    Bed partner:     Time to bed: 10 pm  Sleep onset latency:  Up to 1 hour     Disruptions or awakenings: 1-3  Wakeup time:  7 am  Perceived sleep quality:  Poor to fair          PAST MEDICAL HISTORY:    Active  Ambulatory Problems     Diagnosis Date Noted    Ventricular premature beats 06/03/2013    Hammertoe 08/30/2013    Palpitations 07/31/2015    Costochondritis 03/16/2016    Heart failure, systolic 05/18/2016    Precordial pain 05/27/2016    Bradycardia 06/01/2016    Cardiomyopathy 06/01/2016    Non-rheumatic mitral regurgitation     Essential hypertension     S/P colonoscopy 05/24/2017    Osteopenia of spine 05/24/2017    Pure hypercholesterolemia 06/01/2017    At risk for amiodarone toxicity with long term use 06/22/2018    Dysuria 07/30/2018    Vaginal atrophy 07/30/2018    Vaginal burning 07/30/2018    Open wound of right foot 08/23/2018    GAURAV (obstructive sleep apnea)     Osteoarthritis of hand 07/29/2019    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 07/29/2019     Resolved Ambulatory Problems     Diagnosis Date Noted    Shortness of breath 03/16/2016    Unstable angina     PVC (premature ventricular contraction) 09/27/2016     Past Medical History:   Diagnosis Date    Arrhythmia     Chest pain 5/27/2016    CHF (congestive heart failure)     Hypertension     Osteopenia                 PAST SURGICAL HISTORY:    Past Surgical History:   Procedure Laterality Date    ABLATION N/A 9/27/2016    Performed by [facility]bert Lemons [facility] at [facility] CATH LAB    ANKLE FUSION      right    bladder surgery      with mesh    BLEPHAROPLASTY W/ LASER      CATARACT EXTRACTION, BILATERAL      CORRECTION, HAMMER TOE Right 8/30/2013    [facility]ren [facility] D[facility] at [facility] OR    HAND SURGERY      benign cyst on left    HEART CATH-LEFT AND Right Heart cath Left 6/1/2016    Performed by Jet Jacinto MD at [facility] CATH LAB    HYSTERECTOMY      heavy bleeding    PATELLA FRACTURE SURGERY      right- plate in tibial plateau    TONSILLECTOMY           FAMILY HISTORY:                Family History   Problem Relation Age of Onset    Heart disease Mother 63    Stroke Father  49    Heart disease Father     Early death Father     Cancer Maternal Aunt         bone    Cancer Maternal Uncle         esophageal    Heart disease Paternal Uncle     SIDS Daughter     Breast cancer Neg Hx     Ovarian cancer Neg Hx     Cervical cancer Neg Hx     Endometrial cancer Neg Hx     Vaginal cancer Neg Hx        SOCIAL HISTORY:          Tobacco:   Social History     Tobacco Use   Smoking Status Former Smoker    Packs/day: 0.25    Years: 24.00    Pack years: 6.00    Types: Cigarettes    Start date: 1961    Last attempt to quit: 1985    Years since quittin.6   Smokeless Tobacco Never Used       Alcohol use:    Social History     Substance and Sexual Activity   Alcohol Use Yes    Alcohol/week: 3.0 oz    Types: 5 Glasses of wine per week    Comment: socially                 ALLERGIES:    Review of patient's allergies indicates:   Allergen Reactions    Dilaudid [hydromorphone (bulk)]      Severe nausea/vomiting    Morphine      Severe nausea/vomiting       CURRENT MEDICATIONS:    Current Outpatient Medications   Medication Sig Dispense Refill    amiodarone (PACERONE) 200 MG Tab TAKE ONE TABLET BY MOUTH ONCE DAILY 30 tablet 6    aspirin (ECOTRIN) 81 MG EC tablet Take 81 mg by mouth once daily.      atorvastatin (LIPITOR) 10 MG tablet Take 1 tablet (10 mg total) by mouth once daily. 30 tablet 6    calcium carbonate (OS-HENNA) 600 mg calcium (1,500 mg) Tab Take 1,200 mg by mouth 2 (two) times daily with meals.       carvedilol (COREG) 6.25 MG tablet Take 1 tablet (6.25 mg total) by mouth 2 (two) times daily with meals. 120 tablet 3    cholecalciferol, vitamin D3, (VITAMIN D3) 1,000 unit capsule Take 1,000 Units by mouth once daily.      co-enzyme Q-10 30 mg capsule Take 10 mg by mouth once daily.       conjugated estrogens (PREMARIN) vaginal cream Dime-sized amount with finger inside vagina (to knuckle)/around opening/inner lips.  Do nightly x 2 weeks, then 2x/week  "thereafter. 30 g 11    diclofenac sodium (VOLTAREN) 1 % Gel Apply 2 g topically 4 (four) times daily as needed. 3 Tube 3    fish oil-omega-3 fatty acids 300-1,000 mg capsule Take 2 g by mouth 2 (two) times daily.       furosemide (LASIX) 40 MG tablet TAKE ONE TABLET BY MOUTH ONCE DAILY AS NEEDED FOR weight gain 30 tablet 11    gabapentin (NEURONTIN) 100 MG capsule Take 1 capsule (100 mg total) by mouth every evening. 30 capsule 6    MULTIVITAMIN WITH MINERALS (HAIR,SKIN AND NAILS ORAL) Take by mouth once daily.      potassium chloride (KLOR-CON) 8 MEQ TbSR Take 1 tab (8 meq) oral by mouth daily. 90 tablet 3    sacubitril-valsartan (ENTRESTO) 24-26 mg per tablet Take 1 tablet by mouth 2 (two) times daily. 60 tablet 6    vit C/E/Zn/coppr/lutein/zeaxan (PRESERVISION AREDS 2 ORAL) Take by mouth once daily.      zolpidem (AMBIEN) 5 MG Tab Take one tablet (5 mg) by mouth at bed time as needed to induce sleep. 2 tablet 0     Current Facility-Administered Medications   Medication Dose Route Frequency Provider Last Rate Last Dose    cyanocobalamin injection 1,000 mcg  1,000 mcg Intramuscular Q30 Days Sri Gutierrez MD   1,000 mcg at 08/02/19 1415                  REVIEW OF SYSTEMS:  Sleep related symptoms as per HPI. Otherwise, a balance of 10 systems reviewed is negative.          PHYSICAL EXAM:  /70   Pulse 61   Ht 5' 7" (1.702 m)   LMP  (LMP Unknown) Comment: refused weight   BMI 29.25 kg/m²   GENERAL: Overweight development, well groomed    01/10/2019 TTE  · Low normal left ventricular systolic function. The estimated ejection fraction is 50%, Quantitative LVEF 49% by 2D measurement.  · Grade I (mild) left ventricular diastolic dysfunction consistent with impaired relaxation.  · Mild left atrial enlargement.  · Septal wall has abnormal motion consistent with left bundle branch block.  · Mild mitral sclerosis.  · Normal right ventricular systolic function.  Normal left atrial pressure.              "                       ASSESSMENT:    Obstructive sleep apnea, moderate by AHI, REM predominant (REM AHI 62.9).  The patient symptomatically has snoring and air gasping improves with CPAP. The patient is adherent on CPAP and experiencing symptomatic benefit.  Medical co-morbidities: cardiomyopathy, systemic HTN, and overweight BMI.  This warrants treatment for obstructive sleep apnea.      PLAN:    Treatment:  -continue APAP. Consider Chiquita nasal mask - pt to contact [facility].   -RTC 6 months. Pending efficacy/compliance, consider annual visits thereafter.     Education: During our discussion today, we talked about the etiology of obstructive sleep apnea as well as the potential ramifications of untreated sleep apnea, which could include daytime sleepiness, hypertension, heart disease and/or stroke. We discussed potential treatment options, which could include weight loss, body positioning, continuous positive airway pressure (CPAP), OA, EPAP, or referral for surgical consideration.     Precautions: The patient was advised to abstain from driving should they feel sleepy  or drowsy.

## 2019-08-14 ENCOUNTER — PATIENT MESSAGE (OUTPATIENT)
Dept: SLEEP MEDICINE | Facility: CLINIC | Age: 74
End: 2019-08-14

## 2019-08-14 ENCOUNTER — CLINICAL SUPPORT (OUTPATIENT)
Dept: INFECTIOUS DISEASES | Facility: CLINIC | Age: 74
End: 2019-08-14
Payer: MEDICARE

## 2019-08-14 ENCOUNTER — HOSPITAL ENCOUNTER (OUTPATIENT)
Dept: RADIOLOGY | Facility: HOSPITAL | Age: 74
Discharge: HOME | End: 2019-08-14
Attending: INTERNAL MEDICINE
Payer: MEDICARE

## 2019-08-14 ENCOUNTER — OFFICE VISIT (OUTPATIENT)
Dept: RHEUMATOLOGY | Facility: CLINIC | Age: 74
End: 2019-08-14
Payer: MEDICARE

## 2019-08-14 VITALS
BODY MASS INDEX: 28.29 KG/M2 | HEART RATE: 60 BPM | WEIGHT: 186.69 LBS | DIASTOLIC BLOOD PRESSURE: 73 MMHG | HEIGHT: 68 IN | SYSTOLIC BLOOD PRESSURE: 111 MMHG

## 2019-08-14 DIAGNOSIS — M15.9 PRIMARY OSTEOARTHRITIS INVOLVING MULTIPLE JOINTS: Primary | ICD-10-CM

## 2019-08-14 DIAGNOSIS — M15.9 PRIMARY OSTEOARTHRITIS INVOLVING MULTIPLE JOINTS: ICD-10-CM

## 2019-08-14 DIAGNOSIS — M06.9 RHEUMATOID ARTHRITIS INVOLVING MULTIPLE SITES, UNSPECIFIED RHEUMATOID FACTOR PRESENCE: ICD-10-CM

## 2019-08-14 PROCEDURE — 73130 X-RAY EXAM OF HAND: CPT | Mod: 26,50,, | Performed by: RADIOLOGY

## 2019-08-14 PROCEDURE — 90750 HZV VACC RECOMBINANT IM: CPT | Mod: HCNC,GC,S$GLB, | Performed by: INTERNAL MEDICINE

## 2019-08-14 PROCEDURE — 99213 PR OFFICE/OUTPT VISIT, EST, LEVL III, 20-29 MIN: ICD-10-PCS | Mod: HCNC,S$GLB,, | Performed by: INTERNAL MEDICINE

## 2019-08-14 PROCEDURE — 99999 PR PBB SHADOW E&M-EST. PATIENT-LVL IV: ICD-10-PCS | Mod: PBBFAC,HCNC,, | Performed by: INTERNAL MEDICINE

## 2019-08-14 PROCEDURE — 90750 ZOSTER RECOMBINANT VACCINE: ICD-10-PCS | Mod: HCNC,GC,S$GLB, | Performed by: INTERNAL MEDICINE

## 2019-08-14 PROCEDURE — 1101F PT FALLS ASSESS-DOCD LE1/YR: CPT | Mod: HCNC,CPTII,S$GLB, | Performed by: INTERNAL MEDICINE

## 2019-08-14 PROCEDURE — 1101F PR PT FALLS ASSESS DOC 0-1 FALLS W/OUT INJ PAST YR: ICD-10-PCS | Mod: HCNC,CPTII,S$GLB, | Performed by: INTERNAL MEDICINE

## 2019-08-14 PROCEDURE — 3078F PR MOST RECENT DIASTOLIC BLOOD PRESSURE < 80 MM HG: ICD-10-PCS | Mod: HCNC,CPTII,S$GLB, | Performed by: INTERNAL MEDICINE

## 2019-08-14 PROCEDURE — 90471 ZOSTER RECOMBINANT VACCINE: ICD-10-PCS | Mod: HCNC,GC,S$GLB, | Performed by: INTERNAL MEDICINE

## 2019-08-14 PROCEDURE — 73130 X-RAY EXAM OF HAND: CPT | Mod: 50,TC

## 2019-08-14 PROCEDURE — 99999 PR PBB SHADOW E&M-EST. PATIENT-LVL IV: CPT | Mod: PBBFAC,HCNC,, | Performed by: INTERNAL MEDICINE

## 2019-08-14 PROCEDURE — 73130 XR HAND COMPLETE 3 VIEWS BILATERAL: ICD-10-PCS | Mod: 26,50,, | Performed by: RADIOLOGY

## 2019-08-14 PROCEDURE — 3074F PR MOST RECENT SYSTOLIC BLOOD PRESSURE < 130 MM HG: ICD-10-PCS | Mod: HCNC,CPTII,S$GLB, | Performed by: INTERNAL MEDICINE

## 2019-08-14 PROCEDURE — 3074F SYST BP LT 130 MM HG: CPT | Mod: HCNC,CPTII,S$GLB, | Performed by: INTERNAL MEDICINE

## 2019-08-14 PROCEDURE — 3078F DIAST BP <80 MM HG: CPT | Mod: HCNC,CPTII,S$GLB, | Performed by: INTERNAL MEDICINE

## 2019-08-14 PROCEDURE — 99213 OFFICE O/P EST LOW 20 MIN: CPT | Mod: HCNC,S$GLB,, | Performed by: INTERNAL MEDICINE

## 2019-08-14 PROCEDURE — 90471 IMMUNIZATION ADMIN: CPT | Mod: HCNC,GC,S$GLB, | Performed by: INTERNAL MEDICINE

## 2019-08-14 RX ORDER — PREDNISONE 5 MG/1
10 TABLET ORAL DAILY
Qty: 60 TABLET | Refills: 2 | Status: SHIPPED | OUTPATIENT
Start: 2019-08-14 | End: 2019-08-24

## 2019-08-14 ASSESSMENT — ROUTINE ASSESSMENT OF PATIENT INDEX DATA (RAPID3)
AM STIFFNESS SCORE: 1, YES
WHEN YOU AWAKENED IN THE MORNING OVER THE LAST WEEK, PLEASE INDICATE THE AMOUNT OF TIME IT TAKES UNTIL YOU ARE AS LIMBER AS YOU WILL BE FOR THE DAY: 3 MINS
PSYCHOLOGICAL DISTRESS SCORE: 2.2
MDHAQ FUNCTION SCORE: 0
PATIENT GLOBAL ASSESSMENT SCORE: 4.5
TOTAL RAPID3 SCORE: 3
FATIGUE SCORE: 4
PAIN SCORE: 4.5

## 2019-08-14 ASSESSMENT — DISEASE ACTIVITY SCORE (DAS28)
TOTAL_SCORE_CRP: 4.62
TENDER_JOINTS_COUNT: 5
TOTAL_SCORE_ESR: 4.59
GLOBAL_HEALTH_SCORE: 45
CRP_MG_PER_LITER: 34.9
ESR_MM_PER_HR: 24
SWOLLEN_JOINTS_COUNT: 3

## 2019-08-14 NOTE — PROGRESS NOTES
"Subjective:       Patient ID: Riwzana Block is a 74 y.o. female.    Chief Complaint: RA (RF-, CATHIE-, CCP-); OA of multiple joints    Patient returns today for follow-up. She got all pre-DMARD labs after her last visit, and all were negative. She reports that she has had some increased hand pain and swelling since her last appointment 2 weeks ago. She has tried Volatren gel and taping with Coban with minimal improvement. Hand pain is worse with activity.     Review of Systems   Constitutional: Positive for fatigue.   HENT: Negative.    Eyes: Negative.    Respiratory: Negative.    Cardiovascular: Negative.    Gastrointestinal: Negative.    Endocrine: Negative.    Genitourinary: Negative.    Musculoskeletal: Positive for arthralgias and joint swelling.   Skin: Negative.    Allergic/Immunologic: Negative.    Neurological: Negative.    Hematological: Negative.    Psychiatric/Behavioral: Negative.          Objective:   /73   Pulse 60   Ht 5' 8.4" (1.737 m)   Wt 84.7 kg (186 lb 11.2 oz)   LMP  (LMP Unknown) Comment: refused weight   BMI 28.06 kg/m²      Physical Exam   Constitutional: She is oriented to person, place, and time. No distress.   HENT:   Head: Normocephalic and atraumatic.   Eyes: Conjunctivae and EOM are normal. Pupils are equal, round, and reactive to light.   Cardiovascular: Normal rate and regular rhythm.    Pulmonary/Chest: Effort normal. No respiratory distress.       Right Side Rheumatological Exam     Examination finds the shoulder and elbow normal.    The patient is tender to palpation of the wrist, 1st PIP, 1st MCP, 2nd PIP, 2nd MCP, 3rd PIP, 4th PIP, 5th PIP, 5th MCP, 1st CMC, 2nd DIP, 3rd DIP, 4th DIP and 5th DIP    She has swelling of the 1st PIP, 1st MCP, 2nd PIP, 2nd MCP, 3rd PIP, 4th PIP, 5th PIP, 5th MCP, 1st CMC, 2nd DIP, 3rd DIP, 4th DIP and 5th DIP    The patient has an enlarged 1st PIP, 2nd PIP, 3rd PIP, 4th PIP, 5th PIP, 1st CMC, 2nd DIP, 3rd DIP, 4th DIP and 5th DIP    Left " Side Rheumatological Exam     Examination finds the shoulder, elbow, knee, 1st MCP, 2nd MCP, 3rd MCP, 4th MCP and 5th MCP normal.    The patient is tender to palpation of the wrist, 1st PIP, 2nd PIP, 3rd PIP, 4th PIP, 5th PIP, 1st CMC, 2nd DIP, 3rd DIP, 4th DIP and 5th DIP.    She has swelling of the 1st PIP, 2nd PIP, 3rd PIP, 4th PIP, 5th PIP, 1st CMC, 2nd DIP, 3rd DIP, 4th DIP and 5th DIP    The patient has an enlarged 1st PIP, 2nd PIP, 3rd PIP, 4th PIP, 5th PIP, 1st CMC, 2nd DIP, 3rd DIP, 4th DIP and 5th DIP.      Neurological: She is alert and oriented to person, place, and time. GCS score is 15.   Skin: Skin is warm and dry. No rash noted. She is not diaphoretic. No erythema.     Psychiatric: Mood and affect normal.   Musculoskeletal: She exhibits tenderness.        Lab Results  Reviewed in Epic    CRP - 34.9  ESR - 24     Assessment:       1. RA (RF-, CATHIE-, CCP-) TJ 15, SJ 13, GH 45, CRP 34.7, ESR 24, [id number] 6.06, DAS28 6.03  2. OA of multiple joints      Plan:       Start Prednisone 5mg twice daily for 1 week, 7.5mg daily for 1 week, 5mg daily until seen for follow-up  All pre-DMARD labs negative and will plan to start Xeljanz 11mg daily after receiving Shingrix series  Will get Shingrix #1 today and return in 2 months for #2  Bilateral hand x-rays today  Recommended [place] thumb braces and handout given to patient, size Medium  RTC 2 months

## 2019-08-14 NOTE — PATIENT INSTRUCTIONS
Start Prednisone. Take two(2) 5mg tablets for 1 week. Then decrease to 1.5 tablets for 1 week. Then 1 tablet daily until seen back in clinic.  Shingrix vaccine today.  RTC in 2 months.  [place] thumb braces.  Hand x-rays.

## 2019-08-14 NOTE — PROGRESS NOTES
I have personally taken the history and examined the patient and agree with the resident,s note as stated above     Results for AMY MANLEY (MRN [id number]) as of 8/14/2019 13:17   Ref. Range 7/29/2019 08:43 8/2/2019 14:20   WBC Latest Ref Range: 3.90 - 12.70 K/uL 6.72    RBC Latest Ref Range: 4.00 - 5.40 M/uL 3.99 (L)    Hemoglobin Latest Ref Range: 12.0 - 16.0 g/dL 12.2    Hematocrit Latest Ref Range: 37.0 - 48.5 % 40.2    MCV Latest Ref Range: 82 - 98 fL 101 (H)    MCH Latest Ref Range: 27.0 - 31.0 pg 30.6    MCHC Latest Ref Range: 32.0 - 36.0 g/dL 30.3 (L)    RDW Latest Ref Range: 11.5 - 14.5 % 14.0    Platelets Latest Ref Range: 150 - 350 K/uL 289    MPV Latest Ref Range: 9.2 - 12.9 fL 11.4    Gran% Latest Ref Range: 38.0 - 73.0 % 64.9    Gran # (ANC) Latest Ref Range: 1.8 - 7.7 K/uL 4.4    Lymph% Latest Ref Range: 18.0 - 48.0 % 24.6    Lymph # Latest Ref Range: 1.0 - 4.8 K/uL 1.7    Mono% Latest Ref Range: 4.0 - 15.0 % 8.6    Mono # Latest Ref Range: 0.3 - 1.0 K/uL 0.6    Eosinophil% Latest Ref Range: 0.0 - 8.0 % 1.3    Eos # Latest Ref Range: 0.0 - 0.5 K/uL 0.1    Basophil% Latest Ref Range: 0.0 - 1.9 % 0.3    Baso # Latest Ref Range: 0.00 - 0.20 K/uL 0.02    nRBC Latest Ref Range: 0 /100 WBC 0    Differential Method Unknown Automated    Immature Grans (Abs) Latest Ref Range: 0.00 - 0.04 K/uL 0.02    Immature Granulocytes Latest Ref Range: 0.0 - 0.5 % 0.3    Sed Rate Latest Ref Range: 0 - 36 mm/Hr 24    Sodium Latest Ref Range: 136 - 145 mmol/L 142    Potassium Latest Ref Range: 3.5 - 5.1 mmol/L 3.9    Chloride Latest Ref Range: 95 - 110 mmol/L 103    CO2 Latest Ref Range: 23 - 29 mmol/L 30 (H)    Anion Gap Latest Ref Range: 8 - 16 mmol/L 9    BUN, Bld Latest Ref Range: 8 - 23 mg/dL 18    Creatinine Latest Ref Range: 0.5 - 1.4 mg/dL 1.0    eGFR if non African American Latest Ref Range: >60 mL/min/1.73 m^2 56.0 (A)    eGFR if African American Latest Ref Range: >60 mL/min/1.73 m^2 >60.0    Glucose Latest Ref  Range: 70 - 110 mg/dL 83    Calcium Latest Ref Range: 8.7 - 10.5 mg/dL 9.7    Alkaline Phosphatase Latest Ref Range: 55 - 135 U/L 77    PROTEIN TOTAL Latest Ref Range: 6.0 - 8.4 g/dL 7.3    Albumin Latest Ref Range: 3.5 - 5.2 g/dL 3.6    BILIRUBIN TOTAL Latest Ref Range: 0.1 - 1.0 mg/dL 0.5    AST Latest Ref Range: 10 - 40 U/L 18    ALT Latest Ref Range: 10 - 44 U/L 17    CRP Latest Ref Range: 0.0 - 8.2 mg/L 34.9 (H)    CATHIE Screen Latest Ref Range: Negative <1:160  Negative <1:160    CCP Antibodies Latest Ref Range: <5.0 U/mL <0.5    Rheumatoid Factor Latest Ref Range: 0.0 - 15.0 IU/mL 12.0    Hep B Core Total Ab Unknown  Negative   Hep B S Ab Unknown  Negative   Hepatitis B Surface Ag Unknown  Negative   Hepatitis C Ab Unknown  Negative   Mitogen - Nil Latest Ref Range: See text IU/mL  >10.000   NIL Latest Ref Range: See text IU/mL  0.030   TB Gold Plus Unknown  Negative   TB1 - Nil Latest Ref Range: See text IU/mL  -0.010   TB2 - Nil Latest Ref Range: See text IU/mL  0.000   HIV 1/2 Ag/Ab Latest Ref Range: Negative   Negative   RPR Latest Ref Range: Non-reactive   Non-reactive   Strongyloides Ab IgG Latest Ref Range: Negative   Negative   Varicella IgG Latest Ref Range: 0.00 - 0.90 ISR  4.07 (H)   Varicella Interpretation Latest Ref Range: Negative   Positive (A)   Hepatitis A Antibody IgG Unknown  Positive (A)                 Seronegative RA(RF- ACPA- CATHIE-)(EULAR/ACR 2010: 7 meets RA criteria  v. Peripheral spondyloarthritis CASPAR:        TJ 5  SJ 3  Pt global  ESR 24   CRP 34.9   DONOVAN 28 4.59  SVS51-EIH 4.62(both MDA)   No anti-TNF with cardiomyopathy; intolerant of methotrexate in past. Could consider leflunomide, tofacitinib, or abatacept. Needs Shingrix x 2 prior to tofacitinib if decided upon  DAPSA: TJ 15  SJ 13  Pt pain 4.5   Pt global 4.5    CRP 3.49  = 40.49 (HDA)  OA hands  Cardiomyopathy; low nl LVEF, grade I diastolic dysfunction, on sacubitril-valsartan;  avoid anti-TNF    Updated X-ray hands to look  for RA erosions or PsA changes  To see Dr. Walker in [place] 8/20/19  for vaccination update  *Shingrix today and again in 2 months  Prednisone 10mg daily x 1 wk, then 7.5mg daily x  1wk then 5mg daily and continue  OT  Diclofenac gel 1% four times daily prn  Turmeric  glucosamine sulfate  Coban tape  Medium [place] thumb braces  Re-request paper chart from [facility] 2 months. Plan to start tofacitinib-XR 11mg daily  2 wks after 2nd Shingrix  Risks and benefits discussed. ACR handout provided. No h/o acute diverticulitis  Will need monthly standing labs x 3 after starting and lipids in 2 months

## 2019-08-15 ENCOUNTER — TELEPHONE (OUTPATIENT)
Dept: PHARMACY | Facility: CLINIC | Age: 74
End: 2019-08-15

## 2019-08-15 ENCOUNTER — CLINICAL SUPPORT (OUTPATIENT)
Dept: CARDIOLOGY | Facility: CLINIC | Age: 74
End: 2019-08-15
Payer: MEDICARE

## 2019-08-15 ENCOUNTER — PATIENT MESSAGE (OUTPATIENT)
Dept: RHEUMATOLOGY | Facility: CLINIC | Age: 74
End: 2019-08-15

## 2019-08-15 ENCOUNTER — CLINICAL SUPPORT (OUTPATIENT)
Dept: CARDIOLOGY | Facility: CLINIC | Age: 74
End: 2019-08-15
Attending: INTERNAL MEDICINE
Payer: MEDICARE

## 2019-08-15 ENCOUNTER — OFFICE VISIT (OUTPATIENT)
Dept: CARDIOLOGY | Facility: CLINIC | Age: 74
End: 2019-08-15
Payer: MEDICARE

## 2019-08-15 VITALS
HEIGHT: 68 IN | WEIGHT: 186 LBS | DIASTOLIC BLOOD PRESSURE: 80 MMHG | HEART RATE: 55 BPM | SYSTOLIC BLOOD PRESSURE: 122 MMHG | BODY MASS INDEX: 28.19 KG/M2

## 2019-08-15 DIAGNOSIS — Z91.89 AT RISK FOR AMIODARONE TOXICITY WITH LONG TERM USE: ICD-10-CM

## 2019-08-15 DIAGNOSIS — R00.2 PALPITATIONS: ICD-10-CM

## 2019-08-15 DIAGNOSIS — I34.0 NON-RHEUMATIC MITRAL REGURGITATION: ICD-10-CM

## 2019-08-15 DIAGNOSIS — I42.9 CARDIOMYOPATHY, UNSPECIFIED TYPE: ICD-10-CM

## 2019-08-15 DIAGNOSIS — M06.041 RHEUMATOID ARTHRITIS INVOLVING BOTH HANDS WITH NEGATIVE RHEUMATOID FACTOR: ICD-10-CM

## 2019-08-15 DIAGNOSIS — I49.3 VENTRICULAR PREMATURE BEATS: ICD-10-CM

## 2019-08-15 DIAGNOSIS — R07.2 PRECORDIAL PAIN: ICD-10-CM

## 2019-08-15 DIAGNOSIS — I42.8 OTHER CARDIOMYOPATHY: ICD-10-CM

## 2019-08-15 DIAGNOSIS — Z79.899 AT RISK FOR AMIODARONE TOXICITY WITH LONG TERM USE: ICD-10-CM

## 2019-08-15 DIAGNOSIS — I42.9 CHF WITH CARDIOMYOPATHY: ICD-10-CM

## 2019-08-15 DIAGNOSIS — R00.2 PALPITATIONS: Primary | ICD-10-CM

## 2019-08-15 DIAGNOSIS — G47.33 OSA (OBSTRUCTIVE SLEEP APNEA): ICD-10-CM

## 2019-08-15 DIAGNOSIS — I49.3 VENTRICULAR PREMATURE BEATS: Primary | ICD-10-CM

## 2019-08-15 DIAGNOSIS — I10 ESSENTIAL HYPERTENSION: ICD-10-CM

## 2019-08-15 DIAGNOSIS — I50.9 CHF WITH CARDIOMYOPATHY: ICD-10-CM

## 2019-08-15 DIAGNOSIS — M06.042 RHEUMATOID ARTHRITIS INVOLVING BOTH HANDS WITH NEGATIVE RHEUMATOID FACTOR: ICD-10-CM

## 2019-08-15 LAB
AORTIC VALVE REGURGITATION: NORMAL
DIASTOLIC DYSFUNCTION: NO
ESTIMATED PA SYSTOLIC PRESSURE: 27.21
RETIRED EF AND QEF - SEE NOTES: 55 (ref 55–65)
TRICUSPID VALVE REGURGITATION: NORMAL

## 2019-08-15 PROCEDURE — 99215 PR OFFICE/OUTPT VISIT, EST, LEVL V, 40-54 MIN: ICD-10-PCS | Mod: HCNC,S$GLB,, | Performed by: INTERNAL MEDICINE

## 2019-08-15 PROCEDURE — 93005 ELECTROCARDIOGRAM TRACING: CPT | Mod: HCNC,S$GLB,, | Performed by: INTERNAL MEDICINE

## 2019-08-15 PROCEDURE — 99215 OFFICE O/P EST HI 40 MIN: CPT | Mod: HCNC,S$GLB,, | Performed by: INTERNAL MEDICINE

## 2019-08-15 PROCEDURE — 1101F PR PT FALLS ASSESS DOC 0-1 FALLS W/OUT INJ PAST YR: ICD-10-PCS | Mod: HCNC,CPTII,S$GLB, | Performed by: INTERNAL MEDICINE

## 2019-08-15 PROCEDURE — 93010 EKG 12-LEAD: ICD-10-PCS | Mod: HCNC,S$GLB,, | Performed by: INTERNAL MEDICINE

## 2019-08-15 PROCEDURE — 3079F PR MOST RECENT DIASTOLIC BLOOD PRESSURE 80-89 MM HG: ICD-10-PCS | Mod: HCNC,CPTII,S$GLB, | Performed by: INTERNAL MEDICINE

## 2019-08-15 PROCEDURE — 3074F SYST BP LT 130 MM HG: CPT | Mod: HCNC,CPTII,S$GLB, | Performed by: INTERNAL MEDICINE

## 2019-08-15 PROCEDURE — 93306 2D ECHO WITH COLOR FLOW DOPPLER: ICD-10-PCS | Mod: HCNC,S$GLB,, | Performed by: INTERNAL MEDICINE

## 2019-08-15 PROCEDURE — 93010 ELECTROCARDIOGRAM REPORT: CPT | Mod: HCNC,S$GLB,, | Performed by: INTERNAL MEDICINE

## 2019-08-15 PROCEDURE — 99999 PR PBB SHADOW E&M-EST. PATIENT-LVL II: CPT | Mod: PBBFAC,HCNC,, | Performed by: INTERNAL MEDICINE

## 2019-08-15 PROCEDURE — 93005 EKG 12-LEAD: ICD-10-PCS | Mod: HCNC,S$GLB,, | Performed by: INTERNAL MEDICINE

## 2019-08-15 PROCEDURE — 93306 TTE W/DOPPLER COMPLETE: CPT | Mod: HCNC,S$GLB,, | Performed by: INTERNAL MEDICINE

## 2019-08-15 PROCEDURE — 3074F PR MOST RECENT SYSTOLIC BLOOD PRESSURE < 130 MM HG: ICD-10-PCS | Mod: HCNC,CPTII,S$GLB, | Performed by: INTERNAL MEDICINE

## 2019-08-15 PROCEDURE — 1101F PT FALLS ASSESS-DOCD LE1/YR: CPT | Mod: HCNC,CPTII,S$GLB, | Performed by: INTERNAL MEDICINE

## 2019-08-15 PROCEDURE — 3079F DIAST BP 80-89 MM HG: CPT | Mod: HCNC,CPTII,S$GLB, | Performed by: INTERNAL MEDICINE

## 2019-08-15 PROCEDURE — 99999 PR PBB SHADOW E&M-EST. PATIENT-LVL II: ICD-10-PCS | Mod: PBBFAC,HCNC,, | Performed by: INTERNAL MEDICINE

## 2019-08-15 NOTE — PROGRESS NOTES
Subjective:   Patient ID:  Rizwana Block is a 74 y.o. female     Chief complaint:PVC's      HPI  Background:  72 woman  Hx of PVCs s/p recent RFA, bradycardia, CM, HFrEF (EF 40-45%), non-rheumatic MR, and HTN. Ms. Block has been on long-term amiodarone for PVCs (inferior-posterior-septal PVC morphology). Her PVC burden was thought to have been related to her DCM. Her EF improved to the 50s after amiodarone use, and she was subsequently switched from amiodarone to Verapamil.      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  She later restarted with palps and some PVCs (albeit in lesser frequency than pre RFA) and Rx was added -- Verapamil first then back on amio      Echo (06/22/16) >>  EF of 40-45%, trivial-to-mild TR, trivial MO, upper limit of normal LVE, and a PASP of 26 mmHg.   24-Hour Holter (06/22/16) revealed a 13% PVC burden; 481 couplets, 73 triplets and short VTNS -- the large majority of the PVCs were MM. The triplets are dimorphic.  She had an RFA on 9/27/16  >>  1.  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  Effective lesions were at 12 o'clock level in the [place] view.  2.  Note that the ascending aorta and aortic arch appeared to be very unfolded (unusually so for the  patient's frame).  This may need to be evaluated further.     48-Hour Holter (11/09/16) revealed SR w/HRs varying between  bpm; average 72 bpm. There were very frequent polymorphic PVCs (totaling 9,632; averaging 200 per hour, ~5%), 133 couplets, and no episodes of VT. There were very rare PACs (totaling 20; averaging less than 1 per hour) and no episodes of sustained SVT. There was 1 episode of dizziness reported corresponding w/SR at 95 bpm.       Update June 2018:  Since the RFA she has been feeling a lot better with  "more energy, working full time,, back to gardening and swimming etc  Echo on 6/19/18    1 - Mildly to moderately depressed left ventricular systolic function (EF 40-45%).     2 - Wall motion abnormalities.     3 - Impaired LV relaxation, normal LAP (grade 1 diastolic dysfunction).     4 - Low normal right ventricular systolic function .     5 - Trivial to mild tricuspid regurgitation.     6 - Trivial pulmonic regurgitation.     7 - Atrial septal aneurysm .     8 - The estimated PA systolic pressure is 29 mmHg.       Update 1/9/19:  She feels well -- her only c/o is hair loss -- she thinks it's the amio  Takes lasix when her rings get tight -- @ once a week or so.   I have reviewed the actual image of the ECG tracing obtained today and it shows NSR with normal intervals     Update since then:  She started Biotin supplements and her hair as well as her nails are "better".   She has been feeling well -- gets occ chest discomfort -- at times in the shower - lasts a few secs-- maybe a little longer-- she had similar c/o several years ago too  And her angios were normal  Once she woke up with a feeling pf "a punch in the chest". It was gone as soon as she woke up.   I have reviewed the actual image of the ECG tracing obtained today and it shows NSR with mild IVCD -- QRSd 125 msec  Her main concerns are related to RA     Current Outpatient Medications   Medication Sig    amiodarone (PACERONE) 200 MG Tab TAKE ONE TABLET BY MOUTH ONCE DAILY    aspirin (ECOTRIN) 81 MG EC tablet Take 81 mg by mouth once daily.    atorvastatin (LIPITOR) 10 MG tablet Take 1 tablet (10 mg total) by mouth once daily.    calcium carbonate (OS-HENNA) 600 mg calcium (1,500 mg) Tab Take 1,200 mg by mouth 2 (two) times daily with meals.     carvedilol (COREG) 6.25 MG tablet Take 1 tablet (6.25 mg total) by mouth 2 (two) times daily with meals.    cholecalciferol, vitamin D3, (VITAMIN D3) 1,000 unit capsule Take 1,000 Units by mouth once daily.    " co-enzyme Q-10 30 mg capsule Take 10 mg by mouth once daily.     diclofenac sodium (VOLTAREN) 1 % Gel Apply 2 g topically 4 (four) times daily as needed.    fish oil-omega-3 fatty acids 300-1,000 mg capsule Take 2 g by mouth 2 (two) times daily.     furosemide (LASIX) 40 MG tablet TAKE ONE TABLET BY MOUTH ONCE DAILY AS NEEDED FOR weight gain    MULTIVITAMIN WITH MINERALS (HAIR,SKIN AND NAILS ORAL) Take by mouth once daily.    potassium chloride (KLOR-CON) 8 MEQ TbSR Take 1 tab (8 meq) oral by mouth daily.    predniSONE (DELTASONE) 5 MG tablet Take 2 tablets (10 mg total) by mouth once daily. In 1 week, reduce to 1.5 tabs for 1 week then decrease to 1 tab daily. for 10 days    sacubitril-valsartan (ENTRESTO) 24-26 mg per tablet Take 1 tablet by mouth 2 (two) times daily.    tofacitinib (XELJANZ XR) 11 mg Tb24 Take 1 tablet (11 mg) by mouth once daily.    TURMERIC ORAL Take by mouth.    vit C/E/Zn/coppr/lutein/zeaxan (PRESERVISION AREDS 2 ORAL) Take by mouth once daily.     Current Facility-Administered Medications   Medication    cyanocobalamin injection 1,000 mcg     Review of Systems   Constitution: Negative. Negative for decreased appetite, weight gain and weight loss.   HENT: Negative.  Negative for nosebleeds.    Eyes: Negative.  Negative for blurred vision and visual disturbance.   Cardiovascular: Positive for chest pain, dyspnea on exertion, irregular heartbeat and palpitations. Negative for claudication, cyanosis, leg swelling, near-syncope, orthopnea, paroxysmal nocturnal dyspnea and syncope.   Respiratory: Positive for cough. Negative for shortness of breath and wheezing.    Endocrine: Negative.  Negative for heat intolerance.   Skin: Negative.  Negative for rash.   Musculoskeletal: Negative.  Negative for muscle weakness and myalgias.   Gastrointestinal: Negative.  Negative for abdominal pain, anorexia, melena, nausea and vomiting.   Genitourinary: Negative.  Negative for menorrhagia.  "  Neurological: Positive for dizziness, loss of balance and weakness. Negative for excessive daytime sleepiness, headaches, seizures and vertigo.   Psychiatric/Behavioral: Negative.  Negative for altered mental status and depression. The patient is not nervous/anxious.        Objective:   Physical Exam   Constitutional: She is oriented to person, place, and time. She appears well-developed and well-nourished.   HENT:   Head: Normocephalic and atraumatic.   Right Ear: External ear normal.   Left Ear: External ear normal.   Neck: Normal range of motion. Neck supple. No thyromegaly present.   Cardiovascular: Normal rate, regular rhythm, normal heart sounds and intact distal pulses. Exam reveals no gallop, no S3, no S4, no friction rub, no midsystolic click and no opening snap.   No murmur heard.  Pulses:       Carotid pulses are 2+ on the right side, and 2+ on the left side.       Radial pulses are 2+ on the right side, and 2+ on the left side.        Dorsalis pedis pulses are 2+ on the right side, and 2+ on the left side.        Posterior tibial pulses are 2+ on the right side, and 2+ on the left side.   Pulmonary/Chest: Effort normal and breath sounds normal.   Abdominal: Soft. She exhibits no distension. There is no tenderness.   Musculoskeletal:        Right ankle: She exhibits no swelling.        Left ankle: She exhibits no swelling.        Right lower leg: She exhibits no swelling.        Left lower leg: She exhibits no swelling.   Neurological: She is alert and oriented to person, place, and time. She has normal strength. No cranial nerve deficit. Gait normal.   Skin: Skin is warm. No rash noted. No cyanosis. Nails show no clubbing.   Psychiatric: She has a normal mood and affect. Her speech is normal and behavior is normal. Thought content normal. Cognition and memory are normal.   Nursing note and vitals reviewed.    /80 (BP Location: Right arm, Patient Position: Sitting)   Pulse (!) 55   Ht 5' 8.4" " (1.737 m)   Wt 84.4 kg (186 lb)   LMP  (LMP Unknown) Comment: refused weight   BMI 27.95 kg/m²      Assessment:      1. Ventricular premature beats    2. Palpitations    3. Other cardiomyopathy    4. Non-rheumatic mitral regurgitation    5. Essential hypertension    6. GAURAV (obstructive sleep apnea)    7. At risk for amiodarone toxicity with long term use    8. 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        Plan:      Orders Placed This Encounter   Procedures    Hemoglobin     Standing Status:   Future     Standing Expiration Date:   10/13/2020    Complete PFT with bronchodilator     Standing Status:   Future     Standing Expiration Date:   8/15/2020     Follow up in about 6 months (around 2/15/2020).  There are no discontinued medications.     Medication List with Changes/Refills   Current Medications    AMIODARONE (PACERONE) 200 MG TAB    TAKE ONE TABLET BY MOUTH ONCE DAILY    ASPIRIN (ECOTRIN) 81 MG EC TABLET    Take 81 mg by mouth once daily.    ATORVASTATIN (LIPITOR) 10 MG TABLET    Take 1 tablet (10 mg total) by mouth once daily.    CALCIUM CARBONATE (OS-HENNA) 600 MG CALCIUM (1,500 MG) TAB    Take 1,200 mg by mouth 2 (two) times daily with meals.     CARVEDILOL (COREG) 6.25 MG TABLET    Take 1 tablet (6.25 mg total) by mouth 2 (two) times daily with meals.    CHOLECALCIFEROL, VITAMIN D3, (VITAMIN D3) 1,000 UNIT CAPSULE    Take 1,000 Units by mouth once daily.    CO-ENZYME Q-10 30 MG CAPSULE    Take 10 mg by mouth once daily.     DICLOFENAC SODIUM (VOLTAREN) 1 % GEL    Apply 2 g topically 4 (four) times daily as needed.    FISH OIL-OMEGA-3 FATTY ACIDS 300-1,000 MG CAPSULE    Take 2 g by mouth 2 (two) times daily.     FUROSEMIDE (LASIX) 40 MG TABLET    TAKE ONE TABLET BY MOUTH ONCE DAILY AS NEEDED FOR weight gain    MULTIVITAMIN WITH MINERALS (HAIR,SKIN AND NAILS ORAL)    Take by mouth once daily.    POTASSIUM CHLORIDE (KLOR-CON) 8 MEQ TBSR    Take 1 tab (8 meq) oral by mouth  daily.    PREDNISONE (DELTASONE) 5 MG TABLET    Take 2 tablets (10 mg total) by mouth once daily. In 1 week, reduce to 1.5 tabs for 1 week then decrease to 1 tab daily. for 10 days    SACUBITRIL-VALSARTAN (ENTRESTO) 24-26 MG PER TABLET    Take 1 tablet by mouth 2 (two) times daily.    TOFACITINIB (XELJANZ XR) 11 MG TB24    Take 1 tablet (11 mg) by mouth once daily.    TURMERIC ORAL    Take by mouth.    VIT C/E/ZN/COPPR/LUTEIN/ZEAXAN (PRESERVISION AREDS 2 ORAL)    Take by mouth once daily.

## 2019-08-15 NOTE — TELEPHONE ENCOUNTER
Informed Patient  that [facility] received prescription for Xeljanz XR and prior authorization is required.  [facility] will be back in touch once insurance determination is received.

## 2019-08-16 ENCOUNTER — PATIENT MESSAGE (OUTPATIENT)
Dept: SLEEP MEDICINE | Facility: CLINIC | Age: 74
End: 2019-08-16

## 2019-08-16 ENCOUNTER — PATIENT MESSAGE (OUTPATIENT)
Dept: RHEUMATOLOGY | Facility: CLINIC | Age: 74
End: 2019-08-16

## 2019-08-16 ENCOUNTER — PATIENT MESSAGE (OUTPATIENT)
Dept: CARDIOLOGY | Facility: CLINIC | Age: 74
End: 2019-08-16

## 2019-08-16 ENCOUNTER — PATIENT OUTREACH (OUTPATIENT)
Dept: ADMINISTRATIVE | Facility: OTHER | Age: 74
End: 2019-08-16

## 2019-08-17 ENCOUNTER — PATIENT MESSAGE (OUTPATIENT)
Dept: CARDIOLOGY | Facility: CLINIC | Age: 74
End: 2019-08-17

## 2019-08-18 ENCOUNTER — PATIENT MESSAGE (OUTPATIENT)
Dept: CARDIOLOGY | Facility: CLINIC | Age: 74
End: 2019-08-18

## 2019-08-19 ENCOUNTER — OFFICE VISIT (OUTPATIENT)
Dept: PODIATRY | Facility: CLINIC | Age: 74
End: 2019-08-19
Payer: MEDICARE

## 2019-08-19 VITALS — WEIGHT: 186 LBS | BODY MASS INDEX: 28.19 KG/M2 | HEIGHT: 68 IN

## 2019-08-19 DIAGNOSIS — L60.0 INGROWN NAIL: ICD-10-CM

## 2019-08-19 DIAGNOSIS — M79.675 PAIN IN TOES OF BOTH FEET: ICD-10-CM

## 2019-08-19 DIAGNOSIS — L60.2 LONG TOENAIL: Primary | ICD-10-CM

## 2019-08-19 DIAGNOSIS — M79.674 PAIN IN TOES OF BOTH FEET: ICD-10-CM

## 2019-08-19 PROCEDURE — 17999 UNLISTD PX SKN MUC MEMB SUBQ: CPT | Mod: CSM,HCNC,S$GLB, | Performed by: PODIATRIST

## 2019-08-19 PROCEDURE — 99499 NO LOS: ICD-10-PCS | Mod: HCNC,,, | Performed by: PODIATRIST

## 2019-08-19 PROCEDURE — 99499 UNLISTED E&M SERVICE: CPT | Mod: HCNC,,, | Performed by: PODIATRIST

## 2019-08-19 PROCEDURE — 99999 PR PBB SHADOW E&M-EST. PATIENT-LVL III: ICD-10-PCS | Mod: PBBFAC,HCNC,, | Performed by: PODIATRIST

## 2019-08-19 PROCEDURE — 99999 PR PBB SHADOW E&M-EST. PATIENT-LVL III: CPT | Mod: PBBFAC,HCNC,, | Performed by: PODIATRIST

## 2019-08-19 PROCEDURE — 17999 PR NON-COVERED FOOT CARE: ICD-10-PCS | Mod: CSM,HCNC,S$GLB, | Performed by: PODIATRIST

## 2019-08-19 NOTE — TELEPHONE ENCOUNTER
DOCUMENTATION ONLY:  Prior authorization for Xeljanz approved from 8/16/19 to 12/31/19    Case Id: [id number]    Co-pay: $1176.17    Patient Assistance is required and is being researched.

## 2019-08-20 ENCOUNTER — CLINICAL SUPPORT (OUTPATIENT)
Dept: INFECTIOUS DISEASES | Facility: CLINIC | Age: 74
End: 2019-08-20
Payer: MEDICARE

## 2019-08-20 ENCOUNTER — OFFICE VISIT (OUTPATIENT)
Dept: INFECTIOUS DISEASES | Facility: CLINIC | Age: 74
End: 2019-08-20
Payer: MEDICARE

## 2019-08-20 VITALS
TEMPERATURE: 98 F | SYSTOLIC BLOOD PRESSURE: 134 MMHG | BODY MASS INDEX: 27.9 KG/M2 | WEIGHT: 184.06 LBS | HEIGHT: 68 IN | DIASTOLIC BLOOD PRESSURE: 88 MMHG | HEART RATE: 65 BPM

## 2019-08-20 DIAGNOSIS — Z79.899 LONG TERM CURRENT USE OF IMMUNOSUPPRESSIVE DRUG: ICD-10-CM

## 2019-08-20 DIAGNOSIS — Z79.899 LONG TERM CURRENT USE OF IMMUNOSUPPRESSIVE DRUG: Primary | ICD-10-CM

## 2019-08-20 PROCEDURE — G0009 HEPATITIS B (RECOMBINANT) ADJUVANTED, 2 DOSE: ICD-10-PCS | Mod: HCNC,S$GLB,, | Performed by: INTERNAL MEDICINE

## 2019-08-20 PROCEDURE — 90732 PNEUMOCOCCAL POLYSACCHARIDE VACCINE 23-VALENT =>2YO SQ IM: ICD-10-PCS | Mod: HCNC,S$GLB,, | Performed by: INTERNAL MEDICINE

## 2019-08-20 PROCEDURE — 90732 PPSV23 VACC 2 YRS+ SUBQ/IM: CPT | Mod: HCNC,S$GLB,, | Performed by: INTERNAL MEDICINE

## 2019-08-20 PROCEDURE — 99202 OFFICE O/P NEW SF 15 MIN: CPT | Mod: HCNC,S$GLB,, | Performed by: PHYSICIAN ASSISTANT

## 2019-08-20 PROCEDURE — G0010 PNEUMOCOCCAL POLYSACCHARIDE VACCINE 23-VALENT =>2YO SQ IM: ICD-10-PCS | Mod: HCNC,S$GLB,, | Performed by: INTERNAL MEDICINE

## 2019-08-20 PROCEDURE — 99999 PR PBB SHADOW E&M-EST. PATIENT-LVL IV: CPT | Mod: PBBFAC,HCNC,, | Performed by: PHYSICIAN ASSISTANT

## 2019-08-20 PROCEDURE — 99999 PR PBB SHADOW E&M-EST. PATIENT-LVL IV: ICD-10-PCS | Mod: PBBFAC,HCNC,, | Performed by: PHYSICIAN ASSISTANT

## 2019-08-20 PROCEDURE — 90739 HEPB VACC 2/4 DOSE ADULT IM: CPT | Mod: HCNC,S$GLB,, | Performed by: INTERNAL MEDICINE

## 2019-08-20 PROCEDURE — G0009 ADMIN PNEUMOCOCCAL VACCINE: HCPCS | Mod: HCNC,S$GLB,, | Performed by: INTERNAL MEDICINE

## 2019-08-20 PROCEDURE — 1101F PR PT FALLS ASSESS DOC 0-1 FALLS W/OUT INJ PAST YR: ICD-10-PCS | Mod: HCNC,CPTII,S$GLB, | Performed by: PHYSICIAN ASSISTANT

## 2019-08-20 PROCEDURE — 3079F PR MOST RECENT DIASTOLIC BLOOD PRESSURE 80-89 MM HG: ICD-10-PCS | Mod: HCNC,CPTII,S$GLB, | Performed by: PHYSICIAN ASSISTANT

## 2019-08-20 PROCEDURE — 1101F PT FALLS ASSESS-DOCD LE1/YR: CPT | Mod: HCNC,CPTII,S$GLB, | Performed by: PHYSICIAN ASSISTANT

## 2019-08-20 PROCEDURE — 3075F PR MOST RECENT SYSTOLIC BLOOD PRESS GE 130-139MM HG: ICD-10-PCS | Mod: HCNC,CPTII,S$GLB, | Performed by: PHYSICIAN ASSISTANT

## 2019-08-20 PROCEDURE — 3079F DIAST BP 80-89 MM HG: CPT | Mod: HCNC,CPTII,S$GLB, | Performed by: PHYSICIAN ASSISTANT

## 2019-08-20 PROCEDURE — G0010 ADMIN HEPATITIS B VACCINE: HCPCS | Mod: HCNC,S$GLB,, | Performed by: INTERNAL MEDICINE

## 2019-08-20 PROCEDURE — 90739 HEPATITIS B (RECOMBINANT) ADJUVANTED, 2 DOSE: ICD-10-PCS | Mod: HCNC,S$GLB,, | Performed by: INTERNAL MEDICINE

## 2019-08-20 PROCEDURE — 3075F SYST BP GE 130 - 139MM HG: CPT | Mod: HCNC,CPTII,S$GLB, | Performed by: PHYSICIAN ASSISTANT

## 2019-08-20 PROCEDURE — 99202 PR OFFICE/OUTPT VISIT, NEW, LEVL II, 15-29 MIN: ICD-10-PCS | Mod: HCNC,S$GLB,, | Performed by: PHYSICIAN ASSISTANT

## 2019-08-20 NOTE — LETTER
August 21, 2019      Darryl Javier MD  [address]  [facility] [address]           [facility]  [address]  [facility] [address]  Phone: [phone]  Fax: [phone]          Patient: Rizwana Block   MR Number: [id number]   YOB: 1945   Date of Visit: 8/20/2019       Dear Dr. Darryl Javier:    Thank you for referring Rizwana Block to me for evaluation. Attached you will find relevant portions of my assessment and plan of care.    If you have questions, please do not hesitate to call me. I look forward to following Rizwana Block along with you.    Sincerely,    Tami Walker PA-C    Enclosure  CC:  No Recipients    If you would like to receive this communication electronically, please contact [email or web address] or [phone] to request more information on [employer] Link access.    For providers and/or their staff who would like to refer a patient to [facility], please contact us through our one-stop-shop provider referral line, [facility], at [phone].    If you feel you have received this communication in error or would no longer like to receive these types of communications, please e-mail [email or web address]

## 2019-08-20 NOTE — PROGRESS NOTES
Pre Biologic Response Modifier Therapy Consult  BMR Recipient Evaluation      Reason for Visit:    Chief Complaint   Patient presents with    Immunizations     History of Present Illness  Rizwana Block is a 74 y.o. year old White female with advanced Rheumatoid Arthritis currently being evaluated for prior to starting biologic response modifer (BRM) therapy. Plans to start on Xelijans.  Patient denies any recent fever, chills, or infective infective illnesses.      1) Do you have a history of:    YES NO   Diabetes                 []       [x]   Diabetic Foot Infection/Bone Infection  []  [x]   Surgical Removal of Spleen    []  [x]     2) Have you had recurrent infections involving:             YES NO  Sinus infections  []  [x]  Sore Throat   []  [x]                             Prostate Infections  []  [x]  .                         Bladder Infections  []  [x]                                 Kidney Infections  []  [x]        Intestinal Infections  []  [x]   Skin Infections   []  [x]                   Reproductive Infections               Periodontal Disease                   3)Have you ever had: YES     NO UNKNOWN      Chicken Pox   [x]  []  []                 Shingles   []  [x]  []                Orolabial Herpes             []  [x]  []       Genital Herpes  []  [x]  []           Cytomegalovirus  []  [x]  []               Bo-Barr Virus  []  [x]  []              Genital Warts   []  [x]  []                Hepatitis A   []  [x]  []             Hepatitis B   []  [x]  []                Hepatitis C   []  [x]  []                 Syphilis   []  [x]  []                Gonorrhea   []  [x]  []                 Pelvic Inflammatory  []  [x]  []   Disease   []  [x]  [x]                    Chlamydia Infection  []  [x]  []                Intestinal parasites        or worms   []  [x]  []                 Fungal Infections  []  [x]  []                Blood Infections  []  [x]  []     Comment:       4) Have you ever been exposed    YES NO  to someone with tuberculosis?  []  [x]   If yes, what treatment did you receive:     5) What states have you lived in? [place], [place]    6) What countries have you visited for more than 2 weeks? [place] 5 years ago. [place] 5/2019. [place], [place], [place], [place], [place]                           YES NO  7) Did you have any associated infections?     []  [x]     8) Are you planning to travel outside the           [place] after starting BRMs?    []   [x]           Household                  YES NO  9 Do you have pets living in your house?   [x]   []             If yes, describe: havanese dog      Do you spend time or live on a farm or                 have livestock or other farm animals?   []  [x]   If yes, which ones: had cows, goats, horses,     Do you have a fish tank?     []  [x]                       Do you have a litter box?     []  [x]                        Do you fish or hunt?      []  [x]                       Do you clean or skin fish or animals?   []  [x]                      Do you consume raw or undercooked                meat, fish, or shellfish?     []  [x]         10) What occupations have you had? [employer].                                    12)Do you garden or otherwise  YES NO   work in the soil?    []  [x]   13)Do you hike, camp, or spend   time in wooded areas?   []  [x]                           14) The patient's immunization history was reviewed.   Have you ever received:  YES NO UNKNOWN DATES  Routine Childhood vaccines  [x]  []  []                Influenza vaccine   [x]  []  []         Pneumonia    [x]  []  []      Tetanus-diptheria   [x]  []  []   Hepatitis A vaccine series       []  [x]  []       Hepatitis B vaccine series       []  [x]  []        Meningitis vaccine   []  []  []     Varicella vaccine   []  []  []                  Based on the patients immunization history and serologies, immunizations were ordered:         Ordered  Not  Ordered  Influenza Vaccine     []    [x]   Hepatitis A series at 0, 6 months   [x]    []   Hepatitis B sries at 0, 1, and 6 months  [x]    []   Hepatitis B High Dose series 0,1, and 6 months []    []   Prevnar      []    [x]   Pneumovax      [x]    []   TDap       []    [x]   Zoster       []    [x]   Menactra      []    [x]   HiB       []    [x]               The patient was encouraged to contact us about any problems that may develop after immunization and possible side effects were reviewed.       Allergies: Dilaudid [hydromorphone (bulk)] and Morphine  Immunization History   Administered Date(s) Administered    Hepatitis B (recombinant) Adjuvanted, 2 dose 08/20/2019    Influenza - High Dose 11/30/2016, 09/24/2018    Pneumococcal Conjugate - 13 Valent 11/30/2016    Pneumococcal Polysaccharide - 23 Valent 08/20/2019    Tdap 05/24/2017    Zoster 05/24/2017    Zoster Recombinant 08/14/2019     Past Medical History:   Diagnosis Date    Arrhythmia     Chest pain 5/27/2016    3/2016: Began experience chest discomfort.    CHF (congestive heart failure)     Hypertension     Osteopenia     Reclast infusion 10/5/2010 and 10/20/2011    Rheumatoid arthritis 07/2019     Past Surgical History:   Procedure Laterality Date    ABLATION N/A 9/27/2016    Performed by [facility]bert Lemons [facility] at [facility] CATH [facility]    ANKLE FUSION      right    bladder surgery      with mesh    BLEPHAROPLASTY W/ LASER      CATARACT EXTRACTION, BILATERAL      CORRECTION, HAMMER TOE Right 8/30/2013    [facility]    HAND SURGERY      benign cyst on left    HEART CATH-LEFT AND Right Heart cath Left 6/1/2016    Performed by Jet Jacinto MD at [facility] CATH LAB    HYSTERECTOMY      heavy bleeding    PATELLA FRACTURE SURGERY      right- plate in tibial plateau    TONSILLECTOMY        Social History     Socioeconomic History    Marital status:      Spouse name: Not on file    Number of children: Not on  file    Years of education: Not on file    Highest education level: Not on file   Occupational History    Not on file   Social Needs    Financial resource strain: Not on file    Food insecurity:     Worry: Not on file     Inability: Not on file    Transportation needs:     Medical: Not on file     Non-medical: Not on file   Tobacco Use    Smoking status: Former Smoker     Packs/day: 0.25     Years: 24.00     Pack years: 6.00     Types: Cigarettes     Start date: 1961     Last attempt to quit: 1985     Years since quittin.6    Smokeless tobacco: Never Used   Substance and Sexual Activity    Alcohol use: Yes     Alcohol/week: 3.0 oz     Types: 5 Glasses of wine per week     Comment: socially    Drug use: No    Sexual activity: Yes     Partners: Male     Birth control/protection: None   Lifestyle    Physical activity:     Days per week: Not on file     Minutes per session: Not on file    Stress: Not on file   Relationships    Social connections:     Talks on phone: Not on file     Gets together: Not on file     Attends Spiritism service: Not on file     Active member of club or organization: Not on file     Attends meetings of clubs or organizations: Not on file     Relationship status: Not on file   Other Topics Concern    Not on file   Social History Narrative    . 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 [employer] . She is an emigrant from [place].       Review of Systems   Constitution: Negative for chills, fever and malaise/fatigue.   Respiratory: Negative for cough and shortness of breath.    Musculoskeletal: Positive for arthritis, joint pain and joint swelling. Negative for back pain and myalgias.   Gastrointestinal: Negative for abdominal pain, nausea and vomiting.   Genitourinary: Negative for dysuria.     Physical Exam   Constitutional: She is oriented to person, place, and time. She appears well-developed and well-nourished. No distress.   HENT:   Head: Normocephalic.   Eyes:  Pupils are equal, round, and reactive to light.   Cardiovascular: Normal rate.   No murmur heard.  Pulmonary/Chest: Effort normal. No stridor. No respiratory distress. She has no wheezes.   Abdominal: Soft. She exhibits no distension.   Musculoskeletal: Normal range of motion. She exhibits no edema, tenderness or deformity.   Neurological: She is alert and oriented to person, place, and time.   Skin: Skin is warm and dry. She is not diaphoretic. No erythema. No pallor.   Psychiatric: She has a normal mood and affect.   Vitals reviewed.    Diagnostics:   RPR   Date Value Ref Range Status   08/02/2019 Non-reactive Non-reactive Final     No results found for: CMVANTIBODIE  No results found for: HIV1X2  No results found for: HTLVIIIANTIB  Hepatitis B Surface Ag   Date Value Ref Range Status   08/02/2019 Negative  Final     Hep B Core Total Ab   Date Value Ref Range Status   08/02/2019 Negative  Final     Hepatitis C Ab   Date Value Ref Range Status   08/02/2019 Negative  Final     No results found for: TOXOIGG  No components found for: TOXOIGGINTER  No results found for: VKO2KPH  No results found for: NZW5NHQ  Varicella Zoster IgG   Date Value Ref Range Status   08/02/2019 4.07 (H) 0.00 - 0.90 ISR Final     Varicella Interpretation   Date Value Ref Range Status   08/02/2019 Positive (A) Negative Final     Comment:     <or=0.90     Negative        No detectable IgG antibody to Varicella zoster  by the MAYRA test. Such individuals are presumed to be   uninfected with Varicella zoster and to be susceptible to   primary infection.  0.91-1.09    Equivocal  >or=1.10     Positive        Indicates presence of detectable IgG antibody to Varicella   zoster by the MAYRA test. Indicative of previous or current   infection.       Strongyloides Ab IgG   Date Value Ref Range Status   08/02/2019 Negative Negative Final     Comment:     No detectable levels of IgG antibodies to Strongyloides.  Repeat testing in 1-2 weeks if clinically  indicated.  Test Performed by:  [facility]  [address] [place] [address]       No results found for: EPSTEINBARRV  Hep B S Ab   Date Value Ref Range Status   08/02/2019 Negative  Final     No results found for: QUANTIFERON  Hep A IgM   Date Value Ref Range Status   12/31/2004 Negative  Final     No results found for: PPD  No results found for this or any previous visit.        BRM - Candidacy    Biologic Response Modifier Candidacy: Based on available information, there are no identified significant barriers to BRMs from an infectious disease standpoint pending acceptable serologies.  Final determination of BRM candidacy will be made once evaluation is complete and reviewed.    quantiferon gold TB, strongyloides, HIV, RPR, Hepatitis C negative     Vaccines today  Pneumovax today  heplisav B vaccine today, one month  Complete shingrix series    Tami Walker PA-C        Counseling:I discussed with Rizwana the risk for increased susceptibility to infections following BRM therapy including increased risk for infection.  The patients has been counseled on the importance of vaccinations including but not limited to a yearly flu vaccine.Specific guidance has been provided to the patient regarding the patients occupation, hobbies and activities to avoid future infectious complications including but not limited to avoiding undercooked meats and seafood, proper hygiene, and contact with animals.

## 2019-08-20 NOTE — PROGRESS NOTES
"Vitals:    08/19/19 1604   Weight: 84.4 kg (186 lb)   Height: 5' 8.4" (1.737 m)       Long toenail    Ingrown nail    Pain in toes of both feet        Patient presents to the clinic for non-covered routine foot care. Patient is not a high risk foot care patient. Patient understands this is not typically a covered service every 3-4 weeks and patient is aware of responsibility of payment. Pedal pulses are palpable. No known risk factors requiring routine foot care.  nails  were reduced and trimmed bilaterally. Patient tolerated well.     Follow up  3 weeks Proc B.  She will going out of town afterwards.   "

## 2019-08-22 NOTE — TELEPHONE ENCOUNTER
Patient returned call regarding Xeljanz. Explained that it was approved by her insurance plan but her copay is still ~$1,200 per month. We discussed applying for financial assistance through [employer]. She is over the income limit for the program so we have the option to appeal if she would like to list her expenses. She declined this and reports she is unable to afford the copay at this time. Will message provider to notify him.

## 2019-08-27 ENCOUNTER — HOSPITAL ENCOUNTER (OUTPATIENT)
Dept: PULMONOLOGY | Facility: CLINIC | Age: 74
Discharge: HOME | End: 2019-08-27
Payer: MEDICARE

## 2019-08-27 DIAGNOSIS — Z79.899 AT RISK FOR AMIODARONE TOXICITY WITH LONG TERM USE: ICD-10-CM

## 2019-08-27 DIAGNOSIS — Z91.89 AT RISK FOR AMIODARONE TOXICITY WITH LONG TERM USE: ICD-10-CM

## 2019-08-27 PROCEDURE — 94729 PR C02/MEMBANE DIFFUSE CAPACITY: ICD-10-PCS | Mod: HCNC,S$GLB,, | Performed by: INTERNAL MEDICINE

## 2019-08-27 PROCEDURE — 94010 BREATHING CAPACITY TEST: ICD-10-PCS | Mod: HCNC,S$GLB,, | Performed by: INTERNAL MEDICINE

## 2019-08-27 PROCEDURE — 94010 BREATHING CAPACITY TEST: CPT | Mod: HCNC,S$GLB,, | Performed by: INTERNAL MEDICINE

## 2019-08-27 PROCEDURE — 94729 DIFFUSING CAPACITY: CPT | Mod: HCNC,S$GLB,, | Performed by: INTERNAL MEDICINE

## 2019-08-29 ENCOUNTER — PATIENT MESSAGE (OUTPATIENT)
Dept: CARDIOLOGY | Facility: CLINIC | Age: 74
End: 2019-08-29

## 2019-08-29 DIAGNOSIS — Z79.899 ON AMIODARONE THERAPY: Primary | ICD-10-CM

## 2019-08-29 LAB
DLCO ADJ PRE: 16.01 ML/(MIN*MMHG) (ref 17.66–29.13)
DLCO SINGLE BREATH LLN: 17.66
DLCO SINGLE BREATH PRE REF: 65.8 %
DLCO SINGLE BREATH REF: 23.39
DLCOC SBVA LLN: 2.9
DLCOC SBVA PRE REF: 95.4 %
DLCOC SBVA REF: 4.16
DLCOC SINGLE BREATH LLN: 17.66
DLCOC SINGLE BREATH PRE REF: 68.4 %
DLCOC SINGLE BREATH REF: 23.39
DLCOCSBVAULN: 5.41
DLCOCSINGLEBREATHULN: 29.13
DLCOSINGLEBREATHULN: 29.13
DLCOVA LLN: 2.9
DLCOVA PRE REF: 91.7 %
DLCOVA PRE: 3.81 ML/(MIN*MMHG*L) (ref 2.9–5.41)
DLCOVA REF: 4.16
DLCOVAULN: 5.41
DLVAADJ PRE: 3.97 ML/(MIN*MMHG*L) (ref 2.9–5.41)
FEF 25 75 LLN: 0.86
FEF 25 75 PRE REF: 125.3 %
FEF 25 75 REF: 1.92
FEV05 LLN: 1
FEV05 REF: 1.86
FEV1 FVC LLN: 64
FEV1 FVC PRE REF: 107.6 %
FEV1 FVC REF: 77
FEV1 LLN: 1.74
FEV1 PRE REF: 82.3 %
FEV1 REF: 2.42
FVC LLN: 2.28
FVC PRE REF: 75.6 %
FVC REF: 3.16
IVC PRE: 2.28 L (ref 2.28–4.05)
IVC SINGLE BREATH LLN: 2.28
IVC SINGLE BREATH PRE REF: 72.1 %
IVC SINGLE BREATH REF: 3.16
IVCSINGLEBREATHULN: 4.05
PEF LLN: 4.13
PEF PRE REF: 92.5 %
PEF REF: 6.06
PRE DLCO: 15.38 ML/(MIN*MMHG) (ref 17.66–29.13)
PRE FEF 25 75: 2.41 L/S (ref 0.86–2.99)
PRE FET 100: 5.68 SEC
PRE FEV05 REF: 90.7 %
PRE FEV1 FVC: 83.08 % (ref 63.62–90.85)
PRE FEV1: 1.99 L (ref 1.74–3.09)
PRE FEV5: 1.69 L (ref 1–2.71)
PRE FVC: 2.39 L (ref 2.28–4.05)
PRE PEF: 5.61 L/S (ref 4.13–8)
VA PRE: 4.04 L (ref 5.48–5.48)
VA SINGLE BREATH LLN: 5.48
VA SINGLE BREATH PRE REF: 73.8 %
VA SINGLE BREATH REF: 5.48
VASINGLEBREATHULN: 5.48

## 2019-08-29 NOTE — TELEPHONE ENCOUNTER
Left voice mail message for return call.      ----- Message from Norbert Lemons MD sent at 8/29/2019 12:45 PM CDT -----  Reviewed results. All OK. Please open telephone encounter, call patient and inform him/ her of results,then document in encounter.  Please do not route back to me.   Thanks.  These are OK   DLCO a bit low -- repeat in 6 months

## 2019-08-30 ENCOUNTER — TELEPHONE (OUTPATIENT)
Dept: INTERNAL MEDICINE | Facility: CLINIC | Age: 74
End: 2019-08-30

## 2019-08-30 ENCOUNTER — LAB VISIT (OUTPATIENT)
Dept: LAB | Facility: HOSPITAL | Age: 74
End: 2019-08-30
Attending: INTERNAL MEDICINE
Payer: MEDICARE

## 2019-08-30 ENCOUNTER — CLINICAL SUPPORT (OUTPATIENT)
Dept: INTERNAL MEDICINE | Facility: CLINIC | Age: 74
End: 2019-08-30
Payer: MEDICARE

## 2019-08-30 DIAGNOSIS — Z79.899 ON AMIODARONE THERAPY: ICD-10-CM

## 2019-08-30 LAB — TSH SERPL DL<=0.005 MIU/L-ACNC: 0.54 UIU/ML (ref 0.4–4)

## 2019-08-30 PROCEDURE — 96372 THER/PROPH/DIAG INJ SC/IM: CPT | Mod: HCNC,S$GLB,, | Performed by: INTERNAL MEDICINE

## 2019-08-30 PROCEDURE — 36415 COLL VENOUS BLD VENIPUNCTURE: CPT | Mod: HCNC

## 2019-08-30 PROCEDURE — 96372 PR INJECTION,THERAP/PROPH/DIAG2ST, IM OR SUBCUT: ICD-10-PCS | Mod: HCNC,S$GLB,, | Performed by: INTERNAL MEDICINE

## 2019-08-30 PROCEDURE — 84443 ASSAY THYROID STIM HORMONE: CPT | Mod: HCNC

## 2019-08-30 RX ADMIN — CYANOCOBALAMIN 1000 MCG: 1000 INJECTION, SOLUTION INTRAMUSCULAR at 02:08

## 2019-09-02 ENCOUNTER — PATIENT MESSAGE (OUTPATIENT)
Dept: RHEUMATOLOGY | Facility: CLINIC | Age: 74
End: 2019-09-02

## 2019-09-03 ENCOUNTER — TELEPHONE (OUTPATIENT)
Dept: CARDIOLOGY | Facility: CLINIC | Age: 74
End: 2019-09-03

## 2019-09-03 ENCOUNTER — PATIENT MESSAGE (OUTPATIENT)
Dept: SLEEP MEDICINE | Facility: CLINIC | Age: 74
End: 2019-09-03

## 2019-09-03 ENCOUNTER — PATIENT MESSAGE (OUTPATIENT)
Dept: CARDIOLOGY | Facility: CLINIC | Age: 74
End: 2019-09-03

## 2019-09-03 DIAGNOSIS — Z79.899 AT RISK FOR AMIODARONE TOXICITY WITH LONG TERM USE: Primary | ICD-10-CM

## 2019-09-03 DIAGNOSIS — G47.33 OSA (OBSTRUCTIVE SLEEP APNEA): ICD-10-CM

## 2019-09-03 DIAGNOSIS — Z91.89 AT RISK FOR AMIODARONE TOXICITY WITH LONG TERM USE: Primary | ICD-10-CM

## 2019-09-03 NOTE — TELEPHONE ENCOUNTER
----- Message from Norbert Lemons MD sent at 9/2/2019 10:41 PM CDT -----  See comments below and call patient to discuss.   Please close encounter when done -- no need to route back to me.  Thanks  The DLCO is OK for continued Rx with amio  Repeat in 6 months

## 2019-09-07 ENCOUNTER — PATIENT MESSAGE (OUTPATIENT)
Dept: SLEEP MEDICINE | Facility: CLINIC | Age: 74
End: 2019-09-07

## 2019-09-09 ENCOUNTER — PATIENT MESSAGE (OUTPATIENT)
Dept: SLEEP MEDICINE | Facility: CLINIC | Age: 74
End: 2019-09-09

## 2019-09-11 ENCOUNTER — OFFICE VISIT (OUTPATIENT)
Dept: PODIATRY | Facility: CLINIC | Age: 74
End: 2019-09-11
Payer: MEDICARE

## 2019-09-11 VITALS — BODY MASS INDEX: 27.89 KG/M2 | HEIGHT: 68 IN | WEIGHT: 184 LBS

## 2019-09-11 DIAGNOSIS — L60.2 LONG TOENAIL: Primary | ICD-10-CM

## 2019-09-11 DIAGNOSIS — L60.0 INGROWN NAIL: ICD-10-CM

## 2019-09-11 PROCEDURE — 99999 PR PBB SHADOW E&M-EST. PATIENT-LVL III: CPT | Mod: PBBFAC,HCNC,, | Performed by: PODIATRIST

## 2019-09-11 PROCEDURE — 99499 UNLISTED E&M SERVICE: CPT | Mod: HCNC,,, | Performed by: PODIATRIST

## 2019-09-11 PROCEDURE — 99999 PR PBB SHADOW E&M-EST. PATIENT-LVL III: ICD-10-PCS | Mod: PBBFAC,HCNC,, | Performed by: PODIATRIST

## 2019-09-11 PROCEDURE — 99499 NO LOS: ICD-10-PCS | Mod: HCNC,,, | Performed by: PODIATRIST

## 2019-09-11 PROCEDURE — 17999 UNLISTD PX SKN MUC MEMB SUBQ: CPT | Mod: CSM,HCNC,S$GLB, | Performed by: PODIATRIST

## 2019-09-11 PROCEDURE — 17999 PR NON-COVERED FOOT CARE: ICD-10-PCS | Mod: CSM,HCNC,S$GLB, | Performed by: PODIATRIST

## 2019-09-12 ENCOUNTER — PATIENT OUTREACH (OUTPATIENT)
Dept: OTHER | Facility: OTHER | Age: 74
End: 2019-09-12

## 2019-09-12 NOTE — PROGRESS NOTES
"Vitals:    09/11/19 1545   Weight: 83.5 kg (184 lb)   Height: 5' 8" (1.727 m)       Long toenail    Ingrown nail        Patient presents to the clinic for non-covered routine foot care. Patient is not a high risk foot care patient. Patient understands this is not typically a covered service every 3-4 weeks and patient is aware of responsibility of payment. Pedal pulses are palpable. No known risk factors requiring routine foot care.  nails  were reduced and trimmed bilaterally. Patient tolerated well.     Follow up 4-6 weeks Proc B.   "

## 2019-09-13 ENCOUNTER — PATIENT MESSAGE (OUTPATIENT)
Dept: CARDIOLOGY | Facility: CLINIC | Age: 74
End: 2019-09-13

## 2019-09-13 DIAGNOSIS — I50.22 CHRONIC SYSTOLIC HEART FAILURE: ICD-10-CM

## 2019-09-13 DIAGNOSIS — I42.8 OTHER CARDIOMYOPATHY: ICD-10-CM

## 2019-09-16 ENCOUNTER — PATIENT MESSAGE (OUTPATIENT)
Dept: RHEUMATOLOGY | Facility: CLINIC | Age: 74
End: 2019-09-16

## 2019-09-23 ENCOUNTER — PATIENT MESSAGE (OUTPATIENT)
Dept: RHEUMATOLOGY | Facility: CLINIC | Age: 74
End: 2019-09-23

## 2019-09-23 ENCOUNTER — CLINICAL SUPPORT (OUTPATIENT)
Dept: INFECTIOUS DISEASES | Facility: CLINIC | Age: 74
End: 2019-09-23
Payer: MEDICARE

## 2019-09-23 ENCOUNTER — TELEPHONE (OUTPATIENT)
Dept: RHEUMATOLOGY | Facility: CLINIC | Age: 74
End: 2019-09-23

## 2019-09-23 DIAGNOSIS — Z79.899 LONG TERM CURRENT USE OF IMMUNOSUPPRESSIVE DRUG: ICD-10-CM

## 2019-09-23 PROCEDURE — 90739 HEPB VACC 2/4 DOSE ADULT IM: CPT | Mod: HCNC,S$GLB,, | Performed by: INTERNAL MEDICINE

## 2019-09-23 PROCEDURE — G0010 HEPATITIS B (RECOMBINANT) ADJUVANTED, 2 DOSE: ICD-10-PCS | Mod: HCNC,S$GLB,, | Performed by: INTERNAL MEDICINE

## 2019-09-23 PROCEDURE — G0010 ADMIN HEPATITIS B VACCINE: HCPCS | Mod: HCNC,S$GLB,, | Performed by: INTERNAL MEDICINE

## 2019-09-23 PROCEDURE — 90739 HEPATITIS B (RECOMBINANT) ADJUVANTED, 2 DOSE: ICD-10-PCS | Mod: HCNC,S$GLB,, | Performed by: INTERNAL MEDICINE

## 2019-09-23 RX ORDER — PREDNISONE 5 MG/1
10 TABLET ORAL DAILY
Qty: 60 TABLET | Refills: 1 | Status: SHIPPED | OUTPATIENT
Start: 2019-09-23 | End: 2019-10-28 | Stop reason: SDUPTHER

## 2019-09-24 ENCOUNTER — IMMUNIZATION (OUTPATIENT)
Dept: PHARMACY | Facility: CLINIC | Age: 74
End: 2019-09-24
Payer: MEDICARE

## 2019-09-25 ENCOUNTER — PATIENT OUTREACH (OUTPATIENT)
Dept: OTHER | Facility: OTHER | Age: 74
End: 2019-09-25

## 2019-09-25 NOTE — PROGRESS NOTES
Digital Medicine: Clinician Follow-Up    Reports fatigue with lower BP readings  Reports she tries to minimize use of lasix (takes ~ 1-2 times per week at most)  She is on prednisone for RA    The history is provided by the patient.     Follow Up  Follow-up reason(s): reading review              Sleep Apnea  Patient previously diagnosed with GAURAV She reports she is not currently using CPAP.         Medication Adherence:     She does not wonder if medications are working.  She knows purpose of medications.        INTERVENTION(S)  reviewed appropriate dose schedule and encouragement/support    PLAN  patient verbalizes understanding    Discussed that current regimen more for HF than BP control at this time  Due to infrequent hypotensive symptoms and patient feeling well overall, would not recommend dose adjustments  Encouraged her to further discuss with Dr. Mabry at 10/1 appointment      There are no preventive care reminders to display for this patient.    Last 5 Patient Entered Readings                                      Current 30 Day Average: 106/67     Recent Readings 9/21/2019 9/21/2019 9/19/2019 8/18/2019 8/9/2019    SBP (mmHg) 92 86 119 138 112    DBP (mmHg) 57 66 77 78 71    Pulse 66 70 59 61 61             Hypertension Medications             carvedilol (COREG) 6.25 MG tablet Take 1 tablet (6.25 mg total) by mouth 2 (two) times daily with meals.    furosemide (LASIX) 40 MG tablet TAKE ONE TABLET BY MOUTH ONCE DAILY AS NEEDED FOR weight gain    sacubitril-valsartan (ENTRESTO) 24-26 mg per tablet Take 1 tablet by mouth 2 (two) times daily.

## 2019-09-26 ENCOUNTER — TELEPHONE (OUTPATIENT)
Dept: RHEUMATOLOGY | Facility: CLINIC | Age: 74
End: 2019-09-26

## 2019-09-27 ENCOUNTER — CLINICAL SUPPORT (OUTPATIENT)
Dept: INTERNAL MEDICINE | Facility: CLINIC | Age: 74
End: 2019-09-27
Payer: MEDICARE

## 2019-09-27 PROCEDURE — 96372 THER/PROPH/DIAG INJ SC/IM: CPT | Mod: HCNC,S$GLB,, | Performed by: INTERNAL MEDICINE

## 2019-09-27 PROCEDURE — 96372 PR INJECTION,THERAP/PROPH/DIAG2ST, IM OR SUBCUT: ICD-10-PCS | Mod: HCNC,S$GLB,, | Performed by: INTERNAL MEDICINE

## 2019-09-27 RX ADMIN — CYANOCOBALAMIN 1000 MCG: 1000 INJECTION, SOLUTION INTRAMUSCULAR at 02:09

## 2019-10-16 ENCOUNTER — PATIENT OUTREACH (OUTPATIENT)
Dept: OTHER | Facility: OTHER | Age: 74
End: 2019-10-16

## 2019-10-16 NOTE — PROGRESS NOTES
"Digital Medicine: Health  Follow-Up    The history is provided by the patient.     Follow Up  Follow-up reason(s): reading review and routine education      Routine Education Topics: eating patterns and physical activity            Diet:   Patient reports eating or drinking the following: fresh vegetables and Home cooked meals, yogurt, less bread. She has the following dietary restrictions: low sodium diet and low carb    Patient started Weight watchers in August and has lost 10 lbs. Her goal is to loose another 10 lbs. She has but back on eating bread (only one thin slice per day) and has increased her consumption of "greens" and yogurt.     Physical Activity:   When asked if exercising, patient responded: unable  Patient has the following chronic pain: Right foot issue    She identified the following barriers to physical activity: pain/injury/recent surgery    Patient is planning on buying a stationary bike so she can start exercising again. She does have an issue with her right foot (her bone is slightly protruding out - per her information).     Medication Adherence:   She misses doses: never      Patient identified the following reasons for non-compliance: none    Patient is doing well on her current BP medication regimen. She denies symptoms/side effects.       [facility]    Intervention/Plan    There are no preventive care reminders to display for this patient.    Last 5 Patient Entered Readings                                      Current 30 Day Average: 113/70     Recent Readings 10/13/2019 10/5/2019 9/28/2019 9/21/2019 9/21/2019    SBP (mmHg) 124 101 127 92 86    DBP (mmHg) 82 67 73 57 66    Pulse 74 66 65 66 70                "

## 2019-10-17 DIAGNOSIS — R00.2 PALPITATIONS: ICD-10-CM

## 2019-10-17 DIAGNOSIS — I42.9 CARDIOMYOPATHY, UNSPECIFIED TYPE: ICD-10-CM

## 2019-10-17 RX ORDER — AMIODARONE HYDROCHLORIDE 200 MG/1
TABLET ORAL
Qty: 30 TABLET | Refills: 6 | Status: SHIPPED | OUTPATIENT
Start: 2019-10-17 | End: 2019-11-18 | Stop reason: SDUPTHER

## 2019-10-18 ENCOUNTER — OFFICE VISIT (OUTPATIENT)
Dept: RHEUMATOLOGY | Facility: CLINIC | Age: 74
End: 2019-10-18
Payer: MEDICARE

## 2019-10-18 VITALS
WEIGHT: 174.88 LBS | HEIGHT: 68 IN | HEART RATE: 58 BPM | DIASTOLIC BLOOD PRESSURE: 74 MMHG | BODY MASS INDEX: 26.5 KG/M2 | SYSTOLIC BLOOD PRESSURE: 103 MMHG

## 2019-10-18 DIAGNOSIS — M54.50 CHRONIC MIDLINE LOW BACK PAIN WITHOUT SCIATICA: ICD-10-CM

## 2019-10-18 DIAGNOSIS — M19.042 OSTEOARTHRITIS OF BOTH HANDS, UNSPECIFIED OSTEOARTHRITIS TYPE: ICD-10-CM

## 2019-10-18 DIAGNOSIS — G89.29 CHRONIC MIDLINE LOW BACK PAIN WITHOUT SCIATICA: ICD-10-CM

## 2019-10-18 DIAGNOSIS — M06.041 RHEUMATOID ARTHRITIS INVOLVING BOTH HANDS WITH NEGATIVE RHEUMATOID FACTOR: ICD-10-CM

## 2019-10-18 DIAGNOSIS — M19.041 OSTEOARTHRITIS OF BOTH HANDS, UNSPECIFIED OSTEOARTHRITIS TYPE: ICD-10-CM

## 2019-10-18 DIAGNOSIS — Z98.1 HISTORY OF LUMBAR FUSION: Primary | ICD-10-CM

## 2019-10-18 DIAGNOSIS — M06.042 RHEUMATOID ARTHRITIS INVOLVING BOTH HANDS WITH NEGATIVE RHEUMATOID FACTOR: ICD-10-CM

## 2019-10-18 PROCEDURE — 1101F PR PT FALLS ASSESS DOC 0-1 FALLS W/OUT INJ PAST YR: ICD-10-PCS | Mod: HCNC,CPTII,S$GLB, | Performed by: INTERNAL MEDICINE

## 2019-10-18 PROCEDURE — 3074F SYST BP LT 130 MM HG: CPT | Mod: HCNC,CPTII,S$GLB, | Performed by: INTERNAL MEDICINE

## 2019-10-18 PROCEDURE — 3074F PR MOST RECENT SYSTOLIC BLOOD PRESSURE < 130 MM HG: ICD-10-PCS | Mod: HCNC,CPTII,S$GLB, | Performed by: INTERNAL MEDICINE

## 2019-10-18 PROCEDURE — 1101F PT FALLS ASSESS-DOCD LE1/YR: CPT | Mod: HCNC,CPTII,S$GLB, | Performed by: INTERNAL MEDICINE

## 2019-10-18 PROCEDURE — 99213 OFFICE O/P EST LOW 20 MIN: CPT | Mod: HCNC,S$GLB,, | Performed by: INTERNAL MEDICINE

## 2019-10-18 PROCEDURE — 99999 PR PBB SHADOW E&M-EST. PATIENT-LVL V: CPT | Mod: PBBFAC,HCNC,, | Performed by: INTERNAL MEDICINE

## 2019-10-18 PROCEDURE — 3078F DIAST BP <80 MM HG: CPT | Mod: HCNC,CPTII,S$GLB, | Performed by: INTERNAL MEDICINE

## 2019-10-18 PROCEDURE — 99999 PR PBB SHADOW E&M-EST. PATIENT-LVL V: ICD-10-PCS | Mod: PBBFAC,HCNC,, | Performed by: INTERNAL MEDICINE

## 2019-10-18 PROCEDURE — 3078F PR MOST RECENT DIASTOLIC BLOOD PRESSURE < 80 MM HG: ICD-10-PCS | Mod: HCNC,CPTII,S$GLB, | Performed by: INTERNAL MEDICINE

## 2019-10-18 PROCEDURE — 99213 PR OFFICE/OUTPT VISIT, EST, LEVL III, 20-29 MIN: ICD-10-PCS | Mod: HCNC,S$GLB,, | Performed by: INTERNAL MEDICINE

## 2019-10-18 RX ORDER — ACETAMINOPHEN 325 MG/1
650 TABLET ORAL
Status: CANCELLED | OUTPATIENT
Start: 2019-10-25

## 2019-10-18 RX ORDER — HEPARIN 100 UNIT/ML
500 SYRINGE INTRAVENOUS
Status: CANCELLED | OUTPATIENT
Start: 2019-10-25

## 2019-10-18 RX ORDER — SODIUM CHLORIDE 0.9 % (FLUSH) 0.9 %
10 SYRINGE (ML) INJECTION
Status: CANCELLED | OUTPATIENT
Start: 2019-10-25

## 2019-10-18 ASSESSMENT — ROUTINE ASSESSMENT OF PATIENT INDEX DATA (RAPID3)
WHEN YOU AWAKENED IN THE MORNING OVER THE LAST WEEK, PLEASE INDICATE THE AMOUNT OF TIME IT TAKES UNTIL YOU ARE AS LIMBER AS YOU WILL BE FOR THE DAY: 10 MINS
TOTAL RAPID3 SCORE: 1
AM STIFFNESS SCORE: 1, YES
PATIENT GLOBAL ASSESSMENT SCORE: 0
PAIN SCORE: 3
PSYCHOLOGICAL DISTRESS SCORE: 1.1
MDHAQ FUNCTION SCORE: 0
FATIGUE SCORE: 0

## 2019-10-18 NOTE — ASSESSMENT & PLAN NOTE
DAS28 4.85  [id number] 4.87(both MDA)  DAPSA  TJ 20   SJ 7  Pt global 0 Pt pain 3  CRP 3.44 = 33.44(HDA)         Shingrix #2 next week  Doesn't qualify for co-pay assistance for tofacitinib due to income  Re-request paper chart from [employer]  As cannot use TNFi b/o h/o cardiomyopathy though recent echocardiogram with normal EF and no mention of diastolic dysfunction, will proceed to abatacept. Risks and benefits discussed, ACR handout provided.  Rx sent to [facility] infusion unit, order in  Therapy Plans  RTC 3 months

## 2019-10-18 NOTE — PROGRESS NOTES
"Subjective:       Patient ID: Rizwana Block is a 74 y.o. female.    Chief Complaint: Seronegative RA RF- ACPA- CATHIE- v. Peripheral spondyloarthritis gen OA  HPI  Income too high for co-pay assistance for tofacitinib. Tapered off prednisone several weeks ago. Had mild improvement only. Tells me she was told of lumbar fused vertebra on MRI done a [facility]. Also had low back and hip pain.   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 Risks and benefits discussed, ACR handout provided.  Rx sent to [facility] infusion unit, order in  Therapy Plans  RTC 3 months         Osteoarthritis of hand    Overview     CLINICAL HISTORY:  . Pain in unspecified hand    TECHNIQUE:  PA, lateral, and oblique views of both hands were performed.    COMPARISON:  None    FINDINGS:  No acute fractures.  No radiopaque foreign bodies.  Scattered interphalangeal osteoarthritis.  This is most notable at the 1st IP and 3rd, 4th, and 5th DIP joints on the right and 2nd and 5th " DIP joints on the left.      Impression       No acute fracture.  Interphalangeal joint osteoarthritis.      Electronically signed by: Xavi Stein MD  Date: 05/25/2018  Time: 15:21     Results for AMY MANLEY (MRN [id number]) as of 7/29/2019 09:28   Ref. Range 3/4/2004 13:34 4/20/2004 16:04 7/29/2019 08:43   CATHIE Latest Ref Range: Neg <1:160   Negative    CCP Antibodies Latest Units: U/mL <2.0     Rheumatoid Factor Latest Ref Range: 0.0 - 15.0 IU/mL  Negative 12.0            Current Assessment & Plan     OT  Diclofenac gel 1% four times daily prn  Turmeric  glucosamine sulfate  Coban tape  Medium [place] thumb braces           Low back pain    Current Assessment & Plan     X-rays lumbar spine and pelvis next week r/o AS with peripheral spondyloarthritis           Other Visit Diagnoses     History of lumbar fusion    -  Primary    Relevant Orders    X-Ray Lumbar Spine AP And Lateral    X-Ray Pelvis Routine AP

## 2019-10-18 NOTE — ASSESSMENT & PLAN NOTE
OT  Diclofenac gel 1% four times daily prn  Turmeric  glucosamine sulfate  Coban tape  Medium [place] thumb braces

## 2019-10-19 ENCOUNTER — PATIENT MESSAGE (OUTPATIENT)
Dept: RHEUMATOLOGY | Facility: CLINIC | Age: 74
End: 2019-10-19

## 2019-10-21 ENCOUNTER — TELEPHONE (OUTPATIENT)
Dept: RHEUMATOLOGY | Facility: CLINIC | Age: 74
End: 2019-10-21

## 2019-10-23 ENCOUNTER — CLINICAL SUPPORT (OUTPATIENT)
Dept: INFECTIOUS DISEASES | Facility: CLINIC | Age: 74
End: 2019-10-23
Payer: MEDICARE

## 2019-10-23 ENCOUNTER — PATIENT MESSAGE (OUTPATIENT)
Dept: RHEUMATOLOGY | Facility: CLINIC | Age: 74
End: 2019-10-23

## 2019-10-25 ENCOUNTER — CLINICAL SUPPORT (OUTPATIENT)
Dept: INTERNAL MEDICINE | Facility: CLINIC | Age: 74
End: 2019-10-25
Payer: MEDICARE

## 2019-10-28 ENCOUNTER — PATIENT MESSAGE (OUTPATIENT)
Dept: RHEUMATOLOGY | Facility: CLINIC | Age: 74
End: 2019-10-28

## 2019-10-28 RX ORDER — PREDNISONE 5 MG/1
10 TABLET ORAL DAILY
Qty: 60 TABLET | Refills: 1 | Status: SHIPPED | OUTPATIENT
Start: 2019-10-28 | End: 2020-01-03

## 2019-10-29 ENCOUNTER — IMMUNIZATION (OUTPATIENT)
Dept: PHARMACY | Facility: CLINIC | Age: 74
End: 2019-10-29
Payer: MEDICARE

## 2019-10-30 ENCOUNTER — HOSPITAL ENCOUNTER (OUTPATIENT)
Dept: RADIOLOGY | Facility: HOSPITAL | Age: 74
Discharge: HOME | End: 2019-10-30
Attending: INTERNAL MEDICINE
Payer: MEDICARE

## 2019-10-30 ENCOUNTER — PATIENT MESSAGE (OUTPATIENT)
Dept: CARDIOLOGY | Facility: CLINIC | Age: 74
End: 2019-10-30

## 2019-10-30 ENCOUNTER — TELEPHONE (OUTPATIENT)
Dept: RHEUMATOLOGY | Facility: CLINIC | Age: 74
End: 2019-10-30

## 2019-10-30 ENCOUNTER — OFFICE VISIT (OUTPATIENT)
Dept: CARDIOLOGY | Facility: CLINIC | Age: 74
End: 2019-10-30
Payer: MEDICARE

## 2019-10-30 VITALS
SYSTOLIC BLOOD PRESSURE: 120 MMHG | WEIGHT: 172.38 LBS | HEIGHT: 68 IN | BODY MASS INDEX: 26.13 KG/M2 | HEART RATE: 61 BPM | OXYGEN SATURATION: 99 % | DIASTOLIC BLOOD PRESSURE: 74 MMHG

## 2019-10-30 DIAGNOSIS — I49.3 VENTRICULAR PREMATURE BEATS: ICD-10-CM

## 2019-10-30 DIAGNOSIS — G47.33 OSA (OBSTRUCTIVE SLEEP APNEA): ICD-10-CM

## 2019-10-30 DIAGNOSIS — I50.9 CHF WITH CARDIOMYOPATHY: ICD-10-CM

## 2019-10-30 DIAGNOSIS — I42.9 CHF WITH CARDIOMYOPATHY: ICD-10-CM

## 2019-10-30 DIAGNOSIS — R91.8 LUNG FIELD ABNORMAL FINDING ON EXAMINATION: Primary | ICD-10-CM

## 2019-10-30 DIAGNOSIS — Z98.1 HISTORY OF LUMBAR FUSION: ICD-10-CM

## 2019-10-30 DIAGNOSIS — M85.80 OSTEOPENIA, UNSPECIFIED LOCATION: ICD-10-CM

## 2019-10-30 DIAGNOSIS — I10 ESSENTIAL HYPERTENSION: ICD-10-CM

## 2019-10-30 DIAGNOSIS — Z78.0 MENOPAUSE: Primary | ICD-10-CM

## 2019-10-30 DIAGNOSIS — Z79.899 ON AMIODARONE THERAPY: ICD-10-CM

## 2019-10-30 PROCEDURE — 1101F PT FALLS ASSESS-DOCD LE1/YR: CPT | Mod: HCNC,CPTII,S$GLB, | Performed by: INTERNAL MEDICINE

## 2019-10-30 PROCEDURE — 99214 PR OFFICE/OUTPT VISIT, EST, LEVL IV, 30-39 MIN: ICD-10-PCS | Mod: HCNC,S$GLB,, | Performed by: INTERNAL MEDICINE

## 2019-10-30 PROCEDURE — 99999 PR PBB SHADOW E&M-EST. PATIENT-LVL V: CPT | Mod: PBBFAC,HCNC,, | Performed by: INTERNAL MEDICINE

## 2019-10-30 PROCEDURE — 72170 XR PELVIS ROUTINE AP: ICD-10-PCS | Mod: 26,HCNC,, | Performed by: RADIOLOGY

## 2019-10-30 PROCEDURE — 1101F PR PT FALLS ASSESS DOC 0-1 FALLS W/OUT INJ PAST YR: ICD-10-PCS | Mod: HCNC,CPTII,S$GLB, | Performed by: INTERNAL MEDICINE

## 2019-10-30 PROCEDURE — 99214 OFFICE O/P EST MOD 30 MIN: CPT | Mod: HCNC,S$GLB,, | Performed by: INTERNAL MEDICINE

## 2019-10-30 PROCEDURE — 3078F DIAST BP <80 MM HG: CPT | Mod: HCNC,CPTII,S$GLB, | Performed by: INTERNAL MEDICINE

## 2019-10-30 PROCEDURE — 99499 RISK ADDL DX/OHS AUDIT: ICD-10-PCS | Mod: HCNC,S$GLB,, | Performed by: INTERNAL MEDICINE

## 2019-10-30 PROCEDURE — 99999 PR PBB SHADOW E&M-EST. PATIENT-LVL V: ICD-10-PCS | Mod: PBBFAC,HCNC,, | Performed by: INTERNAL MEDICINE

## 2019-10-30 PROCEDURE — 3074F SYST BP LT 130 MM HG: CPT | Mod: HCNC,CPTII,S$GLB, | Performed by: INTERNAL MEDICINE

## 2019-10-30 PROCEDURE — 99499 UNLISTED E&M SERVICE: CPT | Mod: HCNC,S$GLB,, | Performed by: INTERNAL MEDICINE

## 2019-10-30 PROCEDURE — 3074F PR MOST RECENT SYSTOLIC BLOOD PRESSURE < 130 MM HG: ICD-10-PCS | Mod: HCNC,CPTII,S$GLB, | Performed by: INTERNAL MEDICINE

## 2019-10-30 PROCEDURE — 72100 X-RAY EXAM L-S SPINE 2/3 VWS: CPT | Mod: TC,HCNC

## 2019-10-30 PROCEDURE — 72170 X-RAY EXAM OF PELVIS: CPT | Mod: TC,HCNC

## 2019-10-30 PROCEDURE — 72100 X-RAY EXAM L-S SPINE 2/3 VWS: CPT | Mod: 26,HCNC,, | Performed by: RADIOLOGY

## 2019-10-30 PROCEDURE — 72100 XR LUMBAR SPINE AP AND LATERAL: ICD-10-PCS | Mod: 26,HCNC,, | Performed by: RADIOLOGY

## 2019-10-30 PROCEDURE — 3078F PR MOST RECENT DIASTOLIC BLOOD PRESSURE < 80 MM HG: ICD-10-PCS | Mod: HCNC,CPTII,S$GLB, | Performed by: INTERNAL MEDICINE

## 2019-10-30 PROCEDURE — 72170 X-RAY EXAM OF PELVIS: CPT | Mod: 26,HCNC,, | Performed by: RADIOLOGY

## 2019-10-30 NOTE — PROGRESS NOTES
Subjective:   Patient ID:  Rizwana Block is a 74 y.o. female who presents for follow-up of Other cardiomyopathy and Shortness of Breath  Rizwana Block is a 73 y.o. female who presents for follow-up of Reduction in Urine Output and Results (ECHO)     Ms. Block is a 71yo female with a history of frequent PVCs (s/p RFA 9/27/2016), cardiomyopathy (EF 35%) now  normalized, HFrEF, mild MR, and HTN       Echo 2019:  ·   Low normal left ventricular systolic function. The estimated ejection fraction is 50%, Quantitative LVEF 49% by 2D measurement.  · Grade I (mild) left ventricular diastolic dysfunction consistent with impaired relaxation.  · Mild left atrial enlargement.  · Septal wall has abnormal motion consistent with left bundle branch block.  · Mild mitral sclerosis.  · Normal right ventricular systolic function.  · Normal left atrial pressure.     HPI:   Feels well. No complaints  No chest pain, Orthopnea, PND of heart failure symptoms.   Denies palpitations or fluttering in the chest  Patient is walking 2.5 miles a day.    Non smoker  Mother had MI at age 68- sudden death. Cousin had MI at 33. Grandfather had MI in 40s. Father had CVA.      The 10-year ASCVD risk score (Lamesa PANFILO Jr., et al., 2013) is: 14.9%    Values used to calculate the score:      Age: 73 years      Sex: Female      Is Non- : No      Diabetic: No      Tobacco smoker: No      Systolic Blood Pressure: 128 mmHg      Is BP treated: Yes      HDL Cholesterol: 81 mg/dL      Total Cholesterol: 198 mg/dL    HPI:   No chest pain, Orthopnea, PND of heart failure symptoms. Occasional SOB on minimal exertion that may be new.   Denies palpitations or fluttering in the chest  Intentionally has lost 10 pounds.   Recent treatment for RA  With immunomodulators.      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  CrCl cannot be calculated (Patient's most recent lab result is older than the maximum 7 days allowed.).    Lab Results   Component Value Date     07/29/2019    K 3.9 07/29/2019     07/29/2019    CO2 30 (H) 07/29/2019    BUN 18 07/29/2019    CREATININE 1.0 07/29/2019    GLU 83 07/29/2019    MG 2.1 06/02/2016    AST 18 07/29/2019    ALT 17 07/29/2019    ALBUMIN 3.6 07/29/2019    PROT 7.3 07/29/2019    BILITOT 0.5 07/29/2019    WBC 6.72 07/29/2019    HGB 12.2 07/29/2019    HCT 40.2 07/29/2019     (H) 07/29/2019     07/29/2019    INR 0.9 09/16/2016    TSH 0.540 08/30/2019    CHOL 168 05/03/2019    HDL 84 (H) 05/03/2019    LDLCALC 72.2 05/03/2019    TRIG 59 05/03/2019       Current Outpatient Medications   Medication Sig    abatacept (ORENCIA SUBQ) Inject into the skin. Pt states they are going to do an infusion    amiodarone (PACERONE) 200 MG Tab TAKE ONE TABLET BY MOUTH ONCE DAILY    aspirin (ECOTRIN) 81 MG EC tablet Take 81 mg by mouth once daily.    atorvastatin (LIPITOR) 10 MG tablet Take 1 tablet (10 mg total) by mouth once daily.    calcium carbonate (OS-HENNA) 600 mg calcium (1,500 mg) Tab Take 1,200 mg by mouth 2 (two) times daily with meals.     carvedilol (COREG) 6.25 MG tablet Take 1 tablet (6.25 mg total) by mouth 2 (two) times daily " with meals.    cholecalciferol, vitamin D3, (VITAMIN D3) 1,000 unit capsule Take 1,000 Units by mouth once daily.    co-enzyme Q-10 30 mg capsule Take 10 mg by mouth once daily.     MULTIVITAMIN WITH MINERALS (HAIR,SKIN AND NAILS ORAL) Take by mouth once daily.    potassium chloride (KLOR-CON) 8 MEQ TbSR Take 1 tab (8 meq) oral by mouth daily.    predniSONE (DELTASONE) 5 MG tablet Take 2 tablets (10 mg total) by mouth once daily. After 1 wk, decrease to 1.5 tabs daily x 1 wk, then decrease to 1 tab daily and continue    sacubitril-valsartan (ENTRESTO) 24-26 mg per tablet Take 1 tablet by mouth 2 (two) times daily.    TURMERIC ORAL Take by mouth.     Current Facility-Administered Medications   Medication    cyanocobalamin injection 1,000 mcg       Review of Systems   Constitution: Negative for chills, decreased appetite, malaise/fatigue, night sweats, weight gain and weight loss.   Eyes: Negative for blurred vision, double vision, visual disturbance and visual halos.   Cardiovascular: Negative for chest pain, claudication, cyanosis, dyspnea on exertion, irregular heartbeat, leg swelling, near-syncope, orthopnea, palpitations, paroxysmal nocturnal dyspnea and syncope.   Respiratory: Negative for cough, hemoptysis, snoring, sputum production and wheezing.    Endocrine: Negative for cold intolerance, heat intolerance, polydipsia and polyphagia.   Hematologic/Lymphatic: Negative for adenopathy and bleeding problem. Does not bruise/bleed easily.   Skin: Negative for flushing, itching, poor wound healing and rash.   Musculoskeletal: Negative for arthritis, back pain, falls, gout, joint pain, joint swelling, muscle cramps, muscle weakness, myalgias, neck pain and stiffness.   Gastrointestinal: Negative for bloating, abdominal pain, anorexia, diarrhea, dysphagia, excessive appetite, flatus, hematemesis, jaundice, melena and nausea.   Genitourinary: Negative for hesitancy and incomplete emptying.   Neurological:  Negative for aphonia, brief paralysis, difficulty with concentration, disturbances in coordination, excessive daytime sleepiness, dizziness, focal weakness, light-headedness, loss of balance and weakness.   Psychiatric/Behavioral: Negative for altered mental status, depression, hallucinations, hypervigilance, memory loss, substance abuse and suicidal ideas. The patient does not have insomnia and is not nervous/anxious.        Objective:   Physical Exam   Constitutional: She is oriented to person, place, and time. She appears well-developed and well-nourished. No distress.   HENT:   Head: Normocephalic and atraumatic.   Nose: Nose normal.   Mouth/Throat: Oropharynx is clear and moist. No oropharyngeal exudate.   Eyes: Pupils are equal, round, and reactive to light. Conjunctivae and EOM are normal. Right eye exhibits no discharge. Left eye exhibits no discharge. No scleral icterus.   Neck: Normal range of motion. Neck supple. No JVD present. No tracheal deviation present. No thyromegaly present.   Cardiovascular: Normal rate, regular rhythm, normal heart sounds and intact distal pulses. Exam reveals no gallop and no friction rub.   No murmur heard.  Pulmonary/Chest: Effort normal. No stridor. No respiratory distress. She has no wheezes. She has rales (coarse crackles ). She exhibits no tenderness.   Abdominal: Soft. Bowel sounds are normal. She exhibits no distension and no mass. There is no tenderness. There is no rebound and no guarding.   Musculoskeletal: Normal range of motion. She exhibits no edema or tenderness.   Lymphadenopathy:     She has no cervical adenopathy.   Neurological: She is alert and oriented to person, place, and time. She has normal reflexes. No cranial nerve deficit. She exhibits normal muscle tone. Coordination normal.   Skin: Skin is warm. No rash noted. She is not diaphoretic. No erythema. No pallor.   Psychiatric: She has a normal mood and affect. Her behavior is normal. Judgment and  thought content normal.       Assessment:     1. Lung field abnormal finding on examination    2. GAURAV (obstructive sleep apnea)    3. On amiodarone therapy    4. Ventricular premature beats    5. CHF with cardiomyopathy     6. Essential hypertension        Plan:   Coarse crackles on exam is concerning for lung toxicity from amiodarone. Will discuss this with Dr. Heard if there is pulmonary fibrosis will reduce the amiodarone.   No changes in heart medicine.

## 2019-10-31 ENCOUNTER — OFFICE VISIT (OUTPATIENT)
Dept: PODIATRY | Facility: CLINIC | Age: 74
End: 2019-10-31
Payer: MEDICARE

## 2019-10-31 VITALS — BODY MASS INDEX: 27 KG/M2 | WEIGHT: 172 LBS | HEIGHT: 67 IN

## 2019-10-31 DIAGNOSIS — B35.1 DERMATOPHYTOSIS OF NAIL: ICD-10-CM

## 2019-10-31 DIAGNOSIS — L60.2 LONG TOENAIL: Primary | ICD-10-CM

## 2019-10-31 PROCEDURE — 99499 UNLISTED E&M SERVICE: CPT | Mod: HCNC,,, | Performed by: PODIATRIST

## 2019-10-31 PROCEDURE — 99499 NO LOS: ICD-10-PCS | Mod: HCNC,,, | Performed by: PODIATRIST

## 2019-10-31 PROCEDURE — 99999 PR PBB SHADOW E&M-EST. PATIENT-LVL II: CPT | Mod: PBBFAC,HCNC,, | Performed by: PODIATRIST

## 2019-10-31 PROCEDURE — 17999 PR NON-COVERED FOOT CARE: ICD-10-PCS | Mod: CSM,HCNC,S$GLB, | Performed by: PODIATRIST

## 2019-10-31 PROCEDURE — 99999 PR PBB SHADOW E&M-EST. PATIENT-LVL II: ICD-10-PCS | Mod: PBBFAC,HCNC,, | Performed by: PODIATRIST

## 2019-10-31 PROCEDURE — 17999 UNLISTD PX SKN MUC MEMB SUBQ: CPT | Mod: CSM,HCNC,S$GLB, | Performed by: PODIATRIST

## 2019-10-31 NOTE — PROGRESS NOTES
"Vitals:    10/31/19 1557   Weight: 78 kg (172 lb)   Height: 5' 7" (1.702 m)       Long toenail    Dermatophytosis of nail        Patient presents to the clinic for non-covered routine foot care. Patient is not a high risk foot care patient. Patient understands this is not typically a covered service every 4-6 weeks and patient is aware of responsibility of payment. Pedal pulses are palpable. No known risk factors requiring routine foot care.  nails  were reduced and trimmed bilaterally. Patient tolerated well.     Follow up 4-6 weeks Proc B.   "

## 2019-11-01 ENCOUNTER — HOSPITAL ENCOUNTER (OUTPATIENT)
Dept: RADIOLOGY | Facility: CLINIC | Age: 74
Discharge: HOME | End: 2019-11-01
Attending: INTERNAL MEDICINE
Payer: MEDICARE

## 2019-11-01 ENCOUNTER — INFUSION (OUTPATIENT)
Dept: INFECTIOUS DISEASES | Facility: HOSPITAL | Age: 74
End: 2019-11-01
Attending: INTERNAL MEDICINE
Payer: MEDICARE

## 2019-11-01 ENCOUNTER — TELEPHONE (OUTPATIENT)
Dept: CARDIOLOGY | Facility: CLINIC | Age: 74
End: 2019-11-01

## 2019-11-01 VITALS
SYSTOLIC BLOOD PRESSURE: 143 MMHG | HEIGHT: 67 IN | DIASTOLIC BLOOD PRESSURE: 71 MMHG | HEART RATE: 59 BPM | BODY MASS INDEX: 26.78 KG/M2 | TEMPERATURE: 98 F | RESPIRATION RATE: 16 BRPM | OXYGEN SATURATION: 98 % | WEIGHT: 170.63 LBS

## 2019-11-01 DIAGNOSIS — Z78.0 MENOPAUSE: ICD-10-CM

## 2019-11-01 DIAGNOSIS — M06.041 RHEUMATOID ARTHRITIS INVOLVING BOTH HANDS WITH NEGATIVE RHEUMATOID FACTOR: Primary | ICD-10-CM

## 2019-11-01 DIAGNOSIS — M06.042 RHEUMATOID ARTHRITIS INVOLVING BOTH HANDS WITH NEGATIVE RHEUMATOID FACTOR: Primary | ICD-10-CM

## 2019-11-01 DIAGNOSIS — M85.80 OSTEOPENIA, UNSPECIFIED LOCATION: ICD-10-CM

## 2019-11-01 PROCEDURE — 96367 TX/PROPH/DG ADDL SEQ IV INF: CPT | Mod: HCNC

## 2019-11-01 PROCEDURE — 63600175 PHARM REV CODE 636 W HCPCS: Mod: HCNC | Performed by: INTERNAL MEDICINE

## 2019-11-01 PROCEDURE — 96365 THER/PROPH/DIAG IV INF INIT: CPT | Mod: HCNC

## 2019-11-01 PROCEDURE — 25000003 PHARM REV CODE 250: Mod: HCNC | Performed by: INTERNAL MEDICINE

## 2019-11-01 PROCEDURE — 77080 DXA BONE DENSITY AXIAL: CPT | Mod: 26,HCNC,, | Performed by: INTERNAL MEDICINE

## 2019-11-01 PROCEDURE — 77080 DEXA BONE DENSITY SPINE HIP: ICD-10-PCS | Mod: 26,HCNC,, | Performed by: INTERNAL MEDICINE

## 2019-11-01 PROCEDURE — 77080 DXA BONE DENSITY AXIAL: CPT | Mod: TC,HCNC

## 2019-11-01 RX ORDER — ACETAMINOPHEN 325 MG/1
650 TABLET ORAL
Status: CANCELLED | OUTPATIENT
Start: 2019-11-15

## 2019-11-01 RX ORDER — HEPARIN 100 UNIT/ML
500 SYRINGE INTRAVENOUS
Status: CANCELLED | OUTPATIENT
Start: 2019-11-15

## 2019-11-01 RX ORDER — SODIUM CHLORIDE 0.9 % (FLUSH) 0.9 %
10 SYRINGE (ML) INJECTION
Status: CANCELLED | OUTPATIENT
Start: 2019-11-15

## 2019-11-01 RX ORDER — SODIUM CHLORIDE 0.9 % (FLUSH) 0.9 %
10 SYRINGE (ML) INJECTION
Status: DISCONTINUED | OUTPATIENT
Start: 2019-11-01 | End: 2019-11-01 | Stop reason: HOSPADM

## 2019-11-01 RX ORDER — ACETAMINOPHEN 325 MG/1
650 TABLET ORAL
Status: COMPLETED | OUTPATIENT
Start: 2019-11-01 | End: 2019-11-01

## 2019-11-01 RX ADMIN — ACETAMINOPHEN 650 MG: 325 TABLET ORAL at 02:11

## 2019-11-01 RX ADMIN — DIPHENHYDRAMINE HYDROCHLORIDE 25 MG: 50 INJECTION, SOLUTION INTRAMUSCULAR; INTRAVENOUS at 02:11

## 2019-11-01 RX ADMIN — SODIUM CHLORIDE 750 MG: 9 INJECTION, SOLUTION INTRAVENOUS at 02:11

## 2019-11-01 NOTE — TELEPHONE ENCOUNTER
I let the patient know that I will call her back as soon as I get a response from Dr. Lemons regarding the order from Dr. Nieves for a repeat CT scan.

## 2019-11-01 NOTE — PROGRESS NOTES
Pt here for first dose of Orencia. Pt received Tylenol 650mg and Benadryl 25mg IVPB 30mins prior to infusion. Pt tolerated infusion well and left in NAD.

## 2019-11-01 NOTE — TELEPHONE ENCOUNTER
----- Message from Dalia Pastrana sent at 11/1/2019 12:50 PM CDT -----  Contact: pt  Would like to consult with nurse regarding another cardiologist ordering a CT scan wants to verify with Araceli-Calli that it is necessary. Please give a call back at [phone].           Thanks,  Dalia ALICEA

## 2019-11-04 ENCOUNTER — TELEPHONE (OUTPATIENT)
Dept: CARDIOLOGY | Facility: CLINIC | Age: 74
End: 2019-11-04

## 2019-11-05 ENCOUNTER — TELEPHONE (OUTPATIENT)
Dept: RHEUMATOLOGY | Facility: CLINIC | Age: 74
End: 2019-11-05

## 2019-11-05 NOTE — TELEPHONE ENCOUNTER
The patient stated that she cancelled the CT scheduled by Dr. Nieves. I advised the patient to have a discussion with her cardiologist regarding this matter further.  
To get better and follow your care plan as instructed.

## 2019-11-06 ENCOUNTER — TELEPHONE (OUTPATIENT)
Dept: CARDIOLOGY | Facility: CLINIC | Age: 74
End: 2019-11-06

## 2019-11-06 ENCOUNTER — TELEPHONE (OUTPATIENT)
Dept: RHEUMATOLOGY | Facility: CLINIC | Age: 74
End: 2019-11-06

## 2019-11-06 ENCOUNTER — HOSPITAL ENCOUNTER (OUTPATIENT)
Dept: RADIOLOGY | Facility: HOSPITAL | Age: 74
Discharge: HOME | End: 2019-11-06
Attending: INTERNAL MEDICINE
Payer: MEDICARE

## 2019-11-06 DIAGNOSIS — R91.8 LUNG FIELD ABNORMAL FINDING ON EXAMINATION: ICD-10-CM

## 2019-11-06 PROCEDURE — 71250 CT THORAX DX C-: CPT | Mod: 26,HCNC,, | Performed by: RADIOLOGY

## 2019-11-06 PROCEDURE — 71250 CT THORAX DX C-: CPT | Mod: TC,HCNC

## 2019-11-06 PROCEDURE — 71250 CT CHEST WITHOUT CONTRAST: ICD-10-PCS | Mod: 26,HCNC,, | Performed by: RADIOLOGY

## 2019-11-06 NOTE — TELEPHONE ENCOUNTER
----- Message from Yaritza Mabry MD sent at 11/6/2019  2:19 PM CST -----  Please let patient know that her labs are normal

## 2019-11-07 ENCOUNTER — OFFICE VISIT (OUTPATIENT)
Dept: RHEUMATOLOGY | Facility: CLINIC | Age: 74
End: 2019-11-07
Payer: MEDICARE

## 2019-11-07 ENCOUNTER — LAB VISIT (OUTPATIENT)
Dept: LAB | Facility: HOSPITAL | Age: 74
End: 2019-11-07
Attending: INTERNAL MEDICINE
Payer: MEDICARE

## 2019-11-07 VITALS
SYSTOLIC BLOOD PRESSURE: 124 MMHG | HEART RATE: 62 BPM | BODY MASS INDEX: 26.43 KG/M2 | HEIGHT: 68 IN | DIASTOLIC BLOOD PRESSURE: 66 MMHG | WEIGHT: 174.38 LBS

## 2019-11-07 DIAGNOSIS — M85.80 OSTEOPENIA, UNSPECIFIED LOCATION: ICD-10-CM

## 2019-11-07 LAB
MAGNESIUM SERPL-MCNC: 2.2 MG/DL (ref 1.6–2.6)
PHOSPHATE SERPL-MCNC: 4.3 MG/DL (ref 2.7–4.5)
PTH-INTACT SERPL-MCNC: 49 PG/ML (ref 9–77)

## 2019-11-07 PROCEDURE — 99212 PR OFFICE/OUTPT VISIT, EST, LEVL II, 10-19 MIN: ICD-10-PCS | Mod: HCNC,S$GLB,, | Performed by: INTERNAL MEDICINE

## 2019-11-07 PROCEDURE — 3074F SYST BP LT 130 MM HG: CPT | Mod: HCNC,CPTII,S$GLB, | Performed by: INTERNAL MEDICINE

## 2019-11-07 PROCEDURE — 1101F PT FALLS ASSESS-DOCD LE1/YR: CPT | Mod: HCNC,CPTII,S$GLB, | Performed by: INTERNAL MEDICINE

## 2019-11-07 PROCEDURE — 86334 IMMUNOFIX E-PHORESIS SERUM: CPT | Mod: 26,HCNC,, | Performed by: PATHOLOGY

## 2019-11-07 PROCEDURE — 99999 PR PBB SHADOW E&M-EST. PATIENT-LVL III: CPT | Mod: PBBFAC,HCNC,, | Performed by: INTERNAL MEDICINE

## 2019-11-07 PROCEDURE — 86334 PATHOLOGIST INTERPRETATION IFE: ICD-10-PCS | Mod: 26,HCNC,, | Performed by: PATHOLOGY

## 2019-11-07 PROCEDURE — 83970 ASSAY OF PARATHORMONE: CPT | Mod: HCNC

## 2019-11-07 PROCEDURE — 84100 ASSAY OF PHOSPHORUS: CPT | Mod: HCNC

## 2019-11-07 PROCEDURE — 3078F PR MOST RECENT DIASTOLIC BLOOD PRESSURE < 80 MM HG: ICD-10-PCS | Mod: HCNC,CPTII,S$GLB, | Performed by: INTERNAL MEDICINE

## 2019-11-07 PROCEDURE — 99212 OFFICE O/P EST SF 10 MIN: CPT | Mod: HCNC,S$GLB,, | Performed by: INTERNAL MEDICINE

## 2019-11-07 PROCEDURE — 3074F PR MOST RECENT SYSTOLIC BLOOD PRESSURE < 130 MM HG: ICD-10-PCS | Mod: HCNC,CPTII,S$GLB, | Performed by: INTERNAL MEDICINE

## 2019-11-07 PROCEDURE — 3078F DIAST BP <80 MM HG: CPT | Mod: HCNC,CPTII,S$GLB, | Performed by: INTERNAL MEDICINE

## 2019-11-07 PROCEDURE — 1101F PR PT FALLS ASSESS DOC 0-1 FALLS W/OUT INJ PAST YR: ICD-10-PCS | Mod: HCNC,CPTII,S$GLB, | Performed by: INTERNAL MEDICINE

## 2019-11-07 PROCEDURE — 83735 ASSAY OF MAGNESIUM: CPT | Mod: HCNC

## 2019-11-07 PROCEDURE — 36415 COLL VENOUS BLD VENIPUNCTURE: CPT | Mod: HCNC

## 2019-11-07 PROCEDURE — 99999 PR PBB SHADOW E&M-EST. PATIENT-LVL III: ICD-10-PCS | Mod: PBBFAC,HCNC,, | Performed by: INTERNAL MEDICINE

## 2019-11-07 PROCEDURE — 86334 IMMUNOFIX E-PHORESIS SERUM: CPT | Mod: HCNC

## 2019-11-07 RX ORDER — ZOLEDRONIC ACID 5 MG/100ML
5 INJECTION, SOLUTION INTRAVENOUS
Status: CANCELLED | OUTPATIENT
Start: 2019-11-15

## 2019-11-07 RX ORDER — HEPARIN 100 UNIT/ML
500 SYRINGE INTRAVENOUS
Status: CANCELLED | OUTPATIENT
Start: 2019-11-15

## 2019-11-07 RX ORDER — SODIUM CHLORIDE 0.9 % (FLUSH) 0.9 %
10 SYRINGE (ML) INJECTION
Status: CANCELLED | OUTPATIENT
Start: 2019-11-15

## 2019-11-07 NOTE — ASSESSMENT & PLAN NOTE
IPTH, Mg, PO4, SIFE  24 h urine calcium, UIFE  Resume zolendronic acid 5mg IV q 2 yrs  Tho handout, discussed. Two 8 oz glasses of water prior, and stay well hydrated after.

## 2019-11-07 NOTE — PROGRESS NOTES
"Subjective:       Patient ID: Rizwana Block is a 74 y.o. female.    Chief Complaint: Osteopenia with elevated FRAX  HPI had Reclast in past ordered by Dr. Deng her gynecologist she thinks annual x 2, not in 10 years. Tolerated well. Has had fracture both ankles. Also now back on prednisone 10mg daily for RA. First dose of abatacept 750mg IV on 11/1/19.  Review of Systems   Constitutional: Negative for appetite change, fatigue, fever and unexpected weight change.   HENT: Negative for mouth sores.    Eyes: Negative for visual disturbance.   Respiratory: Negative for cough, shortness of breath and wheezing.    Cardiovascular: Negative for chest pain and palpitations.   Gastrointestinal: Negative for abdominal pain, anal bleeding, blood in stool, constipation, diarrhea, nausea and vomiting.   Genitourinary: Negative for dysuria, frequency and urgency.   Musculoskeletal: Negative for arthralgias, back pain, gait problem, joint swelling, myalgias, neck pain and neck stiffness.   Skin: Negative for rash.   Neurological: Negative for weakness, numbness and headaches.   Hematological: Negative for adenopathy. Does not bruise/bleed easily.   Psychiatric/Behavioral: Negative for sleep disturbance. The patient is not nervous/anxious.          Objective:   /66   Pulse 62   Ht 5' 8.4" (1.737 m)   Wt 79.1 kg (174 lb 6.4 oz)   LMP  (LMP Unknown) Comment: refused weight   BMI 26.21 kg/m²      Physical Exam      Assessment/Plan         Osteopenia, unspecified location  -     PTH, intact; Future; Expected date: 11/07/2019  -     Magnesium; Future; Expected date: 11/07/2019  -     PHOSPHORUS; Future; Expected date: 11/07/2019  -     Immunofixation electrophoresis; Future; Expected date: 11/07/2019  -     Calcium, Timed Urine [facility]; 24 Hours; Future  -     Immunofixation, 24 hour Urine; Future       Problem List Items Addressed This Visit     Osteopenia    Overview     The bone density shows osteopenia with elevated " "fracture risk and Bhumika will schedule brief office visit to discuss therapy options. RJQ   DXA Bone Density Spine And Hip   Order: [id number]   Status:  Final result   Visible to patient:  Yes (Patient Portal)   Next appt:  11/14/2019 at 02:00 PM in Infectious Diseases (INFECTIOUS INFUSION, CHAIR 8)   Dx:  Menopause; Osteopenia, unspecified lo...   Details     Reading Physician Reading Date Result Priority   Mikey Burns MD 11/5/2019       Narrative     EXAMINATION:  DEXA BONE DENSITY SPINE HIP    CLINICAL HISTORY:  suspected osteoporosis; Asymptomatic menopausal state74 y/o female with a history of fractured left foot at 58 y/o.  She had a hysterectomy at 38 y/o and menopausal symptoms at 53 y/o.  She has lost at least 2 "in height since age 26.  She is taking Calcium, Vit D and 7.5 mg of Prednisone.  She has a history of Rheumatoid Arthritis.  She does not exercise or smoke.    TECHNIQUE:  DXA specification: [facility] [employer] [id number].    Bone Mineral Density scanning was performed over the hip and lumbar spine. Review of the images confirms satisfactory positioning and technique.    COMPARISON:  Comparison study done on 04/21/2017. Lumbar spine BMD 0.979 g/cm2 and the T-score -0.6.  The Total Hip BMD 0.726 g/cm2 and the T-score -1.8.    FINDINGS:  Lumbar Spine: Lumbar bone mineral density L1-L4 is 0.940g/cm2, which is a T-score of -1.0. The Z-score is 1.3.    Total Hip: Total hip bone mineral density is 0.670g/cm2.  The T-score is -2.2, and the Z-score is -0.5.    Femoral neck: Bone mineral density is 0.602g/cm2 and the T-score is -2.2 and the Z-score is -0.2 g/cm2.    There is a 26 % risk of a major osteoporotic fracture and a 8.2% risk of hip fracture in the next 10 years (FRAX).    Compared with previous DXA, BMD at the lumbar spine has declined 4% since 2017, and the BMD at the total hip has declined 8%.      Impression       Osteopenia: FRAX Score supports osteoporosis treatment.  In addition " taking prednisone.    RECOMMENDATIONS of [facility]:    1) Adequate calcium and Vitamin D therapy    2) Appropriate exercise    3) Consider medical therapy including bisphosphonates or PTH analogue.    4) Consider repeat BMD in 2 years           Results for AMY MANLEY (MRN [id number]) as of 11/7/2019 13:42   Ref. Range 8/30/2019 13:50   TSH Latest Ref Range: 0.400 - 4.000 uIU/mL 0.540   Results for AMY MANLEY (MRN [id number]) as of 11/7/2019 13:42   Ref. Range 1/11/2019 11:43   Vit D, 25-Hydroxy Latest Ref Range: 30 - 96 ng/mL 63            Current Assessment & Plan     IPTH, Mg, PO4, SIFE  24 h urine calcium, UIFE  Resume zolendronic acid 5mg IV q 2 yrs  Tho handout, discussed. Two 8 oz glasses of water prior, and stay well hydrated after.          Relevant Orders    PTH, intact    Magnesium    PHOSPHORUS    Immunofixation electrophoresis    Calcium, Timed Urine [facility]; 24 Hours    Immunofixation, 24 hour Urine

## 2019-11-07 NOTE — PATIENT INSTRUCTIONS
Zoledronic Acid injection (Paget's Disease, Osteoporosis)  What is this medicine?  ZOLEDRONIC ACID (JOSE le dron ik AS id) lowers the amount of calcium loss from bone. It is used to treat Paget's disease and osteoporosis in women.  How should I use this medicine?  This medicine is for infusion into a vein. It is given by a health care professional in a hospital or clinic setting.  Talk to your pediatrician regarding the use of this medicine in children. This medicine is not approved for use in children.  What side effects may I notice from receiving this medicine?  Side effects that you should report to your doctor or health care professional as soon as possible:  · allergic reactions like skin rash, itching or hives, swelling of the face, lips, or tongue  · anxiety, confusion, or depression  · breathing problems  · changes in vision  · eye pain  · feeling faint or lightheaded, falls  · jaw pain, especially after dental work  · mouth sores  · muscle cramps, stiffness, or weakness  · redness, blistering, peeling or loosening of the skin, including inside the mouth  · trouble passing urine or change in the amount of urine  Side effects that usually do not require medical attention (report to your doctor or health care professional if they continue or are bothersome):  · bone, joint, or muscle pain  · constipation  · diarrhea  · fever  · hair loss  · irritation at site where injected  · loss of appetite  · nausea, vomiting  · stomach upset  · trouble sleeping  · trouble swallowing  · weak or tired  What may interact with this medicine?  · certain antibiotics given by injection  · NSAIDs, medicines for pain and inflammation, like ibuprofen or naproxen  · some diuretics like bumetanide, furosemide  · teriparatide  What if I miss a dose?  It is important not to miss your dose. Call your doctor or health care professional if you are unable to keep an appointment.  Where should I keep my medicine?  This drug is given in a  hospital or clinic and will not be stored at home.  What should I tell my health care provider before I take this medicine?  They need to know if you have any of these conditions:  · aspirin-sensitive asthma  · cancer, especially if you are receiving medicines used to treat cancer  · dental disease or wear dentures  · infection  · kidney disease  · low levels of calcium in the blood  · past surgery on the parathyroid gland or intestines  · receiving corticosteroids like dexamethasone or prednisone  · an unusual or allergic reaction to zoledronic acid, other medicines, foods, dyes, or preservatives  · pregnant or trying to get pregnant  · breast-feeding  What should I watch for while using this medicine?  Visit your doctor or health care professional for regular checkups. It may be some time before you see the benefit from this medicine. Do not stop taking your medicine unless your doctor tells you to. Your doctor may order blood tests or other tests to see how you are doing.  Women should inform their doctor if they wish to become pregnant or think they might be pregnant. There is a potential for serious side effects to an unborn child. Talk to your health care professional or pharmacist for more information.  You should make sure that you get enough calcium and vitamin D while you are taking this medicine. Discuss the foods you eat and the vitamins you take with your health care professional.  Some people who take this medicine have severe bone, joint, and/or muscle pain. This medicine may also increase your risk for jaw problems or a broken thigh bone. Tell your doctor right away if you have severe pain in your jaw, bones, joints, or muscles. Tell your doctor if you have any pain that does not go away or that gets worse.  Tell your dentist and dental surgeon that you are taking this medicine. You should not have major dental surgery while on this medicine. See your dentist to have a dental exam and fix any dental  problems before starting this medicine. Take good care of your teeth while on this medicine. Make sure you see your dentist for regular follow-up appointments.  NOTE:This sheet is a summary. It may not cover all possible information. If you have questions about this medicine, talk to your doctor, pharmacist, or health care provider. Copyright© 2017 [employer]        Zoledronic Acid injection (Paget's Disease, Osteoporosis)  What is this medicine?  ZOLEDRONIC ACID (JOSE le dron ik AS id) lowers the amount of calcium loss from bone. It is used to treat Paget's disease and osteoporosis in women.  How should I use this medicine?  This medicine is for infusion into a vein. It is given by a health care professional in a hospital or clinic setting.  Talk to your pediatrician regarding the use of this medicine in children. This medicine is not approved for use in children.  What side effects may I notice from receiving this medicine?  Side effects that you should report to your doctor or health care professional as soon as possible:  · allergic reactions like skin rash, itching or hives, swelling of the face, lips, or tongue  · anxiety, confusion, or depression  · breathing problems  · changes in vision  · eye pain  · feeling faint or lightheaded, falls  · jaw pain, especially after dental work  · mouth sores  · muscle cramps, stiffness, or weakness  · redness, blistering, peeling or loosening of the skin, including inside the mouth  · trouble passing urine or change in the amount of urine  Side effects that usually do not require medical attention (report to your doctor or health care professional if they continue or are bothersome):  · bone, joint, or muscle pain  · constipation  · diarrhea  · fever  · hair loss  · irritation at site where injected  · loss of appetite  · nausea, vomiting  · stomach upset  · trouble sleeping  · trouble swallowing  · weak or tired  What may interact with this medicine?  · certain  antibiotics given by injection  · NSAIDs, medicines for pain and inflammation, like ibuprofen or naproxen  · some diuretics like bumetanide, furosemide  · teriparatide  What if I miss a dose?  It is important not to miss your dose. Call your doctor or health care professional if you are unable to keep an appointment.  Where should I keep my medicine?  This drug is given in a hospital or clinic and will not be stored at home.  What should I tell my health care provider before I take this medicine?  They need to know if you have any of these conditions:  · aspirin-sensitive asthma  · cancer, especially if you are receiving medicines used to treat cancer  · dental disease or wear dentures  · infection  · kidney disease  · low levels of calcium in the blood  · past surgery on the parathyroid gland or intestines  · receiving corticosteroids like dexamethasone or prednisone  · an unusual or allergic reaction to zoledronic acid, other medicines, foods, dyes, or preservatives  · pregnant or trying to get pregnant  · breast-feeding  What should I watch for while using this medicine?  Visit your doctor or health care professional for regular checkups. It may be some time before you see the benefit from this medicine. Do not stop taking your medicine unless your doctor tells you to. Your doctor may order blood tests or other tests to see how you are doing.  Women should inform their doctor if they wish to become pregnant or think they might be pregnant. There is a potential for serious side effects to an unborn child. Talk to your health care professional or pharmacist for more information.  You should make sure that you get enough calcium and vitamin D while you are taking this medicine. Discuss the foods you eat and the vitamins you take with your health care professional.  Some people who take this medicine have severe bone, joint, and/or muscle pain. This medicine may also increase your risk for jaw problems or a broken  thigh bone. Tell your doctor right away if you have severe pain in your jaw, bones, joints, or muscles. Tell your doctor if you have any pain that does not go away or that gets worse.  Tell your dentist and dental surgeon that you are taking this medicine. You should not have major dental surgery while on this medicine. See your dentist to have a dental exam and fix any dental problems before starting this medicine. Take good care of your teeth while on this medicine. Make sure you see your dentist for regular follow-up appointments.  NOTE:This sheet is a summary. It may not cover all possible information. If you have questions about this medicine, talk to your doctor, pharmacist, or health care provider. Copyright© 2017 [employer]

## 2019-11-08 ENCOUNTER — PATIENT MESSAGE (OUTPATIENT)
Dept: CARDIOLOGY | Facility: CLINIC | Age: 74
End: 2019-11-08

## 2019-11-08 ENCOUNTER — PATIENT MESSAGE (OUTPATIENT)
Dept: RHEUMATOLOGY | Facility: CLINIC | Age: 74
End: 2019-11-08

## 2019-11-08 ENCOUNTER — PATIENT MESSAGE (OUTPATIENT)
Dept: INTERNAL MEDICINE | Facility: CLINIC | Age: 74
End: 2019-11-08

## 2019-11-08 DIAGNOSIS — J47.9 BRONCHIECTASIS WITHOUT COMPLICATION: Primary | ICD-10-CM

## 2019-11-08 LAB
INTERPRETATION SERPL IFE-IMP: NORMAL
PATHOLOGIST INTERPRETATION IFE: NORMAL

## 2019-11-11 ENCOUNTER — PATIENT MESSAGE (OUTPATIENT)
Dept: RHEUMATOLOGY | Facility: CLINIC | Age: 74
End: 2019-11-11

## 2019-11-11 ENCOUNTER — TELEPHONE (OUTPATIENT)
Dept: CARDIOLOGY | Facility: CLINIC | Age: 74
End: 2019-11-11

## 2019-11-11 DIAGNOSIS — J47.9 BRONCHIECTASIS WITHOUT COMPLICATION: Primary | ICD-10-CM

## 2019-11-11 NOTE — TELEPHONE ENCOUNTER
Please remind me to call this patient Wednesday afternoon        Your CT scan shows changes in the lungs called bronchiectasis. I am not sure exactly what the cause of this is but amiodarone tablet is a possibility. Cut the tabled in half and please see a pulmonologist.

## 2019-11-12 ENCOUNTER — PATIENT MESSAGE (OUTPATIENT)
Dept: RHEUMATOLOGY | Facility: CLINIC | Age: 74
End: 2019-11-12

## 2019-11-12 ENCOUNTER — PATIENT MESSAGE (OUTPATIENT)
Dept: INTERNAL MEDICINE | Facility: CLINIC | Age: 74
End: 2019-11-12

## 2019-11-13 ENCOUNTER — PATIENT MESSAGE (OUTPATIENT)
Dept: RHEUMATOLOGY | Facility: CLINIC | Age: 74
End: 2019-11-13

## 2019-11-13 RX ORDER — AMIODARONE HYDROCHLORIDE 100 MG/1
100 TABLET ORAL
COMMUNITY
End: 2019-11-13 | Stop reason: SDUPTHER

## 2019-11-13 RX ORDER — AMIODARONE HYDROCHLORIDE 100 MG/1
100 TABLET ORAL DAILY
Qty: 30 TABLET | Refills: 6 | Status: SHIPPED | OUTPATIENT
Start: 2019-11-13 | End: 2020-05-28 | Stop reason: DRUGHIGH

## 2019-11-14 ENCOUNTER — TELEPHONE (OUTPATIENT)
Dept: RHEUMATOLOGY | Facility: CLINIC | Age: 74
End: 2019-11-14

## 2019-11-14 ENCOUNTER — INFUSION (OUTPATIENT)
Dept: INFECTIOUS DISEASES | Facility: HOSPITAL | Age: 74
End: 2019-11-14
Attending: INTERNAL MEDICINE
Payer: MEDICARE

## 2019-11-14 VITALS
BODY MASS INDEX: 26.43 KG/M2 | OXYGEN SATURATION: 98 % | SYSTOLIC BLOOD PRESSURE: 111 MMHG | HEART RATE: 60 BPM | WEIGHT: 174.38 LBS | RESPIRATION RATE: 16 BRPM | HEIGHT: 68 IN | TEMPERATURE: 99 F | DIASTOLIC BLOOD PRESSURE: 56 MMHG

## 2019-11-14 DIAGNOSIS — M06.042 RHEUMATOID ARTHRITIS INVOLVING BOTH HANDS WITH NEGATIVE RHEUMATOID FACTOR: Primary | ICD-10-CM

## 2019-11-14 DIAGNOSIS — M06.041 RHEUMATOID ARTHRITIS INVOLVING BOTH HANDS WITH NEGATIVE RHEUMATOID FACTOR: Primary | ICD-10-CM

## 2019-11-14 DIAGNOSIS — R91.8 PULMONARY NODULES: Primary | ICD-10-CM

## 2019-11-14 PROCEDURE — 25000003 PHARM REV CODE 250: Mod: HCNC | Performed by: INTERNAL MEDICINE

## 2019-11-14 PROCEDURE — 63600175 PHARM REV CODE 636 W HCPCS: Mod: JG,HCNC | Performed by: INTERNAL MEDICINE

## 2019-11-14 PROCEDURE — 96365 THER/PROPH/DIAG IV INF INIT: CPT | Mod: HCNC

## 2019-11-14 PROCEDURE — 96367 TX/PROPH/DG ADDL SEQ IV INF: CPT | Mod: HCNC

## 2019-11-14 RX ORDER — SODIUM CHLORIDE 0.9 % (FLUSH) 0.9 %
10 SYRINGE (ML) INJECTION
Status: CANCELLED | OUTPATIENT
Start: 2019-11-28

## 2019-11-14 RX ORDER — ACETAMINOPHEN 325 MG/1
650 TABLET ORAL
Status: CANCELLED | OUTPATIENT
Start: 2019-11-28

## 2019-11-14 RX ORDER — ACETAMINOPHEN 325 MG/1
650 TABLET ORAL
Status: COMPLETED | OUTPATIENT
Start: 2019-11-14 | End: 2019-11-14

## 2019-11-14 RX ORDER — HEPARIN 100 UNIT/ML
500 SYRINGE INTRAVENOUS
Status: CANCELLED | OUTPATIENT
Start: 2019-11-28

## 2019-11-14 RX ORDER — SODIUM CHLORIDE 0.9 % (FLUSH) 0.9 %
10 SYRINGE (ML) INJECTION
Status: DISCONTINUED | OUTPATIENT
Start: 2019-11-14 | End: 2019-11-14 | Stop reason: HOSPADM

## 2019-11-14 RX ORDER — ZOLEDRONIC ACID 5 MG/100ML
5 INJECTION, SOLUTION INTRAVENOUS
Status: CANCELLED | OUTPATIENT
Start: 2019-11-28

## 2019-11-14 RX ADMIN — ACETAMINOPHEN 650 MG: 325 TABLET ORAL at 02:11

## 2019-11-14 RX ADMIN — DIPHENHYDRAMINE HYDROCHLORIDE 25 MG: 50 INJECTION, SOLUTION INTRAMUSCULAR; INTRAVENOUS at 02:11

## 2019-11-14 RX ADMIN — SODIUM CHLORIDE 750 MG: 9 INJECTION, SOLUTION INTRAVENOUS at 03:11

## 2019-11-14 NOTE — PROGRESS NOTES
Pt here for second dose of Orencia. Pt received Tylenol 650mg and Benadryl 25mg IVPB 30mins prior to infusion. Pt tolerated infusion well and left in NAD.

## 2019-11-18 DIAGNOSIS — I42.9 CARDIOMYOPATHY, UNSPECIFIED TYPE: ICD-10-CM

## 2019-11-18 DIAGNOSIS — R00.2 PALPITATIONS: ICD-10-CM

## 2019-11-18 DIAGNOSIS — I50.22 CHRONIC SYSTOLIC HEART FAILURE: ICD-10-CM

## 2019-11-18 DIAGNOSIS — I42.8 OTHER CARDIOMYOPATHY: ICD-10-CM

## 2019-11-18 RX ORDER — CARVEDILOL 6.25 MG/1
6.25 TABLET ORAL 2 TIMES DAILY WITH MEALS
Qty: 120 TABLET | Refills: 3 | Status: SHIPPED | OUTPATIENT
Start: 2019-11-18 | End: 2020-08-18

## 2019-11-18 RX ORDER — ATORVASTATIN CALCIUM 10 MG/1
TABLET, FILM COATED ORAL
Qty: 30 TABLET | Refills: 6 | Status: SHIPPED | OUTPATIENT
Start: 2019-11-18 | End: 2020-07-02

## 2019-11-18 RX ORDER — POTASSIUM CHLORIDE 600 MG/1
TABLET, FILM COATED, EXTENDED RELEASE ORAL
Qty: 90 TABLET | Refills: 3 | Status: SHIPPED | OUTPATIENT
Start: 2019-11-18 | End: 2020-03-09

## 2019-11-18 RX ORDER — AMIODARONE HYDROCHLORIDE 200 MG/1
200 TABLET ORAL DAILY
Qty: 90 TABLET | Refills: 3 | Status: SHIPPED | OUTPATIENT
Start: 2019-11-18 | End: 2019-11-21

## 2019-11-21 ENCOUNTER — TELEPHONE (OUTPATIENT)
Dept: CARDIOLOGY | Facility: CLINIC | Age: 74
End: 2019-11-21

## 2019-11-21 ENCOUNTER — OFFICE VISIT (OUTPATIENT)
Dept: PULMONOLOGY | Facility: CLINIC | Age: 74
End: 2019-11-21
Payer: MEDICARE

## 2019-11-21 ENCOUNTER — PATIENT MESSAGE (OUTPATIENT)
Dept: CARDIOLOGY | Facility: CLINIC | Age: 74
End: 2019-11-21

## 2019-11-21 ENCOUNTER — PATIENT MESSAGE (OUTPATIENT)
Dept: INTERNAL MEDICINE | Facility: CLINIC | Age: 74
End: 2019-11-21

## 2019-11-21 VITALS
HEART RATE: 55 BPM | DIASTOLIC BLOOD PRESSURE: 70 MMHG | OXYGEN SATURATION: 97 % | BODY MASS INDEX: 25.07 KG/M2 | HEIGHT: 68 IN | SYSTOLIC BLOOD PRESSURE: 104 MMHG | WEIGHT: 165.44 LBS

## 2019-11-21 DIAGNOSIS — R91.1 LUNG NODULE: ICD-10-CM

## 2019-11-21 DIAGNOSIS — G47.33 OSA (OBSTRUCTIVE SLEEP APNEA): ICD-10-CM

## 2019-11-21 DIAGNOSIS — Z79.899 ON AMIODARONE THERAPY: Primary | ICD-10-CM

## 2019-11-21 DIAGNOSIS — R91.1 PULMONARY NODULE: ICD-10-CM

## 2019-11-21 PROCEDURE — 3074F PR MOST RECENT SYSTOLIC BLOOD PRESSURE < 130 MM HG: ICD-10-PCS | Mod: HCNC,CPTII,S$GLB, | Performed by: INTERNAL MEDICINE

## 2019-11-21 PROCEDURE — 3078F DIAST BP <80 MM HG: CPT | Mod: HCNC,CPTII,S$GLB, | Performed by: INTERNAL MEDICINE

## 2019-11-21 PROCEDURE — 3078F PR MOST RECENT DIASTOLIC BLOOD PRESSURE < 80 MM HG: ICD-10-PCS | Mod: HCNC,CPTII,S$GLB, | Performed by: INTERNAL MEDICINE

## 2019-11-21 PROCEDURE — 1159F PR MEDICATION LIST DOCUMENTED IN MEDICAL RECORD: ICD-10-PCS | Mod: HCNC,S$GLB,, | Performed by: INTERNAL MEDICINE

## 2019-11-21 PROCEDURE — 99999 PR PBB SHADOW E&M-EST. PATIENT-LVL IV: CPT | Mod: PBBFAC,HCNC,, | Performed by: INTERNAL MEDICINE

## 2019-11-21 PROCEDURE — 1101F PR PT FALLS ASSESS DOC 0-1 FALLS W/OUT INJ PAST YR: ICD-10-PCS | Mod: HCNC,CPTII,S$GLB, | Performed by: INTERNAL MEDICINE

## 2019-11-21 PROCEDURE — 99999 PR PBB SHADOW E&M-EST. PATIENT-LVL IV: ICD-10-PCS | Mod: PBBFAC,HCNC,, | Performed by: INTERNAL MEDICINE

## 2019-11-21 PROCEDURE — 1101F PT FALLS ASSESS-DOCD LE1/YR: CPT | Mod: HCNC,CPTII,S$GLB, | Performed by: INTERNAL MEDICINE

## 2019-11-21 PROCEDURE — 1159F MED LIST DOCD IN RCRD: CPT | Mod: HCNC,S$GLB,, | Performed by: INTERNAL MEDICINE

## 2019-11-21 PROCEDURE — 99204 OFFICE O/P NEW MOD 45 MIN: CPT | Mod: HCNC,S$GLB,, | Performed by: INTERNAL MEDICINE

## 2019-11-21 PROCEDURE — 3074F SYST BP LT 130 MM HG: CPT | Mod: HCNC,CPTII,S$GLB, | Performed by: INTERNAL MEDICINE

## 2019-11-21 PROCEDURE — 99204 PR OFFICE/OUTPT VISIT, NEW, LEVL IV, 45-59 MIN: ICD-10-PCS | Mod: HCNC,S$GLB,, | Performed by: INTERNAL MEDICINE

## 2019-11-21 RX ORDER — FUROSEMIDE 40 MG/1
TABLET ORAL
COMMUNITY
Start: 2019-11-18 | End: 2020-11-04

## 2019-11-21 NOTE — ASSESSMENT & PLAN NOTE
No evidence of parenchymal lung disease based upon ct of chest.  No contra-indication from pulmonary perspective in regard to amiodarone.  Will need to repeat ct in 6 months for pulmonary nodule.  Annual pft while on amiodarone.

## 2019-11-21 NOTE — PROGRESS NOTES
Rizwana Block  was seen as a new patient at the request  Yaritza Mabry MD for the evaluation of  Abnormal ct of chest.    CHIEF COMPLAINT:  Pulmonary Nodules and Consult      HISTORY OF PRESENT ILLNESS: Rizwana Block is a 74 y.o. female  has a past medical history of Arrhythmia, Chest pain (5/27/2016), CHF (congestive heart failure), Hypertension, Osteopenia, and Rheumatoid arthritis (07/2019).  Patient was started on amiodarone 200 mg for high pvc burden and s/p rfa.  Patient was s/p pft 8/27/19 due to chronnic  Amiodarone with dloc 65%.  Patient was seen by Dr. Mabry 10/30/19 with crackles on exam.  Patient s/p ct of chest and was referred to pulmonary for further inputs.      Patient denied wheezing.  Intermittent cough (1-2 times per day).   Patient used to smoked 1/2 ppd x 20 years.  Quit in 1970s.  Patient with intermittent chest tightness.  No hemoptysis.  +intentional weight loss with weight watcher.  Chronic back.  No family h/o of lung cancer.  Patient migrate to us from [place] in 1960.  Patient denied taylor with adl.  Still work as an .  Up and down stairs without issue.      PAST MEDICAL HISTORY:    Active Ambulatory Problems     Diagnosis Date Noted    Ventricular premature beats 06/03/2013    Hammertoe 08/30/2013    Palpitations 07/31/2015    Costochondritis 03/16/2016    Heart failure, systolic 05/18/2016    Precordial pain 05/27/2016    Bradycardia 06/01/2016    Cardiomyopathy 06/01/2016    Non-rheumatic mitral regurgitation     Essential hypertension     S/P colonoscopy 05/24/2017    Osteopenia 05/24/2017    Pure hypercholesterolemia 06/01/2017    At risk for amiodarone toxicity with long term use 06/22/2018    Dysuria 07/30/2018    Vaginal atrophy 07/30/2018    Vaginal burning 07/30/2018    Open wound of right foot 08/23/2018    GAURAV (obstructive sleep apnea)     Osteoarthritis of hand 07/29/2019    Rheumatoid arthritis involving both hands with negative  rheumatoid factor 2019    Low back pain 10/18/2019    Pulmonary nodule 2019     Resolved Ambulatory Problems     Diagnosis Date Noted    Shortness of breath 2016    Unstable angina     PVC (premature ventricular contraction) 2016     Past Medical History:   Diagnosis Date    Arrhythmia     Chest pain 2016    CHF (congestive heart failure)     Hypertension     Rheumatoid arthritis 2019                PAST SURGICAL HISTORY:    Past Surgical History:   Procedure Laterality Date    ANKLE FUSION      right    bladder surgery      with mesh    BLEPHAROPLASTY W/ LASER      CATARACT EXTRACTION, BILATERAL      HAND SURGERY      benign cyst on left    HYSTERECTOMY      heavy bleeding    PATELLA FRACTURE SURGERY      right- plate in tibial plateau    TONSILLECTOMY           FAMILY HISTORY:                Family History   Problem Relation Age of Onset    Heart disease Mother 63    Stroke Father 49    Heart disease Father     Early death Father     Cancer Maternal Aunt         bone    Cancer Maternal Uncle         esophageal    Heart disease Paternal Uncle     SIDS Daughter     Breast cancer Neg Hx     Ovarian cancer Neg Hx     Cervical cancer Neg Hx     Endometrial cancer Neg Hx     Vaginal cancer Neg Hx        SOCIAL HISTORY:          Tobacco:   Social History     Tobacco Use   Smoking Status Former Smoker    Packs/day: 0.25    Years: 24.00    Pack years: 6.00    Types: Cigarettes    Start date: 1961    Last attempt to quit: 1985    Years since quittin.9   Smokeless Tobacco Never Used     alcohol use:    Social History     Substance and Sexual Activity   Alcohol Use Yes    Alcohol/week: 5.0 standard drinks    Types: 5 Glasses of wine per week    Comment: socially               Occupation:  Retire; federal .    ALLERGIES:    Review of patient's allergies indicates:   Allergen Reactions    Dilaudid [hydromorphone (bulk)]       Severe nausea/vomiting    Morphine      Severe nausea/vomiting       CURRENT MEDICATIONS:    Current Outpatient Medications   Medication Sig Dispense Refill    abatacept (ORENCIA SUBQ) Inject into the skin. Pt states they are going to do an infusion      amiodarone (PACERONE) 100 MG Tab Take 1 tablet (100 mg total) by mouth once daily. 30 tablet 6    aspirin (ECOTRIN) 81 MG EC tablet Take 81 mg by mouth once daily.      atorvastatin (LIPITOR) 10 MG tablet TAKE ONE TABLET BY MOUTH ONCE DAILY 30 tablet 6    carvedilol (COREG) 6.25 MG tablet Take 1 tablet (6.25 mg total) by mouth 2 (two) times daily with meals. 120 tablet 3    cholecalciferol, vitamin D3, (VITAMIN D3) 1,000 unit capsule Take 1,000 Units by mouth once daily.      co-enzyme Q-10 30 mg capsule Take 10 mg by mouth once daily.       MULTIVITAMIN WITH MINERALS (HAIR,SKIN AND NAILS ORAL) Take by mouth once daily.      potassium chloride (KLOR-CON) 8 MEQ TbSR Take 1 tab (8 meq) oral by mouth daily. 90 tablet 3    predniSONE (DELTASONE) 5 MG tablet Take 2 tablets (10 mg total) by mouth once daily. After 1 wk, decrease to 1.5 tabs daily x 1 wk, then decrease to 1 tab daily and continue 60 tablet 1    sacubitril-valsartan (ENTRESTO) 24-26 mg per tablet Take 1 tablet by mouth 2 (two) times daily. 60 tablet 6    TURMERIC ORAL Take by mouth.      furosemide (LASIX) 40 MG tablet        Current Facility-Administered Medications   Medication Dose Route Frequency Provider Last Rate Last Dose    cyanocobalamin injection 1,000 mcg  1,000 mcg Intramuscular Q30 Days Sri Gutierrez MD   1,000 mcg at 09/27/19 1421                  REVIEW OF SYSTEMS:     Pulmonary related symptoms as per HPI.  Gen:  + weight loss, no fever, no night sweat  HEENT:  no visual changes, no sore throat, no hearing loss  CV:  Per hpi  GI:  no melena, no hematochezia, no diarhea, no constipation.  :  no dysuria, no hematuria, no hesistancy, no dribbling  Neuro:  no syncope, no  "vertigo, no tinitus  Psych:  No homocide or suicide ideation; no depression.  Endocrine:  No heat or cold intolerance.  Sleep:  Doing well with apap  Otherwise, a balance of systems reviewed is negative.          PHYSICAL EXAM:  Vitals:    11/21/19 0908   BP: 104/70   Pulse: (!) 55   SpO2: 97%   Weight: 75 kg (165 lb 7.3 oz)   Height: 5' 8" (1.727 m)   PainSc: (P) 0-No pain     Body mass index is 25.16 kg/m².     GENERAL:  well develop; no apparent distress  HEENT:  no nasal congestion; no discharge noted; class 2 modified mallampatti.   NECK:  supple; no palpable masses.  CARDIO: regular rate and rhythm  PULM:  clear to auscultation bilaterally; no intercostals retractions; no accessory muscle usage   ABDOMEN:  soft nontender/nondistended.  +bowel sound  EXTREMITIES no cce  NEURO:  CN II-XII intact.  5/5 motor in all extremities.  sensation grossly intact   to light touch.  PSYCH:  normal affect.  Alert and oriented x 4    LABS  Pulmonary Functions Testing Results(personally reviewed):  8/27/19 ratio 83%; fvc 76%; dlco 65%  ABG (personally reviewed):  none  CXR (personally reviewed):  5/18/16 no consolidation or effusion  CT CHEST(personally reviewed):  11/6/19 no lad.  No ggo.  No increased reticulation; 6 mm nodule in rll.      8/15/19 echo  CONCLUSIONS     1 - Concentric hypertrophy.     2 - No wall motion abnormalities.     3 - Normal left ventricular systolic function (EF 55-60%).     4 - Normal left ventricular diastolic function.     5 - Normal right ventricular systolic function .     6 - The estimated PA systolic pressure is 27 mmHg.     7 - Trivial aortic regurgitation.     8 - Trivial tricuspid regurgitation.     ASSESSMENT/PLAN  Problem List Items Addressed This Visit     GAURAV (obstructive sleep apnea)    Current Assessment & Plan     Doing well with apap         Pulmonary nodule    Overview     6 mm rll nodule.  pft wnl with mild decreased 65%.           Current Assessment & Plan     No evidence of " parenchymal lung disease based upon ct of chest.  No contra-indication from pulmonary perspective in regard to amiodarone.  Will need to repeat ct in 6 months for pulmonary nodule.  Annual pft while on amiodarone.           Relevant Orders    CT Chest Without Contrast      Other Visit Diagnoses     Lung nodule        Relevant Orders    CT Chest Without Contrast            Patient will Follow up in about 6 months (around 5/21/2020). with md/np.    CC: Send copy of this note to Yaritza Mabry MD

## 2019-11-21 NOTE — LETTER
November 21, 2019      Yaritza Mabry MD  [address] [facility]  8th [address]  [place] [address]           [facility]  [address]  [place] [address]  Phone: [phone]  Fax: [phone]          Patient: Rizwana Block   MR Number: [id number]   YOB: 1945   Date of Visit: 11/21/2019       Dear Dr. Yaritza Mabry:    Thank you for referring Rizwana Block to me for evaluation. Attached you will find relevant portions of my assessment and plan of care.    If you have questions, please do not hesitate to call me. I look forward to following Rizwana Block along with you.    Sincerely,    Efrain Coffman MD    Enclosure  CC:  No Recipients    If you would like to receive this communication electronically, please contact [email or web address] or [phone] to request more information on [employer] Link access.    For providers and/or their staff who would like to refer a patient to [facility], please contact us through our one-stop-shop provider referral line, [facility], at [phone].    If you feel you have received this communication in error or would no longer like to receive these types of communications, please e-mail [email or web address]

## 2019-11-21 NOTE — TELEPHONE ENCOUNTER
Returned call to patient and confirmed appointment in January also scheduled Amiodarone, TSH and Echo for one week before.  Patient stated she'd had PFTs and saw her Pulmonologist    ----- Message from Faviola Cochran sent at 11/21/2019 10:45 AM CST -----  Contact: Pt   Pt is calling regarding  requesting to have nurse call to back. Pt states that she would like to have Echo schedule on tomorrow. .[phone] (home)         .Thank You  Faviola Cochran

## 2019-11-22 ENCOUNTER — CLINICAL SUPPORT (OUTPATIENT)
Dept: INTERNAL MEDICINE | Facility: CLINIC | Age: 74
End: 2019-11-22
Payer: MEDICARE

## 2019-11-29 ENCOUNTER — INFUSION (OUTPATIENT)
Dept: INFECTIOUS DISEASES | Facility: HOSPITAL | Age: 74
End: 2019-11-29
Attending: INTERNAL MEDICINE
Payer: MEDICARE

## 2019-11-29 VITALS
BODY MASS INDEX: 26.06 KG/M2 | RESPIRATION RATE: 18 BRPM | SYSTOLIC BLOOD PRESSURE: 107 MMHG | OXYGEN SATURATION: 93 % | HEIGHT: 68 IN | DIASTOLIC BLOOD PRESSURE: 65 MMHG | TEMPERATURE: 98 F | HEART RATE: 60 BPM | WEIGHT: 171.94 LBS

## 2019-11-29 DIAGNOSIS — M06.041 RHEUMATOID ARTHRITIS INVOLVING BOTH HANDS WITH NEGATIVE RHEUMATOID FACTOR: Primary | ICD-10-CM

## 2019-11-29 DIAGNOSIS — M06.042 RHEUMATOID ARTHRITIS INVOLVING BOTH HANDS WITH NEGATIVE RHEUMATOID FACTOR: Primary | ICD-10-CM

## 2019-11-29 PROCEDURE — 25000003 PHARM REV CODE 250: Mod: HCNC | Performed by: INTERNAL MEDICINE

## 2019-11-29 PROCEDURE — 63600175 PHARM REV CODE 636 W HCPCS: Mod: HCNC | Performed by: INTERNAL MEDICINE

## 2019-11-29 PROCEDURE — 96367 TX/PROPH/DG ADDL SEQ IV INF: CPT | Mod: HCNC

## 2019-11-29 PROCEDURE — 96365 THER/PROPH/DIAG IV INF INIT: CPT | Mod: HCNC

## 2019-11-29 RX ORDER — ZOLEDRONIC ACID 5 MG/100ML
5 INJECTION, SOLUTION INTRAVENOUS
Status: CANCELLED | OUTPATIENT
Start: 2019-12-27

## 2019-11-29 RX ORDER — ACETAMINOPHEN 325 MG/1
650 TABLET ORAL
Status: CANCELLED | OUTPATIENT
Start: 2019-12-27

## 2019-11-29 RX ORDER — ACETAMINOPHEN 325 MG/1
650 TABLET ORAL
Status: COMPLETED | OUTPATIENT
Start: 2019-11-29 | End: 2019-11-29

## 2019-11-29 RX ORDER — SODIUM CHLORIDE 0.9 % (FLUSH) 0.9 %
10 SYRINGE (ML) INJECTION
Status: CANCELLED | OUTPATIENT
Start: 2019-12-27

## 2019-11-29 RX ORDER — HEPARIN 100 UNIT/ML
500 SYRINGE INTRAVENOUS
Status: CANCELLED | OUTPATIENT
Start: 2019-12-27

## 2019-11-29 RX ORDER — SODIUM CHLORIDE 0.9 % (FLUSH) 0.9 %
10 SYRINGE (ML) INJECTION
Status: DISCONTINUED | OUTPATIENT
Start: 2019-11-29 | End: 2019-11-29 | Stop reason: HOSPADM

## 2019-11-29 RX ADMIN — SODIUM CHLORIDE 750 MG: 9 INJECTION, SOLUTION INTRAVENOUS at 02:11

## 2019-11-29 RX ADMIN — ACETAMINOPHEN 650 MG: 325 TABLET ORAL at 01:11

## 2019-11-29 RX ADMIN — DIPHENHYDRAMINE HYDROCHLORIDE 25 MG: 50 INJECTION, SOLUTION INTRAMUSCULAR; INTRAVENOUS at 02:11

## 2019-11-29 NOTE — PROGRESS NOTES
Pt here for dose of Orencia. Pt received Tylenol 650mg and Benadryl 25mg IVPB 30mins prior to infusion. Pt tolerated infusion well and left in NAD.

## 2019-12-02 ENCOUNTER — OFFICE VISIT (OUTPATIENT)
Dept: PODIATRY | Facility: CLINIC | Age: 74
End: 2019-12-02
Payer: MEDICARE

## 2019-12-02 VITALS — BODY MASS INDEX: 25.91 KG/M2 | WEIGHT: 171 LBS | HEIGHT: 68 IN

## 2019-12-02 DIAGNOSIS — B35.1 DERMATOPHYTOSIS OF NAIL: ICD-10-CM

## 2019-12-02 DIAGNOSIS — L60.2 LONG TOENAIL: Primary | ICD-10-CM

## 2019-12-02 PROCEDURE — 17999 UNLISTD PX SKN MUC MEMB SUBQ: CPT | Mod: CSM,HCNC,S$GLB, | Performed by: PODIATRIST

## 2019-12-02 PROCEDURE — 99499 UNLISTED E&M SERVICE: CPT | Mod: HCNC,,, | Performed by: PODIATRIST

## 2019-12-02 PROCEDURE — 17999 PR NON-COVERED FOOT CARE: ICD-10-PCS | Mod: CSM,HCNC,S$GLB, | Performed by: PODIATRIST

## 2019-12-02 PROCEDURE — 99999 PR PBB SHADOW E&M-EST. PATIENT-LVL III: ICD-10-PCS | Mod: PBBFAC,HCNC,, | Performed by: PODIATRIST

## 2019-12-02 PROCEDURE — 99499 NO LOS: ICD-10-PCS | Mod: HCNC,,, | Performed by: PODIATRIST

## 2019-12-02 PROCEDURE — 99999 PR PBB SHADOW E&M-EST. PATIENT-LVL III: CPT | Mod: PBBFAC,HCNC,, | Performed by: PODIATRIST

## 2019-12-02 NOTE — PROGRESS NOTES
"Vitals:    12/02/19 1603   Weight: 77.6 kg (171 lb)   Height: 5' 8" (1.727 m)       Long toenail    Dermatophytosis of nail        Patient presents to the clinic for non-covered routine foot care. Patient is not a high risk foot care patient. Patient understands this is not typically a covered service every 4-6 weeks and patient is aware of responsibility of payment. Pedal pulses are palpable. No known risk factors requiring routine foot care.  nails  were reduced and trimmed bilaterally. Patient tolerated well.     Follow up 4-6 weeks Proc B.   "

## 2019-12-03 ENCOUNTER — PATIENT OUTREACH (OUTPATIENT)
Dept: OTHER | Facility: OTHER | Age: 74
End: 2019-12-03

## 2019-12-12 ENCOUNTER — INFUSION (OUTPATIENT)
Dept: INFECTIOUS DISEASES | Facility: HOSPITAL | Age: 74
End: 2019-12-12
Attending: INTERNAL MEDICINE
Payer: MEDICARE

## 2019-12-12 VITALS
OXYGEN SATURATION: 97 % | WEIGHT: 165.56 LBS | BODY MASS INDEX: 25.09 KG/M2 | DIASTOLIC BLOOD PRESSURE: 75 MMHG | HEART RATE: 62 BPM | RESPIRATION RATE: 18 BRPM | HEIGHT: 68 IN | SYSTOLIC BLOOD PRESSURE: 134 MMHG

## 2019-12-12 DIAGNOSIS — M06.042 RHEUMATOID ARTHRITIS INVOLVING BOTH HANDS WITH NEGATIVE RHEUMATOID FACTOR: Primary | ICD-10-CM

## 2019-12-12 DIAGNOSIS — M06.041 RHEUMATOID ARTHRITIS INVOLVING BOTH HANDS WITH NEGATIVE RHEUMATOID FACTOR: Primary | ICD-10-CM

## 2019-12-12 DIAGNOSIS — M85.80 OSTEOPENIA, UNSPECIFIED LOCATION: ICD-10-CM

## 2019-12-12 PROCEDURE — 63600175 PHARM REV CODE 636 W HCPCS: Mod: HCNC | Performed by: INTERNAL MEDICINE

## 2019-12-12 PROCEDURE — 96365 THER/PROPH/DIAG IV INF INIT: CPT | Mod: HCNC

## 2019-12-12 RX ORDER — HEPARIN 100 UNIT/ML
500 SYRINGE INTRAVENOUS
Status: CANCELLED | OUTPATIENT
Start: 2019-12-27

## 2019-12-12 RX ORDER — ZOLEDRONIC ACID 5 MG/100ML
5 INJECTION, SOLUTION INTRAVENOUS
Status: COMPLETED | OUTPATIENT
Start: 2019-12-12 | End: 2019-12-12

## 2019-12-12 RX ORDER — SODIUM CHLORIDE 0.9 % (FLUSH) 0.9 %
10 SYRINGE (ML) INJECTION
Status: CANCELLED | OUTPATIENT
Start: 2019-12-27

## 2019-12-12 RX ORDER — SODIUM CHLORIDE 0.9 % (FLUSH) 0.9 %
10 SYRINGE (ML) INJECTION
Status: DISCONTINUED | OUTPATIENT
Start: 2019-12-12 | End: 2019-12-12 | Stop reason: HOSPADM

## 2019-12-12 RX ORDER — ZOLEDRONIC ACID 5 MG/100ML
5 INJECTION, SOLUTION INTRAVENOUS
Status: CANCELLED | OUTPATIENT
Start: 2019-12-27

## 2019-12-12 RX ORDER — ACETAMINOPHEN 325 MG/1
650 TABLET ORAL
Status: CANCELLED | OUTPATIENT
Start: 2019-12-27

## 2019-12-12 RX ADMIN — ZOLEDRONIC ACID 5 MG: 5 INJECTION, SOLUTION INTRAVENOUS at 03:12

## 2019-12-19 ENCOUNTER — PATIENT OUTREACH (OUTPATIENT)
Dept: ADMINISTRATIVE | Facility: HOSPITAL | Age: 74
End: 2019-12-19

## 2019-12-19 NOTE — PROGRESS NOTES
[facility]: Health  Follow-Up    The history is provided by the patient.     Follow Up  Follow-up reason(s): routine education      Routine Education Topics: eating patterns  Patient stated that she will continue working on submitting BP readings a few times each week.     She is sleeping with her CPAP even though it is uncomfortable.    She stated Weight Watchers in August and has lost about 20 lbs. Her starting weight was 186lbs and she is down to about 160 lbs. Her new goal is to get down to 150 lbs.       Intervention/Plan    There are no preventive care reminders to display for this patient.    Last 5 Patient Entered Readings                                      Current 30 Day Average: 128/76     Recent Readings 12/14/2019 12/13/2019 11/10/2019 10/27/2019 10/22/2019    SBP (mmHg) 125 131 119 138 94    DBP (mmHg) 74 77 76 81 74    Pulse 62 67 67 62 83                      Diet Screening   No change to diet.  She has the following dietary restrictions: low sodium diet    Patient started Weight Watchers in August and has lost 20 lbs.     Medication Adherence Screening   She misses doses: never      Patient identified the following reasons for non-compliance: none      SDOH   Dermal Autograft Text: The defect edges were debeveled with a #15 scalpel blade.  Given the location of the defect, shape of the defect and the proximity to free margins a dermal autograft was deemed most appropriate.  Using a sterile surgical marker, the primary defect shape was transferred to the donor site. The area thus outlined was incised deep to adipose tissue with a #15 scalpel blade.  The harvested graft was then trimmed of adipose and epidermal tissue until only dermis was left.  The skin graft was then placed in the primary defect and oriented appropriately.

## 2019-12-26 ENCOUNTER — PATIENT MESSAGE (OUTPATIENT)
Dept: SLEEP MEDICINE | Facility: CLINIC | Age: 74
End: 2019-12-26

## 2019-12-27 ENCOUNTER — CLINICAL SUPPORT (OUTPATIENT)
Dept: INFECTIOUS DISEASES | Facility: CLINIC | Age: 74
End: 2019-12-27
Payer: MEDICARE

## 2019-12-27 ENCOUNTER — INFUSION (OUTPATIENT)
Dept: INFECTIOUS DISEASES | Facility: HOSPITAL | Age: 74
End: 2019-12-27
Attending: INTERNAL MEDICINE
Payer: MEDICARE

## 2019-12-27 VITALS
SYSTOLIC BLOOD PRESSURE: 124 MMHG | BODY MASS INDEX: 24.72 KG/M2 | WEIGHT: 162.56 LBS | TEMPERATURE: 98 F | HEART RATE: 59 BPM | DIASTOLIC BLOOD PRESSURE: 68 MMHG

## 2019-12-27 DIAGNOSIS — M06.041 RHEUMATOID ARTHRITIS INVOLVING BOTH HANDS WITH NEGATIVE RHEUMATOID FACTOR: Primary | ICD-10-CM

## 2019-12-27 DIAGNOSIS — M06.042 RHEUMATOID ARTHRITIS INVOLVING BOTH HANDS WITH NEGATIVE RHEUMATOID FACTOR: Primary | ICD-10-CM

## 2019-12-27 PROCEDURE — 25000003 PHARM REV CODE 250: Mod: HCNC | Performed by: INTERNAL MEDICINE

## 2019-12-27 PROCEDURE — 63600175 PHARM REV CODE 636 W HCPCS: Mod: HCNC | Performed by: INTERNAL MEDICINE

## 2019-12-27 PROCEDURE — 96365 THER/PROPH/DIAG IV INF INIT: CPT | Mod: HCNC

## 2019-12-27 PROCEDURE — 96372 THER/PROPH/DIAG INJ SC/IM: CPT | Mod: HCNC,S$GLB,, | Performed by: INTERNAL MEDICINE

## 2019-12-27 PROCEDURE — 96375 TX/PRO/DX INJ NEW DRUG ADDON: CPT | Mod: HCNC

## 2019-12-27 PROCEDURE — 96372 PR INJECTION,THERAP/PROPH/DIAG2ST, IM OR SUBCUT: ICD-10-PCS | Mod: HCNC,S$GLB,, | Performed by: INTERNAL MEDICINE

## 2019-12-27 RX ORDER — ZOLEDRONIC ACID 5 MG/100ML
5 INJECTION, SOLUTION INTRAVENOUS
Status: CANCELLED | OUTPATIENT
Start: 2020-01-24

## 2019-12-27 RX ORDER — SODIUM CHLORIDE 0.9 % (FLUSH) 0.9 %
10 SYRINGE (ML) INJECTION
Status: CANCELLED | OUTPATIENT
Start: 2020-01-24

## 2019-12-27 RX ORDER — HEPARIN 100 UNIT/ML
500 SYRINGE INTRAVENOUS
Status: CANCELLED | OUTPATIENT
Start: 2020-01-24

## 2019-12-27 RX ORDER — ACETAMINOPHEN 325 MG/1
650 TABLET ORAL
Status: CANCELLED | OUTPATIENT
Start: 2020-01-24

## 2019-12-27 RX ORDER — ACETAMINOPHEN 325 MG/1
650 TABLET ORAL
Status: COMPLETED | OUTPATIENT
Start: 2019-12-27 | End: 2019-12-27

## 2019-12-27 RX ORDER — SODIUM CHLORIDE 0.9 % (FLUSH) 0.9 %
10 SYRINGE (ML) INJECTION
Status: DISCONTINUED | OUTPATIENT
Start: 2019-12-27 | End: 2019-12-27 | Stop reason: HOSPADM

## 2019-12-27 RX ADMIN — DIPHENHYDRAMINE HYDROCHLORIDE 25 MG: 50 INJECTION, SOLUTION INTRAMUSCULAR; INTRAVENOUS at 02:12

## 2019-12-27 RX ADMIN — CYANOCOBALAMIN 1000 MCG: 1000 INJECTION, SOLUTION INTRAMUSCULAR at 02:12

## 2019-12-27 RX ADMIN — ACETAMINOPHEN 650 MG: 325 TABLET ORAL at 02:12

## 2019-12-27 RX ADMIN — SODIUM CHLORIDE 750 MG: 9 INJECTION, SOLUTION INTRAVENOUS at 03:12

## 2020-01-02 ENCOUNTER — TELEPHONE (OUTPATIENT)
Dept: CARDIOLOGY | Facility: CLINIC | Age: 75
End: 2020-01-02

## 2020-01-02 ENCOUNTER — TELEPHONE (OUTPATIENT)
Dept: PODIATRY | Facility: CLINIC | Age: 75
End: 2020-01-02

## 2020-01-02 NOTE — TELEPHONE ENCOUNTER
Returned call and rescheduled all test to March to coincide with April appointment and Dr. Kelly.      ----- Message from Serene Koch sent at 1/2/2020  9:43 AM CST -----  Contact: self  Pt would like to speak to Ling in office    Please advise, can be reached at [phone]

## 2020-01-03 ENCOUNTER — OFFICE VISIT (OUTPATIENT)
Dept: INTERNAL MEDICINE | Facility: CLINIC | Age: 75
End: 2020-01-03
Payer: MEDICARE

## 2020-01-03 VITALS
OXYGEN SATURATION: 95 % | HEIGHT: 68 IN | DIASTOLIC BLOOD PRESSURE: 64 MMHG | WEIGHT: 158.31 LBS | BODY MASS INDEX: 23.99 KG/M2 | SYSTOLIC BLOOD PRESSURE: 118 MMHG | HEART RATE: 81 BPM | TEMPERATURE: 98 F

## 2020-01-03 DIAGNOSIS — Z00.00 ROUTINE GENERAL MEDICAL EXAMINATION AT A HEALTH CARE FACILITY: Primary | ICD-10-CM

## 2020-01-03 DIAGNOSIS — Z12.31 SCREENING MAMMOGRAM, ENCOUNTER FOR: ICD-10-CM

## 2020-01-03 PROCEDURE — 3074F SYST BP LT 130 MM HG: CPT | Mod: HCNC,CPTII,S$GLB, | Performed by: INTERNAL MEDICINE

## 2020-01-03 PROCEDURE — 3078F PR MOST RECENT DIASTOLIC BLOOD PRESSURE < 80 MM HG: ICD-10-PCS | Mod: HCNC,CPTII,S$GLB, | Performed by: INTERNAL MEDICINE

## 2020-01-03 PROCEDURE — 3074F PR MOST RECENT SYSTOLIC BLOOD PRESSURE < 130 MM HG: ICD-10-PCS | Mod: HCNC,CPTII,S$GLB, | Performed by: INTERNAL MEDICINE

## 2020-01-03 PROCEDURE — 99499 RISK ADDL DX/OHS AUDIT: ICD-10-PCS | Mod: HCNC,S$GLB,, | Performed by: INTERNAL MEDICINE

## 2020-01-03 PROCEDURE — 99999 PR PBB SHADOW E&M-EST. PATIENT-LVL IV: ICD-10-PCS | Mod: PBBFAC,HCNC,, | Performed by: INTERNAL MEDICINE

## 2020-01-03 PROCEDURE — 3078F DIAST BP <80 MM HG: CPT | Mod: HCNC,CPTII,S$GLB, | Performed by: INTERNAL MEDICINE

## 2020-01-03 PROCEDURE — 99397 PR PREVENTIVE VISIT,EST,65 & OVER: ICD-10-PCS | Mod: HCNC,S$GLB,, | Performed by: INTERNAL MEDICINE

## 2020-01-03 PROCEDURE — 99499 UNLISTED E&M SERVICE: CPT | Mod: HCNC,S$GLB,, | Performed by: INTERNAL MEDICINE

## 2020-01-03 PROCEDURE — 99999 PR PBB SHADOW E&M-EST. PATIENT-LVL IV: CPT | Mod: PBBFAC,HCNC,, | Performed by: INTERNAL MEDICINE

## 2020-01-03 PROCEDURE — 99397 PER PM REEVAL EST PAT 65+ YR: CPT | Mod: HCNC,S$GLB,, | Performed by: INTERNAL MEDICINE

## 2020-01-03 NOTE — PROGRESS NOTES
Chief Complaint: Annual exam    HPI: this is a 74year old woman who presents for her annual exam    She has rheumatoid arthritis - last saw Dr Javier to rheumatology.  She is is getting Orencia infusions. No change in joints yet. She had a reclast infusion 12/12/19 for her bones. She is still very active.  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  She has been off prednisone for the last week.     She has been doing weight watcher's diet and has lost 28 pounds in the last 6 months.       She is followed by Dr Lemons.  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   She saw pulmonary-[facility] on 11/21/19 - 6 mm RLL nodule - repeat CT scan in 6 months. Dr Araceli Mccurdy likes her to take amiodarone 200 mg daily.  She is taking coreg 6.25.mg twice daily and entresto 24/26 twice daily for her heart.  She no longer has skipped beats. She now takes lasix 40 mg 2-3 times a week.  She takes potassium chloride 8 meq daily. .  No chest pain, shortness of breath.  She has some mild CHF with an EF of 40-45% on echo 6/2018 and diastolic dysfunction.  She takes lipitor 10 mg daily for cholesterol. No muscle spasms.      She takes vitamin D 1000 units daily.       She gets a vitamin B12 injection monthly  And energy level is better.     She has been diagnosed with sleep apnea and uses the APAP machine nightly.              Past Medical History:   Diagnosis Date    Arrhythmia      Chest pain 5/27/2016     3/2016: Began experience chest discomfort.    CHF (congestive heart failure)      Hypertension      Osteopenia       Reclast infusion 10/5/2010 and 10/20/2011                   Past Surgical History:   Procedure Laterality Date    ANKLE FUSION         right    bladder surgery         with mesh    BLEPHAROPLASTY W/ LASER        HAND SURGERY         benign cyst on left  "   HYSTERECTOMY         heavy bleeding    PATELLA FRACTURE SURGERY         right    TONSILLECTOMY          Meds and allergies: updated on Epic    REVIEW OF SYSTEMS: She denies fevers, chills, night sweats, fatigue, visual change, hearing loss, sinus congestion, sore throat, chest pain, shortness of breath, nausea, vomiting, constipation, diarrhea, dysuria, hematuria, polydipsia, polyuria, headaches, anxiety, depression, insomnia.           Physical exam:  /64 (BP Location: Left arm, Patient Position: Sitting, BP Method: Large (Manual))   Pulse 81   Temp 97.7 °F (36.5 °C) (Oral)   Ht 5' 8" (1.727 m)   Wt 71.8 kg (158 lb 4.6 oz)   LMP  (LMP Unknown) Comment: refused weight   SpO2 95%   BMI 24.07 kg/m²        General: alert, oriented x 3, no apparent distress.  Affect normal  HEENT: Conjunctivae: anicteric, PERRL, EOMI, TM clear, Oralpharynx clear  Neck: supple, no thyroid enlargement, no cervical lymphadenopathy  Resp: effort normal, lungs clear bilaterally  CV: Regular rate and rhythm without murmurs, gallops or rubs, no lower extremity edema,  ABDOMEN: soft, non distended, non tender, bowel sounds present, no hepatosplenomgaly  BREAST: no abn seen, no nodules palpated, no axillary lad       Assessment/Plan:  Annual exam - discussed diet, exercise and safety issues.  Pulmonary nodule - due ct chest in May 2020.   chf - controlled  HTN - controlled  Sleep apnea - on apap machine  arrythmia - controlled  Osteopenia - BMD 11/19 - s/p reclast 12/19  MMG 4/18 - ordered  Colonoscoy normal 4/2009 due 4/2019 - FitKit negative 6/19     She is to let me know if no improvement or worsen.        "

## 2020-01-05 ENCOUNTER — PATIENT MESSAGE (OUTPATIENT)
Dept: OTHER | Facility: OTHER | Age: 75
End: 2020-01-05

## 2020-01-20 ENCOUNTER — OFFICE VISIT (OUTPATIENT)
Dept: PODIATRY | Facility: CLINIC | Age: 75
End: 2020-01-20
Payer: MEDICARE

## 2020-01-20 VITALS — BODY MASS INDEX: 23.95 KG/M2 | WEIGHT: 158 LBS | HEIGHT: 68 IN

## 2020-01-20 DIAGNOSIS — L60.2 LONG TOENAIL: Primary | ICD-10-CM

## 2020-01-20 DIAGNOSIS — B35.1 DERMATOPHYTOSIS OF NAIL: ICD-10-CM

## 2020-01-20 PROCEDURE — 99499 UNLISTED E&M SERVICE: CPT | Mod: HCNC,,, | Performed by: PODIATRIST

## 2020-01-20 PROCEDURE — 99999 PR PBB SHADOW E&M-EST. PATIENT-LVL II: CPT | Mod: PBBFAC,HCNC,, | Performed by: PODIATRIST

## 2020-01-20 PROCEDURE — 17999 PR NON-COVERED FOOT CARE: ICD-10-PCS | Mod: CSM,HCNC,S$GLB, | Performed by: PODIATRIST

## 2020-01-20 PROCEDURE — 99999 PR PBB SHADOW E&M-EST. PATIENT-LVL II: ICD-10-PCS | Mod: PBBFAC,HCNC,, | Performed by: PODIATRIST

## 2020-01-20 PROCEDURE — 99499 NO LOS: ICD-10-PCS | Mod: HCNC,,, | Performed by: PODIATRIST

## 2020-01-20 PROCEDURE — 17999 UNLISTD PX SKN MUC MEMB SUBQ: CPT | Mod: CSM,HCNC,S$GLB, | Performed by: PODIATRIST

## 2020-01-20 NOTE — PROGRESS NOTES
"Vitals:    01/20/20 1457   Weight: 71.7 kg (158 lb)   Height: 5' 8" (1.727 m)       Long toenail    Dermatophytosis of nail        Patient presents to the clinic for non-covered routine foot care. Patient is not a high risk foot care patient. Patient understands this is not typically a covered service every 4-6 weeks and patient is aware of responsibility of payment. Pedal pulses are palpable. No known risk factors requiring routine foot care.  nails  were reduced and trimmed bilaterally. Patient tolerated well.     Follow up 4-6 weeks Proc B.   "

## 2020-01-24 ENCOUNTER — HOSPITAL ENCOUNTER (OUTPATIENT)
Dept: RADIOLOGY | Facility: HOSPITAL | Age: 75
Discharge: HOME | End: 2020-01-24
Attending: INTERNAL MEDICINE
Payer: MEDICARE

## 2020-01-24 ENCOUNTER — CLINICAL SUPPORT (OUTPATIENT)
Dept: INFECTIOUS DISEASES | Facility: CLINIC | Age: 75
End: 2020-01-24
Payer: MEDICARE

## 2020-01-24 ENCOUNTER — INFUSION (OUTPATIENT)
Dept: INFECTIOUS DISEASES | Facility: HOSPITAL | Age: 75
End: 2020-01-24
Attending: INTERNAL MEDICINE
Payer: MEDICARE

## 2020-01-24 VITALS
BODY MASS INDEX: 24.48 KG/M2 | SYSTOLIC BLOOD PRESSURE: 125 MMHG | RESPIRATION RATE: 18 BRPM | HEART RATE: 59 BPM | DIASTOLIC BLOOD PRESSURE: 66 MMHG | OXYGEN SATURATION: 96 % | WEIGHT: 161.5 LBS | TEMPERATURE: 98 F | HEIGHT: 68 IN

## 2020-01-24 DIAGNOSIS — Z12.31 SCREENING MAMMOGRAM, ENCOUNTER FOR: ICD-10-CM

## 2020-01-24 DIAGNOSIS — M06.041 RHEUMATOID ARTHRITIS INVOLVING BOTH HANDS WITH NEGATIVE RHEUMATOID FACTOR: Primary | ICD-10-CM

## 2020-01-24 DIAGNOSIS — M06.042 RHEUMATOID ARTHRITIS INVOLVING BOTH HANDS WITH NEGATIVE RHEUMATOID FACTOR: Primary | ICD-10-CM

## 2020-01-24 PROCEDURE — 77063 BREAST TOMOSYNTHESIS BI: CPT | Mod: 26,,, | Performed by: RADIOLOGY

## 2020-01-24 PROCEDURE — 77067 SCR MAMMO BI INCL CAD: CPT | Mod: 26,HCNC,, | Performed by: RADIOLOGY

## 2020-01-24 PROCEDURE — 96372 THER/PROPH/DIAG INJ SC/IM: CPT | Mod: HCNC,S$GLB,, | Performed by: INTERNAL MEDICINE

## 2020-01-24 PROCEDURE — 96367 TX/PROPH/DG ADDL SEQ IV INF: CPT | Mod: HCNC

## 2020-01-24 PROCEDURE — 77067 MAMMO DIGITAL SCREENING BILAT WITH TOMOSYNTHESIS_CAD: ICD-10-PCS | Mod: 26,HCNC,, | Performed by: RADIOLOGY

## 2020-01-24 PROCEDURE — 96365 THER/PROPH/DIAG IV INF INIT: CPT | Mod: HCNC

## 2020-01-24 PROCEDURE — 77067 SCR MAMMO BI INCL CAD: CPT | Mod: TC,HCNC

## 2020-01-24 PROCEDURE — 63600175 PHARM REV CODE 636 W HCPCS: Mod: HCNC | Performed by: INTERNAL MEDICINE

## 2020-01-24 PROCEDURE — 77063 PR MAMMO SCREEN DIGITAL BREAST TOMOSYNTHESIS BIL: ICD-10-PCS | Mod: 26,,, | Performed by: RADIOLOGY

## 2020-01-24 PROCEDURE — 25000003 PHARM REV CODE 250: Mod: HCNC | Performed by: INTERNAL MEDICINE

## 2020-01-24 PROCEDURE — 96372 PR INJECTION,THERAP/PROPH/DIAG2ST, IM OR SUBCUT: ICD-10-PCS | Mod: HCNC,S$GLB,, | Performed by: INTERNAL MEDICINE

## 2020-01-24 RX ORDER — HEPARIN 100 UNIT/ML
500 SYRINGE INTRAVENOUS
Status: CANCELLED | OUTPATIENT
Start: 2020-02-21

## 2020-01-24 RX ORDER — SODIUM CHLORIDE 0.9 % (FLUSH) 0.9 %
10 SYRINGE (ML) INJECTION
Status: CANCELLED | OUTPATIENT
Start: 2020-02-21

## 2020-01-24 RX ORDER — ACETAMINOPHEN 325 MG/1
650 TABLET ORAL
Status: COMPLETED | OUTPATIENT
Start: 2020-01-24 | End: 2020-01-24

## 2020-01-24 RX ORDER — SODIUM CHLORIDE 0.9 % (FLUSH) 0.9 %
10 SYRINGE (ML) INJECTION
Status: DISCONTINUED | OUTPATIENT
Start: 2020-01-24 | End: 2020-01-24 | Stop reason: HOSPADM

## 2020-01-24 RX ORDER — ZOLEDRONIC ACID 5 MG/100ML
5 INJECTION, SOLUTION INTRAVENOUS
Status: CANCELLED | OUTPATIENT
Start: 2020-02-21

## 2020-01-24 RX ORDER — ACETAMINOPHEN 325 MG/1
650 TABLET ORAL
Status: CANCELLED | OUTPATIENT
Start: 2020-02-21

## 2020-01-24 RX ADMIN — SODIUM CHLORIDE 750 MG: 9 INJECTION, SOLUTION INTRAVENOUS at 03:01

## 2020-01-24 RX ADMIN — ACETAMINOPHEN 650 MG: 325 TABLET ORAL at 01:01

## 2020-01-24 RX ADMIN — CYANOCOBALAMIN 1000 MCG: 1000 INJECTION, SOLUTION INTRAMUSCULAR at 01:01

## 2020-01-24 RX ADMIN — DIPHENHYDRAMINE HYDROCHLORIDE 25 MG: 50 INJECTION, SOLUTION INTRAMUSCULAR; INTRAVENOUS at 02:01

## 2020-02-04 ENCOUNTER — PATIENT OUTREACH (OUTPATIENT)
Dept: OTHER | Facility: OTHER | Age: 75
End: 2020-02-04

## 2020-02-10 ENCOUNTER — TELEPHONE (OUTPATIENT)
Dept: RHEUMATOLOGY | Facility: CLINIC | Age: 75
End: 2020-02-10

## 2020-02-17 ENCOUNTER — LAB VISIT (OUTPATIENT)
Dept: LAB | Facility: HOSPITAL | Age: 75
End: 2020-02-17
Attending: INTERNAL MEDICINE
Payer: MEDICARE

## 2020-02-17 ENCOUNTER — OFFICE VISIT (OUTPATIENT)
Dept: PODIATRY | Facility: CLINIC | Age: 75
End: 2020-02-17
Payer: MEDICARE

## 2020-02-17 DIAGNOSIS — L84 HELOMA MOLLE: ICD-10-CM

## 2020-02-17 DIAGNOSIS — M06.9 RHEUMATOID ARTHRITIS INVOLVING MULTIPLE SITES, UNSPECIFIED RHEUMATOID FACTOR PRESENCE: ICD-10-CM

## 2020-02-17 DIAGNOSIS — B35.1 DERMATOPHYTOSIS OF NAIL: Primary | ICD-10-CM

## 2020-02-17 LAB
ALBUMIN SERPL BCP-MCNC: 4 G/DL (ref 3.5–5.2)
ALP SERPL-CCNC: 56 U/L (ref 55–135)
ALT SERPL W/O P-5'-P-CCNC: 15 U/L (ref 10–44)
ANION GAP SERPL CALC-SCNC: 9 MMOL/L (ref 8–16)
AST SERPL-CCNC: 18 U/L (ref 10–40)
BASOPHILS # BLD AUTO: 0.02 K/UL (ref 0–0.2)
BASOPHILS NFR BLD: 0.2 % (ref 0–1.9)
BILIRUB SERPL-MCNC: 0.3 MG/DL (ref 0.1–1)
BUN SERPL-MCNC: 30 MG/DL (ref 8–23)
CALCIUM SERPL-MCNC: 9.1 MG/DL (ref 8.7–10.5)
CHLORIDE SERPL-SCNC: 103 MMOL/L (ref 95–110)
CO2 SERPL-SCNC: 26 MMOL/L (ref 23–29)
CREAT SERPL-MCNC: 0.9 MG/DL (ref 0.5–1.4)
CRP SERPL-MCNC: 1.1 MG/L (ref 0–8.2)
DIFFERENTIAL METHOD: ABNORMAL
EOSINOPHIL # BLD AUTO: 0 K/UL (ref 0–0.5)
EOSINOPHIL NFR BLD: 0.1 % (ref 0–8)
ERYTHROCYTE [DISTWIDTH] IN BLOOD BY AUTOMATED COUNT: 13.8 % (ref 11.5–14.5)
ERYTHROCYTE [SEDIMENTATION RATE] IN BLOOD BY WESTERGREN METHOD: 21 MM/HR (ref 0–36)
EST. GFR  (AFRICAN AMERICAN): >60 ML/MIN/1.73 M^2
EST. GFR  (NON AFRICAN AMERICAN): >60 ML/MIN/1.73 M^2
GLUCOSE SERPL-MCNC: 102 MG/DL (ref 70–110)
HCT VFR BLD AUTO: 39.8 % (ref 37–48.5)
HGB BLD-MCNC: 12 G/DL (ref 12–16)
IMM GRANULOCYTES # BLD AUTO: 0.02 K/UL (ref 0–0.04)
IMM GRANULOCYTES NFR BLD AUTO: 0.2 % (ref 0–0.5)
LYMPHOCYTES # BLD AUTO: 1.3 K/UL (ref 1–4.8)
LYMPHOCYTES NFR BLD: 12.3 % (ref 18–48)
MCH RBC QN AUTO: 30.6 PG (ref 27–31)
MCHC RBC AUTO-ENTMCNC: 30.2 G/DL (ref 32–36)
MCV RBC AUTO: 102 FL (ref 82–98)
MONOCYTES # BLD AUTO: 0.4 K/UL (ref 0.3–1)
MONOCYTES NFR BLD: 3.4 % (ref 4–15)
NEUTROPHILS # BLD AUTO: 9 K/UL (ref 1.8–7.7)
NEUTROPHILS NFR BLD: 83.8 % (ref 38–73)
NRBC BLD-RTO: 0 /100 WBC
PLATELET # BLD AUTO: 239 K/UL (ref 150–350)
PMV BLD AUTO: 13 FL (ref 9.2–12.9)
POTASSIUM SERPL-SCNC: 4.1 MMOL/L (ref 3.5–5.1)
PROT SERPL-MCNC: 7.1 G/DL (ref 6–8.4)
RBC # BLD AUTO: 3.92 M/UL (ref 4–5.4)
SODIUM SERPL-SCNC: 138 MMOL/L (ref 136–145)
WBC # BLD AUTO: 10.67 K/UL (ref 3.9–12.7)

## 2020-02-17 PROCEDURE — 86140 C-REACTIVE PROTEIN: CPT | Mod: HCNC

## 2020-02-17 PROCEDURE — 36415 COLL VENOUS BLD VENIPUNCTURE: CPT | Mod: HCNC,PN

## 2020-02-17 PROCEDURE — 85652 RBC SED RATE AUTOMATED: CPT | Mod: HCNC

## 2020-02-17 PROCEDURE — 99499 NO LOS: ICD-10-PCS | Mod: HCNC,,, | Performed by: PODIATRIST

## 2020-02-17 PROCEDURE — 17999 UNLISTD PX SKN MUC MEMB SUBQ: CPT | Mod: CSM,HCNC,S$GLB, | Performed by: PODIATRIST

## 2020-02-17 PROCEDURE — 99499 UNLISTED E&M SERVICE: CPT | Mod: HCNC,,, | Performed by: PODIATRIST

## 2020-02-17 PROCEDURE — 85025 COMPLETE CBC W/AUTO DIFF WBC: CPT | Mod: HCNC

## 2020-02-17 PROCEDURE — 99999 PR PBB SHADOW E&M-EST. PATIENT-LVL I: CPT | Mod: PBBFAC,HCNC,, | Performed by: PODIATRIST

## 2020-02-17 PROCEDURE — 99999 PR PBB SHADOW E&M-EST. PATIENT-LVL I: ICD-10-PCS | Mod: PBBFAC,HCNC,, | Performed by: PODIATRIST

## 2020-02-17 PROCEDURE — 17999 PR NON-COVERED FOOT CARE: ICD-10-PCS | Mod: CSM,HCNC,S$GLB, | Performed by: PODIATRIST

## 2020-02-17 PROCEDURE — 80053 COMPREHEN METABOLIC PANEL: CPT | Mod: HCNC

## 2020-02-17 NOTE — PROGRESS NOTES
There were no vitals filed for this visit.    Dermatophytosis of nail    Heloma molle - Right Foot        Patient presents to the clinic for non-covered routine foot care.   Patient understands this is not typically a covered service every 4-6 weeks and patient is aware of responsibility of payment. Pedal pulses are palpable. No known risk factors requiring routine foot care.  nails and heloma molle were reduced and trimmed bilaterally. Patient tolerated well.       Continue lambs wool and shoe modification.     Follow up 5 weeks Proc SEVERO.

## 2020-02-17 NOTE — PATIENT INSTRUCTIONS
General nail care measures for abnormal nails include:    ? Keeping nails trimmed short    ? Avoiding trauma     ? Avoiding contact irritants     ? Keeping nails dry (avoiding wet work)    ? Avoiding all nail cosmetics

## 2020-02-18 ENCOUNTER — PES CALL (OUTPATIENT)
Dept: ADMINISTRATIVE | Facility: CLINIC | Age: 75
End: 2020-02-18

## 2020-02-21 ENCOUNTER — CLINICAL SUPPORT (OUTPATIENT)
Dept: INFECTIOUS DISEASES | Facility: CLINIC | Age: 75
End: 2020-02-21
Payer: MEDICARE

## 2020-02-21 ENCOUNTER — INFUSION (OUTPATIENT)
Dept: INFECTIOUS DISEASES | Facility: HOSPITAL | Age: 75
End: 2020-02-21
Attending: INTERNAL MEDICINE
Payer: MEDICARE

## 2020-02-21 ENCOUNTER — OFFICE VISIT (OUTPATIENT)
Dept: RHEUMATOLOGY | Facility: CLINIC | Age: 75
End: 2020-02-21
Payer: MEDICARE

## 2020-02-21 VITALS
HEART RATE: 63 BPM | WEIGHT: 156 LBS | DIASTOLIC BLOOD PRESSURE: 68 MMHG | BODY MASS INDEX: 24.48 KG/M2 | SYSTOLIC BLOOD PRESSURE: 91 MMHG | HEIGHT: 67 IN

## 2020-02-21 VITALS
BODY MASS INDEX: 24.39 KG/M2 | WEIGHT: 156.63 LBS | SYSTOLIC BLOOD PRESSURE: 127 MMHG | OXYGEN SATURATION: 98 % | RESPIRATION RATE: 16 BRPM | TEMPERATURE: 98 F | DIASTOLIC BLOOD PRESSURE: 67 MMHG | HEART RATE: 62 BPM

## 2020-02-21 DIAGNOSIS — M06.042 RHEUMATOID ARTHRITIS INVOLVING BOTH HANDS WITH NEGATIVE RHEUMATOID FACTOR: Primary | ICD-10-CM

## 2020-02-21 DIAGNOSIS — M06.042 RHEUMATOID ARTHRITIS INVOLVING BOTH HANDS WITH NEGATIVE RHEUMATOID FACTOR: ICD-10-CM

## 2020-02-21 DIAGNOSIS — M06.041 RHEUMATOID ARTHRITIS INVOLVING BOTH HANDS WITH NEGATIVE RHEUMATOID FACTOR: Primary | ICD-10-CM

## 2020-02-21 DIAGNOSIS — R91.1 PULMONARY NODULE: ICD-10-CM

## 2020-02-21 DIAGNOSIS — M06.041 RHEUMATOID ARTHRITIS INVOLVING BOTH HANDS WITH NEGATIVE RHEUMATOID FACTOR: ICD-10-CM

## 2020-02-21 DIAGNOSIS — M85.80 OSTEOPENIA, UNSPECIFIED LOCATION: ICD-10-CM

## 2020-02-21 PROCEDURE — 99999 PR PBB SHADOW E&M-EST. PATIENT-LVL III: ICD-10-PCS | Mod: PBBFAC,HCNC,, | Performed by: INTERNAL MEDICINE

## 2020-02-21 PROCEDURE — 3078F PR MOST RECENT DIASTOLIC BLOOD PRESSURE < 80 MM HG: ICD-10-PCS | Mod: HCNC,CPTII,S$GLB, | Performed by: INTERNAL MEDICINE

## 2020-02-21 PROCEDURE — 3078F DIAST BP <80 MM HG: CPT | Mod: HCNC,CPTII,S$GLB, | Performed by: INTERNAL MEDICINE

## 2020-02-21 PROCEDURE — 96365 THER/PROPH/DIAG IV INF INIT: CPT | Mod: HCNC

## 2020-02-21 PROCEDURE — 99999 PR PBB SHADOW E&M-EST. PATIENT-LVL III: CPT | Mod: PBBFAC,HCNC,, | Performed by: INTERNAL MEDICINE

## 2020-02-21 PROCEDURE — 99213 OFFICE O/P EST LOW 20 MIN: CPT | Mod: HCNC,S$GLB,, | Performed by: INTERNAL MEDICINE

## 2020-02-21 PROCEDURE — 1101F PR PT FALLS ASSESS DOC 0-1 FALLS W/OUT INJ PAST YR: ICD-10-PCS | Mod: HCNC,CPTII,S$GLB, | Performed by: INTERNAL MEDICINE

## 2020-02-21 PROCEDURE — 63600175 PHARM REV CODE 636 W HCPCS: Mod: HCNC | Performed by: INTERNAL MEDICINE

## 2020-02-21 PROCEDURE — 1101F PT FALLS ASSESS-DOCD LE1/YR: CPT | Mod: HCNC,CPTII,S$GLB, | Performed by: INTERNAL MEDICINE

## 2020-02-21 PROCEDURE — 99213 PR OFFICE/OUTPT VISIT, EST, LEVL III, 20-29 MIN: ICD-10-PCS | Mod: HCNC,S$GLB,, | Performed by: INTERNAL MEDICINE

## 2020-02-21 PROCEDURE — 96372 PR INJECTION,THERAP/PROPH/DIAG2ST, IM OR SUBCUT: ICD-10-PCS | Mod: HCNC,S$GLB,, | Performed by: INTERNAL MEDICINE

## 2020-02-21 PROCEDURE — 96372 THER/PROPH/DIAG INJ SC/IM: CPT | Mod: HCNC,S$GLB,, | Performed by: INTERNAL MEDICINE

## 2020-02-21 PROCEDURE — 1126F PR PAIN SEVERITY QUANTIFIED, NO PAIN PRESENT: ICD-10-PCS | Mod: HCNC,S$GLB,, | Performed by: INTERNAL MEDICINE

## 2020-02-21 PROCEDURE — 25000003 PHARM REV CODE 250: Mod: HCNC | Performed by: INTERNAL MEDICINE

## 2020-02-21 PROCEDURE — 96367 TX/PROPH/DG ADDL SEQ IV INF: CPT | Mod: HCNC

## 2020-02-21 PROCEDURE — 3074F PR MOST RECENT SYSTOLIC BLOOD PRESSURE < 130 MM HG: ICD-10-PCS | Mod: HCNC,CPTII,S$GLB, | Performed by: INTERNAL MEDICINE

## 2020-02-21 PROCEDURE — 1126F AMNT PAIN NOTED NONE PRSNT: CPT | Mod: HCNC,S$GLB,, | Performed by: INTERNAL MEDICINE

## 2020-02-21 PROCEDURE — 3074F SYST BP LT 130 MM HG: CPT | Mod: HCNC,CPTII,S$GLB, | Performed by: INTERNAL MEDICINE

## 2020-02-21 PROCEDURE — 1159F PR MEDICATION LIST DOCUMENTED IN MEDICAL RECORD: ICD-10-PCS | Mod: HCNC,S$GLB,, | Performed by: INTERNAL MEDICINE

## 2020-02-21 PROCEDURE — 1159F MED LIST DOCD IN RCRD: CPT | Mod: HCNC,S$GLB,, | Performed by: INTERNAL MEDICINE

## 2020-02-21 RX ORDER — ACETAMINOPHEN 325 MG/1
650 TABLET ORAL
Status: COMPLETED | OUTPATIENT
Start: 2020-02-21 | End: 2020-02-21

## 2020-02-21 RX ORDER — SODIUM CHLORIDE 0.9 % (FLUSH) 0.9 %
10 SYRINGE (ML) INJECTION
Status: CANCELLED | OUTPATIENT
Start: 2020-03-20

## 2020-02-21 RX ORDER — ACETAMINOPHEN 325 MG/1
650 TABLET ORAL
Status: CANCELLED | OUTPATIENT
Start: 2020-03-20

## 2020-02-21 RX ORDER — HEPARIN 100 UNIT/ML
500 SYRINGE INTRAVENOUS
Status: CANCELLED | OUTPATIENT
Start: 2020-03-20

## 2020-02-21 RX ORDER — SODIUM CHLORIDE 0.9 % (FLUSH) 0.9 %
10 SYRINGE (ML) INJECTION
Status: DISCONTINUED | OUTPATIENT
Start: 2020-02-21 | End: 2020-02-21 | Stop reason: HOSPADM

## 2020-02-21 RX ORDER — ZOLEDRONIC ACID 5 MG/100ML
5 INJECTION, SOLUTION INTRAVENOUS
Status: CANCELLED | OUTPATIENT
Start: 2020-03-20

## 2020-02-21 RX ADMIN — DIPHENHYDRAMINE HYDROCHLORIDE 25 MG: 50 INJECTION, SOLUTION INTRAMUSCULAR; INTRAVENOUS at 02:02

## 2020-02-21 RX ADMIN — SODIUM CHLORIDE 750 MG: 9 INJECTION, SOLUTION INTRAVENOUS at 03:02

## 2020-02-21 RX ADMIN — CYANOCOBALAMIN 1000 MCG: 1000 INJECTION, SOLUTION INTRAMUSCULAR at 02:02

## 2020-02-21 RX ADMIN — ACETAMINOPHEN 650 MG: 325 TABLET ORAL at 02:02

## 2020-02-21 NOTE — ASSESSMENT & PLAN NOTE
abatacept 750mg IV 11/1, 11/1, 11/29, 12/27 2019, 1/24, 2/21/2020    TJ 0 SJ 4 Pt global 0 ESR 21  CRP 1.1 DAS28 2.69(LDA)  [id number] 1.79(remission) CDAI  6(  LDA)    On Monday, decrease prednisone 5mg daily x 1wk, then 2.5mg daily x 1wk then stop  Message if flares, will resume prednisone low dose and then add leflunomide 20mg daily as she is intolerant of methotrexate  RTC 3 months with standing labs

## 2020-02-21 NOTE — PROGRESS NOTES
"Subjective:       Patient ID: Amy Manley is a 74 y.o. female.    Chief Complaint: RA RF- ACPA- CATHIE- v peripheral spondylarthritis; Osteopenia with elevated FRAX; gen OA  HPI tolerating abatacept 750mg IV started 11/1/19 dose today. Several weeks ago generalized flare, restarted prednisone 10mg daily x 1 wk, now 7.5mg daily and to decrease Monday to 5mg daily x 1wk, then 2.5mg daily x 1 wk, then stop. Intolerant in past of methotrexate, depression, malaise. Also tolerated zoledronic acid 12/12/21  Review of Systems   Constitutional: Positive for fatigue (1.5/10) and unexpected weight change. Negative for appetite change and fever.   HENT: Negative for mouth sores.    Eyes: Negative for visual disturbance.   Respiratory: Negative for cough, shortness of breath and wheezing.    Cardiovascular: Negative for chest pain and palpitations.   Gastrointestinal: Negative for abdominal pain, anal bleeding, blood in stool, constipation, diarrhea, nausea and vomiting.   Genitourinary: Negative for dysuria, frequency and urgency.   Musculoskeletal: Negative for arthralgias, back pain, gait problem, joint swelling, myalgias, neck pain and neck stiffness.   Skin: Negative for rash.   Neurological: Negative for weakness, numbness and headaches.   Hematological: Negative for adenopathy. Does not bruise/bleed easily.   Psychiatric/Behavioral: Negative for sleep disturbance. The patient is not nervous/anxious.          Objective:   BP 91/68   Pulse 63   Ht 5' 7.2" (1.707 m)   Wt 70.8 kg (156 lb)   LMP  (LMP Unknown) Comment: refused weight   BMI 24.29 kg/m²      Physical Exam             Results for AMY MANLEY (MRN [id number]) as of 2/21/2020 07:55   Ref. Range 2/17/2020 15:38   WBC Latest Ref Range: 3.90 - 12.70 K/uL 10.67   RBC Latest Ref Range: 4.00 - 5.40 M/uL 3.92 (L)   Hemoglobin Latest Ref Range: 12.0 - 16.0 g/dL 12.0   Hematocrit Latest Ref Range: 37.0 - 48.5 % 39.8   MCV Latest Ref Range: 82 - 98 fL 102 (H)   MCH " Latest Ref Range: 27.0 - 31.0 pg 30.6   MCHC Latest Ref Range: 32.0 - 36.0 g/dL 30.2 (L)   RDW Latest Ref Range: 11.5 - 14.5 % 13.8   Platelets Latest Ref Range: 150 - 350 K/uL 239   MPV Latest Ref Range: 9.2 - 12.9 fL 13.0 (H)   Gran% Latest Ref Range: 38.0 - 73.0 % 83.8 (H)   Gran # (ANC) Latest Ref Range: 1.8 - 7.7 K/uL 9.0 (H)   Lymph% Latest Ref Range: 18.0 - 48.0 % 12.3 (L)   Lymph # Latest Ref Range: 1.0 - 4.8 K/uL 1.3   Mono% Latest Ref Range: 4.0 - 15.0 % 3.4 (L)   Mono # Latest Ref Range: 0.3 - 1.0 K/uL 0.4   Eosinophil% Latest Ref Range: 0.0 - 8.0 % 0.1   Eos # Latest Ref Range: 0.0 - 0.5 K/uL 0.0   Basophil% Latest Ref Range: 0.0 - 1.9 % 0.2   Baso # Latest Ref Range: 0.00 - 0.20 K/uL 0.02   nRBC Latest Ref Range: 0 /100 WBC 0   Differential Method Unknown Automated   Immature Grans (Abs) Latest Ref Range: 0.00 - 0.04 K/uL 0.02   Immature Granulocytes Latest Ref Range: 0.0 - 0.5 % 0.2   Sed Rate Latest Ref Range: 0 - 36 mm/Hr 21   Sodium Latest Ref Range: 136 - 145 mmol/L 138   Potassium Latest Ref Range: 3.5 - 5.1 mmol/L 4.1   Chloride Latest Ref Range: 95 - 110 mmol/L 103   CO2 Latest Ref Range: 23 - 29 mmol/L 26   Anion Gap Latest Ref Range: 8 - 16 mmol/L 9   BUN, Bld Latest Ref Range: 8 - 23 mg/dL 30 (H)   Creatinine Latest Ref Range: 0.5 - 1.4 mg/dL 0.9   eGFR if non African American Latest Ref Range: >60 mL/min/1.73 m^2 >60.0   eGFR if African American Latest Ref Range: >60 mL/min/1.73 m^2 >60.0   Glucose Latest Ref Range: 70 - 110 mg/dL 102   Calcium Latest Ref Range: 8.7 - 10.5 mg/dL 9.1   Alkaline Phosphatase Latest Ref Range: 55 - 135 U/L 56   PROTEIN TOTAL Latest Ref Range: 6.0 - 8.4 g/dL 7.1   Albumin Latest Ref Range: 3.5 - 5.2 g/dL 4.0   BILIRUBIN TOTAL Latest Ref Range: 0.1 - 1.0 mg/dL 0.3   AST Latest Ref Range: 10 - 40 U/L 18   ALT Latest Ref Range: 10 - 44 U/L 15   CRP Latest Ref Range: 0.0 - 8.2 mg/L 1.1     Assessment/Plan         Rheumatoid arthritis involving both hands with  "negative rheumatoid factor    Osteopenia, unspecified location    Pulmonary nodule       Problem List Items Addressed This Visit     Osteopenia    Overview     The bone density shows osteopenia with elevated fracture risk and Bhumika will schedule brief office visit to discuss therapy options. [facility]   DXA Bone Density Spine And Hip   Order: [id number]   Status:  Final result   Visible to patient:  Yes (Patient Portal)   Next appt:  11/14/2019 at 02:00 PM in Infectious Diseases (INFECTIOUS INFUSION, CHAIR 8)   Dx:  Menopause; Osteopenia, unspecified lo...   Details     Reading Physician Reading Date Result Priority   Mikey Burns MD 11/5/2019       Narrative     EXAMINATION:  DEXA BONE DENSITY SPINE HIP    CLINICAL HISTORY:  suspected osteoporosis; Asymptomatic menopausal state74 y/o female with a history of fractured left foot at 56 y/o.  She had a hysterectomy at 36 y/o and menopausal symptoms at 51 y/o.  She has lost at least 2 "in height since age 26.  She is taking Calcium, Vit D and 7.5 mg of Prednisone.  She has a history of Rheumatoid Arthritis.  She does not exercise or smoke.    TECHNIQUE:  DXA specification: [facility] Touch[employer] [id number].    Bone Mineral Density scanning was performed over the hip and lumbar spine. Review of the images confirms satisfactory positioning and technique.    COMPARISON:  Comparison study done on 04/21/2017. Lumbar spine BMD 0.979 g/cm2 and the T-score -0.6.  The Total Hip BMD 0.726 g/cm2 and the T-score -1.8.    FINDINGS:  Lumbar Spine: Lumbar bone mineral density L1-L4 is 0.940g/cm2, which is a T-score of -1.0. The Z-score is 1.3.    Total Hip: Total hip bone mineral density is 0.670g/cm2.  The T-score is -2.2, and the Z-score is -0.5.    Femoral neck: Bone mineral density is 0.602g/cm2 and the T-score is -2.2 and the Z-score is -0.2 g/cm2.    There is a 26 % risk of a major osteoporotic fracture and a 8.2% risk of hip fracture in the next 10 years (FRAX).    Compared " with previous DXA, BMD at the lumbar spine has declined 4% since 2017, and the BMD at the total hip has declined 8%.      Impression       Osteopenia: FRAX Score supports osteoporosis treatment.  In addition taking prednisone.    RECOMMENDATIONS of [facility]:    1) Adequate calcium and Vitamin D therapy    2) Appropriate exercise    3) Consider medical therapy including bisphosphonates or PTH analogue.    4) Consider repeat BMD in 2 years           Results for AMY MANLEY (MRN [id number]) as of 11/7/2019 13:42   Ref. Range 8/30/2019 13:50   TSH Latest Ref Range: 0.400 - 4.000 uIU/mL 0.540   Results for AMY MANLEY (MRN [id number]) as of 11/7/2019 13:42   Ref. Range 1/11/2019 11:43   Vit D, 25-Hydroxy Latest Ref Range: 30 - 96 ng/mL 63            Current Assessment & Plan     S/p zoledronic acid 5mg IV 12/12/19 next dose 12/12/21         Pulmonary nodule    Overview     6 mm rll nodule.  pft wnl with mild decreased 65%.      Saw Dr. Coffman in Pulmonary:  Problem List Items Addressed This Visit           GAURAV (obstructive sleep apnea)      Current Assessment & Plan        Doing well with apap            Pulmonary nodule      Overview        6 mm rll nodule.  pft wnl with mild decreased 65%.              Current Assessment & Plan        No evidence of parenchymal lung disease based upon ct of chest.  No contra-indication from pulmonary perspective in regard to amiodarone.  Will need to repeat ct in 6 months for pulmonary nodule.  Annual pft while on amiodarone.              Relevant Orders      CT Chest Without Contrast                Other Visit Diagnoses      Lung nodule         Relevant Orders     CT Chest Without Contrast                Patient will Follow up in about 6 months (around 5/21/2020). with md/np.           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    Current Assessment & Plan     abatacept 750mg IV 11/1, 11/1, 11/29, 12/27 2019,  1/24, 2/21/2020    TJ 0 SJ 4 Pt global 0 ESR 21  CRP 1.1 DAS28 2.69(LDA)  [id number] 1.79(remission) CDAI  6(  LDA)    On Monday, decrease prednisone 5mg daily x 1wk, then 2.5mg daily x 1wk then stop  Message if flares, will resume prednisone low dose and then add leflunomide 20mg daily as she is intolerant of methotrexate  RTC 3 months with standing labs

## 2020-02-21 NOTE — PATIENT INSTRUCTIONS
Decrease prednisone to 5mg daily on Monday x 1 wk then decrease to 2.5mg daily x 1 wk, then stop  If flare, message and will resume prednisone, and plan to start leflunomide 20mg

## 2020-02-21 NOTE — PROGRESS NOTES
Patient arrived for Orencia infusion. Pt received Tylenol 650mg and Benadryl 25mg IVPB 30mins prior to infusion. Pt tolerated infusion well and left in NAD.

## 2020-03-09 ENCOUNTER — PATIENT OUTREACH (OUTPATIENT)
Dept: OTHER | Facility: OTHER | Age: 75
End: 2020-03-09

## 2020-03-09 RX ORDER — POTASSIUM CHLORIDE 600 MG/1
TABLET, FILM COATED, EXTENDED RELEASE ORAL
Qty: 90 TABLET | Refills: 3 | Status: SHIPPED | OUTPATIENT
Start: 2020-03-09 | End: 2020-11-04

## 2020-03-09 NOTE — PROGRESS NOTES
Digital Medicine: Clinician Follow-Up    Patient reports 34 pound intentional weight loss through [employer]. She enjoys [employer] and says it is a good fit for her. States she has not had liquor since 8/2019.  She states Orencia is working well for RA.    The history is provided by the patient.     Follow Up  Follow-up reason(s): reading review          INTERVENTION(S)  encouragement/support and denied questions    PLAN  patient verbalizes understanding and continue monitoring    BP remains at goal  Reviewed that current regimen more for HF than htn  Continue current regimen and monitoring      There are no preventive care reminders to display for this patient.    Last 5 Patient Entered Readings                                      Current 30 Day Average: 109/73     Recent Readings 3/8/2020 2/25/2020 2/13/2020 1/5/2020 12/22/2019    SBP (mmHg) 108 99 121 116 93    DBP (mmHg) 77 62 80 72 67    Pulse 62 65 63 66 76             Hypertension Medications             carvedilol (COREG) 6.25 MG tablet Take 1 tablet (6.25 mg total) by mouth 2 (two) times daily with meals.    furosemide (LASIX) 40 MG tablet     sacubitril-valsartan (ENTRESTO) 24-26 mg per tablet Take 1 tablet by mouth 2 (two) times daily.                 Screenings

## 2020-03-13 ENCOUNTER — PATIENT MESSAGE (OUTPATIENT)
Dept: RHEUMATOLOGY | Facility: CLINIC | Age: 75
End: 2020-03-13

## 2020-03-15 ENCOUNTER — TELEPHONE (OUTPATIENT)
Dept: RHEUMATOLOGY | Facility: CLINIC | Age: 75
End: 2020-03-15

## 2020-03-16 ENCOUNTER — TELEPHONE (OUTPATIENT)
Dept: CARDIOLOGY | Facility: CLINIC | Age: 75
End: 2020-03-16

## 2020-03-16 NOTE — TELEPHONE ENCOUNTER
----- Message from Mayte Diaz sent at 3/16/2020  8:47 AM CDT -----  Contact: pt  Pt had echo today but cancelled and would like a call back to get it r/s. Pt can be reached at [phone]      Thanks,  Mayte Diaz

## 2020-03-16 NOTE — TELEPHONE ENCOUNTER
Returned call to patient--states needs to reschedule echo and labs to date closer to appointment with Dr. Norbert Murray on 5/28/20--appointments have been rescheduled--patient has tanmay given all appointment information

## 2020-03-19 ENCOUNTER — TELEPHONE (OUTPATIENT)
Dept: INFECTIOUS DISEASES | Facility: HOSPITAL | Age: 75
End: 2020-03-19

## 2020-03-19 NOTE — TELEPHONE ENCOUNTER
Contacted patient this morning to confirm infusion appt.  Pt denied any contact with anyone who is sick and denied any recent travel.  Advised patient on temperature screening upon arrival and 0  visitor policy.  Pt verbalized understanding and confirmed appt.

## 2020-03-20 ENCOUNTER — INFUSION (OUTPATIENT)
Dept: INFECTIOUS DISEASES | Facility: HOSPITAL | Age: 75
End: 2020-03-20
Attending: INTERNAL MEDICINE
Payer: MEDICARE

## 2020-03-20 ENCOUNTER — CLINICAL SUPPORT (OUTPATIENT)
Dept: INFECTIOUS DISEASES | Facility: CLINIC | Age: 75
End: 2020-03-20
Payer: MEDICARE

## 2020-03-20 VITALS
SYSTOLIC BLOOD PRESSURE: 119 MMHG | HEART RATE: 64 BPM | BODY MASS INDEX: 24.33 KG/M2 | HEIGHT: 67 IN | RESPIRATION RATE: 18 BRPM | DIASTOLIC BLOOD PRESSURE: 67 MMHG | OXYGEN SATURATION: 94 % | TEMPERATURE: 98 F | WEIGHT: 155 LBS

## 2020-03-20 DIAGNOSIS — M06.041 RHEUMATOID ARTHRITIS INVOLVING BOTH HANDS WITH NEGATIVE RHEUMATOID FACTOR: Primary | ICD-10-CM

## 2020-03-20 DIAGNOSIS — M06.042 RHEUMATOID ARTHRITIS INVOLVING BOTH HANDS WITH NEGATIVE RHEUMATOID FACTOR: Primary | ICD-10-CM

## 2020-03-20 PROCEDURE — 25000003 PHARM REV CODE 250: Mod: HCNC | Performed by: INTERNAL MEDICINE

## 2020-03-20 PROCEDURE — 63600175 PHARM REV CODE 636 W HCPCS: Mod: JG,HCNC | Performed by: INTERNAL MEDICINE

## 2020-03-20 PROCEDURE — 96372 THER/PROPH/DIAG INJ SC/IM: CPT | Mod: HCNC,S$GLB,, | Performed by: INTERNAL MEDICINE

## 2020-03-20 PROCEDURE — 96372 PR INJECTION,THERAP/PROPH/DIAG2ST, IM OR SUBCUT: ICD-10-PCS | Mod: HCNC,S$GLB,, | Performed by: INTERNAL MEDICINE

## 2020-03-20 PROCEDURE — 96365 THER/PROPH/DIAG IV INF INIT: CPT | Mod: HCNC

## 2020-03-20 PROCEDURE — 96367 TX/PROPH/DG ADDL SEQ IV INF: CPT | Mod: HCNC

## 2020-03-20 RX ORDER — SODIUM CHLORIDE 0.9 % (FLUSH) 0.9 %
10 SYRINGE (ML) INJECTION
Status: DISCONTINUED | OUTPATIENT
Start: 2020-03-20 | End: 2020-03-20 | Stop reason: HOSPADM

## 2020-03-20 RX ORDER — ZOLEDRONIC ACID 5 MG/100ML
5 INJECTION, SOLUTION INTRAVENOUS
Status: CANCELLED | OUTPATIENT
Start: 2020-04-17

## 2020-03-20 RX ORDER — SODIUM CHLORIDE 0.9 % (FLUSH) 0.9 %
10 SYRINGE (ML) INJECTION
Status: CANCELLED | OUTPATIENT
Start: 2020-04-17

## 2020-03-20 RX ORDER — ACETAMINOPHEN 325 MG/1
650 TABLET ORAL
Status: CANCELLED | OUTPATIENT
Start: 2020-04-17

## 2020-03-20 RX ORDER — HEPARIN 100 UNIT/ML
500 SYRINGE INTRAVENOUS
Status: CANCELLED | OUTPATIENT
Start: 2020-04-17

## 2020-03-20 RX ORDER — ACETAMINOPHEN 325 MG/1
650 TABLET ORAL
Status: COMPLETED | OUTPATIENT
Start: 2020-03-20 | End: 2020-03-20

## 2020-03-20 RX ADMIN — DIPHENHYDRAMINE HYDROCHLORIDE 25 MG: 50 INJECTION, SOLUTION INTRAMUSCULAR; INTRAVENOUS at 01:03

## 2020-03-20 RX ADMIN — ACETAMINOPHEN 650 MG: 325 TABLET ORAL at 01:03

## 2020-03-20 RX ADMIN — SODIUM CHLORIDE 750 MG: 9 INJECTION, SOLUTION INTRAVENOUS at 01:03

## 2020-03-20 RX ADMIN — CYANOCOBALAMIN 1000 MCG: 1000 INJECTION, SOLUTION INTRAMUSCULAR at 01:03

## 2020-03-23 ENCOUNTER — CLINICAL SUPPORT (OUTPATIENT)
Dept: INTERNAL MEDICINE | Facility: CLINIC | Age: 75
End: 2020-03-23
Payer: MEDICARE

## 2020-03-23 ENCOUNTER — PATIENT MESSAGE (OUTPATIENT)
Dept: INTERNAL MEDICINE | Facility: CLINIC | Age: 75
End: 2020-03-23

## 2020-03-23 ENCOUNTER — TELEPHONE (OUTPATIENT)
Dept: RHEUMATOLOGY | Facility: CLINIC | Age: 75
End: 2020-03-23

## 2020-03-23 DIAGNOSIS — R05.9 COUGH: Primary | ICD-10-CM

## 2020-03-23 DIAGNOSIS — M06.09 RHEUMATOID ARTHRITIS OF MULTIPLE SITES WITH NEGATIVE RHEUMATOID FACTOR: Primary | ICD-10-CM

## 2020-03-23 DIAGNOSIS — R50.9 FEVER, UNSPECIFIED FEVER CAUSE: Primary | ICD-10-CM

## 2020-03-23 PROCEDURE — U0002 COVID-19 LAB TEST NON-CDC: HCPCS | Mod: HCNC

## 2020-03-23 RX ORDER — LEFLUNOMIDE 20 MG/1
20 TABLET ORAL DAILY
Qty: 30 TABLET | Refills: 2 | Status: SHIPPED | OUTPATIENT
Start: 2020-03-23 | End: 2020-05-21 | Stop reason: SDUPTHER

## 2020-03-24 ENCOUNTER — PATIENT OUTREACH (OUTPATIENT)
Dept: OTHER | Facility: OTHER | Age: 75
End: 2020-03-24

## 2020-03-24 NOTE — PROGRESS NOTES
Digital Medicine: Health  Follow-Up    The history is provided by the patient.     Follow Up  Follow-up reason(s): routine education    Patient stated that she is doing well. She did get tested for covid-19 out of precaution due to a bad sore throat she is having. Her PCP recommended that she get tested. Other then that, she is feeling good (she thinks her symptoms are just symptoms of allergy season).     She will continue working on submitting at least 1 BP reading each week.       Intervention/Plan    There are no preventive care reminders to display for this patient.    Last 5 Patient Entered Readings                                      Current 30 Day Average: 104/72     Recent Readings 3/22/2020 3/8/2020 2/25/2020 2/13/2020 1/5/2020    SBP (mmHg) 105 108 99 121 116    DBP (mmHg) 76 77 62 80 72    Pulse 78 62 65 63 66                      Medication Adherence Screening   She did not miss a dose this month.    Patient identified the following reasons for non-compliance: None      SDOH

## 2020-03-25 ENCOUNTER — TELEPHONE (OUTPATIENT)
Dept: PHARMACY | Facility: CLINIC | Age: 75
End: 2020-03-25

## 2020-03-25 LAB — SARS-COV-2 RNA RESP QL NAA+PROBE: NOT DETECTED

## 2020-03-25 NOTE — TELEPHONE ENCOUNTER
DOCUMENTATION ONLY:     Prior Authorization for Leflunomide is not required per Radha at insurance.      Case ID# none    Co-Pay: $12.00    Patient Assistance IS NOT required.     Forward to clinical pharmacist for consult & shipment.      FRANCHESKA

## 2020-03-25 NOTE — TELEPHONE ENCOUNTER
Informed Patient  that [facility] received prescription for Arava and benefits investigation is required.  [facility] will be back in touch once insurance determination is received.

## 2020-04-04 ENCOUNTER — PATIENT MESSAGE (OUTPATIENT)
Dept: ADMINISTRATIVE | Facility: OTHER | Age: 75
End: 2020-04-04

## 2020-04-07 ENCOUNTER — TELEPHONE (OUTPATIENT)
Dept: PODIATRY | Facility: CLINIC | Age: 75
End: 2020-04-07

## 2020-04-07 NOTE — TELEPHONE ENCOUNTER
Spoke with patient and reminded them of appointment Thursday with the MD.  Patient verbalized understanding.     Rosie Galindo LPN

## 2020-04-07 NOTE — TELEPHONE ENCOUNTER
----- Message from MIKE Chahal sent at 8/10/2018  1:13 PM CDT -----  Eulalio Huerta, please help us out with that pat., see message below, thank you, Renetta  ----- Message -----  From: Marychuy Moser DPM  Sent: 8/10/2018  12:20 PM  To: Ehsan Perrin Staff    This patient would like an appointment with [facility] for a R 4th/5th toe ulceration. How do we set this up>      No

## 2020-04-09 ENCOUNTER — OFFICE VISIT (OUTPATIENT)
Dept: PODIATRY | Facility: CLINIC | Age: 75
End: 2020-04-09
Payer: MEDICARE

## 2020-04-09 VITALS
DIASTOLIC BLOOD PRESSURE: 58 MMHG | SYSTOLIC BLOOD PRESSURE: 101 MMHG | BODY MASS INDEX: 24.27 KG/M2 | HEIGHT: 67 IN | HEART RATE: 68 BPM

## 2020-04-09 DIAGNOSIS — B35.1 DERMATOPHYTOSIS OF NAIL: ICD-10-CM

## 2020-04-09 DIAGNOSIS — L84 HELOMA MOLLE: Primary | ICD-10-CM

## 2020-04-09 PROCEDURE — 99499 NO LOS: ICD-10-PCS | Mod: HCNC,,, | Performed by: PODIATRIST

## 2020-04-09 PROCEDURE — 99999 PR PBB SHADOW E&M-EST. PATIENT-LVL III: ICD-10-PCS | Mod: PBBFAC,HCNC,, | Performed by: PODIATRIST

## 2020-04-09 PROCEDURE — 99499 UNLISTED E&M SERVICE: CPT | Mod: HCNC,,, | Performed by: PODIATRIST

## 2020-04-09 PROCEDURE — 99999 PR PBB SHADOW E&M-EST. PATIENT-LVL III: CPT | Mod: PBBFAC,HCNC,, | Performed by: PODIATRIST

## 2020-04-09 PROCEDURE — 17999 PR NON-COVERED FOOT CARE: ICD-10-PCS | Mod: CSM,HCNC,S$GLB, | Performed by: PODIATRIST

## 2020-04-09 PROCEDURE — 17999 UNLISTD PX SKN MUC MEMB SUBQ: CPT | Mod: CSM,HCNC,S$GLB, | Performed by: PODIATRIST

## 2020-04-09 NOTE — PROGRESS NOTES
"Vitals:    04/09/20 1047   BP: (!) 101/58   Pulse: 68   Height: 5' 7" (1.702 m)       Heloma molle - Right Foot    Dermatophytosis of nail        Patient presents to the clinic for non-covered routine foot care.   Patient understands this is not typically a covered service every 4-6 weeks and patient is aware of responsibility of payment. Pedal pulses are palpable. No known risk factors requiring routine foot care.  nails and heloma molle were reduced and trimmed bilaterally. Patient tolerated well.       Continue lambs wool and shoe modification.     Follow up 4-5 weeks Proc B.   "

## 2020-04-14 ENCOUNTER — LAB VISIT (OUTPATIENT)
Dept: INTERNAL MEDICINE | Facility: CLINIC | Age: 75
End: 2020-04-14
Payer: MEDICARE

## 2020-04-14 ENCOUNTER — TELEPHONE (OUTPATIENT)
Dept: INFECTIOUS DISEASES | Facility: HOSPITAL | Age: 75
End: 2020-04-14

## 2020-04-14 DIAGNOSIS — M06.042 RHEUMATOID ARTHRITIS INVOLVING BOTH HANDS WITH NEGATIVE RHEUMATOID FACTOR: ICD-10-CM

## 2020-04-14 DIAGNOSIS — M06.042 RHEUMATOID ARTHRITIS INVOLVING BOTH HANDS WITH NEGATIVE RHEUMATOID FACTOR: Primary | ICD-10-CM

## 2020-04-14 DIAGNOSIS — M06.041 RHEUMATOID ARTHRITIS INVOLVING BOTH HANDS WITH NEGATIVE RHEUMATOID FACTOR: Primary | ICD-10-CM

## 2020-04-14 DIAGNOSIS — M06.041 RHEUMATOID ARTHRITIS INVOLVING BOTH HANDS WITH NEGATIVE RHEUMATOID FACTOR: ICD-10-CM

## 2020-04-14 LAB — SARS-COV-2 RNA RESP QL NAA+PROBE: NOT DETECTED

## 2020-04-14 PROCEDURE — U0002 COVID-19 LAB TEST NON-CDC: HCPCS | Mod: HCNC

## 2020-04-14 NOTE — TELEPHONE ENCOUNTER
Contacted patient this morning to discuss infusion appt.  Pt denied any contact with anyone who is sick and denied any recent travel.  Advised patient on temperature screening upon arrival and restrictions on visitor policy.     Discussed the following with the patient:     In an effort to protect our immunocompromised patients from potential exposure to COVID-19, [facility] will now require all patients receiving an infusion or injection to be tested for COVID-19 prior to their appointment.  All patients must be tested within 72 hours of their appointment.     Pt verbalized understanding and COVID-19 screening appt scheduled.  Will contact patient with results prior to scheduled infusion.

## 2020-04-15 ENCOUNTER — TELEPHONE (OUTPATIENT)
Dept: INFECTIOUS DISEASES | Facility: CLINIC | Age: 75
End: 2020-04-15

## 2020-04-15 NOTE — TELEPHONE ENCOUNTER
----- Message from Etta Thomas MD sent at 4/15/2020  9:16 AM CDT -----  Jolene - please let her know her COVID-19 results returned negative and she can come to her infusion appointment on 4/17/2020. Thank you.

## 2020-04-17 ENCOUNTER — CLINICAL SUPPORT (OUTPATIENT)
Dept: INFECTIOUS DISEASES | Facility: CLINIC | Age: 75
End: 2020-04-17
Payer: MEDICARE

## 2020-04-17 ENCOUNTER — INFUSION (OUTPATIENT)
Dept: INFECTIOUS DISEASES | Facility: HOSPITAL | Age: 75
End: 2020-04-17
Payer: MEDICARE

## 2020-04-17 VITALS
SYSTOLIC BLOOD PRESSURE: 124 MMHG | TEMPERATURE: 99 F | WEIGHT: 150.69 LBS | HEART RATE: 65 BPM | OXYGEN SATURATION: 100 % | DIASTOLIC BLOOD PRESSURE: 75 MMHG | HEIGHT: 67 IN | BODY MASS INDEX: 23.65 KG/M2 | RESPIRATION RATE: 18 BRPM

## 2020-04-17 DIAGNOSIS — M06.041 RHEUMATOID ARTHRITIS INVOLVING BOTH HANDS WITH NEGATIVE RHEUMATOID FACTOR: Primary | ICD-10-CM

## 2020-04-17 DIAGNOSIS — M06.042 RHEUMATOID ARTHRITIS INVOLVING BOTH HANDS WITH NEGATIVE RHEUMATOID FACTOR: Primary | ICD-10-CM

## 2020-04-17 PROCEDURE — 96367 TX/PROPH/DG ADDL SEQ IV INF: CPT | Mod: HCNC

## 2020-04-17 PROCEDURE — 96365 THER/PROPH/DIAG IV INF INIT: CPT | Mod: HCNC

## 2020-04-17 PROCEDURE — 96372 PR INJECTION,THERAP/PROPH/DIAG2ST, IM OR SUBCUT: ICD-10-PCS | Mod: HCNC,S$GLB,, | Performed by: INTERNAL MEDICINE

## 2020-04-17 PROCEDURE — 63600175 PHARM REV CODE 636 W HCPCS: Mod: JG,HCNC | Performed by: INTERNAL MEDICINE

## 2020-04-17 PROCEDURE — 25000003 PHARM REV CODE 250: Mod: HCNC | Performed by: INTERNAL MEDICINE

## 2020-04-17 PROCEDURE — 96372 THER/PROPH/DIAG INJ SC/IM: CPT | Mod: HCNC,S$GLB,, | Performed by: INTERNAL MEDICINE

## 2020-04-17 RX ORDER — ZOLEDRONIC ACID 5 MG/100ML
5 INJECTION, SOLUTION INTRAVENOUS
Status: CANCELLED | OUTPATIENT
Start: 2020-05-15

## 2020-04-17 RX ORDER — ACETAMINOPHEN 325 MG/1
650 TABLET ORAL
Status: CANCELLED | OUTPATIENT
Start: 2020-05-15

## 2020-04-17 RX ORDER — ACETAMINOPHEN 325 MG/1
650 TABLET ORAL
Status: COMPLETED | OUTPATIENT
Start: 2020-04-17 | End: 2020-04-17

## 2020-04-17 RX ORDER — SODIUM CHLORIDE 0.9 % (FLUSH) 0.9 %
10 SYRINGE (ML) INJECTION
Status: DISCONTINUED | OUTPATIENT
Start: 2020-04-17 | End: 2020-04-17 | Stop reason: HOSPADM

## 2020-04-17 RX ORDER — SODIUM CHLORIDE 0.9 % (FLUSH) 0.9 %
10 SYRINGE (ML) INJECTION
Status: CANCELLED | OUTPATIENT
Start: 2020-05-15

## 2020-04-17 RX ORDER — HEPARIN 100 UNIT/ML
500 SYRINGE INTRAVENOUS
Status: CANCELLED | OUTPATIENT
Start: 2020-05-15

## 2020-04-17 RX ADMIN — DIPHENHYDRAMINE HYDROCHLORIDE 25 MG: 50 INJECTION, SOLUTION INTRAMUSCULAR; INTRAVENOUS at 01:04

## 2020-04-17 RX ADMIN — SODIUM CHLORIDE: 9 INJECTION, SOLUTION INTRAVENOUS at 01:04

## 2020-04-17 RX ADMIN — CYANOCOBALAMIN 1000 MCG: 1000 INJECTION, SOLUTION INTRAMUSCULAR at 01:04

## 2020-04-17 RX ADMIN — SODIUM CHLORIDE 750 MG: 9 INJECTION, SOLUTION INTRAVENOUS at 02:04

## 2020-04-17 RX ADMIN — ACETAMINOPHEN 650 MG: 325 TABLET ORAL at 01:04

## 2020-04-17 NOTE — PROGRESS NOTES
Patient arrived for Orencia infusion. Pt received Tylenol 650mg and Benadryl 25mg IVPB 30mins prior to infusion. NS 100cc bag infusing at 25cc/hr. Pt tolerated infusion well and left in NAD.

## 2020-05-04 ENCOUNTER — HOSPITAL ENCOUNTER (OUTPATIENT)
Dept: RADIOLOGY | Facility: HOSPITAL | Age: 75
Discharge: HOME | End: 2020-05-04
Attending: INTERNAL MEDICINE
Payer: MEDICARE

## 2020-05-04 DIAGNOSIS — R91.1 LUNG NODULE: ICD-10-CM

## 2020-05-04 DIAGNOSIS — R91.1 PULMONARY NODULE: ICD-10-CM

## 2020-05-04 PROCEDURE — 71250 CT THORAX DX C-: CPT | Mod: 26,HCNC,, | Performed by: RADIOLOGY

## 2020-05-04 PROCEDURE — 71250 CT CHEST WITHOUT CONTRAST: ICD-10-PCS | Mod: 26,HCNC,, | Performed by: RADIOLOGY

## 2020-05-04 PROCEDURE — 71250 CT THORAX DX C-: CPT | Mod: TC,HCNC

## 2020-05-06 ENCOUNTER — TELEPHONE (OUTPATIENT)
Dept: PULMONOLOGY | Facility: CLINIC | Age: 75
End: 2020-05-06

## 2020-05-06 ENCOUNTER — PATIENT MESSAGE (OUTPATIENT)
Dept: INTERNAL MEDICINE | Facility: CLINIC | Age: 75
End: 2020-05-06

## 2020-05-06 NOTE — TELEPHONE ENCOUNTER
----- Message from Efrain Coffman MD sent at 5/6/2020  9:28 AM CDT -----  When compared to prior ct 11/6/19.  0.6 rll nodule stable but new 0.3 rll nodule (image 233).  1 year follow up ct of chest.  Please schedule clinic follow up to discuss ct result.

## 2020-05-08 ENCOUNTER — PATIENT MESSAGE (OUTPATIENT)
Dept: INTERNAL MEDICINE | Facility: CLINIC | Age: 75
End: 2020-05-08

## 2020-05-08 DIAGNOSIS — N28.1 KIDNEY CYST, ACQUIRED: Primary | ICD-10-CM

## 2020-05-12 ENCOUNTER — OFFICE VISIT (OUTPATIENT)
Dept: PULMONOLOGY | Facility: CLINIC | Age: 75
End: 2020-05-12
Payer: MEDICARE

## 2020-05-12 VITALS
TEMPERATURE: 98 F | HEIGHT: 67 IN | BODY MASS INDEX: 23.08 KG/M2 | DIASTOLIC BLOOD PRESSURE: 66 MMHG | HEART RATE: 80 BPM | OXYGEN SATURATION: 95 % | SYSTOLIC BLOOD PRESSURE: 97 MMHG | WEIGHT: 147.06 LBS

## 2020-05-12 DIAGNOSIS — G47.33 OSA (OBSTRUCTIVE SLEEP APNEA): ICD-10-CM

## 2020-05-12 DIAGNOSIS — R91.1 LUNG NODULE: ICD-10-CM

## 2020-05-12 DIAGNOSIS — R91.1 PULMONARY NODULE: ICD-10-CM

## 2020-05-12 PROCEDURE — 3078F DIAST BP <80 MM HG: CPT | Mod: HCNC,CPTII,S$GLB, | Performed by: INTERNAL MEDICINE

## 2020-05-12 PROCEDURE — 1101F PR PT FALLS ASSESS DOC 0-1 FALLS W/OUT INJ PAST YR: ICD-10-PCS | Mod: HCNC,CPTII,S$GLB, | Performed by: INTERNAL MEDICINE

## 2020-05-12 PROCEDURE — 1126F AMNT PAIN NOTED NONE PRSNT: CPT | Mod: HCNC,S$GLB,, | Performed by: INTERNAL MEDICINE

## 2020-05-12 PROCEDURE — 1159F MED LIST DOCD IN RCRD: CPT | Mod: HCNC,S$GLB,, | Performed by: INTERNAL MEDICINE

## 2020-05-12 PROCEDURE — 1159F PR MEDICATION LIST DOCUMENTED IN MEDICAL RECORD: ICD-10-PCS | Mod: HCNC,S$GLB,, | Performed by: INTERNAL MEDICINE

## 2020-05-12 PROCEDURE — 3078F PR MOST RECENT DIASTOLIC BLOOD PRESSURE < 80 MM HG: ICD-10-PCS | Mod: HCNC,CPTII,S$GLB, | Performed by: INTERNAL MEDICINE

## 2020-05-12 PROCEDURE — 99214 PR OFFICE/OUTPT VISIT, EST, LEVL IV, 30-39 MIN: ICD-10-PCS | Mod: HCNC,S$GLB,, | Performed by: INTERNAL MEDICINE

## 2020-05-12 PROCEDURE — 99999 PR PBB SHADOW E&M-EST. PATIENT-LVL IV: ICD-10-PCS | Mod: PBBFAC,HCNC,, | Performed by: INTERNAL MEDICINE

## 2020-05-12 PROCEDURE — 99214 OFFICE O/P EST MOD 30 MIN: CPT | Mod: HCNC,S$GLB,, | Performed by: INTERNAL MEDICINE

## 2020-05-12 PROCEDURE — 3074F PR MOST RECENT SYSTOLIC BLOOD PRESSURE < 130 MM HG: ICD-10-PCS | Mod: HCNC,CPTII,S$GLB, | Performed by: INTERNAL MEDICINE

## 2020-05-12 PROCEDURE — 3074F SYST BP LT 130 MM HG: CPT | Mod: HCNC,CPTII,S$GLB, | Performed by: INTERNAL MEDICINE

## 2020-05-12 PROCEDURE — 99999 PR PBB SHADOW E&M-EST. PATIENT-LVL IV: CPT | Mod: PBBFAC,HCNC,, | Performed by: INTERNAL MEDICINE

## 2020-05-12 PROCEDURE — 1101F PT FALLS ASSESS-DOCD LE1/YR: CPT | Mod: HCNC,CPTII,S$GLB, | Performed by: INTERNAL MEDICINE

## 2020-05-12 PROCEDURE — 1126F PR PAIN SEVERITY QUANTIFIED, NO PAIN PRESENT: ICD-10-PCS | Mod: HCNC,S$GLB,, | Performed by: INTERNAL MEDICINE

## 2020-05-12 NOTE — PROGRESS NOTES
Rizwana Block  was seen as a follow up.    CHIEF COMPLAINT:  Results (CT results)      HISTORY OF PRESENT ILLNESS: Rizwana Block is a 74 y.o. female  has a past medical history of Arrhythmia, Chest pain (5/27/2016), CHF (congestive heart failure), Hypertension, Osteopenia, Rheumatoid arthritis (07/2019), and Sleep apnea.  Patient was started on amiodarone 200 mg for high pvc burden and s/p rfa.  Patient was s/p pft 8/27/19 due to chronnic  Amiodarone with dloc 65%.  Patient was seen by Dr. Mabry 10/30/19 with crackles on exam.  Patient s/p ct of chest and was referred to pulmonary for further inputs.      Patient denied wheezing.  Intermittent cough (1-2 times per day).   Patient used to smoked 1/2 ppd x 20 years.  Quit in 1970s.  Patient with intermittent chest tightness.  No hemoptysis.  +intentional weight loss with weight watcher.  Chronic back.  No family h/o of lung cancer.  Patient [place] from [place].  Patient denied taylor with adl.  Still work as an .  Up and down stairs without issue.      Since last visit, amiodarone was reduced from 200 mg to 100 mg daily.  During last appointment, ct with 6 mm rll nodule.  S/p repeat ct.  No issue with cough.        PAST MEDICAL HISTORY:    Active Ambulatory Problems     Diagnosis Date Noted    Ventricular premature beats 06/03/2013    Hammertoe 08/30/2013    Palpitations 07/31/2015    Costochondritis 03/16/2016    Heart failure, systolic 05/18/2016    Precordial pain 05/27/2016    Bradycardia 06/01/2016    Cardiomyopathy 06/01/2016    Non-rheumatic mitral regurgitation     Essential hypertension     S/P colonoscopy 05/24/2017    Osteopenia 05/24/2017    Pure hypercholesterolemia 06/01/2017    At risk for amiodarone toxicity with long term use 06/22/2018    Dysuria 07/30/2018    Vaginal atrophy 07/30/2018    Vaginal burning 07/30/2018    Open wound of right foot 08/23/2018    GAURAV (obstructive sleep apnea)     Osteoarthritis  of hand 2019    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 2019    Low back pain 10/18/2019    Pulmonary nodule 2019     Resolved Ambulatory Problems     Diagnosis Date Noted    Shortness of breath 2016    Unstable angina     PVC (premature ventricular contraction) 2016     Past Medical History:   Diagnosis Date    Arrhythmia     Chest pain 2016    CHF (congestive heart failure)     Hypertension     Rheumatoid arthritis 2019    Sleep apnea                 PAST SURGICAL HISTORY:    Past Surgical History:   Procedure Laterality Date    ANKLE FUSION      right    bladder surgery      with mesh    BLEPHAROPLASTY W/ LASER      CATARACT EXTRACTION, BILATERAL      HAND SURGERY      benign cyst on left    HYSTERECTOMY      heavy bleeding    PATELLA FRACTURE SURGERY      right- plate in tibial plateau    TONSILLECTOMY           FAMILY HISTORY:                Family History   Problem Relation Age of Onset    Heart disease Mother 63    Stroke Father 49    Heart disease Father     Early death Father     Cancer Maternal Aunt         bone    Cancer Maternal Uncle         esophageal    Heart disease Paternal Uncle     SIDS Daughter     Breast cancer Neg Hx     Ovarian cancer Neg Hx     Cervical cancer Neg Hx     Endometrial cancer Neg Hx     Vaginal cancer Neg Hx        SOCIAL HISTORY:          Tobacco:   Social History     Tobacco Use   Smoking Status Former Smoker    Packs/day: 0.25    Years: 24.00    Pack years: 6.00    Types: Cigarettes    Start date: 1961    Last attempt to quit: 1985    Years since quittin.3   Smokeless Tobacco Never Used     alcohol use:    Social History     Substance and Sexual Activity   Alcohol Use Yes    Alcohol/week: 5.0 standard drinks    Types: 5 Glasses of wine per week    Comment: socially               Occupation:  Retire; federal .    ALLERGIES:    Review  "of patient's allergies indicates:   Allergen Reactions    Dilaudid [hydromorphone (bulk)]      Severe nausea/vomiting    Methotrexate analogues      Deeply depressed    Morphine      Severe nausea/vomiting       CURRENT MEDICATIONS:    Current Outpatient Medications   Medication Sig Dispense Refill    amiodarone (PACERONE) 100 MG Tab Take 1 tablet (100 mg total) by mouth once daily. 30 tablet 6    aspirin (ECOTRIN) 81 MG EC tablet Take 81 mg by mouth once daily.      atorvastatin (LIPITOR) 10 MG tablet TAKE ONE TABLET BY MOUTH ONCE DAILY 30 tablet 6    carvedilol (COREG) 6.25 MG tablet Take 1 tablet (6.25 mg total) by mouth 2 (two) times daily with meals. 120 tablet 3    cholecalciferol, vitamin D3, (VITAMIN D3) 1,000 unit capsule Take 1,000 Units by mouth once daily.      co-enzyme Q-10 30 mg capsule Take 10 mg by mouth once daily.       furosemide (LASIX) 40 MG tablet       leflunomide (ARAVA) 20 MG Tab Take 1 tablet (20 mg total) by mouth once daily. 30 tablet 2    MULTIVITAMIN WITH MINERALS (HAIR,SKIN AND NAILS ORAL) Take by mouth once daily.      potassium chloride (KLOR-CON) 8 MEQ TbSR TAKE ONE TABLET BY MOUTH ONCE DAILY 90 tablet 3    sacubitril-valsartan (ENTRESTO) 24-26 mg per tablet Take 1 tablet by mouth 2 (two) times daily. 60 tablet 6    TURMERIC ORAL Take by mouth.       Current Facility-Administered Medications   Medication Dose Route Frequency Provider Last Rate Last Dose    cyanocobalamin injection 1,000 mcg  1,000 mcg Intramuscular Q30 Days Sri Gutierrez MD   1,000 mcg at 04/17/20 1306                  REVIEW OF SYSTEMS:   No acute changes from previous encounter dated 11/21/2019 with exceptions mentioned in HPI.             PHYSICAL EXAM:  Vitals:    05/12/20 1315   BP: 97/66   Pulse: 80   Temp: 97.7 °F (36.5 °C)   TempSrc: Oral   SpO2: 95%   Weight: 66.7 kg (147 lb 0.8 oz)   Height: 5' 7" (1.702 m)   PainSc: 0-No pain     Body mass index is 23.03 kg/m².     GENERAL:  well " develop; no apparent distress  HEENT:  no nasal congestion; no discharge noted; class 2 modified mallampatti.   NECK:  supple; no palpable masses.  CARDIO: regular rate and rhythm  PULM:  clear to auscultation bilaterally; no intercostals retractions; no accessory muscle usage   ABDOMEN:  soft nontender/nondistended.  +bowel sound  EXTREMITIES no cce  NEURO:  CN II-XII intact.  5/5 motor in all extremities.  sensation grossly intact   to light touch.  PSYCH:  normal affect.  Alert and oriented x 4    LABS  Pulmonary Functions Testing Results(personally reviewed):  8/27/19 ratio 83%; fvc 76%; dlco 65%  ABG (personally reviewed):  none  CXR (personally reviewed):  5/18/16 no consolidation or effusion  CT CHEST(personally reviewed):  11/6/19 no lad.  No ggo.  No increased reticulation; 6 mm nodule in rll.    5/4/20 stable rll nodule at 6 mm.  New 3 mm rll (image 233)    05/04/2019  lb. The overall AHI was 28.0 with an oxygen talib of 73.0%. The supine AHI was 36.9 and the REM AHI was 62.9.     8/15/19 echo  CONCLUSIONS     1 - Concentric hypertrophy.     2 - No wall motion abnormalities.     3 - Normal left ventricular systolic function (EF 55-60%).     4 - Normal left ventricular diastolic function.     5 - Normal right ventricular systolic function .     6 - The estimated PA systolic pressure is 27 mmHg.     7 - Trivial aortic regurgitation.     8 - Trivial tricuspid regurgitation.     ASSESSMENT/PLAN  Problem List Items Addressed This Visit     GAURAV (obstructive sleep apnea)    Overview     ahi 28         Current Assessment & Plan     Main barrier is dryness of throat and lips  Improve with increase humidifier setting.  Will switch to nasal pillow.  Advise patient to wear chin strap.           Relevant Orders    CPAP/BIPAP SUPPLIES    Pulmonary nodule    Overview     6 mm rll nodule stable.  New 3 mm rll.           Current Assessment & Plan     3 mm nodule is new.  Clinically asymptomatic.  Ct in 1 year.              Other Visit Diagnoses     Lung nodule        Relevant Orders    CT Chest Without Contrast          Patient will Follow up in about 1 year (around 5/12/2021), or if symptoms worsen or fail to improve. with md/np.

## 2020-05-12 NOTE — ASSESSMENT & PLAN NOTE
Main barrier is dryness of throat and lips  Improve with increase humidifier setting.  Will switch to nasal pillow.  Advise patient to wear chin strap.

## 2020-05-13 ENCOUNTER — TELEPHONE (OUTPATIENT)
Dept: RHEUMATOLOGY | Facility: CLINIC | Age: 75
End: 2020-05-13

## 2020-05-13 ENCOUNTER — PATIENT MESSAGE (OUTPATIENT)
Dept: RHEUMATOLOGY | Facility: CLINIC | Age: 75
End: 2020-05-13

## 2020-05-13 ENCOUNTER — LAB VISIT (OUTPATIENT)
Dept: INTERNAL MEDICINE | Facility: CLINIC | Age: 75
End: 2020-05-13
Payer: MEDICARE

## 2020-05-13 DIAGNOSIS — M06.041 RHEUMATOID ARTHRITIS INVOLVING BOTH HANDS WITH NEGATIVE RHEUMATOID FACTOR: ICD-10-CM

## 2020-05-13 DIAGNOSIS — M06.042 RHEUMATOID ARTHRITIS INVOLVING BOTH HANDS WITH NEGATIVE RHEUMATOID FACTOR: ICD-10-CM

## 2020-05-13 LAB — SARS-COV-2 RNA RESP QL NAA+PROBE: NOT DETECTED

## 2020-05-13 PROCEDURE — U0002 COVID-19 LAB TEST NON-CDC: HCPCS | Mod: HCNC

## 2020-05-14 ENCOUNTER — PATIENT MESSAGE (OUTPATIENT)
Dept: RHEUMATOLOGY | Facility: CLINIC | Age: 75
End: 2020-05-14

## 2020-05-14 ENCOUNTER — PATIENT MESSAGE (OUTPATIENT)
Dept: INFECTIOUS DISEASES | Facility: HOSPITAL | Age: 75
End: 2020-05-14

## 2020-05-15 ENCOUNTER — CLINICAL SUPPORT (OUTPATIENT)
Dept: INFECTIOUS DISEASES | Facility: CLINIC | Age: 75
End: 2020-05-15
Payer: MEDICARE

## 2020-05-15 ENCOUNTER — INFUSION (OUTPATIENT)
Dept: INFECTIOUS DISEASES | Facility: HOSPITAL | Age: 75
End: 2020-05-15
Attending: INTERNAL MEDICINE
Payer: MEDICARE

## 2020-05-15 ENCOUNTER — OFFICE VISIT (OUTPATIENT)
Dept: PODIATRY | Facility: CLINIC | Age: 75
End: 2020-05-15
Payer: MEDICARE

## 2020-05-15 VITALS
DIASTOLIC BLOOD PRESSURE: 59 MMHG | BODY MASS INDEX: 23.72 KG/M2 | OXYGEN SATURATION: 95 % | HEIGHT: 67 IN | WEIGHT: 151.13 LBS | RESPIRATION RATE: 18 BRPM | SYSTOLIC BLOOD PRESSURE: 115 MMHG | HEART RATE: 68 BPM | TEMPERATURE: 99 F

## 2020-05-15 VITALS
HEIGHT: 67 IN | TEMPERATURE: 97 F | BODY MASS INDEX: 23.08 KG/M2 | DIASTOLIC BLOOD PRESSURE: 60 MMHG | WEIGHT: 147.06 LBS | HEART RATE: 63 BPM | SYSTOLIC BLOOD PRESSURE: 120 MMHG

## 2020-05-15 DIAGNOSIS — M85.80 OSTEOPENIA, UNSPECIFIED LOCATION: ICD-10-CM

## 2020-05-15 DIAGNOSIS — M06.041 RHEUMATOID ARTHRITIS INVOLVING BOTH HANDS WITH NEGATIVE RHEUMATOID FACTOR: Primary | ICD-10-CM

## 2020-05-15 DIAGNOSIS — B35.1 DERMATOPHYTOSIS OF NAIL: ICD-10-CM

## 2020-05-15 DIAGNOSIS — M06.042 RHEUMATOID ARTHRITIS INVOLVING BOTH HANDS WITH NEGATIVE RHEUMATOID FACTOR: Primary | ICD-10-CM

## 2020-05-15 DIAGNOSIS — L60.2 LONG TOENAIL: ICD-10-CM

## 2020-05-15 DIAGNOSIS — L84 HELOMA MOLLE: Primary | ICD-10-CM

## 2020-05-15 PROCEDURE — 99499 NO LOS: ICD-10-PCS | Mod: HCNC,,, | Performed by: PODIATRIST

## 2020-05-15 PROCEDURE — 25000003 PHARM REV CODE 250: Mod: HCNC | Performed by: INTERNAL MEDICINE

## 2020-05-15 PROCEDURE — 99999 PR PBB SHADOW E&M-EST. PATIENT-LVL III: CPT | Mod: PBBFAC,HCNC,, | Performed by: PODIATRIST

## 2020-05-15 PROCEDURE — 96372 THER/PROPH/DIAG INJ SC/IM: CPT | Mod: HCNC,S$GLB,, | Performed by: INTERNAL MEDICINE

## 2020-05-15 PROCEDURE — 17999 PR NON-COVERED FOOT CARE: ICD-10-PCS | Mod: CSM,HCNC,S$GLB, | Performed by: PODIATRIST

## 2020-05-15 PROCEDURE — 99999 PR PBB SHADOW E&M-EST. PATIENT-LVL III: ICD-10-PCS | Mod: PBBFAC,HCNC,, | Performed by: PODIATRIST

## 2020-05-15 PROCEDURE — 96372 PR INJECTION,THERAP/PROPH/DIAG2ST, IM OR SUBCUT: ICD-10-PCS | Mod: HCNC,S$GLB,, | Performed by: INTERNAL MEDICINE

## 2020-05-15 PROCEDURE — 96365 THER/PROPH/DIAG IV INF INIT: CPT | Mod: HCNC

## 2020-05-15 PROCEDURE — 63600175 PHARM REV CODE 636 W HCPCS: Mod: HCNC | Performed by: INTERNAL MEDICINE

## 2020-05-15 PROCEDURE — 99499 UNLISTED E&M SERVICE: CPT | Mod: HCNC,,, | Performed by: PODIATRIST

## 2020-05-15 PROCEDURE — 96367 TX/PROPH/DG ADDL SEQ IV INF: CPT | Mod: HCNC

## 2020-05-15 PROCEDURE — 17999 UNLISTD PX SKN MUC MEMB SUBQ: CPT | Mod: CSM,HCNC,S$GLB, | Performed by: PODIATRIST

## 2020-05-15 RX ORDER — SODIUM CHLORIDE 0.9 % (FLUSH) 0.9 %
10 SYRINGE (ML) INJECTION
Status: CANCELLED | OUTPATIENT
Start: 2020-06-12

## 2020-05-15 RX ORDER — ZOLEDRONIC ACID 5 MG/100ML
5 INJECTION, SOLUTION INTRAVENOUS
Status: CANCELLED | OUTPATIENT
Start: 2020-06-12

## 2020-05-15 RX ORDER — ACETAMINOPHEN 325 MG/1
650 TABLET ORAL
Status: COMPLETED | OUTPATIENT
Start: 2020-05-15 | End: 2020-05-15

## 2020-05-15 RX ORDER — ACETAMINOPHEN 325 MG/1
650 TABLET ORAL
Status: CANCELLED | OUTPATIENT
Start: 2020-06-12

## 2020-05-15 RX ORDER — HEPARIN 100 UNIT/ML
500 SYRINGE INTRAVENOUS
Status: CANCELLED | OUTPATIENT
Start: 2020-06-12

## 2020-05-15 RX ORDER — SODIUM CHLORIDE 0.9 % (FLUSH) 0.9 %
10 SYRINGE (ML) INJECTION
Status: DISCONTINUED | OUTPATIENT
Start: 2020-05-15 | End: 2020-05-15 | Stop reason: HOSPADM

## 2020-05-15 RX ADMIN — SODIUM CHLORIDE 750 MG: 9 INJECTION, SOLUTION INTRAVENOUS at 02:05

## 2020-05-15 RX ADMIN — SODIUM CHLORIDE: 9 INJECTION, SOLUTION INTRAVENOUS at 02:05

## 2020-05-15 RX ADMIN — CYANOCOBALAMIN 1000 MCG: 1000 INJECTION, SOLUTION INTRAMUSCULAR at 01:05

## 2020-05-15 RX ADMIN — ACETAMINOPHEN 650 MG: 325 TABLET ORAL at 01:05

## 2020-05-15 RX ADMIN — DIPHENHYDRAMINE HYDROCHLORIDE 25 MG: 50 INJECTION, SOLUTION INTRAMUSCULAR; INTRAVENOUS at 02:05

## 2020-05-15 NOTE — PROGRESS NOTES
"Vitals:    05/15/20 1032   BP: 120/60   BP Location: Left arm   Patient Position: Sitting   BP Method: Medium (Automatic)   Pulse: 63   Temp: 97.1 °F (36.2 °C)   TempSrc: Oral   Weight: 66.7 kg (147 lb 0.8 oz)   Height: 5' 7" (1.702 m)       Heloma molle - Right Foot    Dermatophytosis of nail    Long toenail        Patient presents to the clinic for non-covered routine foot care.   Patient understands this is not typically a covered service every 4-6 weeks and patient is aware of responsibility of payment. Pedal pulses are palpable. No known risk factors requiring routine foot care.  nails and heloma molle were reduced and trimmed bilaterally. Patient tolerated well.       Continue lambs wool and shoe modification.     Follow up 4-5 weeks Proc B.   "

## 2020-05-18 ENCOUNTER — TELEPHONE (OUTPATIENT)
Dept: CARDIOLOGY | Facility: CLINIC | Age: 75
End: 2020-05-18

## 2020-05-18 ENCOUNTER — TELEPHONE (OUTPATIENT)
Dept: RHEUMATOLOGY | Facility: CLINIC | Age: 75
End: 2020-05-18

## 2020-05-18 ENCOUNTER — TELEPHONE (OUTPATIENT)
Dept: RHEUMATOLOGY | Facility: HOSPITAL | Age: 75
End: 2020-05-18

## 2020-05-18 ENCOUNTER — PATIENT MESSAGE (OUTPATIENT)
Dept: RHEUMATOLOGY | Facility: CLINIC | Age: 75
End: 2020-05-18

## 2020-05-18 ENCOUNTER — HOSPITAL ENCOUNTER (OUTPATIENT)
Dept: CARDIOLOGY | Facility: CLINIC | Age: 75
Discharge: HOME | End: 2020-05-18
Attending: INTERNAL MEDICINE
Payer: MEDICARE

## 2020-05-18 VITALS
HEART RATE: 64 BPM | BODY MASS INDEX: 23.07 KG/M2 | HEIGHT: 67 IN | SYSTOLIC BLOOD PRESSURE: 120 MMHG | DIASTOLIC BLOOD PRESSURE: 68 MMHG | WEIGHT: 147 LBS

## 2020-05-18 DIAGNOSIS — I49.3 PREMATURE VENTRICULAR BEATS: ICD-10-CM

## 2020-05-18 DIAGNOSIS — I49.3 PVC (PREMATURE VENTRICULAR CONTRACTION): ICD-10-CM

## 2020-05-18 DIAGNOSIS — Z79.899 ON AMIODARONE THERAPY: ICD-10-CM

## 2020-05-18 DIAGNOSIS — I50.22 CHRONIC SYSTOLIC HEART FAILURE: Primary | ICD-10-CM

## 2020-05-18 DIAGNOSIS — D53.9 MACROCYTIC ANEMIA: Primary | ICD-10-CM

## 2020-05-18 DIAGNOSIS — M06.9 RHEUMATOID ARTHRITIS, INVOLVING UNSPECIFIED SITE, UNSPECIFIED RHEUMATOID FACTOR PRESENCE: ICD-10-CM

## 2020-05-18 LAB
ASCENDING AORTA: 3.41 CM
AV INDEX (PROSTH): 0.89
AV MEAN GRADIENT: 3 MMHG
AV PEAK GRADIENT: 5 MMHG
AV VALVE AREA: 3.72 CM2
AV VELOCITY RATIO: 0.82
BSA FOR ECHO PROCEDURE: 1.78 M2
CV ECHO LV RWT: 0.37 CM
DOP CALC AO PEAK VEL: 1.11 M/S
DOP CALC AO VTI: 20.91 CM
DOP CALC LVOT AREA: 4.2 CM2
DOP CALC LVOT DIAMETER: 2.3 CM
DOP CALC LVOT PEAK VEL: 0.91 M/S
DOP CALC LVOT STROKE VOLUME: 77.7 CM3
DOP CALCLVOT PEAK VEL VTI: 18.71 CM
E WAVE DECELERATION TIME: 118.56 MSEC
E/A RATIO: 0.47
E/E' RATIO: 5.38 M/S
ECHO LV POSTERIOR WALL: 0.92 CM (ref 0.6–1.1)
FRACTIONAL SHORTENING: 27 % (ref 28–44)
INTERVENTRICULAR SEPTUM: 1.15 CM (ref 0.6–1.1)
LA MAJOR: 4.58 CM
LA MINOR: 4.21 CM
LA WIDTH: 3.72 CM
LEFT ATRIUM SIZE: 3.11 CM
LEFT ATRIUM VOLUME INDEX: 24.3 ML/M2
LEFT ATRIUM VOLUME: 43.14 CM3
LEFT INTERNAL DIMENSION IN SYSTOLE: 3.62 CM (ref 2.1–4)
LEFT VENTRICLE DIASTOLIC VOLUME INDEX: 66.47 ML/M2
LEFT VENTRICLE DIASTOLIC VOLUME: 117.92 ML
LEFT VENTRICLE MASS INDEX: 107 G/M2
LEFT VENTRICLE SYSTOLIC VOLUME INDEX: 31.1 ML/M2
LEFT VENTRICLE SYSTOLIC VOLUME: 55.1 ML
LEFT VENTRICULAR INTERNAL DIMENSION IN DIASTOLE: 4.99 CM (ref 3.5–6)
LEFT VENTRICULAR MASS: 190 G
LV LATERAL E/E' RATIO: 3.89 M/S
LV SEPTAL E/E' RATIO: 8.75 M/S
MV PEAK A VEL: 0.75 M/S
MV PEAK E VEL: 0.35 M/S
PISA TR MAX VEL: 2.14 M/S
RA MAJOR: 4.23 CM
RA PRESSURE: 3 MMHG
RA WIDTH: 3.3 CM
RIGHT VENTRICULAR END-DIASTOLIC DIMENSION: 2.69 CM
RV TISSUE DOPPLER FREE WALL SYSTOLIC VELOCITY 1 (APICAL 4 CHAMBER VIEW): 5.53 CM/S
SINUS: 3.06 CM
STJ: 2.81 CM
TDI LATERAL: 0.09 M/S
TDI SEPTAL: 0.04 M/S
TDI: 0.07 M/S
TR MAX PG: 18 MMHG
TV REST PULMONARY ARTERY PRESSURE: 21 MMHG

## 2020-05-18 PROCEDURE — 93306 TTE W/DOPPLER COMPLETE: CPT | Mod: HCNC,S$GLB,, | Performed by: INTERNAL MEDICINE

## 2020-05-18 PROCEDURE — 93306 ECHO (CUPID ONLY): ICD-10-PCS | Mod: HCNC,S$GLB,, | Performed by: INTERNAL MEDICINE

## 2020-05-18 NOTE — TELEPHONE ENCOUNTER
Returned call to patient and advised no In Clinic appointment currently only Virtual  Will check with Dr. Kelly if Amiodarone level or Holter needed before 5/28/20 visit      ----- Message from Fabienne Ruff sent at 5/18/2020  9:50 AM CDT -----  Contact: self  Pt requesting a call back regarding upcoming appt. She would like to know if she should have a Holter before the appt. Please call pt back at [phone]

## 2020-05-19 ENCOUNTER — TELEPHONE (OUTPATIENT)
Dept: RHEUMATOLOGY | Facility: CLINIC | Age: 75
End: 2020-05-19

## 2020-05-19 NOTE — TELEPHONE ENCOUNTER
----- Message from Alyx Hsu LPN sent at 5/18/2020  6:34 PM CDT -----      ----- Message -----  From: Norbert Lemons MD  Sent: 5/18/2020   5:38 PM CDT  To: Alyx Hsu LPN    Results reviewed. abnormalities as listed. Please see appended comments,create telephone encounter, call patient and inform him/her of results and action to take then document in encounter and close it.   In general there will be no need to route back to   me unless there is an issue that you need to discuss. Thanks    Get a T3

## 2020-05-19 NOTE — TELEPHONE ENCOUNTER
----- Message from Norbert Lemons MD sent at 5/18/2020  5:35 PM CDT -----  See comments below and call patient to discuss.   Please close encounter when done -- no need to route back to me.  Thanks    Looks OK

## 2020-05-20 ENCOUNTER — PATIENT MESSAGE (OUTPATIENT)
Dept: CARDIOLOGY | Facility: CLINIC | Age: 75
End: 2020-05-20

## 2020-05-20 NOTE — TELEPHONE ENCOUNTER
----- Message from Norbert Lemons MD sent at 5/20/2020  4:42 PM CDT -----  See comments below and call patient to discuss.   Please close encounter when done -- no need to route back to me.  Thanks    Low levels of amio - that's OK unless she has clinical issues related to insufficient dosing.  Sorry about the previous messages - the TSH is in fact a bit low and she needs a T3   Tx

## 2020-05-21 ENCOUNTER — OFFICE VISIT (OUTPATIENT)
Dept: RHEUMATOLOGY | Facility: CLINIC | Age: 75
End: 2020-05-21
Payer: MEDICARE

## 2020-05-21 VITALS
HEART RATE: 66 BPM | SYSTOLIC BLOOD PRESSURE: 107 MMHG | DIASTOLIC BLOOD PRESSURE: 72 MMHG | BODY MASS INDEX: 22.79 KG/M2 | HEIGHT: 68 IN | TEMPERATURE: 98 F | WEIGHT: 150.38 LBS

## 2020-05-21 DIAGNOSIS — M06.09 RHEUMATOID ARTHRITIS OF MULTIPLE SITES WITH NEGATIVE RHEUMATOID FACTOR: ICD-10-CM

## 2020-05-21 DIAGNOSIS — D64.9 ANEMIA, UNSPECIFIED TYPE: Primary | ICD-10-CM

## 2020-05-21 PROCEDURE — 3078F PR MOST RECENT DIASTOLIC BLOOD PRESSURE < 80 MM HG: ICD-10-PCS | Mod: HCNC,CPTII,S$GLB, | Performed by: INTERNAL MEDICINE

## 2020-05-21 PROCEDURE — 1101F PR PT FALLS ASSESS DOC 0-1 FALLS W/OUT INJ PAST YR: ICD-10-PCS | Mod: HCNC,CPTII,S$GLB, | Performed by: INTERNAL MEDICINE

## 2020-05-21 PROCEDURE — 1101F PT FALLS ASSESS-DOCD LE1/YR: CPT | Mod: HCNC,CPTII,S$GLB, | Performed by: INTERNAL MEDICINE

## 2020-05-21 PROCEDURE — 3074F PR MOST RECENT SYSTOLIC BLOOD PRESSURE < 130 MM HG: ICD-10-PCS | Mod: HCNC,CPTII,S$GLB, | Performed by: INTERNAL MEDICINE

## 2020-05-21 PROCEDURE — 99999 PR PBB SHADOW E&M-EST. PATIENT-LVL IV: CPT | Mod: PBBFAC,HCNC,, | Performed by: INTERNAL MEDICINE

## 2020-05-21 PROCEDURE — 1125F AMNT PAIN NOTED PAIN PRSNT: CPT | Mod: HCNC,S$GLB,, | Performed by: INTERNAL MEDICINE

## 2020-05-21 PROCEDURE — 1125F PR PAIN SEVERITY QUANTIFIED, PAIN PRESENT: ICD-10-PCS | Mod: HCNC,S$GLB,, | Performed by: INTERNAL MEDICINE

## 2020-05-21 PROCEDURE — 3078F DIAST BP <80 MM HG: CPT | Mod: HCNC,CPTII,S$GLB, | Performed by: INTERNAL MEDICINE

## 2020-05-21 PROCEDURE — 1159F MED LIST DOCD IN RCRD: CPT | Mod: HCNC,S$GLB,, | Performed by: INTERNAL MEDICINE

## 2020-05-21 PROCEDURE — 99214 OFFICE O/P EST MOD 30 MIN: CPT | Mod: HCNC,S$GLB,, | Performed by: INTERNAL MEDICINE

## 2020-05-21 PROCEDURE — 99999 PR PBB SHADOW E&M-EST. PATIENT-LVL IV: ICD-10-PCS | Mod: PBBFAC,HCNC,, | Performed by: INTERNAL MEDICINE

## 2020-05-21 PROCEDURE — 1159F PR MEDICATION LIST DOCUMENTED IN MEDICAL RECORD: ICD-10-PCS | Mod: HCNC,S$GLB,, | Performed by: INTERNAL MEDICINE

## 2020-05-21 PROCEDURE — 3074F SYST BP LT 130 MM HG: CPT | Mod: HCNC,CPTII,S$GLB, | Performed by: INTERNAL MEDICINE

## 2020-05-21 PROCEDURE — 99214 PR OFFICE/OUTPT VISIT, EST, LEVL IV, 30-39 MIN: ICD-10-PCS | Mod: HCNC,S$GLB,, | Performed by: INTERNAL MEDICINE

## 2020-05-21 RX ORDER — LEFLUNOMIDE 20 MG/1
20 TABLET ORAL DAILY
Qty: 30 TABLET | Refills: 2 | Status: SHIPPED | OUTPATIENT
Start: 2020-05-21 | End: 2020-06-17 | Stop reason: SDUPTHER

## 2020-05-21 ASSESSMENT — ROUTINE ASSESSMENT OF PATIENT INDEX DATA (RAPID3)
AM STIFFNESS SCORE: 1, YES
TOTAL RAPID3 SCORE: 3.17
MDHAQ FUNCTION SCORE: .6
FATIGUE SCORE: 7.5
PAIN SCORE: 4
PSYCHOLOGICAL DISTRESS SCORE: 1.1
PATIENT GLOBAL ASSESSMENT SCORE: 3.5

## 2020-05-21 NOTE — PROGRESS NOTES
I have personally taken the history and examined the patient and agree with the resident,s note as stated above       RA RF- ACPA- CATHIE- v. Peripheral spondyloarthritis:  TJ 2 SJ 2 Pt global 35  ESR 8 CRP 0.5  DAS28 3.13 XIM28-WBM 2.78(both LDA) CDAI  8.5(LDA)  DAPSA TJ 2  SJ 2 Pt global 3.5 Pt pain 4  CRP 0.05= 11.55(LDA)  Mild macrocytic anemia  Fibromyalgia  Right pes anserine bursitis  Osteopenia with elevated FRAX s/p zoledronic acid 12/12/19      Cont abatacept 750mg IV q 28 days  Cont leflunomide 20mg daily  Anemia labs today  Increase aerobic exercise  Quad and hamstring strengthening  RTC 3 months with standing labs

## 2020-05-21 NOTE — PATIENT INSTRUCTIONS
Hamstring strengthening and stretching exercises provided in this packet to help with right knee pain  Continue orencia  Continue reclast treatments  Continue daily leflunomide  Have bloodwork drawn today  Start home exercises daily  Return to clinic in 3 months      Hamstring Stretch (Flexibility)    1. Sit on the floor with your right leg straight in front of you. Bend your left leg so the sole of your foot rests against the inside of your right thigh.  2. Reach toward your ankle. Keep your knee, neck, and back straight. Feel the stretch in the back of your thigh.  3. Hold for 30 to 60 seconds. Repeat 2 times, or as instructed.  4. Switch legs and repeat.  5. Repeat this exercise 3 times per day, or as instructed.     Tip: Dont bounce while youre stretching.   Date Last Reviewed: 3/10/2016  © 1103-2356 [employer]. [address] [place] [address]. All rights reserved. This information is not intended as a substitute for professional medical care. Always follow your healthcare professional's instructions.        Seated Hamstring Stretch    The following flexibility exercise may be suggested by your therapist. Stop the exercise if it causes pain and discuss it with your physical therapist or doctor. During the exercise, be sure not to bounce.  · Sit with one leg extended and your back straight. Bend your other leg so that the sole of your foot rests against your mid-thigh.  · Reach toward your ankle. Keep your knee, neck, and back straight.  · Feel the stretch in the back of your thigh.  · Hold for 30-60 seconds. Repeat 2 times.  · Repeat __ times per day.  For your safety, check with your healthcare provider before starting an exercise program.      Date Last Reviewed: 8/16/2015  © 1017-9713 [employer]. [address] [place] [address]. All rights reserved. This information is not intended as a substitute for professional medical care. Always follow your healthcare  professional's instructions.        Hamstring Stretch (with Towel)    To start, lie on your back with your knees bent and feet flat on the floor. Dont press your neck or lower back to the floor. Breathe deeply. You should feel comfortable and relaxed in this position.  · Put a towel behind one knee or calf.  · Use the towel to pull the leg toward your chest, keeping the leg straight or slightly bent.  · Hold for 30-60 seconds. Then lower the leg.  · Repeat 2 times.  · Switch legs.   For your safety, check with your healthcare provider before starting an exercise program.   Date Last Reviewed: 8/16/2015 © 2000-2017 [employer]. [address] [place] [address]. All rights reserved. This information is not intended as a substitute for professional medical care. Always follow your healthcare professional's instructions.        Hamstring Stretch    Begin your rehabilitation with exercises that develop muscle control. These help you meet basic goals, like driving a car or going back to work. Exercise as often as youre advised. But stop right away if any exercise causes sharp or increasing pain. Icing your knee for 15 to 20 minutes after exercise can help prevent swelling and soreness.  · Lie on your back with your good knee bent. Put a towel around the back of your injured leg. Tighten your stomach muscles.  · Keeping the knee as straight as you can, slowly pull on the towel to bring your injured leg up. Raise it as far as you comfortably can.  · Hold for 30 to 60 seconds. Repeat 2 to 3 times.   Caution: If you feel tingling or pain in your back or legs, youre not yet ready for this exercise or are pulling too aggressively.   For your safety, check with your healthcare provider before starting an exercise program.   Date Last Reviewed: 8/16/2015 © 2000-2016 [employer]. [address] [place] [address]. All rights reserved. This information is not intended as a  substitute for professional medical care. Always follow your healthcare professional's instructions.        Hamstring Curl (Strength)    This exercise is for an injured right knee. Switch sides if the injury is to your left knee.  6. Tie the ends of an elastic exercise band or tubing into a large, strong knot. Close the door on the elastic band, so the knot is on one side and the loop of the band is on the other. The elastic band should be close to the floor. You should be on the side of the door with the loop.  7. Sit in a chair facing the closed door. Slip the loop of the elastic exercise band around the heel of your right leg.  8. Slowly pull the elastic band backward along the floor with your heel. Stop when you cant pull it any farther. Hold in place for 10 seconds. Slowly return your leg to the starting position.  9. Repeat 10 times, or as instructed.     Safety tip: Take it slowly. If you do too much too soon, you can create new knee problems, or reinjure your knee.   Date Last Reviewed: 3/10/2016  © 9232-7326 [employer]. [address] [place] [address]. All rights reserved. This information is not intended as a substitute for professional medical care. Always follow your healthcare professional's instructions.        Hamstring Curls    The following exercise helps build strong, balanced leg muscles. Make sure to adjust exercise machines as instructed by your physical therapist. He or she will tell you how many times to do the exercise.  Note: To prevent injury, always warm up and stretch before your strengthening exercises. Stop any exercise that causes pain. Discuss it with your physical therapist or doctor.  · Lying on your stomach, pull one leg up as far as you comfortably can.  · Let your leg uncurl slowly and steadily.  · Take care not to arch your back.  Date Last Reviewed: 8/16/2015  © 5403-6574 [employer]. [address] [place] [address]. All rights  reserved. This information is not intended as a substitute for professional medical care. Always follow your healthcare professional's instructions.

## 2020-05-21 NOTE — PROGRESS NOTES
Subjective:       Patient ID: Rizwana Block is a 74 y.o. female.    Chief Complaint: RA RF- ACPA- CATHIE- v peripheral spondylarthritis; Osteopenia with elevated FRAX; gen OA, Right knee OA s/p tibial plateau fracture repair with hardware placement  HPI   Here for 3 month follow up. Since last visit she finished her prednisone taper and restarted leflunomide 20 mg daily. She relates significant transient improvement during previous prednisone dosing. She is also still taking abatacept 750 mg IV monthly. She complains of increased fatigue and general malaise along with morning stiffness in her fingers and right knee incessant aching. She endorses the presence of significant recent stressors in her life, including but not limited to, the recent death of one of her sons. All her sons have Pompei disease. She has not been able to exercise due to the responsibilities of taking care of her ill children, being the designated hospice caretaker, lack of motivation, fatigue. She used to exercise frequently and does display a desire to start using her home treadmill again. For her osteopenia she is on zolendronic acid 5 mg.She is tolerating all her current medications.She states she is finding an unusual amount of hair falling out during the shower but not in clumps. Denies joint swelling, fever, abdominal pain, oral ulcers, alopecia, extreme fatigue, CP, skin changes, cough, SOB, recent fall, or other injury or illness.       Review of Systems   Constitutional: Positive for fatigue and unexpected weight change.        Weight gain of 5 pounds, though she notes she has not taken her usual morning furosemide dose.     HENT: Negative.    Eyes: Negative.    Respiratory: Negative.    Cardiovascular: Positive for leg swelling.        Mild intermittent bilateral leg swelling for which she takes furosemide.    Gastrointestinal: Negative.    Endocrine: Negative for cold intolerance and heat intolerance.        Significant fatigue    Musculoskeletal: Positive for arthralgias.   Neurological: Negative.          Objective:   LMP  (LMP Unknown) Comment: refused weight      Physical Exam   Constitutional: She is oriented to person, place, and time and well-developed, well-nourished, and in no distress.   HENT:   Head: Normocephalic.   Eyes: Conjunctivae are normal. Pupils are equal, round, and reactive to light.   Neck: Normal range of motion.   Cardiovascular: Normal rate and regular rhythm.    Pulmonary/Chest: Effort normal and breath sounds normal.       Right Side Rheumatological Exam     Muscle Strength (0-5 scale):  Deltoid:  5  Biceps: 5/5   Iliopsoas: 5  Quadriceps:  5     Left Side Rheumatological Exam     Muscle Strength (0-5 scale):  Deltoid:  5  Biceps: 5/5   Iliopsoas: 5  Quadriceps:  5       Neurological: She is alert and oriented to person, place, and time.   Skin: Skin is dry. No rash noted.     Post-surgical scar noted to right knee   Psychiatric:   Mood somewhat somber and depressed though this is expected as she is actively grieving from a recent immediate family death. Denies SI.    Musculoskeletal: Normal range of motion. She exhibits tenderness (TTP over right Anserine bursa) and deformity (darrel nodules on bilateral digits with OA changes to bilateral 1st MCP and right knee).   Radial deviation of left 5th distal phalanx           10/18/2019 2/21/2020   Tender (DONOVAN-28) 12 / 28  0 / 28    Swollen (DONOVAN-28) 6 / 28 4 / 28    Provider Global Assessment 30 (mm) 20 (mm)   Patient Global Assessment 0 (mm) 0 (mm)   ESR 24 (mm/hr) 21 (mm/hr)   CRP 34.4 (mg/L) 1.1 (mg/L)   DONOVAN-28 (ESR) 4.85 (Moderate disease activity) 2.69 (Low disease activity)   DONOVAN-28 (CRP) 4.87 (Moderate disease activity) 1.79 (Remission)      Results for AMY MANLEY (MRN [id number]) as of 5/21/2020 08:01   Ref. Range 5/18/2020 08:25   WBC Latest Ref Range: 3.90 - 12.70 K/uL 5.36   RBC Latest Ref Range: 4.00 - 5.40 M/uL 3.69 (L)   Hemoglobin Latest Ref Range:  12.0 - 16.0 g/dL 11.3 (L)   Hematocrit Latest Ref Range: 37.0 - 48.5 % 38.1   MCV Latest Ref Range: 82 - 98 fL 103 (H)   MCH Latest Ref Range: 27.0 - 31.0 pg 30.6   MCHC Latest Ref Range: 32.0 - 36.0 g/dL 29.7 (L)   RDW Latest Ref Range: 11.5 - 14.5 % 13.2   Platelets Latest Ref Range: 150 - 350 K/uL 254   MPV Latest Ref Range: 9.2 - 12.9 fL 13.2 (H)   Gran% Latest Ref Range: 38.0 - 73.0 % 48.3   Gran # (ANC) Latest Ref Range: 1.8 - 7.7 K/uL 2.6   Lymph% Latest Ref Range: 18.0 - 48.0 % 36.6   Lymph # Latest Ref Range: 1.0 - 4.8 K/uL 2.0   Mono% Latest Ref Range: 4.0 - 15.0 % 11.4   Mono # Latest Ref Range: 0.3 - 1.0 K/uL 0.6   Eosinophil% Latest Ref Range: 0.0 - 8.0 % 2.6   Eos # Latest Ref Range: 0.0 - 0.5 K/uL 0.1   Basophil% Latest Ref Range: 0.0 - 1.9 % 0.7   Baso # Latest Ref Range: 0.00 - 0.20 K/uL 0.04   nRBC Latest Ref Range: 0 /100 WBC 0   Differential Method Unknown Automated   Immature Grans (Abs) Latest Ref Range: 0.00 - 0.04 K/uL 0.02   Immature Granulocytes Latest Ref Range: 0.0 - 0.5 % 0.4   Sed Rate Latest Ref Range: 0 - 36 mm/Hr 8   Sodium Latest Ref Range: 136 - 145 mmol/L 143   Potassium Latest Ref Range: 3.5 - 5.1 mmol/L 4.4   Chloride Latest Ref Range: 95 - 110 mmol/L 108   CO2 Latest Ref Range: 23 - 29 mmol/L 28   Anion Gap Latest Ref Range: 8 - 16 mmol/L 7 (L)   BUN, Bld Latest Ref Range: 8 - 23 mg/dL 26 (H)   Creatinine Latest Ref Range: 0.5 - 1.4 mg/dL 0.8   eGFR if non African American Latest Ref Range: >60 mL/min/1.73 m^2 >60.0   eGFR if African American Latest Ref Range: >60 mL/min/1.73 m^2 >60.0   Glucose Latest Ref Range: 70 - 110 mg/dL 69 (L)   Calcium Latest Ref Range: 8.7 - 10.5 mg/dL 9.1   Alkaline Phosphatase Latest Ref Range: 55 - 135 U/L 62   PROTEIN TOTAL Latest Ref Range: 6.0 - 8.4 g/dL 6.6   Albumin Latest Ref Range: 3.5 - 5.2 g/dL 3.6   BILIRUBIN TOTAL Latest Ref Range: 0.1 - 1.0 mg/dL 0.4   AST Latest Ref Range: 10 - 40 U/L 19   ALT Latest Ref Range: 10 - 44 U/L 13   CRP  "Latest Ref Range: 0.0 - 8.2 mg/L 0.5   TSH Latest Ref Range: 0.400 - 4.000 uIU/mL 0.123 (L)   Free T4 Latest Ref Range: 0.71 - 1.51 ng/dL 1.20   Amiodarone Lvl Latest Ref Range: 1.0 - 3.0 ug/mL 0.3 (L)   N-Desethyl-Amiodarone Latest Ref Range: SeeBelow ug/mL 0.3     DEXA 11/1/19  EXAMINATION:  DEXA BONE DENSITY SPINE HIP    CLINICAL HISTORY:  suspected osteoporosis; Asymptomatic menopausal state74 y/o female with a history of fractured left foot at 56 y/o.  She had a hysterectomy at 38 y/o and menopausal symptoms at 53 y/o.  She has lost at least 2 "in height since age 26.  She is taking Calcium, Vit D and 7.5 mg of Prednisone.  She has a history of Rheumatoid Arthritis.  She does not exercise or smoke.    TECHNIQUE:  DXA specification: [facility] [employer] Horizon [id number].    Bone Mineral Density scanning was performed over the hip and lumbar spine. Review of the images confirms satisfactory positioning and technique.    COMPARISON:  Comparison study done on 04/21/2017. Lumbar spine BMD 0.979 g/cm2 and the T-score -0.6.  The Total Hip BMD 0.726 g/cm2 and the T-score -1.8.    FINDINGS:  Lumbar Spine: Lumbar bone mineral density L1-L4 is 0.940g/cm2, which is a T-score of -1.0. The Z-score is 1.3.    Total Hip: Total hip bone mineral density is 0.670g/cm2.  The T-score is -2.2, and the Z-score is -0.5.    Femoral neck: Bone mineral density is 0.602g/cm2 and the T-score is -2.2 and the Z-score is -0.2 g/cm2.    There is a 26 % risk of a major osteoporotic fracture and a 8.2% risk of hip fracture in the next 10 years (FRAX).    Compared with previous DXA, BMD at the lumbar spine has declined 4% since 2017, and the BMD at the total hip has declined 8%.      Impression       Osteopenia: FRAX Score supports osteoporosis treatment.  In addition taking prednisone.       Assessment:       RA:  -TJ 13 SJ 2 Pt global 3.5 ESR 8  CRP 0.5   - Abatacept 75 mg IV monthly  - Leflunomide 20 mg daily  - start home exercises    Right " sided Pes Anseritis:  - hamstring weakness likely contributing  - provided hamstring exercises for stretching and strength      Depressed TSH:  - normal T4, T3 pending per cardiologist  - TSH 0.123  - T4 1.2  - no presence of goiter       Osteopenia:  - Zolendronic Acid, up to date on dose      Macrocytic anemia:  - B12, folate, iron studies, MMA all pending to be drawn later today    Plan:       Problem List Items Addressed This Visit     None        Hamstring strengthening and stretching exercises for right sided anserine bursitis  Continue orencia 750 mg monthly  Continue reclast treatments yearly  Continue daily leflunomide 20 mg PO  Have bloodwork drawn today  Start home exercises daily  Return to clinic in 3 months with standing labs        E. Israel Spencer MD  [employer] [facility], PGY1

## 2020-05-22 ENCOUNTER — CLINICAL SUPPORT (OUTPATIENT)
Dept: CARDIOLOGY | Facility: HOSPITAL | Age: 75
End: 2020-05-22
Attending: INTERNAL MEDICINE
Payer: MEDICARE

## 2020-05-22 ENCOUNTER — PATIENT MESSAGE (OUTPATIENT)
Dept: RHEUMATOLOGY | Facility: CLINIC | Age: 75
End: 2020-05-22

## 2020-05-22 DIAGNOSIS — I49.3 PREMATURE VENTRICULAR BEATS: ICD-10-CM

## 2020-05-22 DIAGNOSIS — I50.22 CHRONIC SYSTOLIC HEART FAILURE: ICD-10-CM

## 2020-05-22 PROCEDURE — 93226 XTRNL ECG REC<48 HR SCAN A/R: CPT | Mod: HCNC

## 2020-05-22 PROCEDURE — 93227 XTRNL ECG REC<48 HR R&I: CPT | Mod: HCNC,,, | Performed by: INTERNAL MEDICINE

## 2020-05-22 PROCEDURE — 93227 HOLTER MONITOR - 48 HOUR (CUPID ONLY): ICD-10-PCS | Mod: HCNC,,, | Performed by: INTERNAL MEDICINE

## 2020-05-26 LAB
OHS CV EVENT MONITOR DAY: 0
OHS CV HOLTER LENGTH DECIMAL HOURS: 48
OHS CV HOLTER LENGTH HOURS: 48
OHS CV HOLTER LENGTH MINUTES: 0

## 2020-05-27 ENCOUNTER — PATIENT MESSAGE (OUTPATIENT)
Dept: RHEUMATOLOGY | Facility: CLINIC | Age: 75
End: 2020-05-27

## 2020-05-27 ENCOUNTER — PATIENT MESSAGE (OUTPATIENT)
Dept: OTHER | Facility: OTHER | Age: 75
End: 2020-05-27

## 2020-05-28 ENCOUNTER — PATIENT MESSAGE (OUTPATIENT)
Dept: CARDIOLOGY | Facility: CLINIC | Age: 75
End: 2020-05-28

## 2020-05-28 ENCOUNTER — OFFICE VISIT (OUTPATIENT)
Dept: CARDIOLOGY | Facility: CLINIC | Age: 75
End: 2020-05-28
Payer: MEDICARE

## 2020-05-28 VITALS
SYSTOLIC BLOOD PRESSURE: 101 MMHG | WEIGHT: 146 LBS | BODY MASS INDEX: 22.53 KG/M2 | DIASTOLIC BLOOD PRESSURE: 65 MMHG | HEART RATE: 84 BPM

## 2020-05-28 DIAGNOSIS — I49.3 VENTRICULAR PREMATURE BEATS: Primary | ICD-10-CM

## 2020-05-28 DIAGNOSIS — G47.33 OSA (OBSTRUCTIVE SLEEP APNEA): ICD-10-CM

## 2020-05-28 DIAGNOSIS — I42.8 OTHER CARDIOMYOPATHY: ICD-10-CM

## 2020-05-28 DIAGNOSIS — M06.042 RHEUMATOID ARTHRITIS INVOLVING BOTH HANDS WITH NEGATIVE RHEUMATOID FACTOR: ICD-10-CM

## 2020-05-28 DIAGNOSIS — Z79.899 AT RISK FOR AMIODARONE TOXICITY WITH LONG TERM USE: ICD-10-CM

## 2020-05-28 DIAGNOSIS — R00.2 PALPITATIONS: ICD-10-CM

## 2020-05-28 DIAGNOSIS — I10 ESSENTIAL HYPERTENSION: ICD-10-CM

## 2020-05-28 DIAGNOSIS — Z91.89 AT RISK FOR AMIODARONE TOXICITY WITH LONG TERM USE: ICD-10-CM

## 2020-05-28 DIAGNOSIS — M06.041 RHEUMATOID ARTHRITIS INVOLVING BOTH HANDS WITH NEGATIVE RHEUMATOID FACTOR: ICD-10-CM

## 2020-05-28 PROCEDURE — 3074F SYST BP LT 130 MM HG: CPT | Mod: HCNC,CPTII,95, | Performed by: INTERNAL MEDICINE

## 2020-05-28 PROCEDURE — 3078F DIAST BP <80 MM HG: CPT | Mod: HCNC,CPTII,95, | Performed by: INTERNAL MEDICINE

## 2020-05-28 PROCEDURE — 99215 OFFICE O/P EST HI 40 MIN: CPT | Mod: HCNC,95,, | Performed by: INTERNAL MEDICINE

## 2020-05-28 PROCEDURE — 3078F PR MOST RECENT DIASTOLIC BLOOD PRESSURE < 80 MM HG: ICD-10-PCS | Mod: HCNC,CPTII,95, | Performed by: INTERNAL MEDICINE

## 2020-05-28 PROCEDURE — 99215 PR OFFICE/OUTPT VISIT, EST, LEVL V, 40-54 MIN: ICD-10-PCS | Mod: HCNC,95,, | Performed by: INTERNAL MEDICINE

## 2020-05-28 PROCEDURE — 3074F PR MOST RECENT SYSTOLIC BLOOD PRESSURE < 130 MM HG: ICD-10-PCS | Mod: HCNC,CPTII,95, | Performed by: INTERNAL MEDICINE

## 2020-05-28 PROCEDURE — 1159F MED LIST DOCD IN RCRD: CPT | Mod: HCNC,95,, | Performed by: INTERNAL MEDICINE

## 2020-05-28 PROCEDURE — 1101F PR PT FALLS ASSESS DOC 0-1 FALLS W/OUT INJ PAST YR: ICD-10-PCS | Mod: HCNC,CPTII,95, | Performed by: INTERNAL MEDICINE

## 2020-05-28 PROCEDURE — 1101F PT FALLS ASSESS-DOCD LE1/YR: CPT | Mod: HCNC,CPTII,95, | Performed by: INTERNAL MEDICINE

## 2020-05-28 PROCEDURE — 1159F PR MEDICATION LIST DOCUMENTED IN MEDICAL RECORD: ICD-10-PCS | Mod: HCNC,95,, | Performed by: INTERNAL MEDICINE

## 2020-05-28 RX ORDER — AMIODARONE HYDROCHLORIDE 200 MG/1
200 TABLET ORAL
COMMUNITY
End: 2020-05-28 | Stop reason: SDUPTHER

## 2020-05-28 RX ORDER — AMIODARONE HYDROCHLORIDE 200 MG/1
TABLET ORAL
Qty: 90 TABLET | Refills: 3 | Status: SHIPPED | OUTPATIENT
Start: 2020-05-28 | End: 2020-08-19 | Stop reason: SDUPTHER

## 2020-05-28 NOTE — PROGRESS NOTES
Subjective:   Patient ID:  Rizwana Block is a 74 y.o. female     The patient location is:  home  The chief complaint leading to consultation is:  PVCs, heart function, fatigue    Visit type: audiovisual    Face to Face time with patient: 20   35 minutes of total time spent on the encounter, which includes face to face time and non-face to face time preparing to see the patient (eg, review of tests), Obtaining and/or reviewing separately obtained history, Documenting clinical information in the electronic or other health record, Independently interpreting results (not separately reported) and communicating results to the patient/family/caregiver, or Care coordination (not separately reported).     Each patient to whom he or she provides medical services by telemedicine is:  (1) informed of the relationship between the physician and patient and the respective role of any other health care provider with respect to management of the patient; and (2) notified that he or she may decline to receive medical services by telemedicine and may withdraw from such care at any time.      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 Ms. Block has been on long-term amiodarone for PVCs (inferior-posterior-septal PVC morphology). Her PVC burden was thought to have been related to her DCM. Her EF improved to the 50s after amiodarone use, and she was subsequently switched from amiodarone to Verapamil.      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  She later restarted with palps and some PVCs (albeit in lesser frequency than pre RFA) and Rx was added -- Verapamil first then back on amio      Echo (06/22/16) >>  EF of 40-45%, trivial-to-mild TR, trivial NC, upper limit of normal LVE, and a PASP of 26  "mmHg.   24-Hour Holter (06/22/16) revealed a 13% PVC burden; 481 couplets, 73 triplets and short VTNS -- the large majority of the PVCs were MM. The triplets are dimorphic.  She had an RFA on 9/27/16  >>  1.  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  Effective lesions were at 12 o'clock level in the [place] view.  2.  Note that the ascending aorta and aortic arch appeared to be very unfolded (unusually so for the  patient's frame).  This may need to be evaluated further.     48-Hour Holter (11/09/16) revealed SR w/HRs varying between  bpm; average 72 bpm. There were very frequent polymorphic PVCs (totaling 9,632; averaging 200 per hour, ~5%), 133 couplets, and no episodes of VT. There were very rare PACs (totaling 20; averaging less than 1 per hour) and no episodes of sustained SVT. There was 1 episode of dizziness reported corresponding w/SR at 95 bpm.       Update June 2018:  Since the RFA she has been feeling a lot better with more energy, working full time,, back to gardening and swimming etc  Echo on 6/19/18    1 - Mildly to moderately depressed left ventricular systolic function (EF 40-45%).     2 - Wall motion abnormalities.     3 - Impaired LV relaxation, normal LAP (grade 1 diastolic dysfunction).     4 - Low normal right ventricular systolic function .     5 - Trivial to mild tricuspid regurgitation.     6 - Trivial pulmonic regurgitation.     7 - Atrial septal aneurysm .     8 - The estimated PA systolic pressure is 29 mmHg.       Update 1/9/19:  She feels well -- her only c/o is hair loss -- she thinks it's the amio  Takes lasix when her rings get tight -- @ once a week or so.   I have reviewed the actual image of the ECG tracing obtained today and it shows NSR with normal intervals     Update 8/15/2019:  She started Biotin supplements and her hair as well as her nails are "better".   She has been feeling well -- gets occ chest discomfort -- at times in the " "shower - lasts a few secs-- maybe a little longer-- she had similar c/o several years ago too  And her angios were normal  Once she woke up with a feeling pf "a punch in the chest". It was gone as soon as she woke up.   I have reviewed the actual image of the ECG tracing obtained today and it shows NSR with mild IVCD -- QRSd 125 msec  Her main concerns are related to RA      Update since then:    I have obtained recent updates in her CV tests and amiodarone toxicity panel:  The echo revealed the following:  · Eccentric left ventricular hypertrophy.  · Low normal left ventricular systolic function. The estimated ejection fraction is 50%.  · Normal right ventricular systolic function.  · Normal LV diastolic function.  · Mild tricuspid regurgitation.  · The estimated PA systolic pressure is 21 mmHg.  · Normal central venous pressure (3 mmHg).  Amiodarone level was 0.3/0.3 on 100 mg of amiodarone a day.     Ref. Range 5/18/2020 08:25 5/21/2020 09:51   TSH Latest Ref Range: 0.400 - 4.000 uIU/mL 0.123 (L)    T3, Total Latest Ref Range: 60 - 180 ng/dL  75   Free T4 Latest Ref Range: 0.71 - 1.51 ng/dL 1.20      CMP was within normal limits.  DLCO was 66% of predicted and DLCO/VA was 96%.  Recent repeat Holter showed the following:  NSR  The patient slept from 22:00 until 06:00 with a heart rate of 78 bpm during waking hours, 68 bpm during sleep.  There were very frequent dimorphic PVCs totalling 86888 and averaging 238.54 per hour. The ventricular arrhythmias percentage was 5.6. There were 152 couplets. There were 1318 bigeminal cycles. There were 4 triplets.  Clinically, she is doing relatively well, and in fact, after having laid off exercise for year and a half she has restarted recently going back on the treadmill.  She can stay 30 min on the treadmill and then starts becoming a bit short of breath.  This is less than what she used to do but in all fairness, she probably is deconditioned at this point.  She has no leg " edema, no chest pain, occasional palpitations, no undue dyspnea on exertion-certainly not with usual ADLs, no orthopnea, no PND, no syncope and no presyncope.  She has recently seen Dr. Charlie freire and she complained of fatigue.  She feels stiff in the morning, gets better and then gets a little tired in the afternoon.  There are no specific associated symptoms  Her social life is in up even.  A 2nd of 3 step sons has recently passed away and she is having hard time coping.  In addition, her daughter is in community cardio back in the [place].      Current Outpatient Medications   Medication Sig    amiodarone (PACERONE) 100 MG Tab Take 1 tablet (100 mg total) by mouth once daily.    aspirin (ECOTRIN) 81 MG EC tablet Take 81 mg by mouth once daily.    atorvastatin (LIPITOR) 10 MG tablet TAKE ONE TABLET BY MOUTH ONCE DAILY    carvedilol (COREG) 6.25 MG tablet Take 1 tablet (6.25 mg total) by mouth 2 (two) times daily with meals.    cholecalciferol, vitamin D3, (VITAMIN D3) 1,000 unit capsule Take 1,000 Units by mouth once daily.    co-enzyme Q-10 30 mg capsule Take 10 mg by mouth once daily.     furosemide (LASIX) 40 MG tablet     leflunomide (ARAVA) 20 MG Tab Take 1 tablet (20 mg total) by mouth once daily.    MULTIVITAMIN WITH MINERALS (HAIR,SKIN AND NAILS ORAL) Take by mouth once daily.    potassium chloride (KLOR-CON) 8 MEQ TbSR TAKE ONE TABLET BY MOUTH ONCE DAILY    sacubitril-valsartan (ENTRESTO) 24-26 mg per tablet Take 1 tablet by mouth 2 (two) times daily.    TURMERIC ORAL Take by mouth.     Current Facility-Administered Medications   Medication    cyanocobalamin injection 1,000 mcg     ROS  See HPI.  Of note, full CV ROS is negative except for occasional mild palpitations  Objective:   Physical Exam  LMP  (LMP Unknown) Comment: refused weight    Appears to be in NAD,   Color is WNL (no pallor, no facial rashes), no obvious NVC. No obvious neck masses.   No hoarseness and no facial distortions.     No tachypnea.  No labored breathing.  Mood, affect, comprehension and speech are all WNL.   Patient denies leg edema.      Assessment:    Overall she is doing well.  There is no bradycardia and therefore this is not the cause of her fatigue.  More than likely, this related to rheumatoid arthritis, aging and deconditioning.  On the other hand, her PVC count is a little bit excessive and in view of the fact that her amiodarone level is quite low, it is reasonable to increase the dose slightly.  Of note is that her ejection fraction is now at 50%.  It tends to drift down as the PVCs increase in frequency.  There is no evidence of CHF.  She has no evidence of amiodarone toxicity at this point.  1. Ventricular premature beats    2. Other cardiomyopathy    3. Essential hypertension    4. Palpitations    5. 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    6. At risk for amiodarone toxicity with long term use    7. GAURAV (obstructive sleep apnea)        Plan:     Increase amiodarone to 200 mg a day 5 days a week (skip Wednesdays and Sundays).  In 6 months, repeat PFT/DLCO.  Continue using the treadmill and increase time on it as tolerated.  Follow-up in 6 months.  No orders of the defined types were placed in this encounter.    Follow up in about 6 months (around 11/28/2020), or if symptoms worsen or fail to improve.  There are no discontinued medications.     Medication List with Changes/Refills   Current Medications    AMIODARONE (PACERONE) 100 MG TAB    Take 1 tablet (100 mg total) by mouth once daily.    ASPIRIN (ECOTRIN) 81 MG EC TABLET    Take 81 mg by mouth once daily.    ATORVASTATIN (LIPITOR) 10 MG TABLET    TAKE ONE TABLET BY MOUTH ONCE DAILY    CARVEDILOL (COREG) 6.25 MG TABLET    Take 1 tablet (6.25 mg total) by mouth 2 (two) times daily with meals.    CHOLECALCIFEROL, VITAMIN D3, (VITAMIN D3) 1,000 UNIT CAPSULE    Take 1,000 Units by mouth once daily.    CO-ENZYME Q-10 30 MG CAPSULE     Take 10 mg by mouth once daily.     FUROSEMIDE (LASIX) 40 MG TABLET        LEFLUNOMIDE (ARAVA) 20 MG TAB    Take 1 tablet (20 mg total) by mouth once daily.    MULTIVITAMIN WITH MINERALS (HAIR,SKIN AND NAILS ORAL)    Take by mouth once daily.    POTASSIUM CHLORIDE (KLOR-CON) 8 MEQ TBSR    TAKE ONE TABLET BY MOUTH ONCE DAILY    SACUBITRIL-VALSARTAN (ENTRESTO) 24-26 MG PER TABLET    Take 1 tablet by mouth 2 (two) times daily.    TURMERIC ORAL    Take by mouth.

## 2020-06-11 ENCOUNTER — PATIENT OUTREACH (OUTPATIENT)
Dept: ADMINISTRATIVE | Facility: OTHER | Age: 75
End: 2020-06-11

## 2020-06-11 NOTE — PROGRESS NOTES
Chart reviewed.   Immunizations: Triggered Imm Registry     Orders placed: n/a  Upcoming appts to satisfy JOSE E topics: n/a

## 2020-06-12 ENCOUNTER — INFUSION (OUTPATIENT)
Dept: INFECTIOUS DISEASES | Facility: HOSPITAL | Age: 75
End: 2020-06-12
Attending: INTERNAL MEDICINE
Payer: MEDICARE

## 2020-06-12 ENCOUNTER — CLINICAL SUPPORT (OUTPATIENT)
Dept: INFECTIOUS DISEASES | Facility: CLINIC | Age: 75
End: 2020-06-12
Payer: MEDICARE

## 2020-06-12 VITALS
DIASTOLIC BLOOD PRESSURE: 64 MMHG | TEMPERATURE: 99 F | HEIGHT: 67 IN | SYSTOLIC BLOOD PRESSURE: 105 MMHG | OXYGEN SATURATION: 95 % | RESPIRATION RATE: 17 BRPM | WEIGHT: 146.25 LBS | BODY MASS INDEX: 22.96 KG/M2 | HEART RATE: 75 BPM

## 2020-06-12 DIAGNOSIS — M06.041 RHEUMATOID ARTHRITIS INVOLVING BOTH HANDS WITH NEGATIVE RHEUMATOID FACTOR: Primary | ICD-10-CM

## 2020-06-12 DIAGNOSIS — M06.042 RHEUMATOID ARTHRITIS INVOLVING BOTH HANDS WITH NEGATIVE RHEUMATOID FACTOR: Primary | ICD-10-CM

## 2020-06-12 DIAGNOSIS — M85.80 OSTEOPENIA, UNSPECIFIED LOCATION: ICD-10-CM

## 2020-06-12 PROCEDURE — 96372 PR INJECTION,THERAP/PROPH/DIAG2ST, IM OR SUBCUT: ICD-10-PCS | Mod: HCNC,S$GLB,, | Performed by: INTERNAL MEDICINE

## 2020-06-12 PROCEDURE — 96367 TX/PROPH/DG ADDL SEQ IV INF: CPT | Mod: HCNC

## 2020-06-12 PROCEDURE — 25000003 PHARM REV CODE 250: Mod: HCNC | Performed by: INTERNAL MEDICINE

## 2020-06-12 PROCEDURE — 96372 THER/PROPH/DIAG INJ SC/IM: CPT | Mod: HCNC,S$GLB,, | Performed by: INTERNAL MEDICINE

## 2020-06-12 PROCEDURE — 96365 THER/PROPH/DIAG IV INF INIT: CPT | Mod: HCNC

## 2020-06-12 PROCEDURE — 63600175 PHARM REV CODE 636 W HCPCS: Mod: JG,HCNC | Performed by: INTERNAL MEDICINE

## 2020-06-12 RX ORDER — ZOLEDRONIC ACID 5 MG/100ML
5 INJECTION, SOLUTION INTRAVENOUS
Status: CANCELLED | OUTPATIENT
Start: 2020-07-10

## 2020-06-12 RX ORDER — HEPARIN 100 UNIT/ML
500 SYRINGE INTRAVENOUS
Status: DISCONTINUED | OUTPATIENT
Start: 2020-06-12 | End: 2020-06-12 | Stop reason: HOSPADM

## 2020-06-12 RX ORDER — HEPARIN 100 UNIT/ML
500 SYRINGE INTRAVENOUS
Status: CANCELLED | OUTPATIENT
Start: 2020-07-10

## 2020-06-12 RX ORDER — SODIUM CHLORIDE 0.9 % (FLUSH) 0.9 %
10 SYRINGE (ML) INJECTION
Status: CANCELLED | OUTPATIENT
Start: 2020-07-10

## 2020-06-12 RX ORDER — SODIUM CHLORIDE 0.9 % (FLUSH) 0.9 %
10 SYRINGE (ML) INJECTION
Status: DISCONTINUED | OUTPATIENT
Start: 2020-06-12 | End: 2020-06-12 | Stop reason: HOSPADM

## 2020-06-12 RX ORDER — ACETAMINOPHEN 325 MG/1
650 TABLET ORAL
Status: CANCELLED | OUTPATIENT
Start: 2020-07-10

## 2020-06-12 RX ORDER — ACETAMINOPHEN 325 MG/1
650 TABLET ORAL
Status: COMPLETED | OUTPATIENT
Start: 2020-06-12 | End: 2020-06-12

## 2020-06-12 RX ADMIN — SODIUM CHLORIDE: 9 INJECTION, SOLUTION INTRAVENOUS at 01:06

## 2020-06-12 RX ADMIN — CYANOCOBALAMIN 1000 MCG: 1000 INJECTION, SOLUTION INTRAMUSCULAR at 01:06

## 2020-06-12 RX ADMIN — ACETAMINOPHEN 650 MG: 325 TABLET ORAL at 01:06

## 2020-06-12 RX ADMIN — SODIUM CHLORIDE 750 MG: 9 INJECTION, SOLUTION INTRAVENOUS at 02:06

## 2020-06-12 RX ADMIN — DIPHENHYDRAMINE HYDROCHLORIDE 25 MG: 50 INJECTION, SOLUTION INTRAMUSCULAR; INTRAVENOUS at 01:06

## 2020-06-12 NOTE — PROGRESS NOTES
Ms. Block received B12 injection. Tolerated well. Left unit in NAD.     Problem: Falls - Risk of  Goal: *Absence of Falls  Document Bello Fall Risk and appropriate interventions in the flowsheet. Outcome: Progressing Towards Goal  Fall Risk Interventions:  Patient is absent of falls. Mobility Interventions: Bed/chair exit alarm  Mentation Interventions: Bed/chair exit alarm  Medication Interventions: Bed/chair exit alarm, Evaluate medications/consider consulting pharmacy  Elimination Interventions: Bed/chair exit alarm, Call light in reach  History of Falls Interventions: Bed/chair exit alarm, Door open when patient unattended      Problem: Depressed Mood (Adult/Pediatric)  Goal: *STG: Attends activities and groups  Outcome: Not Progressing Towards Goal  Variance: Patient Uncooperative  Comments: Patient is not attending activities and groups.

## 2020-06-17 DIAGNOSIS — M06.09 RHEUMATOID ARTHRITIS OF MULTIPLE SITES WITH NEGATIVE RHEUMATOID FACTOR: ICD-10-CM

## 2020-06-17 DIAGNOSIS — M06.9 RHEUMATOID ARTHRITIS, INVOLVING UNSPECIFIED SITE, UNSPECIFIED RHEUMATOID FACTOR PRESENCE: Primary | ICD-10-CM

## 2020-06-17 RX ORDER — LEFLUNOMIDE 20 MG/1
20 TABLET ORAL DAILY
Qty: 30 TABLET | Refills: 1 | Status: SHIPPED | OUTPATIENT
Start: 2020-06-17 | End: 2020-06-17

## 2020-06-17 RX ORDER — LEFLUNOMIDE 20 MG/1
20 TABLET ORAL DAILY
Qty: 30 TABLET | Refills: 1 | Status: SHIPPED | OUTPATIENT
Start: 2020-06-17 | End: 2020-09-23 | Stop reason: SDUPTHER

## 2020-06-18 ENCOUNTER — OFFICE VISIT (OUTPATIENT)
Dept: PODIATRY | Facility: CLINIC | Age: 75
End: 2020-06-18
Payer: MEDICARE

## 2020-06-18 DIAGNOSIS — L84 CORN OR CALLUS: ICD-10-CM

## 2020-06-18 DIAGNOSIS — L60.2 LONG TOENAIL: Primary | ICD-10-CM

## 2020-06-18 PROCEDURE — 99999 PR PBB SHADOW E&M-EST. PATIENT-LVL III: ICD-10-PCS | Mod: PBBFAC,HCNC,, | Performed by: PODIATRIST

## 2020-06-18 PROCEDURE — 99499 UNLISTED E&M SERVICE: CPT | Mod: HCNC,,, | Performed by: PODIATRIST

## 2020-06-18 PROCEDURE — 99999 PR PBB SHADOW E&M-EST. PATIENT-LVL III: CPT | Mod: PBBFAC,HCNC,, | Performed by: PODIATRIST

## 2020-06-18 PROCEDURE — 99499 NO LOS: ICD-10-PCS | Mod: HCNC,,, | Performed by: PODIATRIST

## 2020-06-18 PROCEDURE — 17999 PR NON-COVERED FOOT CARE: ICD-10-PCS | Mod: CSM,HCNC,S$GLB, | Performed by: PODIATRIST

## 2020-06-18 PROCEDURE — 17999 UNLISTD PX SKN MUC MEMB SUBQ: CPT | Mod: CSM,HCNC,S$GLB, | Performed by: PODIATRIST

## 2020-06-22 ENCOUNTER — PATIENT OUTREACH (OUTPATIENT)
Dept: OTHER | Facility: OTHER | Age: 75
End: 2020-06-22

## 2020-06-22 NOTE — PROGRESS NOTES
Digital Medicine: Health  Follow-Up    The history is provided by the patient.   Follow Up  Follow-up reason(s): routine education    Brief follow up completed with the patient. She stated that she is doing well and feeling good. She denies any major issues at this time. She will reach out if any concerns arise.       Intervention/Plan    There are no preventive care reminders to display for this patient.    Last 5 Patient Entered Readings                                      Current 30 Day Average: 111/70     Recent Readings 6/21/2020 6/7/2020 6/3/2020 5/22/2020 5/9/2020    SBP (mmHg) 114 123 95 101 121    DBP (mmHg) 80 70 61 65 81    Pulse 75 63 71 76 67                      Diet Screening   No change to diet.      Physical Activity Screening   When asked if exercising, patient responded: yesHer level of intensity when exercising is moderate.    Patient participates in the following activities: walking    Patient has been walking a few miles per day on her treadmill at home.       SDOH

## 2020-06-23 ENCOUNTER — PATIENT MESSAGE (OUTPATIENT)
Dept: INTERNAL MEDICINE | Facility: CLINIC | Age: 75
End: 2020-06-23

## 2020-06-25 ENCOUNTER — HOSPITAL ENCOUNTER (OUTPATIENT)
Dept: RADIOLOGY | Facility: HOSPITAL | Age: 75
Discharge: HOME | End: 2020-06-25
Attending: INTERNAL MEDICINE
Payer: MEDICARE

## 2020-06-25 DIAGNOSIS — N28.1 KIDNEY CYST, ACQUIRED: ICD-10-CM

## 2020-06-25 PROCEDURE — 76770 US EXAM ABDO BACK WALL COMP: CPT | Mod: TC,HCNC

## 2020-06-25 PROCEDURE — 76770 US EXAM ABDO BACK WALL COMP: CPT | Mod: 26,HCNC,, | Performed by: RADIOLOGY

## 2020-06-25 PROCEDURE — 76770 US RETROPERITONEAL COMPLETE: ICD-10-PCS | Mod: 26,HCNC,, | Performed by: RADIOLOGY

## 2020-06-30 ENCOUNTER — HOSPITAL ENCOUNTER (OUTPATIENT)
Dept: RADIOLOGY | Facility: HOSPITAL | Age: 75
Discharge: HOME | End: 2020-06-30
Attending: INTERNAL MEDICINE
Payer: MEDICARE

## 2020-06-30 DIAGNOSIS — N28.89 RIGHT RENAL MASS: ICD-10-CM

## 2020-06-30 LAB
CREAT SERPL-MCNC: 0.7 MG/DL (ref 0.5–1.4)
SAMPLE: NORMAL

## 2020-06-30 PROCEDURE — 25500020 PHARM REV CODE 255: Mod: HCNC | Performed by: INTERNAL MEDICINE

## 2020-06-30 PROCEDURE — 74178 CT ABD&PLV WO CNTR FLWD CNTR: CPT | Mod: TC,HCNC

## 2020-06-30 PROCEDURE — 74178 CT ABDOMEN PELVIS W WO CONTRAST: ICD-10-PCS | Mod: 26,HCNC,, | Performed by: RADIOLOGY

## 2020-06-30 PROCEDURE — 74178 CT ABD&PLV WO CNTR FLWD CNTR: CPT | Mod: 26,HCNC,, | Performed by: RADIOLOGY

## 2020-06-30 RX ADMIN — IOHEXOL 75 ML: 350 INJECTION, SOLUTION INTRAVENOUS at 04:06

## 2020-07-01 DIAGNOSIS — N28.1 KIDNEY CYST, ACQUIRED: Primary | ICD-10-CM

## 2020-07-07 ENCOUNTER — OFFICE VISIT (OUTPATIENT)
Dept: INTERNAL MEDICINE | Facility: CLINIC | Age: 75
End: 2020-07-07
Payer: MEDICARE

## 2020-07-07 VITALS
SYSTOLIC BLOOD PRESSURE: 128 MMHG | HEIGHT: 67 IN | WEIGHT: 144.94 LBS | DIASTOLIC BLOOD PRESSURE: 70 MMHG | BODY MASS INDEX: 22.75 KG/M2 | HEART RATE: 48 BPM | OXYGEN SATURATION: 97 %

## 2020-07-07 DIAGNOSIS — I48.0 PAF (PAROXYSMAL ATRIAL FIBRILLATION): ICD-10-CM

## 2020-07-07 DIAGNOSIS — I10 ESSENTIAL HYPERTENSION: ICD-10-CM

## 2020-07-07 DIAGNOSIS — M06.042 RHEUMATOID ARTHRITIS INVOLVING BOTH HANDS WITH NEGATIVE RHEUMATOID FACTOR: ICD-10-CM

## 2020-07-07 DIAGNOSIS — M06.041 RHEUMATOID ARTHRITIS INVOLVING BOTH HANDS WITH NEGATIVE RHEUMATOID FACTOR: ICD-10-CM

## 2020-07-07 DIAGNOSIS — I48.20 ATRIAL FIBRILLATION, CHRONIC: Primary | ICD-10-CM

## 2020-07-07 DIAGNOSIS — E78.00 PURE HYPERCHOLESTEROLEMIA: ICD-10-CM

## 2020-07-07 PROCEDURE — 3074F SYST BP LT 130 MM HG: CPT | Mod: HCNC,CPTII,S$GLB, | Performed by: INTERNAL MEDICINE

## 2020-07-07 PROCEDURE — 3008F BODY MASS INDEX DOCD: CPT | Mod: HCNC,CPTII,S$GLB, | Performed by: INTERNAL MEDICINE

## 2020-07-07 PROCEDURE — 99214 PR OFFICE/OUTPT VISIT, EST, LEVL IV, 30-39 MIN: ICD-10-PCS | Mod: HCNC,S$GLB,, | Performed by: INTERNAL MEDICINE

## 2020-07-07 PROCEDURE — 1101F PT FALLS ASSESS-DOCD LE1/YR: CPT | Mod: HCNC,CPTII,S$GLB, | Performed by: INTERNAL MEDICINE

## 2020-07-07 PROCEDURE — 3078F PR MOST RECENT DIASTOLIC BLOOD PRESSURE < 80 MM HG: ICD-10-PCS | Mod: HCNC,CPTII,S$GLB, | Performed by: INTERNAL MEDICINE

## 2020-07-07 PROCEDURE — 1159F MED LIST DOCD IN RCRD: CPT | Mod: HCNC,S$GLB,, | Performed by: INTERNAL MEDICINE

## 2020-07-07 PROCEDURE — 99214 OFFICE O/P EST MOD 30 MIN: CPT | Mod: HCNC,S$GLB,, | Performed by: INTERNAL MEDICINE

## 2020-07-07 PROCEDURE — 99999 PR PBB SHADOW E&M-EST. PATIENT-LVL II: ICD-10-PCS | Mod: PBBFAC,HCNC,, | Performed by: INTERNAL MEDICINE

## 2020-07-07 PROCEDURE — 99999 PR PBB SHADOW E&M-EST. PATIENT-LVL II: CPT | Mod: PBBFAC,HCNC,, | Performed by: INTERNAL MEDICINE

## 2020-07-07 PROCEDURE — 3078F DIAST BP <80 MM HG: CPT | Mod: HCNC,CPTII,S$GLB, | Performed by: INTERNAL MEDICINE

## 2020-07-07 PROCEDURE — 1159F PR MEDICATION LIST DOCUMENTED IN MEDICAL RECORD: ICD-10-PCS | Mod: HCNC,S$GLB,, | Performed by: INTERNAL MEDICINE

## 2020-07-07 PROCEDURE — 3074F PR MOST RECENT SYSTOLIC BLOOD PRESSURE < 130 MM HG: ICD-10-PCS | Mod: HCNC,CPTII,S$GLB, | Performed by: INTERNAL MEDICINE

## 2020-07-07 PROCEDURE — 3008F PR BODY MASS INDEX (BMI) DOCUMENTED: ICD-10-PCS | Mod: HCNC,CPTII,S$GLB, | Performed by: INTERNAL MEDICINE

## 2020-07-07 PROCEDURE — 1101F PR PT FALLS ASSESS DOC 0-1 FALLS W/OUT INJ PAST YR: ICD-10-PCS | Mod: HCNC,CPTII,S$GLB, | Performed by: INTERNAL MEDICINE

## 2020-07-07 NOTE — PROGRESS NOTES
Chief Complaint: renal cysts     HPI: this is a 74 year old woman who presents due to renal cysts.  She had CT chest on 5/4/20 which revealed a probable renal cyst.  She had US kidneys 6/25/20 - Partially calcified 1 cm complex right renal lesion.  Recommend further evaluation with dedicated CT abdomen renal mass protocol. CT abdomen and pelvis on 6/30/20 - Multiple simple left renal cysts, largest of which measures 3.1 cm.  In the superior pole of the right kidney there is a 1.0 cm focus demonstrating no evidence of enhancement, likely a complicated cyst.  No dysuria, hematuria.    She has rheumatoid arthritis.  She is is getting Orencia infusions. No change in joints yet. She had a reclast infusion 12/12/19 for her bones. She is still very active.  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 Joints are doing well.      She is followed by Dr Lemons.  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 She had had an irregular heart rate lately. She is taking coreg 6.25.mg twice daily and entresto 24/26 twice daily for her heart.. She now takes lasix 40 mg 2-3 times a week.  She takes potassium chloride 8 meq daily. .  No chest pain, shortness of breath.  She has some mild CHF with an EF of 40-45% on echo 6/2018 and diastolic dysfunction.  She takes lipitor 10 mg daily for cholesterol. No muscle spasms.      She takes vitamin D 1000 units daily.       She gets a vitamin B12 injection monthly  And energy level is better.      She has been diagnosed with sleep apnea and uses the APAP machine nightly.              Past Medical History:   Diagnosis Date    Arrhythmia      Chest pain 5/27/2016     3/2016: Began experience chest discomfort.    CHF (congestive heart failure)      Hypertension      Osteopenia       Reclast infusion 10/5/2010 and 10/20/2011                   Past Surgical History:   Procedure Laterality Date    ANKLE FUSION      "    right    bladder surgery         with mesh    BLEPHAROPLASTY W/ LASER        HAND SURGERY         benign cyst on left    HYSTERECTOMY         heavy bleeding    PATELLA FRACTURE SURGERY         right    TONSILLECTOMY          Meds and allergies: updated on Epic     REVIEW OF SYSTEMS: She denies fevers, chills, night sweats, fatigue, visual change, hearing loss, sinus congestion, sore throat, chest pain, shortness of breath, nausea, vomiting, constipation, diarrhea, dysuria, hematuria, polydipsia, polyuria, headaches, anxiety, depression, insomnia.            Physical exam:    /70   Pulse (!) 48   Ht 5' 7" (1.702 m)   Wt 65.8 kg (144 lb 15.2 oz)   LMP  (LMP Unknown) Comment: refused weight   SpO2 97%   BMI 22.70 kg/m²     General: alert, oriented x 3, no apparent distress.  Affect normal  HEENT: Conjunctivae: anicteric, PERRL, EOMI, TM clear, Oralpharynx clear  Neck: supple, no thyroid enlargement, no cervical lymphadenopathy  Resp: effort normal, lungs clear bilaterally  CV: iregular rate and irrhythm without murmurs, gallops or rubs, no lower extremity edema,  ABDOMEN: soft, non distended, non tender, bowel sounds present, no hepatosplenomgaly  BREAST: no abn seen, no nodules palpated, no axillary lad        Assessment/Plan:  Possible kidney cyst- repeat US in 3 months  A fib - holter monitor  Pulmonary nodule -  ct chest in May 2020.   chf - controlled  HTN - controlled  Sleep apnea - on apap machine  Osteopenia - BMD 11/19 - s/p reclast 12/19  MMG 1/20  Colonoscoy normal 4/2009 due 4/2019 - [employer] negative 6/19     Follow up in 6 months, sooner if issues.     "

## 2020-07-08 ENCOUNTER — CLINICAL SUPPORT (OUTPATIENT)
Dept: CARDIOLOGY | Facility: CLINIC | Age: 75
End: 2020-07-08
Attending: INTERNAL MEDICINE
Payer: MEDICARE

## 2020-07-08 DIAGNOSIS — I48.20 ATRIAL FIBRILLATION, CHRONIC: ICD-10-CM

## 2020-07-08 PROCEDURE — 93224 XTRNL ECG REC UP TO 48 HRS: CPT | Mod: HCNC,S$GLB,, | Performed by: INTERNAL MEDICINE

## 2020-07-08 PROCEDURE — 93224 HOLTER MONITOR - 48 HOUR (CUPID ONLY): ICD-10-PCS | Mod: HCNC,S$GLB,, | Performed by: INTERNAL MEDICINE

## 2020-07-10 ENCOUNTER — CLINICAL SUPPORT (OUTPATIENT)
Dept: INFECTIOUS DISEASES | Facility: CLINIC | Age: 75
End: 2020-07-10
Payer: MEDICARE

## 2020-07-10 ENCOUNTER — INFUSION (OUTPATIENT)
Dept: INFECTIOUS DISEASES | Facility: HOSPITAL | Age: 75
End: 2020-07-10
Payer: MEDICARE

## 2020-07-10 VITALS
BODY MASS INDEX: 22.54 KG/M2 | HEIGHT: 67 IN | WEIGHT: 143.63 LBS | HEART RATE: 82 BPM | TEMPERATURE: 98 F | DIASTOLIC BLOOD PRESSURE: 74 MMHG | SYSTOLIC BLOOD PRESSURE: 107 MMHG

## 2020-07-10 DIAGNOSIS — M85.80 OSTEOPENIA, UNSPECIFIED LOCATION: ICD-10-CM

## 2020-07-10 DIAGNOSIS — M06.042 RHEUMATOID ARTHRITIS INVOLVING BOTH HANDS WITH NEGATIVE RHEUMATOID FACTOR: Primary | ICD-10-CM

## 2020-07-10 DIAGNOSIS — M06.041 RHEUMATOID ARTHRITIS INVOLVING BOTH HANDS WITH NEGATIVE RHEUMATOID FACTOR: Primary | ICD-10-CM

## 2020-07-10 PROCEDURE — 99999 PR PBB SHADOW E&M-EST. PATIENT-LVL II: ICD-10-PCS | Mod: PBBFAC,HCNC,,

## 2020-07-10 PROCEDURE — 25000003 PHARM REV CODE 250: Mod: HCNC | Performed by: STUDENT IN AN ORGANIZED HEALTH CARE EDUCATION/TRAINING PROGRAM

## 2020-07-10 PROCEDURE — 96372 THER/PROPH/DIAG INJ SC/IM: CPT | Mod: HCNC,S$GLB,, | Performed by: INTERNAL MEDICINE

## 2020-07-10 PROCEDURE — 63600175 PHARM REV CODE 636 W HCPCS: Mod: HCNC | Performed by: STUDENT IN AN ORGANIZED HEALTH CARE EDUCATION/TRAINING PROGRAM

## 2020-07-10 PROCEDURE — 96372 PR INJECTION,THERAP/PROPH/DIAG2ST, IM OR SUBCUT: ICD-10-PCS | Mod: HCNC,S$GLB,, | Performed by: INTERNAL MEDICINE

## 2020-07-10 PROCEDURE — 96367 TX/PROPH/DG ADDL SEQ IV INF: CPT | Mod: HCNC

## 2020-07-10 PROCEDURE — 99999 PR PBB SHADOW E&M-EST. PATIENT-LVL II: CPT | Mod: PBBFAC,HCNC,,

## 2020-07-10 PROCEDURE — 96365 THER/PROPH/DIAG IV INF INIT: CPT | Mod: HCNC

## 2020-07-10 RX ORDER — HEPARIN 100 UNIT/ML
500 SYRINGE INTRAVENOUS
Status: CANCELLED | OUTPATIENT
Start: 2020-08-07

## 2020-07-10 RX ORDER — SODIUM CHLORIDE 0.9 % (FLUSH) 0.9 %
10 SYRINGE (ML) INJECTION
Status: CANCELLED | OUTPATIENT
Start: 2020-08-07

## 2020-07-10 RX ORDER — ACETAMINOPHEN 325 MG/1
650 TABLET ORAL
Status: CANCELLED | OUTPATIENT
Start: 2020-08-07

## 2020-07-10 RX ORDER — HEPARIN 100 UNIT/ML
500 SYRINGE INTRAVENOUS
Status: DISCONTINUED | OUTPATIENT
Start: 2020-07-10 | End: 2020-07-10 | Stop reason: HOSPADM

## 2020-07-10 RX ORDER — SODIUM CHLORIDE 0.9 % (FLUSH) 0.9 %
10 SYRINGE (ML) INJECTION
Status: DISCONTINUED | OUTPATIENT
Start: 2020-07-10 | End: 2020-07-10 | Stop reason: HOSPADM

## 2020-07-10 RX ORDER — ACETAMINOPHEN 325 MG/1
650 TABLET ORAL
Status: COMPLETED | OUTPATIENT
Start: 2020-07-10 | End: 2020-07-10

## 2020-07-10 RX ORDER — ZOLEDRONIC ACID 5 MG/100ML
5 INJECTION, SOLUTION INTRAVENOUS
Status: CANCELLED | OUTPATIENT
Start: 2020-08-07

## 2020-07-10 RX ADMIN — ACETAMINOPHEN 650 MG: 325 TABLET ORAL at 01:07

## 2020-07-10 RX ADMIN — SODIUM CHLORIDE 750 MG: 9 INJECTION, SOLUTION INTRAVENOUS at 02:07

## 2020-07-10 RX ADMIN — DIPHENHYDRAMINE HYDROCHLORIDE 25 MG: 50 INJECTION, SOLUTION INTRAMUSCULAR; INTRAVENOUS at 01:07

## 2020-07-10 RX ADMIN — CYANOCOBALAMIN 1000 MCG: 1000 INJECTION, SOLUTION INTRAMUSCULAR at 01:07

## 2020-07-10 NOTE — PROGRESS NOTES
Pt arrived for orencia infusion. Pt states no changes/problems since last infusion.  Pt denies any symptoms of active infection such as fever, cough, etc.

## 2020-07-15 ENCOUNTER — PATIENT OUTREACH (OUTPATIENT)
Dept: ADMINISTRATIVE | Facility: OTHER | Age: 75
End: 2020-07-15

## 2020-07-16 ENCOUNTER — OFFICE VISIT (OUTPATIENT)
Dept: PODIATRY | Facility: CLINIC | Age: 75
End: 2020-07-16
Payer: MEDICARE

## 2020-07-16 VITALS
HEIGHT: 67 IN | HEART RATE: 67 BPM | WEIGHT: 143 LBS | SYSTOLIC BLOOD PRESSURE: 121 MMHG | TEMPERATURE: 98 F | DIASTOLIC BLOOD PRESSURE: 70 MMHG | BODY MASS INDEX: 22.44 KG/M2

## 2020-07-16 DIAGNOSIS — L84 CORN OR CALLUS: ICD-10-CM

## 2020-07-16 DIAGNOSIS — L60.2 LONG TOENAIL: Primary | ICD-10-CM

## 2020-07-16 PROCEDURE — 99999 PR PBB SHADOW E&M-EST. PATIENT-LVL IV: CPT | Mod: PBBFAC,HCNC,, | Performed by: PODIATRIST

## 2020-07-16 PROCEDURE — 99999 PR PBB SHADOW E&M-EST. PATIENT-LVL IV: ICD-10-PCS | Mod: PBBFAC,HCNC,, | Performed by: PODIATRIST

## 2020-07-16 PROCEDURE — 17999 PR NON-COVERED FOOT CARE: ICD-10-PCS | Mod: CSM,HCNC,S$GLB, | Performed by: PODIATRIST

## 2020-07-16 PROCEDURE — 99499 NO LOS: ICD-10-PCS | Mod: HCNC,,, | Performed by: PODIATRIST

## 2020-07-16 PROCEDURE — 17999 UNLISTD PX SKN MUC MEMB SUBQ: CPT | Mod: CSM,HCNC,S$GLB, | Performed by: PODIATRIST

## 2020-07-16 PROCEDURE — 99499 UNLISTED E&M SERVICE: CPT | Mod: HCNC,,, | Performed by: PODIATRIST

## 2020-07-16 NOTE — PROGRESS NOTES
"Vitals:    07/16/20 1506   BP: 121/70   Pulse: 67   Temp: 97.6 °F (36.4 °C)   Weight: 64.9 kg (143 lb)   Height: 5' 7" (1.702 m)       Long toenail    Corn or callus        Patient presents to the clinic for non-covered routine foot care.   Patient understands this is not typically a covered service every 4-6 weeks and patient is aware of responsibility of payment. Pedal pulses are palpable. No known risk factors requiring routine foot care.  nails and calluses/corns were reduced and trimmed bilaterally. Patient tolerated well.       Continue toe spacers and shoe modification.     Follow up 4-5 weeks Proc B.       "

## 2020-07-23 ENCOUNTER — TELEPHONE (OUTPATIENT)
Dept: RHEUMATOLOGY | Facility: CLINIC | Age: 75
End: 2020-07-23

## 2020-07-23 ENCOUNTER — LAB VISIT (OUTPATIENT)
Dept: SURGERY | Facility: CLINIC | Age: 75
End: 2020-07-23
Payer: MEDICARE

## 2020-07-23 DIAGNOSIS — Z03.818 ENCOUNTER FOR OBSERVATION FOR SUSPECTED EXPOSURE TO OTHER BIOLOGICAL AGENTS RULED OUT: ICD-10-CM

## 2020-07-23 LAB — SARS-COV-2 RNA RESP QL NAA+PROBE: NOT DETECTED

## 2020-07-23 PROCEDURE — U0003 INFECTIOUS AGENT DETECTION BY NUCLEIC ACID (DNA OR RNA); SEVERE ACUTE RESPIRATORY SYNDROME CORONAVIRUS 2 (SARS-COV-2) (CORONAVIRUS DISEASE [COVID-19]), AMPLIFIED PROBE TECHNIQUE, MAKING USE OF HIGH THROUGHPUT TECHNOLOGIES AS DESCRIBED BY CMS-2020-01-R: HCPCS | Mod: HCNC

## 2020-08-01 ENCOUNTER — PATIENT MESSAGE (OUTPATIENT)
Dept: INTERNAL MEDICINE | Facility: CLINIC | Age: 75
End: 2020-08-01

## 2020-08-01 DIAGNOSIS — N39.46 MIXED STRESS AND URGE URINARY INCONTINENCE: Primary | ICD-10-CM

## 2020-08-06 ENCOUNTER — INFUSION (OUTPATIENT)
Dept: INFECTIOUS DISEASES | Facility: HOSPITAL | Age: 75
End: 2020-08-06
Attending: INTERNAL MEDICINE
Payer: MEDICARE

## 2020-08-06 ENCOUNTER — CLINICAL SUPPORT (OUTPATIENT)
Dept: INFECTIOUS DISEASES | Facility: CLINIC | Age: 75
End: 2020-08-06
Payer: MEDICARE

## 2020-08-06 VITALS
HEIGHT: 67 IN | OXYGEN SATURATION: 98 % | SYSTOLIC BLOOD PRESSURE: 124 MMHG | BODY MASS INDEX: 21.89 KG/M2 | HEART RATE: 65 BPM | WEIGHT: 139.44 LBS | DIASTOLIC BLOOD PRESSURE: 56 MMHG | TEMPERATURE: 98 F | RESPIRATION RATE: 15 BRPM

## 2020-08-06 DIAGNOSIS — M06.042 RHEUMATOID ARTHRITIS INVOLVING BOTH HANDS WITH NEGATIVE RHEUMATOID FACTOR: Primary | ICD-10-CM

## 2020-08-06 DIAGNOSIS — M85.80 OSTEOPENIA, UNSPECIFIED LOCATION: ICD-10-CM

## 2020-08-06 DIAGNOSIS — M06.041 RHEUMATOID ARTHRITIS INVOLVING BOTH HANDS WITH NEGATIVE RHEUMATOID FACTOR: Primary | ICD-10-CM

## 2020-08-06 PROCEDURE — 96367 TX/PROPH/DG ADDL SEQ IV INF: CPT | Mod: HCNC

## 2020-08-06 PROCEDURE — 96372 THER/PROPH/DIAG INJ SC/IM: CPT | Mod: HCNC,S$GLB,, | Performed by: INTERNAL MEDICINE

## 2020-08-06 PROCEDURE — 25000003 PHARM REV CODE 250: Mod: HCNC | Performed by: STUDENT IN AN ORGANIZED HEALTH CARE EDUCATION/TRAINING PROGRAM

## 2020-08-06 PROCEDURE — 96372 PR INJECTION,THERAP/PROPH/DIAG2ST, IM OR SUBCUT: ICD-10-PCS | Mod: HCNC,S$GLB,, | Performed by: INTERNAL MEDICINE

## 2020-08-06 PROCEDURE — 96365 THER/PROPH/DIAG IV INF INIT: CPT | Mod: HCNC

## 2020-08-06 PROCEDURE — 63600175 PHARM REV CODE 636 W HCPCS: Mod: HCNC | Performed by: STUDENT IN AN ORGANIZED HEALTH CARE EDUCATION/TRAINING PROGRAM

## 2020-08-06 RX ORDER — SODIUM CHLORIDE 0.9 % (FLUSH) 0.9 %
10 SYRINGE (ML) INJECTION
Status: CANCELLED | OUTPATIENT
Start: 2020-09-03

## 2020-08-06 RX ORDER — HEPARIN 100 UNIT/ML
500 SYRINGE INTRAVENOUS
Status: CANCELLED | OUTPATIENT
Start: 2020-09-03

## 2020-08-06 RX ORDER — ACETAMINOPHEN 325 MG/1
650 TABLET ORAL
Status: COMPLETED | OUTPATIENT
Start: 2020-08-06 | End: 2020-08-06

## 2020-08-06 RX ORDER — SODIUM CHLORIDE 0.9 % (FLUSH) 0.9 %
10 SYRINGE (ML) INJECTION
Status: DISCONTINUED | OUTPATIENT
Start: 2020-08-06 | End: 2020-08-06 | Stop reason: HOSPADM

## 2020-08-06 RX ORDER — ZOLEDRONIC ACID 5 MG/100ML
5 INJECTION, SOLUTION INTRAVENOUS
Status: CANCELLED | OUTPATIENT
Start: 2020-09-03

## 2020-08-06 RX ORDER — ACETAMINOPHEN 325 MG/1
650 TABLET ORAL
Status: CANCELLED | OUTPATIENT
Start: 2020-09-03

## 2020-08-06 RX ADMIN — SODIUM CHLORIDE 750 MG: 9 INJECTION, SOLUTION INTRAVENOUS at 10:08

## 2020-08-06 RX ADMIN — CYANOCOBALAMIN 1000 MCG: 1000 INJECTION, SOLUTION INTRAMUSCULAR at 11:08

## 2020-08-06 RX ADMIN — DIPHENHYDRAMINE HYDROCHLORIDE 25 MG: 50 INJECTION INTRAMUSCULAR; INTRAVENOUS at 10:08

## 2020-08-06 RX ADMIN — ACETAMINOPHEN 650 MG: 325 TABLET ORAL at 10:08

## 2020-08-10 ENCOUNTER — PATIENT OUTREACH (OUTPATIENT)
Dept: OTHER | Facility: OTHER | Age: 75
End: 2020-08-10

## 2020-08-10 ENCOUNTER — PATIENT OUTREACH (OUTPATIENT)
Dept: ADMINISTRATIVE | Facility: OTHER | Age: 75
End: 2020-08-10

## 2020-08-10 DIAGNOSIS — Z12.11 ENCOUNTER FOR FIT (FECAL IMMUNOCHEMICAL TEST) SCREENING: Primary | ICD-10-CM

## 2020-08-10 NOTE — PROGRESS NOTES
Requested updates within Care Everywhere.  Patient's chart was reviewed for overdue JOSE E topics.  Immunizations reconciled.    Orders placed: fit kit

## 2020-08-10 NOTE — PROGRESS NOTES
Digital Medicine: Health  Follow-Up    The history is provided by the patient.             Reason for review: Blood pressure at goal        Topics Covered on Call: physical activity and Diet    Additional Follow-up details: Patient stated that she is doing well and she is very pleased with her recent BP control at this time.         Diet-no change to diet    No change to diet.        Physical Activity-no change to routine  No change to exercise routine.       Additional physical activity details: She continues walking 30 minutes on her treadmill daily.       Medication Adherence-Medication adherence was assessed.      Substance, Sleep, Stress-Not assessed      Continue current diet/physical activity routine.       Addressed any questions or concerns and patient has my contact information if needed prior to next outreach.   Explained the importance of self-monitoring and medication adherence. Encouraged the patient to communicate with their health  for lifestyle modifications to help improve or maintain a healthy lifestyle.            There are no preventive care reminders to display for this patient.    Last 5 Patient Entered Readings                                      Current 30 Day Average: 111/68     Recent Readings 8/2/2020 7/26/2020 7/15/2020 7/9/2020 7/4/2020    SBP (mmHg) 113 113 108 122 110    DBP (mmHg) 72 67 66 81 63    Pulse 79 66 69 36 70

## 2020-08-13 ENCOUNTER — OFFICE VISIT (OUTPATIENT)
Dept: PODIATRY | Facility: CLINIC | Age: 75
End: 2020-08-13
Payer: MEDICARE

## 2020-08-13 VITALS — HEIGHT: 67 IN | WEIGHT: 139 LBS | BODY MASS INDEX: 21.82 KG/M2

## 2020-08-13 DIAGNOSIS — B35.1 DERMATOPHYTOSIS OF NAIL: ICD-10-CM

## 2020-08-13 DIAGNOSIS — L60.2 LONG TOENAIL: Primary | ICD-10-CM

## 2020-08-13 DIAGNOSIS — L84 CORN OR CALLUS: ICD-10-CM

## 2020-08-13 PROCEDURE — 99999 PR PBB SHADOW E&M-EST. PATIENT-LVL III: CPT | Mod: PBBFAC,HCNC,, | Performed by: PODIATRIST

## 2020-08-13 PROCEDURE — 99999 PR PBB SHADOW E&M-EST. PATIENT-LVL III: ICD-10-PCS | Mod: PBBFAC,HCNC,, | Performed by: PODIATRIST

## 2020-08-13 PROCEDURE — 99499 UNLISTED E&M SERVICE: CPT | Mod: HCNC,,, | Performed by: PODIATRIST

## 2020-08-13 PROCEDURE — 17999 PR NON-COVERED FOOT CARE: ICD-10-PCS | Mod: CSM,HCNC,S$GLB, | Performed by: PODIATRIST

## 2020-08-13 PROCEDURE — 99499 NO LOS: ICD-10-PCS | Mod: HCNC,,, | Performed by: PODIATRIST

## 2020-08-13 PROCEDURE — 17999 UNLISTD PX SKN MUC MEMB SUBQ: CPT | Mod: CSM,HCNC,S$GLB, | Performed by: PODIATRIST

## 2020-08-13 NOTE — PROGRESS NOTES
"Vitals:    08/13/20 1444   Weight: 63 kg (139 lb)   Height: 5' 7" (1.702 m)       Long toenail    Dermatophytosis of nail    Corn or callus        Patient presents to the clinic for non-covered routine foot care.   Patient understands this is not typically a covered service every 4-6 weeks and patient is aware of responsibility of payment. Pedal pulses are palpable. No known risk factors requiring routine foot care.  nails and calluses/corns were reduced and trimmed bilaterally. Patient tolerated well.       Continue toe spacers and shoe modification.     Follow up 4 weeks Proc B.         "

## 2020-08-15 ENCOUNTER — PATIENT MESSAGE (OUTPATIENT)
Dept: CARDIOLOGY | Facility: CLINIC | Age: 75
End: 2020-08-15

## 2020-08-17 NOTE — TELEPHONE ENCOUNTER
Awaiting Dr. Kelly's response.    ----- Message from Rosa Ledbetter sent at 8/17/2020 11:08 AM CDT -----  Type:  Pharmacy Calling to Clarify an RX    Name of Caller:Victor Manuel  Pharmacy Name:[facility]  Prescription Name:Pacerone 200 mg  What do they need to clarify?:verify  Best Call Back Number:[phone]  Additional Information:

## 2020-08-19 DIAGNOSIS — I49.3 VENTRICULAR PREMATURE BEATS: ICD-10-CM

## 2020-08-19 RX ORDER — AMIODARONE HYDROCHLORIDE 200 MG/1
TABLET ORAL
Qty: 90 TABLET | Refills: 3 | Status: SHIPPED | OUTPATIENT
Start: 2020-08-19 | End: 2020-08-20

## 2020-08-19 NOTE — TELEPHONE ENCOUNTER
----- Message from Jovanni Acevedo sent at 8/19/2020 12:51 PM CDT -----  Regarding: Rx refill  Contact: [facility] [phone]  RX request - refill or new RX.  Is this a refill or new RX:  Refill   RX name and strength:  amiodarone (PACERONE) 200 MG Tab   Directions:   Is this a 30 day or 90 day RX:    Pharmacy name and phone # [facility] - [address] [phone] (Phone) [phone] (Fax)    Comments:  pharmacy stating second request to fill Rx, call patient to update.    Please call an advise  Thank you

## 2020-08-19 NOTE — TELEPHONE ENCOUNTER
----- Message from Kandace Mueller RN sent at 8/19/2020  5:53 PM CDT -----  Regarding: FW: request call back    ----- Message -----  From: Mariajose Sharp  Sent: 8/19/2020   3:10 PM CDT  To: [facility] Staff  Subject: request call back                                Pt request call back stated she really needs to speak with nurse Zoe/Ling needing to discuss Rx  ... call back:  [phone] (home)

## 2020-08-20 DIAGNOSIS — I49.3 VENTRICULAR PREMATURE BEATS: ICD-10-CM

## 2020-08-20 RX ORDER — AMIODARONE HYDROCHLORIDE 200 MG/1
TABLET ORAL
Qty: 90 TABLET | Refills: 3 | Status: SHIPPED | OUTPATIENT
Start: 2020-08-20 | End: 2021-09-27

## 2020-09-04 ENCOUNTER — PATIENT OUTREACH (OUTPATIENT)
Dept: OTHER | Facility: OTHER | Age: 75
End: 2020-09-04

## 2020-09-04 ENCOUNTER — INFUSION (OUTPATIENT)
Dept: INFECTIOUS DISEASES | Facility: HOSPITAL | Age: 75
End: 2020-09-04
Attending: INTERNAL MEDICINE
Payer: MEDICARE

## 2020-09-04 ENCOUNTER — CLINICAL SUPPORT (OUTPATIENT)
Dept: INFECTIOUS DISEASES | Facility: CLINIC | Age: 75
End: 2020-09-04
Payer: MEDICARE

## 2020-09-04 VITALS
SYSTOLIC BLOOD PRESSURE: 120 MMHG | BODY MASS INDEX: 21.37 KG/M2 | HEART RATE: 60 BPM | OXYGEN SATURATION: 97 % | RESPIRATION RATE: 16 BRPM | HEIGHT: 67 IN | TEMPERATURE: 98 F | WEIGHT: 136.13 LBS | DIASTOLIC BLOOD PRESSURE: 69 MMHG

## 2020-09-04 DIAGNOSIS — M06.042 RHEUMATOID ARTHRITIS INVOLVING BOTH HANDS WITH NEGATIVE RHEUMATOID FACTOR: Primary | ICD-10-CM

## 2020-09-04 DIAGNOSIS — M06.041 RHEUMATOID ARTHRITIS INVOLVING BOTH HANDS WITH NEGATIVE RHEUMATOID FACTOR: Primary | ICD-10-CM

## 2020-09-04 DIAGNOSIS — M85.80 OSTEOPENIA, UNSPECIFIED LOCATION: ICD-10-CM

## 2020-09-04 PROCEDURE — 96365 THER/PROPH/DIAG IV INF INIT: CPT | Mod: HCNC

## 2020-09-04 PROCEDURE — 96372 THER/PROPH/DIAG INJ SC/IM: CPT | Mod: HCNC,S$GLB,, | Performed by: INTERNAL MEDICINE

## 2020-09-04 PROCEDURE — 25000003 PHARM REV CODE 250: Mod: HCNC | Performed by: STUDENT IN AN ORGANIZED HEALTH CARE EDUCATION/TRAINING PROGRAM

## 2020-09-04 PROCEDURE — 63600175 PHARM REV CODE 636 W HCPCS: Mod: HCNC | Performed by: STUDENT IN AN ORGANIZED HEALTH CARE EDUCATION/TRAINING PROGRAM

## 2020-09-04 PROCEDURE — 96372 PR INJECTION,THERAP/PROPH/DIAG2ST, IM OR SUBCUT: ICD-10-PCS | Mod: HCNC,S$GLB,, | Performed by: INTERNAL MEDICINE

## 2020-09-04 PROCEDURE — 96367 TX/PROPH/DG ADDL SEQ IV INF: CPT | Mod: HCNC

## 2020-09-04 RX ORDER — SODIUM CHLORIDE 0.9 % (FLUSH) 0.9 %
10 SYRINGE (ML) INJECTION
Status: DISCONTINUED | OUTPATIENT
Start: 2020-09-04 | End: 2020-09-04 | Stop reason: HOSPADM

## 2020-09-04 RX ORDER — SODIUM CHLORIDE 0.9 % (FLUSH) 0.9 %
10 SYRINGE (ML) INJECTION
Status: CANCELLED | OUTPATIENT
Start: 2020-10-02

## 2020-09-04 RX ORDER — HEPARIN 100 UNIT/ML
500 SYRINGE INTRAVENOUS
Status: CANCELLED | OUTPATIENT
Start: 2020-10-02

## 2020-09-04 RX ORDER — ACETAMINOPHEN 325 MG/1
650 TABLET ORAL
Status: CANCELLED | OUTPATIENT
Start: 2020-10-02

## 2020-09-04 RX ORDER — ACETAMINOPHEN 325 MG/1
650 TABLET ORAL
Status: COMPLETED | OUTPATIENT
Start: 2020-09-04 | End: 2020-09-04

## 2020-09-04 RX ORDER — ZOLEDRONIC ACID 5 MG/100ML
5 INJECTION, SOLUTION INTRAVENOUS
Status: CANCELLED | OUTPATIENT
Start: 2020-10-02

## 2020-09-04 RX ADMIN — ACETAMINOPHEN 650 MG: 325 TABLET ORAL at 10:09

## 2020-09-04 RX ADMIN — SODIUM CHLORIDE 750 MG: 9 INJECTION, SOLUTION INTRAVENOUS at 11:09

## 2020-09-04 RX ADMIN — DIPHENHYDRAMINE HYDROCHLORIDE 25 MG: 50 INJECTION INTRAMUSCULAR; INTRAVENOUS at 10:09

## 2020-09-04 RX ADMIN — CYANOCOBALAMIN 1000 MCG: 1000 INJECTION, SOLUTION INTRAMUSCULAR at 11:09

## 2020-09-04 NOTE — PROGRESS NOTES
Sent [facility] message to follow up with Ms. Rizwana Block.    Per 30 day average, blood pressure is well controlled 122/70 mmHg. Below medications more for AFib and HF than blood pressure. Encouraged adherence to low sodium diet and physical activity guidelines. Advised patient to call or message with questions or concerns.     Hypertension Medications             carvediloL (COREG) 6.25 MG tablet TAKE ONE TABLET (6.25MG) BY MOUTH TWICE DAILY WITH MEALS    furosemide (LASIX) 40 MG tablet     sacubitriL-valsartan (ENTRESTO) 24-26 mg per tablet Take 1 tablet by mouth 2 (two) times daily.    sacubitriL-valsartan (ENTRESTO) 24-26 mg per tablet Take 1 tablet by mouth 2 (two) times daily.

## 2020-09-10 ENCOUNTER — PATIENT OUTREACH (OUTPATIENT)
Dept: ADMINISTRATIVE | Facility: OTHER | Age: 75
End: 2020-09-10

## 2020-09-10 ENCOUNTER — PATIENT MESSAGE (OUTPATIENT)
Dept: SLEEP MEDICINE | Facility: CLINIC | Age: 75
End: 2020-09-10

## 2020-09-10 NOTE — PROGRESS NOTES
Health Maintenance Due   Topic Date Due    Colorectal Cancer Screening  06/11/2019     Updates were requested from care everywhere.  Chart was reviewed for overdue [facility] Encounters (JOSE E) topics (CRS, Breast Cancer Screening, Eye exam)  Health Maintenance has been updated.  LINKS immunization registry triggered.  Immunizations were reconciled.

## 2020-09-14 ENCOUNTER — OFFICE VISIT (OUTPATIENT)
Dept: PODIATRY | Facility: CLINIC | Age: 75
End: 2020-09-14
Payer: MEDICARE

## 2020-09-14 DIAGNOSIS — L84 CORN OR CALLUS: ICD-10-CM

## 2020-09-14 DIAGNOSIS — L60.2 LONG TOENAIL: Primary | ICD-10-CM

## 2020-09-14 PROCEDURE — 99999 PR PBB SHADOW E&M-EST. PATIENT-LVL II: ICD-10-PCS | Mod: PBBFAC,HCNC,, | Performed by: PODIATRIST

## 2020-09-14 PROCEDURE — 99999 PR PBB SHADOW E&M-EST. PATIENT-LVL II: CPT | Mod: PBBFAC,HCNC,, | Performed by: PODIATRIST

## 2020-09-14 PROCEDURE — 99499 NO LOS: ICD-10-PCS | Mod: HCNC,,, | Performed by: PODIATRIST

## 2020-09-14 PROCEDURE — 17999 PR NON-COVERED FOOT CARE: ICD-10-PCS | Mod: CSM,HCNC,S$GLB, | Performed by: PODIATRIST

## 2020-09-14 PROCEDURE — 99499 UNLISTED E&M SERVICE: CPT | Mod: HCNC,,, | Performed by: PODIATRIST

## 2020-09-14 PROCEDURE — 17999 UNLISTD PX SKN MUC MEMB SUBQ: CPT | Mod: CSM,HCNC,S$GLB, | Performed by: PODIATRIST

## 2020-09-14 NOTE — PROGRESS NOTES
Long toenail    Corn or callus        Patient presents to the clinic for non-covered routine foot care.   Patient understands this is not typically a covered service every 4-6 weeks and patient is aware of responsibility of payment. Pedal pulses are palpable. No known risk factors requiring routine foot care.  nails and calluses/corns were reduced and trimmed bilaterally. Patient tolerated well.       Continue toe spacers and shoe modification.     Follow up 4 weeks Proc B.

## 2020-09-23 ENCOUNTER — PATIENT MESSAGE (OUTPATIENT)
Dept: RHEUMATOLOGY | Facility: CLINIC | Age: 75
End: 2020-09-23

## 2020-09-25 ENCOUNTER — OFFICE VISIT (OUTPATIENT)
Dept: RHEUMATOLOGY | Facility: CLINIC | Age: 75
End: 2020-09-25
Payer: MEDICARE

## 2020-09-25 DIAGNOSIS — M06.9 RHEUMATOID ARTHRITIS, INVOLVING UNSPECIFIED SITE, UNSPECIFIED RHEUMATOID FACTOR PRESENCE: Primary | ICD-10-CM

## 2020-09-25 DIAGNOSIS — M15.9 PRIMARY OSTEOARTHRITIS INVOLVING MULTIPLE JOINTS: ICD-10-CM

## 2020-09-25 PROCEDURE — 99214 PR OFFICE/OUTPT VISIT, EST, LEVL IV, 30-39 MIN: ICD-10-PCS | Mod: HCNC,95,, | Performed by: INTERNAL MEDICINE

## 2020-09-25 PROCEDURE — 99214 OFFICE O/P EST MOD 30 MIN: CPT | Mod: HCNC,95,, | Performed by: INTERNAL MEDICINE

## 2020-09-25 NOTE — PROGRESS NOTES
Subjective:      The patient location is: Home  The chief complaint leading to consultation is: RA Follow-up    Visit type: audiovisual    Face to Face time with patient: 25 minutes  40 minutes of total time spent on the encounter, which includes face to face time and non-face to face time preparing to see the patient (eg, review of tests), Obtaining and/or reviewing separately obtained history, Documenting clinical information in the electronic or other health record, Independently interpreting results (not separately reported) and communicating results to the patient/family/caregiver, or Care coordination (not separately reported).     Each patient to whom he or she provides medical services by telemedicine is:  (1) informed of the relationship between the physician and patient and the respective role of any other health care provider with respect to management of the patient; and (2) notified that he or she may decline to receive medical services by telemedicine and may withdraw from such care at any time.     Patient ID: Rizwana Block is a 75 y.o. female.    Chief Complaint: RA (RF- ACPA- CATHIE-) vs. Peripheral spondylarthritis; osteopenia with elevated FRAX; Gen OA    HPI   Pt is a 75 year old female here for a virtual follow-up visit today. She reports that she began losing large amounts of hair after starting Leflunomide at her last visit. She sent a portal message and it was suggested that she begin taking Leflunomide every other day; however, she stopped the medication altogether. The hair loss did decrease, but a few weeks ago she began feeling quite sluggish and stiff again. Her left 5th digit has been particularly bothersome (both painful and swollen). She resumed her Leflunomide 3 days ago and is taking it daily. Additionally, she is still receiving her Orencia every month and her next dose is scheduled for one week from today. She continues to walk on her treadmill daily and has lost close to 50 pounds  over the last year with a combination of daily exercise and Weight Watchers. Reports that her knees are feeling much better with the daily exercise and weight loss.     Review of Systems   Constitutional: Negative for fever and unexpected weight change.   HENT: Negative for mouth sores and trouble swallowing.    Eyes: Negative for redness.   Respiratory: Negative for cough and shortness of breath.    Cardiovascular: Negative for chest pain.   Gastrointestinal: Negative for constipation and diarrhea.   Genitourinary: Negative for dysuria and genital sores.   Skin: Negative for rash.   Neurological: Positive for headaches.   Hematological: Does not bruise/bleed easily.         Objective:   There were no vitals filed for this visit.      Patient reported weight: 132lbs.    Physical Exam limited due to virtual visit.    Physical Exam    Patient was asked to palpate each joint and show hands to camera:  Normal active range of motion at wrist and hands.            10/18/2019 2/21/2020 5/21/2020 9/25/2020   DONOVAN-28 (ESR) 4.85 (Moderate disease activity) 2.69 (Low disease activity) 3.13 (Low disease activity) --   DONOVAN-28 (CRP) 4.87 (Moderate disease activity) 1.79 (Remission) 2.78 (Low disease activity) --   Tender (DONOVAN-28) 12 / 28  0 / 28  2 / 28 12 / 28    Swollen (DONOVAN-28) 6 / 28 4 / 28  2 / 28 2 / 28    Provider Global 30 mm 20 mm 10 mm --   Patient Global 0 mm 0 mm 35 mm 5 mm   ESR 24 mm/hr 21 mm/hr 8 mm/hr --   CRP 34.4 mg/L 1.1 mg/L 0.5 mg/L --        Assessment:       1. Rheumatoid arthritis, involving unspecified site, unspecified rheumatoid factor presence    2. Primary osteoarthritis involving multiple joints        RA  - Abatacept 75mg IV monthly  - Restart Leflunomide 20mg daily. If major hair loss becomes an issue again, instructed to take Leflunomide 20mg every other day.   - Continue home exercises    Osteopenia  - Zolendronic Acid, received last year.  - Recommended to maintain current weight as further  weight loss can begin to increase her risks.     Health Maintenance  - In immunization records, it is recorded that she received the influenza vaccine on 9/3/20, but the patient does not believe that she received the vaccine yet. Instructed her to check with the ID department next week when she is here for Orencia infusion to clear this up and receive influenza vaccine if she did not get it yet.    Return to clinic in 3 months with standing labs due 1st week of November.      Plan:       Problem List Items Addressed This Visit     None      Visit Diagnoses     Rheumatoid arthritis, involving unspecified site, unspecified rheumatoid factor presence    -  Primary    Primary osteoarthritis involving multiple joints            Patient was seen with Dr. Javier who agrees with the above plan.    Kristen Berg MD  PM&R PGY-1

## 2020-09-25 NOTE — PROGRESS NOTES
I have personally taken the history and examined the patient and agree with the resident,s note as stated above       The patient location is: home  The chief complaint leading to consultation is: RA, seronegative    Visit type: audiovisual    Face to Face time with patient: 15 min  15 minutes of total time spent on the encounter, which includes face to face time and non-face to face time preparing to see the patient (eg, review of tests), Obtaining and/or reviewing separately obtained history, Documenting clinical information in the electronic or other health record, Independently interpreting results (not separately reported) and communicating results to the patient/family/caregiver, or Care coordination (not separately reported).         Each patient to whom he or she provides medical services by telemedicine is:  (1) informed of the relationship between the physician and patient and the respective role of any other health care provider with respect to management of the patient; and (2) notified that he or she may decline to receive medical services by telemedicine and may withdraw from such care at any time.    Notes:        RA RF- ACPA- CATHIE- v. Peripheral spondyloarthritis:  TJ 2 SJ 2 Pt global 50  ESR 6 CRP 1.1  DAS28 3.66 LLR98-NPQ 3.63 CDAI  16.5(MDA) flare due to stopping leflunomide early July rather than decreasing to every other day due to hair fall. Resumed daily x 3 days  DAPSA TJ 12  SJ 2 Pt global 0.5 Pt pain 0.5  CRP 0.05= 15.05(MDA)  Mild macrocytic anemia of chronic disease/inflammation  Fibromyalgia  Right pes anserine bursitis  Osteopenia with elevated FRAX s/p zoledronic acid 12/12/19     standing labs 11/6/20, 2/6/21  Cont abatacept 750mg IV q 28 days  Resume  leflunomide 20mg daily. If hair fall recurs, decrease to every other day rather than stopping altogether.   Continue aerobic exercise  Quad and hamstring strengthening  RTC 5 months with standing labs      Answers for HPI/ROS submitted  by the patient on 9/23/2020   fever: No  eye redness: No  mouth sores: No  headaches: Yes  shortness of breath: No  chest pain: No  trouble swallowing: No  diarrhea: No  constipation: No  unexpected weight change: No  genital sore: No  dysuria: No  During the last 3 days, have you had a skin rash?: No  Bruises or bleeds easily: No  cough: No

## 2020-09-25 NOTE — PROGRESS NOTES
Pre chart    Answers for HPI/ROS submitted by the patient on 9/23/2020   fever: No  eye redness: No  mouth sores: No  headaches: Yes  shortness of breath: No  chest pain: No  trouble swallowing: No  diarrhea: No  constipation: No  unexpected weight change: No  genital sore: No  dysuria: No  During the last 3 days, have you had a skin rash?: No  Bruises or bleeds easily: No  cough: No

## 2020-09-25 NOTE — PROGRESS NOTES
Answers for HPI/ROS submitted by the patient on 9/23/2020   fever: No  eye redness: No  mouth sores: No  headaches: Yes  shortness of breath: No  chest pain: No  trouble swallowing: No  diarrhea: No  constipation: No  unexpected weight change: No  genital sore: No  dysuria: No  During the last 3 days, have you had a skin rash?: No  Bruises or bleeds easily: No  cough: No

## 2020-09-28 ENCOUNTER — CLINICAL SUPPORT (OUTPATIENT)
Dept: INFECTIOUS DISEASES | Facility: CLINIC | Age: 75
End: 2020-09-28
Payer: MEDICARE

## 2020-09-28 PROCEDURE — G0008 ADMIN INFLUENZA VIRUS VAC: HCPCS | Mod: HCNC,S$GLB,, | Performed by: INTERNAL MEDICINE

## 2020-09-28 PROCEDURE — 90694 FLU VACCINE - QUADRIVALENT - ADJUVANTED: ICD-10-PCS | Mod: HCNC,S$GLB,, | Performed by: INTERNAL MEDICINE

## 2020-09-28 PROCEDURE — G0008 FLU VACCINE - QUADRIVALENT - ADJUVANTED: ICD-10-PCS | Mod: HCNC,S$GLB,, | Performed by: INTERNAL MEDICINE

## 2020-09-28 PROCEDURE — 90694 VACC AIIV4 NO PRSRV 0.5ML IM: CPT | Mod: HCNC,S$GLB,, | Performed by: INTERNAL MEDICINE

## 2020-10-02 ENCOUNTER — HOSPITAL ENCOUNTER (OUTPATIENT)
Dept: RADIOLOGY | Facility: HOSPITAL | Age: 75
Discharge: HOME | End: 2020-10-02
Attending: INTERNAL MEDICINE
Payer: MEDICARE

## 2020-10-02 ENCOUNTER — CLINICAL SUPPORT (OUTPATIENT)
Dept: INFECTIOUS DISEASES | Facility: CLINIC | Age: 75
End: 2020-10-02
Payer: MEDICARE

## 2020-10-02 ENCOUNTER — INFUSION (OUTPATIENT)
Dept: INFECTIOUS DISEASES | Facility: HOSPITAL | Age: 75
End: 2020-10-02
Attending: INTERNAL MEDICINE
Payer: MEDICARE

## 2020-10-02 ENCOUNTER — PATIENT MESSAGE (OUTPATIENT)
Dept: PODIATRY | Facility: CLINIC | Age: 75
End: 2020-10-02

## 2020-10-02 VITALS
HEART RATE: 60 BPM | OXYGEN SATURATION: 99 % | SYSTOLIC BLOOD PRESSURE: 133 MMHG | BODY MASS INDEX: 21.35 KG/M2 | DIASTOLIC BLOOD PRESSURE: 69 MMHG | WEIGHT: 136 LBS | RESPIRATION RATE: 15 BRPM | TEMPERATURE: 98 F | HEIGHT: 67 IN

## 2020-10-02 DIAGNOSIS — M85.80 OSTEOPENIA, UNSPECIFIED LOCATION: ICD-10-CM

## 2020-10-02 DIAGNOSIS — M06.042 RHEUMATOID ARTHRITIS INVOLVING BOTH HANDS WITH NEGATIVE RHEUMATOID FACTOR: Primary | ICD-10-CM

## 2020-10-02 DIAGNOSIS — M06.041 RHEUMATOID ARTHRITIS INVOLVING BOTH HANDS WITH NEGATIVE RHEUMATOID FACTOR: Primary | ICD-10-CM

## 2020-10-02 DIAGNOSIS — N28.1 KIDNEY CYST, ACQUIRED: ICD-10-CM

## 2020-10-02 PROCEDURE — 96372 PR INJECTION,THERAP/PROPH/DIAG2ST, IM OR SUBCUT: ICD-10-PCS | Mod: HCNC,S$GLB,, | Performed by: INTERNAL MEDICINE

## 2020-10-02 PROCEDURE — 25000003 PHARM REV CODE 250: Mod: HCNC | Performed by: STUDENT IN AN ORGANIZED HEALTH CARE EDUCATION/TRAINING PROGRAM

## 2020-10-02 PROCEDURE — 76770 US EXAM ABDO BACK WALL COMP: CPT | Mod: TC,HCNC

## 2020-10-02 PROCEDURE — 96365 THER/PROPH/DIAG IV INF INIT: CPT | Mod: HCNC

## 2020-10-02 PROCEDURE — 96372 THER/PROPH/DIAG INJ SC/IM: CPT | Mod: HCNC,S$GLB,, | Performed by: INTERNAL MEDICINE

## 2020-10-02 PROCEDURE — 76770 US EXAM ABDO BACK WALL COMP: CPT | Mod: 26,HCNC,, | Performed by: RADIOLOGY

## 2020-10-02 PROCEDURE — 76770 US RETROPERITONEAL COMPLETE: ICD-10-PCS | Mod: 26,HCNC,, | Performed by: RADIOLOGY

## 2020-10-02 PROCEDURE — 96367 TX/PROPH/DG ADDL SEQ IV INF: CPT | Mod: HCNC

## 2020-10-02 PROCEDURE — 63600175 PHARM REV CODE 636 W HCPCS: Mod: HCNC | Performed by: STUDENT IN AN ORGANIZED HEALTH CARE EDUCATION/TRAINING PROGRAM

## 2020-10-02 RX ORDER — SODIUM CHLORIDE 0.9 % (FLUSH) 0.9 %
10 SYRINGE (ML) INJECTION
Status: CANCELLED | OUTPATIENT
Start: 2020-10-30

## 2020-10-02 RX ORDER — ACETAMINOPHEN 325 MG/1
650 TABLET ORAL
Status: COMPLETED | OUTPATIENT
Start: 2020-10-02 | End: 2020-10-02

## 2020-10-02 RX ORDER — SODIUM CHLORIDE 0.9 % (FLUSH) 0.9 %
10 SYRINGE (ML) INJECTION
Status: DISCONTINUED | OUTPATIENT
Start: 2020-10-02 | End: 2020-10-02 | Stop reason: HOSPADM

## 2020-10-02 RX ORDER — HEPARIN 100 UNIT/ML
500 SYRINGE INTRAVENOUS
Status: CANCELLED | OUTPATIENT
Start: 2020-10-30

## 2020-10-02 RX ORDER — ACETAMINOPHEN 325 MG/1
650 TABLET ORAL
Status: CANCELLED | OUTPATIENT
Start: 2020-10-30

## 2020-10-02 RX ORDER — ZOLEDRONIC ACID 5 MG/100ML
5 INJECTION, SOLUTION INTRAVENOUS
Status: CANCELLED | OUTPATIENT
Start: 2020-10-30

## 2020-10-02 RX ADMIN — SODIUM CHLORIDE 750 MG: 9 INJECTION, SOLUTION INTRAVENOUS at 02:10

## 2020-10-02 RX ADMIN — ACETAMINOPHEN 650 MG: 325 TABLET ORAL at 02:10

## 2020-10-02 RX ADMIN — DIPHENHYDRAMINE HYDROCHLORIDE 25 MG: 50 INJECTION, SOLUTION INTRAMUSCULAR; INTRAVENOUS at 02:10

## 2020-10-02 RX ADMIN — CYANOCOBALAMIN 1000 MCG: 1000 INJECTION, SOLUTION INTRAMUSCULAR at 03:10

## 2020-10-02 NOTE — PROGRESS NOTES
Arrived for Orencia infusion. Pre-meds of Tylenol 650mg and Benadryl 25mg IVPB 30mins prior to infusion. Tolerated infusion without any difficulty, left in NAD.

## 2020-10-05 ENCOUNTER — PATIENT MESSAGE (OUTPATIENT)
Dept: ADMINISTRATIVE | Facility: HOSPITAL | Age: 75
End: 2020-10-05

## 2020-10-05 DIAGNOSIS — Z12.11 SCREEN FOR COLON CANCER: Primary | ICD-10-CM

## 2020-10-09 ENCOUNTER — TELEPHONE (OUTPATIENT)
Dept: INTERNAL MEDICINE | Facility: CLINIC | Age: 75
End: 2020-10-09

## 2020-10-10 ENCOUNTER — PATIENT MESSAGE (OUTPATIENT)
Dept: INTERNAL MEDICINE | Facility: CLINIC | Age: 75
End: 2020-10-10

## 2020-10-12 ENCOUNTER — OFFICE VISIT (OUTPATIENT)
Dept: PODIATRY | Facility: CLINIC | Age: 75
End: 2020-10-12
Payer: MEDICARE

## 2020-10-12 VITALS — HEIGHT: 67 IN | WEIGHT: 136 LBS | BODY MASS INDEX: 21.35 KG/M2

## 2020-10-12 DIAGNOSIS — L60.2 LONG TOENAIL: Primary | ICD-10-CM

## 2020-10-12 DIAGNOSIS — B35.1 DERMATOPHYTOSIS OF NAIL: ICD-10-CM

## 2020-10-12 DIAGNOSIS — L84 CORN OR CALLUS: ICD-10-CM

## 2020-10-12 PROCEDURE — 17999 UNLISTD PX SKN MUC MEMB SUBQ: CPT | Mod: CSM,HCNC,S$GLB, | Performed by: PODIATRIST

## 2020-10-12 PROCEDURE — 99999 PR PBB SHADOW E&M-EST. PATIENT-LVL III: ICD-10-PCS | Mod: PBBFAC,HCNC,, | Performed by: PODIATRIST

## 2020-10-12 PROCEDURE — 17999 PR NON-COVERED FOOT CARE: ICD-10-PCS | Mod: CSM,HCNC,S$GLB, | Performed by: PODIATRIST

## 2020-10-12 PROCEDURE — 99999 PR PBB SHADOW E&M-EST. PATIENT-LVL III: CPT | Mod: PBBFAC,HCNC,, | Performed by: PODIATRIST

## 2020-10-12 PROCEDURE — 99499 UNLISTED E&M SERVICE: CPT | Mod: HCNC,,, | Performed by: PODIATRIST

## 2020-10-12 PROCEDURE — 99499 NO LOS: ICD-10-PCS | Mod: HCNC,,, | Performed by: PODIATRIST

## 2020-10-12 NOTE — PROGRESS NOTES
Long toenail    Corn or callus    Dermatophytosis of nail        Patient presents to the clinic for non-covered routine foot care.   Patient understands this is not typically a covered service every 4-6 weeks and patient is aware of responsibility of payment. Pedal pulses are palpable. No known risk factors requiring routine foot care.  nails and calluses/corns were reduced and trimmed bilaterally. Patient tolerated well.       Continue toe spacers and shoe modification.     Follow up 4 weeks Proc B.

## 2020-10-15 ENCOUNTER — PATIENT MESSAGE (OUTPATIENT)
Dept: CARDIOLOGY | Facility: CLINIC | Age: 75
End: 2020-10-15

## 2020-10-19 ENCOUNTER — TELEPHONE (OUTPATIENT)
Dept: ENDOSCOPY | Facility: HOSPITAL | Age: 75
End: 2020-10-19

## 2020-10-19 DIAGNOSIS — Z01.818 PRE-OP TESTING: Primary | ICD-10-CM

## 2020-10-19 NOTE — TELEPHONE ENCOUNTER
Contacted patient regarding Colonoscopy orders.    Patient is driving and cannot talk at this moment.

## 2020-10-21 ENCOUNTER — PATIENT MESSAGE (OUTPATIENT)
Dept: ENDOSCOPY | Facility: HOSPITAL | Age: 75
End: 2020-10-21

## 2020-10-24 ENCOUNTER — PATIENT MESSAGE (OUTPATIENT)
Dept: CARDIOLOGY | Facility: CLINIC | Age: 75
End: 2020-10-24

## 2020-10-26 ENCOUNTER — TELEPHONE (OUTPATIENT)
Dept: CARDIOLOGY | Facility: CLINIC | Age: 75
End: 2020-10-26

## 2020-10-26 NOTE — TELEPHONE ENCOUNTER
Pt is wanting an echo to be done at [facility]. There is no order for this in computer pb    ----- Message from Yung Cota sent at 10/26/2020  4:09 PM CDT -----  Type:  Needs Medical Advice    Who Called: Pt  Symptoms (please be specific):    How long has patient had these symptoms:    Pharmacy name and phone #:    Would the patient rather a call back or a response via [facility]? Call back  Best Call Back Number: [phone] (home)   Additional Information:   Please call back in regards to pt requesting blood work before appt. Please call back asap

## 2020-10-27 ENCOUNTER — TELEPHONE (OUTPATIENT)
Dept: CARDIOLOGY | Facility: CLINIC | Age: 75
End: 2020-10-27

## 2020-10-27 DIAGNOSIS — I42.8 OTHER CARDIOMYOPATHY: Primary | ICD-10-CM

## 2020-10-27 DIAGNOSIS — I50.22 CHRONIC SYSTOLIC HEART FAILURE: ICD-10-CM

## 2020-10-30 ENCOUNTER — INFUSION (OUTPATIENT)
Dept: INFECTIOUS DISEASES | Facility: HOSPITAL | Age: 75
End: 2020-10-30
Attending: INTERNAL MEDICINE
Payer: MEDICARE

## 2020-10-30 ENCOUNTER — CLINICAL SUPPORT (OUTPATIENT)
Dept: INTERNAL MEDICINE | Facility: CLINIC | Age: 75
End: 2020-10-30
Payer: MEDICARE

## 2020-10-30 VITALS
TEMPERATURE: 98 F | BODY MASS INDEX: 21.38 KG/M2 | HEIGHT: 67 IN | WEIGHT: 136.25 LBS | SYSTOLIC BLOOD PRESSURE: 150 MMHG | RESPIRATION RATE: 18 BRPM | DIASTOLIC BLOOD PRESSURE: 69 MMHG | HEART RATE: 68 BPM | OXYGEN SATURATION: 99 %

## 2020-10-30 DIAGNOSIS — M06.042 RHEUMATOID ARTHRITIS INVOLVING BOTH HANDS WITH NEGATIVE RHEUMATOID FACTOR: Primary | ICD-10-CM

## 2020-10-30 DIAGNOSIS — M06.041 RHEUMATOID ARTHRITIS INVOLVING BOTH HANDS WITH NEGATIVE RHEUMATOID FACTOR: Primary | ICD-10-CM

## 2020-10-30 DIAGNOSIS — M85.80 OSTEOPENIA, UNSPECIFIED LOCATION: ICD-10-CM

## 2020-10-30 PROCEDURE — 25000003 PHARM REV CODE 250: Mod: HCNC | Performed by: STUDENT IN AN ORGANIZED HEALTH CARE EDUCATION/TRAINING PROGRAM

## 2020-10-30 PROCEDURE — 96372 PR INJECTION,THERAP/PROPH/DIAG2ST, IM OR SUBCUT: ICD-10-PCS | Mod: HCNC,S$GLB,, | Performed by: INTERNAL MEDICINE

## 2020-10-30 PROCEDURE — 96372 THER/PROPH/DIAG INJ SC/IM: CPT | Mod: HCNC,S$GLB,, | Performed by: INTERNAL MEDICINE

## 2020-10-30 PROCEDURE — 63600175 PHARM REV CODE 636 W HCPCS: Mod: JG,HCNC | Performed by: STUDENT IN AN ORGANIZED HEALTH CARE EDUCATION/TRAINING PROGRAM

## 2020-10-30 PROCEDURE — 96367 TX/PROPH/DG ADDL SEQ IV INF: CPT | Mod: HCNC

## 2020-10-30 PROCEDURE — 96365 THER/PROPH/DIAG IV INF INIT: CPT | Mod: HCNC

## 2020-10-30 RX ORDER — ACETAMINOPHEN 325 MG/1
650 TABLET ORAL
Status: COMPLETED | OUTPATIENT
Start: 2020-10-30 | End: 2020-10-30

## 2020-10-30 RX ORDER — HEPARIN 100 UNIT/ML
500 SYRINGE INTRAVENOUS
Status: CANCELLED | OUTPATIENT
Start: 2020-11-27

## 2020-10-30 RX ORDER — ZOLEDRONIC ACID 5 MG/100ML
5 INJECTION, SOLUTION INTRAVENOUS
Status: CANCELLED | OUTPATIENT
Start: 2020-11-27

## 2020-10-30 RX ORDER — SODIUM CHLORIDE 0.9 % (FLUSH) 0.9 %
10 SYRINGE (ML) INJECTION
Status: CANCELLED | OUTPATIENT
Start: 2020-11-27

## 2020-10-30 RX ORDER — SODIUM CHLORIDE 0.9 % (FLUSH) 0.9 %
10 SYRINGE (ML) INJECTION
Status: DISCONTINUED | OUTPATIENT
Start: 2020-10-30 | End: 2020-10-30 | Stop reason: HOSPADM

## 2020-10-30 RX ORDER — ACETAMINOPHEN 325 MG/1
650 TABLET ORAL
Status: CANCELLED | OUTPATIENT
Start: 2020-11-27

## 2020-10-30 RX ADMIN — ACETAMINOPHEN 650 MG: 325 TABLET ORAL at 02:10

## 2020-10-30 RX ADMIN — CYANOCOBALAMIN 1000 MCG: 1000 INJECTION, SOLUTION INTRAMUSCULAR at 01:10

## 2020-10-30 RX ADMIN — DIPHENHYDRAMINE HYDROCHLORIDE 25 MG: 50 INJECTION INTRAMUSCULAR; INTRAVENOUS at 02:10

## 2020-10-30 RX ADMIN — SODIUM CHLORIDE 750 MG: 9 INJECTION, SOLUTION INTRAVENOUS at 02:10

## 2020-11-01 ENCOUNTER — PATIENT MESSAGE (OUTPATIENT)
Dept: ADMINISTRATIVE | Facility: OTHER | Age: 75
End: 2020-11-01

## 2020-11-02 ENCOUNTER — TELEPHONE (OUTPATIENT)
Dept: RHEUMATOLOGY | Facility: CLINIC | Age: 75
End: 2020-11-02

## 2020-11-02 ENCOUNTER — TELEPHONE (OUTPATIENT)
Dept: INTERNAL MEDICINE | Facility: CLINIC | Age: 75
End: 2020-11-02

## 2020-11-02 ENCOUNTER — PATIENT MESSAGE (OUTPATIENT)
Dept: RHEUMATOLOGY | Facility: CLINIC | Age: 75
End: 2020-11-02

## 2020-11-02 ENCOUNTER — HOSPITAL ENCOUNTER (EMERGENCY)
Facility: HOSPITAL | Age: 75
Discharge: HOME | End: 2020-11-02
Attending: EMERGENCY MEDICINE
Payer: MEDICARE

## 2020-11-02 VITALS
BODY MASS INDEX: 20.16 KG/M2 | TEMPERATURE: 98 F | RESPIRATION RATE: 16 BRPM | SYSTOLIC BLOOD PRESSURE: 111 MMHG | DIASTOLIC BLOOD PRESSURE: 63 MMHG | WEIGHT: 133 LBS | HEART RATE: 59 BPM | HEIGHT: 68 IN | OXYGEN SATURATION: 95 %

## 2020-11-02 DIAGNOSIS — E53.8 VITAMIN B12 DEFICIENCY: Primary | ICD-10-CM

## 2020-11-02 DIAGNOSIS — R89.9 ABNORMAL LABORATORY TEST: Primary | ICD-10-CM

## 2020-11-02 DIAGNOSIS — I10 ESSENTIAL HYPERTENSION: Primary | ICD-10-CM

## 2020-11-02 LAB
ANION GAP SERPL CALC-SCNC: 10 MMOL/L (ref 8–16)
BUN SERPL-MCNC: 23 MG/DL (ref 6–30)
BUN SERPL-MCNC: 24 MG/DL (ref 8–23)
CALCIUM SERPL-MCNC: 9 MG/DL (ref 8.7–10.5)
CHLORIDE SERPL-SCNC: 104 MMOL/L (ref 95–110)
CHLORIDE SERPL-SCNC: 105 MMOL/L (ref 95–110)
CO2 SERPL-SCNC: 25 MMOL/L (ref 23–29)
CREAT SERPL-MCNC: 0.8 MG/DL (ref 0.5–1.4)
CREAT SERPL-MCNC: 0.8 MG/DL (ref 0.5–1.4)
EST. GFR  (AFRICAN AMERICAN): >60 ML/MIN/1.73 M^2
EST. GFR  (NON AFRICAN AMERICAN): >60 ML/MIN/1.73 M^2
GLUCOSE SERPL-MCNC: 85 MG/DL (ref 70–110)
GLUCOSE SERPL-MCNC: 86 MG/DL (ref 70–110)
HCT VFR BLD CALC: 37 %PCV (ref 36–54)
POC IONIZED CALCIUM: 1.09 MMOL/L (ref 1.06–1.42)
POC TCO2 (MEASURED): 24 MMOL/L (ref 23–29)
POTASSIUM BLD-SCNC: 4 MMOL/L (ref 3.5–5.1)
POTASSIUM SERPL-SCNC: 4.1 MMOL/L (ref 3.5–5.1)
SAMPLE: NORMAL
SODIUM BLD-SCNC: 141 MMOL/L (ref 136–145)
SODIUM SERPL-SCNC: 140 MMOL/L (ref 136–145)
TROPONIN I SERPL DL<=0.01 NG/ML-MCNC: 0.01 NG/ML (ref 0–0.03)

## 2020-11-02 PROCEDURE — 82330 ASSAY OF CALCIUM: CPT | Mod: HCNC

## 2020-11-02 PROCEDURE — 80047 BASIC METABLC PNL IONIZED CA: CPT | Mod: HCNC

## 2020-11-02 PROCEDURE — 99284 PR EMERGENCY DEPT VISIT,LEVEL IV: ICD-10-PCS | Mod: HCNC,,, | Performed by: PHYSICIAN ASSISTANT

## 2020-11-02 PROCEDURE — 84484 ASSAY OF TROPONIN QUANT: CPT | Mod: HCNC

## 2020-11-02 PROCEDURE — 80048 BASIC METABOLIC PNL TOTAL CA: CPT | Mod: HCNC

## 2020-11-02 PROCEDURE — 93005 ELECTROCARDIOGRAM TRACING: CPT | Mod: HCNC

## 2020-11-02 PROCEDURE — 99285 EMERGENCY DEPT VISIT HI MDM: CPT | Mod: 25,HCNC

## 2020-11-02 PROCEDURE — 80299 QUANTITATIVE ASSAY DRUG: CPT | Mod: HCNC

## 2020-11-02 PROCEDURE — 82962 GLUCOSE BLOOD TEST: CPT | Mod: HCNC

## 2020-11-02 PROCEDURE — 99284 EMERGENCY DEPT VISIT MOD MDM: CPT | Mod: HCNC,,, | Performed by: PHYSICIAN ASSISTANT

## 2020-11-02 RX ORDER — CYANOCOBALAMIN 1000 UG/ML
1000 INJECTION, SOLUTION INTRAMUSCULAR; SUBCUTANEOUS
Status: SHIPPED | OUTPATIENT
Start: 2020-11-03 | End: 2021-10-29

## 2020-11-02 RX ORDER — ALBUTEROL SULFATE 2.5 MG/.5ML
10 SOLUTION RESPIRATORY (INHALATION)
Status: DISCONTINUED | OUTPATIENT
Start: 2020-11-02 | End: 2020-11-02

## 2020-11-02 RX ORDER — FUROSEMIDE 10 MG/ML
80 INJECTION INTRAMUSCULAR; INTRAVENOUS
Status: DISCONTINUED | OUTPATIENT
Start: 2020-11-02 | End: 2020-11-02

## 2020-11-02 NOTE — TELEPHONE ENCOUNTER
Pt states that Infectious Disease advised her that she has to get her B12 injections in Primary Care only. Pt would like to have this done on 11-27 @ 1:15. Please advise

## 2020-11-02 NOTE — TELEPHONE ENCOUNTER
----- Message from Mary Kelly sent at 11/2/2020  7:31 AM CST -----  Contact: AMY MANLEY [[id number]]  Patient want to rescheduled her B12 inj appointment from 11/30 to 11/27 @ 1:15

## 2020-11-02 NOTE — ED PROVIDER NOTES
Encounter Date: 11/2/2020       History     Chief Complaint   Patient presents with    Hyperkalemia     dr booth called and told me come get checked, lab today potassium high, i just walked 4 miles     75-year-old female with for rheumatoid arthritis, CHF, AFib, hypertension presents for hyperkalemia noted on outpatient labs.  Patient had labs drawn this morning for notable for potassium of 6.3.  She states that she feels well, she has chronic palpitations and has had occasional chest discomfort and dyspnea on exertion, however has been able to exercise walking 4 miles a day without any difficulty.  She takes a potassium supplement daily, denies any changes in this.  She has not been taking Lasix.        Review of patient's allergies indicates:   Allergen Reactions    Dilaudid [hydromorphone (bulk)]      Severe nausea/vomiting    Methotrexate analogues      Deeply depressed    Morphine      Severe nausea/vomiting     Past Medical History:   Diagnosis Date    Arrhythmia     Chest pain 5/27/2016    3/2016: Began experience chest discomfort.    CHF (congestive heart failure)     Hypertension     Osteopenia     Reclast infusion 10/5/2010 and 10/20/2011    Rheumatoid arthritis 07/2019    Sleep apnea      Past Surgical History:   Procedure Laterality Date    ANKLE FUSION      right    bladder surgery      with mesh    BLEPHAROPLASTY W/ LASER      CATARACT EXTRACTION, BILATERAL      HAND SURGERY      benign cyst on left    HYSTERECTOMY      heavy bleeding    PATELLA FRACTURE SURGERY      right- plate in tibial plateau    TONSILLECTOMY       Family History   Problem Relation Age of Onset    Heart disease Mother 63    Stroke Father 49    Heart disease Father     Early death Father     Cancer Maternal Aunt         bone    Cancer Maternal Uncle         esophageal    Heart disease Paternal Uncle     SIDS Daughter     Breast cancer Neg Hx     Ovarian cancer Neg Hx     Cervical cancer Neg Hx      Endometrial cancer Neg Hx     Vaginal cancer Neg Hx      Social History     Tobacco Use    Smoking status: Former Smoker     Packs/day: 0.25     Years: 24.00     Pack years: 6.00     Types: Cigarettes     Start date: 1961     Quit date: 1985     Years since quittin.8    Smokeless tobacco: Never Used   Substance Use Topics    Alcohol use: Not Currently     Alcohol/week: 5.0 standard drinks     Types: 5 Glasses of wine per week     Comment: socially    Drug use: No     Review of Systems   Constitutional: Negative for fever.   HENT: Negative for sore throat.    Respiratory: Positive for chest tightness and shortness of breath.    Cardiovascular: Positive for palpitations. Negative for chest pain and leg swelling.   Gastrointestinal: Negative for abdominal pain, nausea and vomiting.   Genitourinary: Negative for dysuria.   Musculoskeletal: Negative for back pain.   Skin: Negative for rash.   Neurological: Negative for weakness.   Hematological: Does not bruise/bleed easily.       Physical Exam     Initial Vitals [20 1027]   BP Pulse Resp Temp SpO2   (!) 142/82 81 18 97.6 °F (36.4 °C) 98 %      MAP       --         Physical Exam    Nursing note and vitals reviewed.  Constitutional: She appears well-developed and well-nourished.   HENT:   Head: Normocephalic and atraumatic.   Eyes: EOM are normal. Pupils are equal, round, and reactive to light.   Neck: Normal range of motion. Neck supple.   Cardiovascular: Normal rate, regular rhythm, normal heart sounds and intact distal pulses. Exam reveals no gallop and no friction rub.    No murmur heard.  Pulmonary/Chest: Breath sounds normal. No respiratory distress. She has no wheezes. She has no rhonchi. She has no rales. She exhibits no tenderness.   Musculoskeletal: Normal range of motion.   Neurological: She is alert and oriented to person, place, and time.   Skin: Skin is warm and dry.   Psychiatric: She has a normal mood and affect.         ED Course    Procedures  Labs Reviewed   BASIC METABOLIC PANEL - Abnormal; Notable for the following components:       Result Value    BUN 24 (*)     All other components within normal limits   TROPONIN I   AMIODARONE & METABOLITE   ISTAT PROCEDURE     EKG Readings: (Independently Interpreted)   Initial Reading: No STEMI. Previous EKG: Compared with most recent EKG Previous EKG Date: 7/8/2020. Rhythm: Normal Sinus Rhythm. Heart Rate: 72. Ectopy: Frequent PVCs. T Waves Flipped: AVF and III. Clinical Impression: Normal Sinus Rhythm with PVCs     ECG Results          EKG 12-lead (Final result)  Result time 11/02/20 11:14:59    Final result by Interface, Lab In [facility] (11/02/20 11:14:59)                 Narrative:    Test Reason : E87.5,    Vent. Rate : 072 BPM     Atrial Rate : 072 BPM     P-R Int : 170 ms          QRS Dur : 102 ms      QT Int : 410 ms       P-R-T Axes : 055 002 020 degrees     QTc Int : 448 ms    Sinus rhythm with frequent Premature ventricular complexes  Nonspecific T wave abnormality  Abnormal ECG  When compared with ECG of 15-AUG-2019 11:34,  Premature ventricular complexes are now Present  FL interval has decreased  QRS duration has decreased  Inverted T waves have replaced nonspecific T wave abnormality in Inferior  leads  Nonspecific T wave abnormality has replaced inverted T waves in Anterior  leads  Confirmed by Brad LOCKWOOD, Luca ([id number]) on 11/2/2020 11:14:43 AM    Referred By: CHACORTAERR   SELF           Confirmed By:Luca Salinas MD                            Imaging Results          X-Ray Chest PA And Lateral (Final result)  Result time 11/02/20 12:54:40    Final result by Anil Joseph MD (11/02/20 12:54:40)                 Impression:      No acute radiographic findings in the chest.      Electronically signed by: Anil Joseph MD  Date:    11/02/2020  Time:    12:54             Narrative:    EXAMINATION:  XR CHEST PA AND LATERAL    CLINICAL HISTORY:  Chest pain, unspecified    TECHNIQUE:  PA  and lateral views of the chest were performed.    COMPARISON:  05/04/2020    FINDINGS:  Cardiac silhouette and pulmonary vascularity are within normal limits.  There is aortic atherosclerosis.  Lungs are symmetrically expanded and show no evidence of significant focal consolidation, large effusion or pneumothorax.  Bones show no acute abnormalities.  Mild deformity of the left lateral ribs suggesting remote fractures.  There are postoperative changes of the left shoulder.  Both shoulders demonstrate degenerative changes.                                 Medical Decision Making:   History:   Old Medical Records: I decided to obtain old medical records.  Old Records Summarized: records from clinic visits.       <> Summary of Records: Were drawn earlier today notable for hyperkalemia potassium of 6.3.  BUN mildly elevated but creatinine normal.  Hemolysis was not mentioned in the lab report.  Initial Assessment:   75-year-old female presenting for hyperkalemia noted on outpatient labs.  She is mildly hypertensive 142/82 with otherwise normal vitals.  Well-appearing.  Differential Diagnosis:   Hyperkalemia may be inaccurate draw due to hemolysis  Over supplementation of potassium  Dysrhythmia  Patient is endorsing some intermittent, mild chest discomfort over the past few days but has no worsening with exertion, I doubt ACS  Independently Interpreted Test(s):   I have ordered and independently interpreted X-rays - see summary below.       <> Summary of X-Ray Reading(s): No consolidation or pulmonary edema  I have ordered and independently interpreted EKG Reading(s) - see prior notes  Clinical Tests:   Lab Tests: Ordered and Reviewed  Radiological Study: Ordered and Reviewed  Medical Tests: Ordered and Reviewed  ED Management:  Will place on cardiac monitor, check i-STAT, give glucose, insulin, Lasix and reassess.    I-STAT shows normal potassium at 4.0.  Will recheck with BMP but hold hyperkalemia medications at this  time.    Metabolic panel also with normal potassium 4.1.  Troponin negative.  Chest x-ray without any consolidation or pulmonary edema.  Given her reassuring workup, I do not believe that she requires further ED treatment and is stable for discharge.  Instructed patient to follow up with PCP and return to the ED for any worsening symptoms. Stressed the importance of follow-up, strict ED return precautions given.  Patient voiced understanding and is comfortable with discharge. I discussed this patient with my supervising physician.                     ED Course as of Nov 02 2025   Mon Nov 02, 2020   1234 Troponin I [CC]   1243 Basic metabolic panel(!) [CC]      ED Course User Index  [CC] Latasha Casey PA-C            Clinical Impression:       ICD-10-CM ICD-9-CM   1. Abnormal laboratory test  R89.9 796.4                      Disposition:   Disposition: Discharged  Condition: Stable     ED Disposition Condition    Discharge Stable        ED Prescriptions     None        Follow-up Information     Follow up With Specialties Details Why Contact Info    Sri Gutierrez MD Internal Medicine Schedule an appointment as soon as possible for a visit in 1 week  [address]  [place] [address]  [phone]      [facility] Emergency Medicine Go to  If symptoms worsen [address] [facility] [id number] [phone]                                       Latasha Casey PA-C  11/02/20 2025

## 2020-11-02 NOTE — TELEPHONE ENCOUNTER
Spoke with patient about Dr Javier wants her to go to the emergency room now. Her Kt is 6.3 and I also told her not to take any more potassium per Dr Javier. Patient said that she will be on her way to the ED today.

## 2020-11-02 NOTE — PROVIDER PROGRESS NOTES - EMERGENCY DEPT.
Encounter Date: 11/2/2020    ED Physician Progress Notes         EKG - STEMI Decision  Initial Reading: No STEMI present.

## 2020-11-02 NOTE — DISCHARGE INSTRUCTIONS
Diagnosis:  Abnormal laboratory test    Your potassium was elevated when you had labs drawn this morning, however we rechecked it twice in the emergency department and it is normal.  I suspect the lab test was an error.  Currently, your potassium is normal, if you are not taking Lasix, you can wait to take your potassium until your labs are rechecked.    Please schedule a follow-up appointment with your primary care doctor.  If you start to have any worsening symptoms, especially weakness, fatigue, persistent vomiting or shortness of breath or chest pain please return to the emergency department.

## 2020-11-02 NOTE — ED NOTES
Pt may take home meds verbal order read back  ISRA Escobedo / ESA Duenas,RN   Home meds given :  Amiodarone 200 mg oral one tablet,  Carvedilol 6.25 mg oral one tablet  Entresto 24 mg-26 mg oral one tablet and Leflunomide 20 mg oral one tablet

## 2020-11-02 NOTE — ED NOTES
Pt identifiers Rizwana Block were checked and are correct  LOC: The patient is awake, alert, aware of environment with an appropriate affect. Oriented x4, speaking appropriately  APPEARANCE: Pt resting comfortably, in no acute distress, pt is clean and well groomed, clothing properly fastened  SKIN: Skin warm, dry and intact, normal skin turgor, moist mucus membranes  RESPIRATORY: Airway is open and patent, respirations are spontaneous, even and unlabored, normal effort and rate  Breath sounds clear mahad to upper lung fields, crackles auscultated to lower lung fields   CARDIAC: Radial pulses strong and irregular , no peripheral edema noted, capillary refill < 3 seconds, bilateral radial pulses 2+  ABDOMEN: Soft, nontender, nondistended.   NEUROLOGIC: PERRL, facial expression is symmetrical, patient moving all extremities spontaneously, normal sensation in all extremities when touched with a finger.  Follows all commands appropriately  MUSCULOSKELETAL: No obvious deformities.

## 2020-11-02 NOTE — ED NOTES
I-STAT Chem-8+ Results:   Value Reference Range   Sodium 141 136-145 mmol/L   Potassium  4.0 3.5-5.1 mmol/L   Chloride 104  mmol/L   Ionized Calcium 1.09 1.06-1.42 mmol/L   CO2 (measured) 24 23-29 mmol/L   Glucose 86  mg/dL   BUN 23 6-30 mg/dL   Creatinine 0.8 0.5-1.4 mg/dL   Hematocrit 37 36-54%

## 2020-11-02 NOTE — TELEPHONE ENCOUNTER
Pt notified of K =6.3 may be related to  Entresto and  K 8 mEq supplement. Apparently no hemolysis Told to stop the latter and report to ED immediately. Pt states she is on her way to ED. KYLAH

## 2020-11-02 NOTE — TELEPHONE ENCOUNTER
----- Message from Mary Kelly sent at 11/2/2020  7:31 AM CST -----  Contact: AMY MANLEY [[id number]]  Patient want to rescheduled her B12 inj appointment from 11/30 to 11/27 @ 1:15

## 2020-11-03 ENCOUNTER — PATIENT OUTREACH (OUTPATIENT)
Dept: ADMINISTRATIVE | Facility: OTHER | Age: 75
End: 2020-11-03

## 2020-11-03 NOTE — PROGRESS NOTES
Requested updates within Care Everywhere.  Patient's chart was reviewed for overdue JOSE E topics.  Immunizations reconciled.

## 2020-11-04 ENCOUNTER — OFFICE VISIT (OUTPATIENT)
Dept: CARDIOLOGY | Facility: CLINIC | Age: 75
End: 2020-11-04
Payer: MEDICARE

## 2020-11-04 ENCOUNTER — PATIENT MESSAGE (OUTPATIENT)
Dept: SLEEP MEDICINE | Facility: CLINIC | Age: 75
End: 2020-11-04

## 2020-11-04 ENCOUNTER — TELEPHONE (OUTPATIENT)
Dept: CARDIOLOGY | Facility: CLINIC | Age: 75
End: 2020-11-04

## 2020-11-04 VITALS
WEIGHT: 133.63 LBS | OXYGEN SATURATION: 95 % | HEART RATE: 69 BPM | DIASTOLIC BLOOD PRESSURE: 69 MMHG | BODY MASS INDEX: 20.97 KG/M2 | SYSTOLIC BLOOD PRESSURE: 111 MMHG | HEIGHT: 67 IN

## 2020-11-04 DIAGNOSIS — I42.8 OTHER CARDIOMYOPATHY: ICD-10-CM

## 2020-11-04 DIAGNOSIS — I50.22 CHRONIC SYSTOLIC HEART FAILURE: Primary | ICD-10-CM

## 2020-11-04 DIAGNOSIS — I10 ESSENTIAL HYPERTENSION: ICD-10-CM

## 2020-11-04 DIAGNOSIS — I48.20 ATRIAL FIBRILLATION, CHRONIC: ICD-10-CM

## 2020-11-04 DIAGNOSIS — G47.33 OSA (OBSTRUCTIVE SLEEP APNEA): ICD-10-CM

## 2020-11-04 DIAGNOSIS — M06.042 RHEUMATOID ARTHRITIS INVOLVING BOTH HANDS WITH NEGATIVE RHEUMATOID FACTOR: ICD-10-CM

## 2020-11-04 DIAGNOSIS — G47.33 OSA (OBSTRUCTIVE SLEEP APNEA): Primary | ICD-10-CM

## 2020-11-04 DIAGNOSIS — I49.3 VENTRICULAR PREMATURE BEATS: ICD-10-CM

## 2020-11-04 DIAGNOSIS — M06.041 RHEUMATOID ARTHRITIS INVOLVING BOTH HANDS WITH NEGATIVE RHEUMATOID FACTOR: ICD-10-CM

## 2020-11-04 LAB
AMIODARONE SERPL-SCNC: 0.3 UG/ML (ref 1–3)
N-DESETHYL-AMIODARONE: 0.4 UG/ML

## 2020-11-04 PROCEDURE — 3074F SYST BP LT 130 MM HG: CPT | Mod: HCNC,CPTII,S$GLB, | Performed by: INTERNAL MEDICINE

## 2020-11-04 PROCEDURE — 99999 PR PBB SHADOW E&M-EST. PATIENT-LVL IV: CPT | Mod: PBBFAC,HCNC,, | Performed by: INTERNAL MEDICINE

## 2020-11-04 PROCEDURE — 3078F PR MOST RECENT DIASTOLIC BLOOD PRESSURE < 80 MM HG: ICD-10-PCS | Mod: HCNC,CPTII,S$GLB, | Performed by: INTERNAL MEDICINE

## 2020-11-04 PROCEDURE — 99499 RISK ADDL DX/OHS AUDIT: ICD-10-PCS | Mod: S$GLB,,, | Performed by: INTERNAL MEDICINE

## 2020-11-04 PROCEDURE — 1101F PR PT FALLS ASSESS DOC 0-1 FALLS W/OUT INJ PAST YR: ICD-10-PCS | Mod: HCNC,CPTII,S$GLB, | Performed by: INTERNAL MEDICINE

## 2020-11-04 PROCEDURE — 3008F PR BODY MASS INDEX (BMI) DOCUMENTED: ICD-10-PCS | Mod: HCNC,CPTII,S$GLB, | Performed by: INTERNAL MEDICINE

## 2020-11-04 PROCEDURE — 99999 PR PBB SHADOW E&M-EST. PATIENT-LVL IV: ICD-10-PCS | Mod: PBBFAC,HCNC,, | Performed by: INTERNAL MEDICINE

## 2020-11-04 PROCEDURE — 1126F AMNT PAIN NOTED NONE PRSNT: CPT | Mod: HCNC,S$GLB,, | Performed by: INTERNAL MEDICINE

## 2020-11-04 PROCEDURE — 3078F DIAST BP <80 MM HG: CPT | Mod: HCNC,CPTII,S$GLB, | Performed by: INTERNAL MEDICINE

## 2020-11-04 PROCEDURE — 99214 OFFICE O/P EST MOD 30 MIN: CPT | Mod: HCNC,S$GLB,, | Performed by: INTERNAL MEDICINE

## 2020-11-04 PROCEDURE — 1101F PT FALLS ASSESS-DOCD LE1/YR: CPT | Mod: HCNC,CPTII,S$GLB, | Performed by: INTERNAL MEDICINE

## 2020-11-04 PROCEDURE — 3074F PR MOST RECENT SYSTOLIC BLOOD PRESSURE < 130 MM HG: ICD-10-PCS | Mod: HCNC,CPTII,S$GLB, | Performed by: INTERNAL MEDICINE

## 2020-11-04 PROCEDURE — 1126F PR PAIN SEVERITY QUANTIFIED, NO PAIN PRESENT: ICD-10-PCS | Mod: HCNC,S$GLB,, | Performed by: INTERNAL MEDICINE

## 2020-11-04 PROCEDURE — 1159F MED LIST DOCD IN RCRD: CPT | Mod: HCNC,S$GLB,, | Performed by: INTERNAL MEDICINE

## 2020-11-04 PROCEDURE — 3008F BODY MASS INDEX DOCD: CPT | Mod: HCNC,CPTII,S$GLB, | Performed by: INTERNAL MEDICINE

## 2020-11-04 PROCEDURE — 99499 UNLISTED E&M SERVICE: CPT | Mod: S$GLB,,, | Performed by: INTERNAL MEDICINE

## 2020-11-04 PROCEDURE — 99214 PR OFFICE/OUTPT VISIT, EST, LEVL IV, 30-39 MIN: ICD-10-PCS | Mod: HCNC,S$GLB,, | Performed by: INTERNAL MEDICINE

## 2020-11-04 PROCEDURE — 1159F PR MEDICATION LIST DOCUMENTED IN MEDICAL RECORD: ICD-10-PCS | Mod: HCNC,S$GLB,, | Performed by: INTERNAL MEDICINE

## 2020-11-04 RX ORDER — MELATONIN 10 MG
30 CAPSULE ORAL NIGHTLY
COMMUNITY
Start: 2015-06-29 | End: 2022-04-04

## 2020-11-04 NOTE — PROGRESS NOTES
Subjective:   Patient ID:  Rizwana Block is a 75 y.o. female who presents for follow-up of Lung field abnormal finding on examination and Hyperkalemia (ER 11/02/20)    Ms. Block is a 71yo female with a history of frequent PVCs (s/p RFA 9/27/2016), cardiomyopathy (EF 35%) now  normalized, HFrEF, mild MR, and HTN       Echo 5/20:  · Eccentric left ventricular hypertrophy.  · Low normal left ventricular systolic function. The estimated ejection fraction is 50%.  · Normal right ventricular systolic function.  · Normal LV diastolic function.  · Mild tricuspid regurgitation.  · The estimated PA systolic pressure is 21 mmHg.  · Normal central venous pressure (3 mmHg).  HPI:   Feels well. No complaints  No chest pain, Orthopnea, PND of heart failure symptoms.   Denies palpitations or fluttering in the chest  Patient is walking 2.5 miles a day.    Non smoker  Mother had MI at age 68- sudden death. Cousin had MI at 33. Grandfather had MI in 40s. Father had CVA.      The 10-year ASCVD risk score (Nayeli PANFILO Jr., et al., 2013) is: 14.9%    Values used to calculate the score:      Age: 73 years      Sex: Female      Is Non- : No      Diabetic: No      Tobacco smoker: No      Systolic Blood Pressure: 128 mmHg      Is BP treated: Yes      HDL Cholesterol: 81 mg/dL      Total Cholesterol: 198 mg/dL     HPI:   No chest pain, Orthopnea, PND of heart failure symptoms. Occasional SOB on minimal exertion that may be new.   Denies palpitations or fluttering in the chest  Intentionally has lost 10 pounds.   Recent treatment for RA  With immunomodulators.    HPI:   Lost 50 pounds. Plant based.  No chest pain, Orthopnea, PND of heart failure symptoms.   Occasional  palpitations or fluttering in the chest  Amiodarone was increased by Dr. Arriaga because of increased PVC burden (she has had a failed prior ablation).  Patient's chart says AF but we reviewed prior EP notes and EKG I do not see evidence of AF.  "    Patient Active Problem List   Diagnosis    Ventricular premature beats    Hammertoe    Palpitations    Costochondritis    Heart failure, systolic    Precordial pain    Bradycardia    Cardiomyopathy    Non-rheumatic mitral regurgitation    Essential hypertension    S/P colonoscopy    Osteopenia    Pure hypercholesterolemia    At risk for amiodarone toxicity with long term use    Dysuria    Vaginal atrophy    Vaginal burning    Open wound of right foot    GAURAV (obstructive sleep apnea)    Osteoarthritis of hand    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    Low back pain    Pulmonary nodule    PAF (paroxysmal atrial fibrillation)     /69 (BP Location: Left arm, Patient Position: Sitting, BP Method: Large (Automatic))   Pulse 69   Ht 5' 7" (1.702 m)   Wt 60.6 kg (133 lb 9.6 oz)   LMP  (LMP Unknown) Comment: refused weight   SpO2 95%   BMI 20.92 kg/m²   Body mass index is 20.92 kg/m².  Estimated Creatinine Clearance: 59 mL/min (based on SCr of 0.8 mg/dL).    Lab Results   Component Value Date     11/02/2020    K 4.1 11/02/2020     11/02/2020    CO2 25 11/02/2020    BUN 24 (H) 11/02/2020    CREATININE 0.8 11/02/2020    GLU 85 11/02/2020    MG 2.2 11/07/2019    AST 22 11/02/2020    ALT 17 11/02/2020    ALBUMIN 3.6 11/02/2020    PROT 6.5 11/02/2020    BILITOT 0.5 11/02/2020    WBC 5.33 11/02/2020    HGB 12.3 11/02/2020    HCT 37 11/02/2020     (H) 11/02/2020     11/02/2020    INR 0.9 09/16/2016    TSH 0.123 (L) 05/18/2020    CHOL 168 05/03/2019    HDL 84 (H) 05/03/2019    LDLCALC 72.2 05/03/2019    TRIG 59 05/03/2019       Current Outpatient Medications   Medication Sig    amiodarone (PACERONE) 200 MG Tab Take 200 mg one tablet daily    aspirin (ECOTRIN) 81 MG EC tablet Take 81 mg by mouth once daily.    atorvastatin (LIPITOR) 10 MG tablet TAKE ONE TABLET BY MOUTH ONCE DAILY    carvediloL (COREG) 6.25 MG tablet TAKE ONE " TABLET (6.25MG) BY MOUTH TWICE DAILY WITH MEALS    cholecalciferol, vitamin D3, (VITAMIN D3) 1,000 unit capsule Take 1,000 Units by mouth once daily.    co-enzyme Q-10 30 mg capsule Take 10 mg by mouth once daily.     leflunomide (ARAVA) 20 MG Tab Take 1 tablet (20 mg total) by mouth once daily.    melatonin 10 mg Cap 30 mg every evening.    MULTIVITAMIN WITH MINERALS (HAIR,SKIN AND NAILS ORAL) Take by mouth once daily.    sacubitriL-valsartan (ENTRESTO) 24-26 mg per tablet Take 1 tablet by mouth 2 (two) times daily.    TURMERIC ORAL Take by mouth.    UNABLE TO FIND 2 capsules 2 (two) times a day. Nutrafol     Current Facility-Administered Medications   Medication    cyanocobalamin injection 1,000 mcg    cyanocobalamin injection 1,000 mcg       Review of Systems   Constitution: Negative for chills, decreased appetite, malaise/fatigue, night sweats, weight gain and weight loss.   Eyes: Negative for blurred vision, double vision, visual disturbance and visual halos.   Cardiovascular: Negative for chest pain, claudication, cyanosis, dyspnea on exertion, irregular heartbeat, leg swelling, near-syncope, orthopnea, palpitations, paroxysmal nocturnal dyspnea and syncope.   Respiratory: Negative for cough, hemoptysis, snoring, sputum production and wheezing.    Endocrine: Negative for cold intolerance, heat intolerance, polydipsia and polyphagia.   Hematologic/Lymphatic: Negative for adenopathy and bleeding problem. Does not bruise/bleed easily.   Skin: Negative for flushing, itching, poor wound healing and rash.   Musculoskeletal: Negative for arthritis, back pain, falls, gout, joint pain, joint swelling, muscle cramps, muscle weakness, myalgias, neck pain and stiffness.   Gastrointestinal: Negative for bloating, abdominal pain, anorexia, diarrhea, dysphagia, excessive appetite, flatus, hematemesis, jaundice, melena and nausea.   Genitourinary: Negative for hesitancy and incomplete emptying.   Neurological:  Negative for aphonia, brief paralysis, difficulty with concentration, disturbances in coordination, excessive daytime sleepiness, dizziness, focal weakness, light-headedness, loss of balance and weakness.   Psychiatric/Behavioral: Negative for altered mental status, depression, hallucinations, hypervigilance, memory loss, substance abuse and suicidal ideas. The patient does not have insomnia and is not nervous/anxious.        Objective:   Physical Exam   Constitutional: She is oriented to person, place, and time. She appears well-developed and well-nourished. No distress.   HENT:   Head: Normocephalic and atraumatic.   Nose: Nose normal.   Mouth/Throat: Oropharynx is clear and moist. No oropharyngeal exudate.   Eyes: Pupils are equal, round, and reactive to light. Conjunctivae and EOM are normal. Right eye exhibits no discharge. Left eye exhibits no discharge. No scleral icterus.   Neck: Normal range of motion. Neck supple. No JVD present. No tracheal deviation present. No thyromegaly present.   Cardiovascular: Normal rate, regular rhythm, normal heart sounds and intact distal pulses. Exam reveals no gallop and no friction rub.   No murmur heard.  Pulmonary/Chest: Effort normal and breath sounds normal. No stridor. No respiratory distress. She has no wheezes. She has no rales. She exhibits no tenderness.   Abdominal: Soft. Bowel sounds are normal. She exhibits no distension and no mass. There is no abdominal tenderness. There is no rebound and no guarding.   Musculoskeletal: Normal range of motion.         General: No tenderness or edema.   Lymphadenopathy:     She has no cervical adenopathy.   Neurological: She is alert and oriented to person, place, and time. She has normal reflexes. No cranial nerve deficit. She exhibits normal muscle tone. Coordination normal.   Skin: Skin is warm. No rash noted. She is not diaphoretic. No erythema. No pallor.   Psychiatric: She has a normal mood and affect. Her behavior is  normal. Judgment and thought content normal.       Assessment:     1. Chronic systolic heart failure    2. Atrial fibrillation, chronic    3. Essential hypertension    4. Other cardiomyopathy    5. Ventricular premature beats    6. GAURAV (obstructive sleep apnea)    7. 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        Plan:   Loosing weight has made her feel really well. She had a recent ER visit for hyperkalemia, I stopped her potassium and she is not taking lasix anymore. Continue entresto. Echo next  Year.  Rizwana was seen today for lung field abnormal finding on examination and hyperkalemia.    Diagnoses and all orders for this visit:    Chronic systolic heart failure  -     Echo Color Flow Doppler? Yes; Future    Atrial fibrillation, chronic    Essential hypertension    Other cardiomyopathy    Ventricular premature beats    GAURAV (obstructive sleep apnea)    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      RTC 10 MO

## 2020-11-04 NOTE — TELEPHONE ENCOUNTER
Pt notified pb      ----- Message from Norbert Lemons MD sent at 11/3/2020  4:47 PM CST -----  Reviewed results. All OK. Please open telephone encounter, call patient and inform him/ her of results,then document in encounter.  Please do not route back to me.   Thanks.  No change

## 2020-11-05 ENCOUNTER — TELEPHONE (OUTPATIENT)
Dept: CARDIOLOGY | Facility: HOSPITAL | Age: 75
End: 2020-11-05

## 2020-11-05 NOTE — TELEPHONE ENCOUNTER
Tried to call Bekah back at [phone]. Called x3, no answer. I was not able to leave a voice message. The patients holter results are in the chart.   ----- Message from Marychuy Hendricks sent at 11/5/2020  3:55 PM CST -----  Regarding: reports  Contact: Bekah-Dr. Lemons's dtsolx-[phone]  Bekah is calling to get reports- rhythm strip put in the media. Thanks, Marychuy

## 2020-11-06 ENCOUNTER — TELEPHONE (OUTPATIENT)
Dept: SLEEP MEDICINE | Facility: OTHER | Age: 75
End: 2020-11-06

## 2020-11-08 ENCOUNTER — PATIENT MESSAGE (OUTPATIENT)
Dept: OTHER | Facility: OTHER | Age: 75
End: 2020-11-08

## 2020-11-09 ENCOUNTER — PATIENT MESSAGE (OUTPATIENT)
Dept: SURGERY | Facility: CLINIC | Age: 75
End: 2020-11-09

## 2020-11-09 ENCOUNTER — OFFICE VISIT (OUTPATIENT)
Dept: CARDIOLOGY | Facility: CLINIC | Age: 75
End: 2020-11-09
Payer: MEDICARE

## 2020-11-09 ENCOUNTER — PATIENT MESSAGE (OUTPATIENT)
Dept: CARDIOLOGY | Facility: CLINIC | Age: 75
End: 2020-11-09

## 2020-11-09 ENCOUNTER — PATIENT MESSAGE (OUTPATIENT)
Dept: INFECTIOUS DISEASES | Facility: CLINIC | Age: 75
End: 2020-11-09

## 2020-11-09 VITALS
DIASTOLIC BLOOD PRESSURE: 74 MMHG | WEIGHT: 134.5 LBS | HEART RATE: 32 BPM | BODY MASS INDEX: 21.11 KG/M2 | SYSTOLIC BLOOD PRESSURE: 102 MMHG | HEIGHT: 67 IN | OXYGEN SATURATION: 99 %

## 2020-11-09 DIAGNOSIS — Z79.899 AT RISK FOR AMIODARONE TOXICITY WITH LONG TERM USE: ICD-10-CM

## 2020-11-09 DIAGNOSIS — Z91.89 AT RISK FOR AMIODARONE TOXICITY WITH LONG TERM USE: ICD-10-CM

## 2020-11-09 DIAGNOSIS — I48.0 PAF (PAROXYSMAL ATRIAL FIBRILLATION): ICD-10-CM

## 2020-11-09 DIAGNOSIS — I42.8 OTHER CARDIOMYOPATHY: ICD-10-CM

## 2020-11-09 DIAGNOSIS — G47.33 OSA (OBSTRUCTIVE SLEEP APNEA): ICD-10-CM

## 2020-11-09 DIAGNOSIS — R00.2 PALPITATIONS: ICD-10-CM

## 2020-11-09 DIAGNOSIS — I10 ESSENTIAL HYPERTENSION: ICD-10-CM

## 2020-11-09 DIAGNOSIS — I49.3 VENTRICULAR PREMATURE BEATS: Primary | ICD-10-CM

## 2020-11-09 PROCEDURE — 1159F PR MEDICATION LIST DOCUMENTED IN MEDICAL RECORD: ICD-10-PCS | Mod: HCNC,S$GLB,, | Performed by: INTERNAL MEDICINE

## 2020-11-09 PROCEDURE — 3078F DIAST BP <80 MM HG: CPT | Mod: HCNC,CPTII,S$GLB, | Performed by: INTERNAL MEDICINE

## 2020-11-09 PROCEDURE — 1159F MED LIST DOCD IN RCRD: CPT | Mod: HCNC,S$GLB,, | Performed by: INTERNAL MEDICINE

## 2020-11-09 PROCEDURE — 93005 ELECTROCARDIOGRAM TRACING: CPT | Mod: HCNC

## 2020-11-09 PROCEDURE — 99215 PR OFFICE/OUTPT VISIT, EST, LEVL V, 40-54 MIN: ICD-10-PCS | Mod: HCNC,S$GLB,, | Performed by: INTERNAL MEDICINE

## 2020-11-09 PROCEDURE — 99999 PR PBB SHADOW E&M-EST. PATIENT-LVL III: ICD-10-PCS | Mod: PBBFAC,HCNC,, | Performed by: INTERNAL MEDICINE

## 2020-11-09 PROCEDURE — 93010 EKG 12-LEAD: ICD-10-PCS | Mod: HCNC,S$GLB,, | Performed by: INTERNAL MEDICINE

## 2020-11-09 PROCEDURE — 3074F PR MOST RECENT SYSTOLIC BLOOD PRESSURE < 130 MM HG: ICD-10-PCS | Mod: HCNC,CPTII,S$GLB, | Performed by: INTERNAL MEDICINE

## 2020-11-09 PROCEDURE — 3074F SYST BP LT 130 MM HG: CPT | Mod: HCNC,CPTII,S$GLB, | Performed by: INTERNAL MEDICINE

## 2020-11-09 PROCEDURE — 3078F PR MOST RECENT DIASTOLIC BLOOD PRESSURE < 80 MM HG: ICD-10-PCS | Mod: HCNC,CPTII,S$GLB, | Performed by: INTERNAL MEDICINE

## 2020-11-09 PROCEDURE — 99215 OFFICE O/P EST HI 40 MIN: CPT | Mod: HCNC,S$GLB,, | Performed by: INTERNAL MEDICINE

## 2020-11-09 PROCEDURE — 3008F BODY MASS INDEX DOCD: CPT | Mod: HCNC,CPTII,S$GLB, | Performed by: INTERNAL MEDICINE

## 2020-11-09 PROCEDURE — 3008F PR BODY MASS INDEX (BMI) DOCUMENTED: ICD-10-PCS | Mod: HCNC,CPTII,S$GLB, | Performed by: INTERNAL MEDICINE

## 2020-11-09 PROCEDURE — 93010 ELECTROCARDIOGRAM REPORT: CPT | Mod: HCNC,S$GLB,, | Performed by: INTERNAL MEDICINE

## 2020-11-09 PROCEDURE — 99999 PR PBB SHADOW E&M-EST. PATIENT-LVL III: CPT | Mod: PBBFAC,HCNC,, | Performed by: INTERNAL MEDICINE

## 2020-11-09 NOTE — PROGRESS NOTES
Subjective:   Patient ID:  Rizwana Block is a 75 y.o. female     Chief complaint:Follow-up (pt states that her heart rate has been in the 30's and she is symptomatic pb)      HPI    Background:  Hx of PVCs s/p recent RFA, bradycardia, CM, HFrEF (EF 40-45%), non-rheumatic MR, and HTN. Ms. Block has been on long-term amiodarone for PVCs (inferior-posterior-septal PVC morphology). Her PVC burden was thought to have been related to her DCM. Her EF improved to the 50s after amiodarone use, and she was subsequently switched from amiodarone to Verapamil.      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  She later restarted with palps and some PVCs (albeit in lesser frequency than pre RFA) and Rx was added -- Verapamil first then back on amio      Echo (06/22/16) >>  EF of 40-45%, trivial-to-mild TR, trivial VA, upper limit of normal LVE, and a PASP of 26 mmHg.   24-Hour Holter (06/22/16) revealed a 13% PVC burden; 481 couplets, 73 triplets and short VTNS -- the large majority of the PVCs were MM. The triplets are dimorphic.  She had an RFA on 9/27/16  >>  1.  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  Effective lesions were at 12 o'clock level in the German view.  2.  Note that the ascending aorta and aortic arch appeared to be very unfolded (unusually so for the  patient's frame).  This may need to be evaluated further.     48-Hour Holter (11/09/16) revealed SR w/HRs varying between  bpm; average 72 bpm. There were very frequent polymorphic PVCs (totaling 9,632; averaging 200 per hour, ~5%), 133 couplets, and no episodes of VT. There were very rare PACs (totaling 20; averaging less than 1 per hour) and no episodes of sustained SVT. There was 1 episode of dizziness reported corresponding w/SR at 95 bpm.  "      Update June 2018:  Since the RFA she has been feeling a lot better with more energy, working full time,, back to gardening and swimming etc  Echo on 6/19/18    1 - Mildly to moderately depressed left ventricular systolic function (EF 40-45%).     2 - Wall motion abnormalities.     3 - Impaired LV relaxation, normal LAP (grade 1 diastolic dysfunction).     4 - Low normal right ventricular systolic function .     5 - Trivial to mild tricuspid regurgitation.     6 - Trivial pulmonic regurgitation.     7 - Atrial septal aneurysm .     8 - The estimated PA systolic pressure is 29 mmHg.       Update 1/9/19:  She feels well -- her only c/o is hair loss -- she thinks it's the amio  Takes lasix when her rings get tight -- @ once a week or so.   I have reviewed the actual image of the ECG tracing obtained today and it shows NSR with normal intervals     Update 8/15/2019:  She started Biotin supplements and her hair as well as her nails are "better".   She has been feeling well -- gets occ chest discomfort -- at times in the shower - lasts a few secs-- maybe a little longer-- she had similar c/o several years ago too  And her angios were normal  Once she woke up with a feeling pf "a punch in the chest". It was gone as soon as she woke up.   I have reviewed the actual image of the ECG tracing obtained today and it shows NSR with mild IVCD -- QRSd 125 msec  Her main concerns are related to RA      Update 5/28/2020:    I have obtained recent updates in her CV tests and amiodarone toxicity panel:  The echo revealed the following:  · Eccentric left ventricular hypertrophy.  · Low normal left ventricular systolic function. The estimated ejection fraction is 50%.  · Normal right ventricular systolic function.  · Normal LV diastolic function.  · Mild tricuspid regurgitation.  · The estimated PA systolic pressure is 21 mmHg.  · Normal central venous pressure (3 mmHg).  Amiodarone level was 0.3/0.3 on 100 mg of amiodarone a " day.       Ref. Range 5/18/2020 08:25 5/21/2020 09:51   TSH Latest Ref Range: 0.400 - 4.000 uIU/mL 0.123 (L)     T3, Total Latest Ref Range: 60 - 180 ng/dL   75   Free T4 Latest Ref Range: 0.71 - 1.51 ng/dL 1.20        CMP was within normal limits.  DLCO was 66% of predicted and DLCO/VA was 96%.  Recent repeat Holter showed the following:  NSR  The patient slept from 22:00 until 06:00 with a heart rate of 78 bpm during waking hours, 68 bpm during sleep.  There were very frequent dimorphic PVCs totalling 00384 and averaging 238.54 per hour. The ventricular arrhythmias percentage was 5.6. There were 152 couplets. There were 1318 bigeminal cycles. There were 4 triplets.  Clinically, she is doing relatively well, and in fact, after having laid off exercise for year and a half she has restarted recently going back on the treadmill.  She can stay 30 min on the treadmill and then starts becoming a bit short of breath.  This is less than what she used to do but in all fairness, she probably is deconditioned at this point.  She has no leg edema, no chest pain, occasional palpitations, no undue dyspnea on exertion-certainly not with usual ADLs, no orthopnea, no PND, no syncope and no presyncope.  She has recently seen Dr. Guerra at and she complained of fatigue.  She feels stiff in the morning, gets better and then gets a little tired in the afternoon.  There are no specific associated symptoms  Her social life is in up even.  A 2nd of 3 step sons has recently passed away and she is having hard time coping.  In addition, her daughter is back in the [place].    >>  Overall she is doing well.  There is no bradycardia and therefore this is not the cause of her fatigue.  More than likely, this related to rheumatoid arthritis, aging and deconditioning.  On the other hand, her PVC count is a little bit excessive and in view of the fact that her amiodarone level is quite low, it is reasonable to increase the dose slightly.  Of  note is that her ejection fraction is now at 50%.  It tends to drift down as the PVCs increase in frequency.  There is no evidence of CHF.  She has no evidence of amiodarone toxicity at this point.     Increase amiodarone to 200 mg a day 5 days a week (skip Wednesdays and Sundays).  In 6 months, repeat PFT/DLCO.  Continue using the treadmill and increase time on it as tolerated.    Update since then:  She is now here stating that her heart rate has been in the 30's and she is symptomatic   She has been walking up to 4 miles a day but not as often due to CoViD issues   She has had an updated echo showing normal EF   She does have some PVCs and at times she ? Feels them. However they are not significantly disturbing     Current Outpatient Medications   Medication Sig    amiodarone (PACERONE) 200 MG Tab Take 200 mg one tablet daily    aspirin (ECOTRIN) 81 MG EC tablet Take 81 mg by mouth once daily.    atorvastatin (LIPITOR) 10 MG tablet TAKE ONE TABLET BY MOUTH ONCE DAILY    carvediloL (COREG) 6.25 MG tablet TAKE ONE TABLET (6.25MG) BY MOUTH TWICE DAILY WITH MEALS    cholecalciferol, vitamin D3, (VITAMIN D3) 1,000 unit capsule Take 1,000 Units by mouth once daily.    co-enzyme Q-10 30 mg capsule Take 10 mg by mouth once daily.     leflunomide (ARAVA) 20 MG Tab Take 1 tablet (20 mg total) by mouth once daily.    melatonin 10 mg Cap 30 mg every evening.    MULTIVITAMIN WITH MINERALS (HAIR,SKIN AND NAILS ORAL) Take by mouth once daily.    sacubitriL-valsartan (ENTRESTO) 24-26 mg per tablet Take 1 tablet by mouth 2 (two) times daily.    TURMERIC ORAL Take by mouth.    UNABLE TO FIND 2 capsules 2 (two) times a day. Nutrafol     Current Facility-Administered Medications   Medication    cyanocobalamin injection 1,000 mcg    cyanocobalamin injection 1,000 mcg     Review of Systems   Constitution: Negative for decreased appetite, weight gain and weight loss.   HENT: Negative for nosebleeds.    Eyes: Negative for  blurred vision and visual disturbance.   Cardiovascular: Negative for chest pain, claudication, cyanosis, dyspnea on exertion, irregular heartbeat, leg swelling, near-syncope, orthopnea, palpitations, paroxysmal nocturnal dyspnea and syncope.   Respiratory: Negative for cough, shortness of breath and wheezing.    Endocrine: Negative for heat intolerance.   Skin: Negative for rash.   Musculoskeletal: Negative for muscle weakness and myalgias.   Gastrointestinal: Negative for abdominal pain, anorexia, melena, nausea and vomiting.   Genitourinary: Negative for menorrhagia.   Neurological: Negative for excessive daytime sleepiness, dizziness, headaches, loss of balance, seizures, vertigo and weakness.   Psychiatric/Behavioral: Negative for altered mental status and depression. The patient is not nervous/anxious.        Objective:   Physical Exam   Constitutional: She is oriented to person, place, and time. She appears well-developed and well-nourished.   HENT:   Head: Normocephalic and atraumatic.   Right Ear: External ear normal.   Left Ear: External ear normal.   Neck: Normal range of motion. Neck supple. No thyromegaly present.   Cardiovascular: Normal rate, regular rhythm, normal heart sounds and intact distal pulses. Exam reveals no gallop, no S3, no S4, no friction rub, no midsystolic click and no opening snap.   No murmur heard.  Pulses:       Carotid pulses are 2+ on the right side and 2+ on the left side.       Radial pulses are 2+ on the right side and 2+ on the left side.        Dorsalis pedis pulses are 2+ on the right side and 2+ on the left side.        Posterior tibial pulses are 2+ on the right side and 2+ on the left side.   Pulmonary/Chest: Effort normal and breath sounds normal.   Abdominal: Soft. She exhibits no distension. There is no abdominal tenderness.   Musculoskeletal:      Right ankle: She exhibits no swelling.      Left ankle: She exhibits no swelling.      Right lower leg: She exhibits no  "swelling.      Left lower leg: She exhibits no swelling.   Neurological: She is alert and oriented to person, place, and time. She has normal strength. No cranial nerve deficit. Gait normal.   Skin: Skin is warm. No rash noted. No cyanosis. Nails show no clubbing.   Psychiatric: She has a normal mood and affect. Her speech is normal and behavior is normal. Thought content normal. Cognition and memory are normal.   Nursing note and vitals reviewed.    /74 (BP Location: Right arm, Patient Position: Sitting, BP Method: Medium (Manual))   Pulse (!) 32   Ht 5' 7" (1.702 m)   Wt 61 kg (134 lb 7.7 oz)   LMP  (LMP Unknown) Comment: refused weight   SpO2 99%   BMI 21.06 kg/m²      Assessment:      1. Ventricular premature beats    2. Other cardiomyopathy    3. Palpitations    4. PAF (paroxysmal atrial fibrillation)    5. Essential hypertension    6. At risk for amiodarone toxicity with long term use    7. GAURAV (obstructive sleep apnea)        Plan:   Her PVCs seem to be benign at this time.   Amio levels are low - we will update  We will repeat PFT/DLCO and consider increasing amio dosing if warranted/safe  I discussed RFA as an alternative - she really would like to avoid that and I am not sure that this is really warranted at this time.   No orders of the defined types were placed in this encounter.    No follow-ups on file.  There are no discontinued medications.     Medication List with Changes/Refills   Current Medications    AMIODARONE (PACERONE) 200 MG TAB    Take 200 mg one tablet daily    ASPIRIN (ECOTRIN) 81 MG EC TABLET    Take 81 mg by mouth once daily.    ATORVASTATIN (LIPITOR) 10 MG TABLET    TAKE ONE TABLET BY MOUTH ONCE DAILY    CARVEDILOL (COREG) 6.25 MG TABLET    TAKE ONE TABLET (6.25MG) BY MOUTH TWICE DAILY WITH MEALS    CHOLECALCIFEROL, VITAMIN D3, (VITAMIN D3) 1,000 UNIT CAPSULE    Take 1,000 Units by mouth once daily.    CO-ENZYME Q-10 30 MG CAPSULE    Take 10 mg by mouth once daily.     " LEFLUNOMIDE (ARAVA) 20 MG TAB    Take 1 tablet (20 mg total) by mouth once daily.    MELATONIN 10 MG CAP    30 mg every evening.    MULTIVITAMIN WITH MINERALS (HAIR,SKIN AND NAILS ORAL)    Take by mouth once daily.    SACUBITRIL-VALSARTAN (ENTRESTO) 24-26 MG PER TABLET    Take 1 tablet by mouth 2 (two) times daily.    TURMERIC ORAL    Take by mouth.    UNABLE TO FIND    2 capsules 2 (two) times a day. Nutrafol

## 2020-11-11 DIAGNOSIS — Z79.899 HIGH RISK MEDICATION USE: Primary | ICD-10-CM

## 2020-11-11 DIAGNOSIS — I42.8 OTHER CARDIOMYOPATHY: ICD-10-CM

## 2020-11-11 DIAGNOSIS — R00.2 PALPITATIONS: ICD-10-CM

## 2020-11-11 DIAGNOSIS — I48.0 PAF (PAROXYSMAL ATRIAL FIBRILLATION): ICD-10-CM

## 2020-11-11 DIAGNOSIS — I49.3 VENTRICULAR PREMATURE BEATS: ICD-10-CM

## 2020-11-12 ENCOUNTER — PATIENT OUTREACH (OUTPATIENT)
Dept: OTHER | Facility: OTHER | Age: 75
End: 2020-11-12

## 2020-11-12 ENCOUNTER — TELEPHONE (OUTPATIENT)
Dept: CARDIOLOGY | Facility: CLINIC | Age: 75
End: 2020-11-12

## 2020-11-12 NOTE — PROGRESS NOTES
Digital Medicine: Health  Follow-Up    The history is provided by the patient.             Reason for review: Blood pressure at goal        Topics Covered on Call: physical activity    Additional Follow-up details: Patient stated that she is doing well. She did follow up with her cardiologist about her low heart rate. He will be scheduling her for a breathing test since she also mentions getting SOB. She denies symptoms even when her pressure is low but she will reach out if any concerns arise.             Diet-Not assessed          Physical Activity-Change      She added Patient is back to exercising just about every morning. to Her physical activity routine.      Medication Adherence-Medication adherence was assessed.      Substance, Sleep, Stress-Not assessed      Continue current diet/physical activity routine.       Addressed patient questions and patient has my contact information if needed prior to next outreach. Patient verbalizes understanding.      Explained the importance of self-monitoring and medication adherence. Encouraged the patient to communicate with their health  for lifestyle modifications to help improve or maintain a healthy lifestyle.               There are no preventive care reminders to display for this patient.      Last 5 Patient Entered Readings                                      Current 30 Day Average: 105/60     Recent Readings 11/11/2020 11/8/2020 11/8/2020 11/8/2020 11/1/2020    SBP (mmHg) 103 112 96 85 97    DBP (mmHg) 55 52 53 57 65    Pulse 39 36 36 35 73

## 2020-11-16 ENCOUNTER — LAB VISIT (OUTPATIENT)
Dept: SURGERY | Facility: CLINIC | Age: 75
End: 2020-11-16
Payer: MEDICARE

## 2020-11-16 DIAGNOSIS — R00.2 PALPITATIONS: ICD-10-CM

## 2020-11-16 DIAGNOSIS — I49.3 VENTRICULAR PREMATURE BEATS: ICD-10-CM

## 2020-11-16 DIAGNOSIS — I48.0 PAF (PAROXYSMAL ATRIAL FIBRILLATION): ICD-10-CM

## 2020-11-16 DIAGNOSIS — Z79.899 HIGH RISK MEDICATION USE: ICD-10-CM

## 2020-11-16 DIAGNOSIS — I42.8 OTHER CARDIOMYOPATHY: ICD-10-CM

## 2020-11-16 PROCEDURE — U0003 INFECTIOUS AGENT DETECTION BY NUCLEIC ACID (DNA OR RNA); SEVERE ACUTE RESPIRATORY SYNDROME CORONAVIRUS 2 (SARS-COV-2) (CORONAVIRUS DISEASE [COVID-19]), AMPLIFIED PROBE TECHNIQUE, MAKING USE OF HIGH THROUGHPUT TECHNOLOGIES AS DESCRIBED BY CMS-2020-01-R: HCPCS | Mod: HCNC

## 2020-11-17 ENCOUNTER — OFFICE VISIT (OUTPATIENT)
Dept: PODIATRY | Facility: CLINIC | Age: 75
End: 2020-11-17
Payer: MEDICARE

## 2020-11-17 VITALS — HEIGHT: 67 IN | BODY MASS INDEX: 21.11 KG/M2 | WEIGHT: 134.5 LBS

## 2020-11-17 DIAGNOSIS — L60.3 DYSTROPHIC NAIL: ICD-10-CM

## 2020-11-17 DIAGNOSIS — Z12.11 SPECIAL SCREENING FOR MALIGNANT NEOPLASMS, COLON: Primary | ICD-10-CM

## 2020-11-17 DIAGNOSIS — L84 HELOMA MOLLE: Primary | ICD-10-CM

## 2020-11-17 LAB — SARS-COV-2 RNA RESP QL NAA+PROBE: NOT DETECTED

## 2020-11-17 PROCEDURE — 1126F AMNT PAIN NOTED NONE PRSNT: CPT | Mod: HCNC,,, | Performed by: PODIATRIST

## 2020-11-17 PROCEDURE — 99999 PR PBB SHADOW E&M-EST. PATIENT-LVL III: ICD-10-PCS | Mod: PBBFAC,HCNC,, | Performed by: PODIATRIST

## 2020-11-17 PROCEDURE — 99999 PR PBB SHADOW E&M-EST. PATIENT-LVL III: CPT | Mod: PBBFAC,HCNC,, | Performed by: PODIATRIST

## 2020-11-17 PROCEDURE — 1126F PR PAIN SEVERITY QUANTIFIED, NO PAIN PRESENT: ICD-10-PCS | Mod: HCNC,,, | Performed by: PODIATRIST

## 2020-11-17 PROCEDURE — 99499 UNLISTED E&M SERVICE: CPT | Mod: HCNC,,, | Performed by: PODIATRIST

## 2020-11-17 PROCEDURE — 1101F PT FALLS ASSESS-DOCD LE1/YR: CPT | Mod: HCNC,CPTII,, | Performed by: PODIATRIST

## 2020-11-17 PROCEDURE — 17999 UNLISTD PX SKN MUC MEMB SUBQ: CPT | Mod: CSM,HCNC,S$GLB, | Performed by: PODIATRIST

## 2020-11-17 PROCEDURE — 1101F PR PT FALLS ASSESS DOC 0-1 FALLS W/OUT INJ PAST YR: ICD-10-PCS | Mod: HCNC,CPTII,, | Performed by: PODIATRIST

## 2020-11-17 PROCEDURE — 99499 NO LOS: ICD-10-PCS | Mod: HCNC,,, | Performed by: PODIATRIST

## 2020-11-17 PROCEDURE — 3008F PR BODY MASS INDEX (BMI) DOCUMENTED: ICD-10-PCS | Mod: HCNC,CPTII,, | Performed by: PODIATRIST

## 2020-11-17 PROCEDURE — 3288F PR FALLS RISK ASSESSMENT DOCUMENTED: ICD-10-PCS | Mod: HCNC,CPTII,, | Performed by: PODIATRIST

## 2020-11-17 PROCEDURE — 17999 PR NON-COVERED FOOT CARE: ICD-10-PCS | Mod: CSM,HCNC,S$GLB, | Performed by: PODIATRIST

## 2020-11-17 PROCEDURE — 3288F FALL RISK ASSESSMENT DOCD: CPT | Mod: HCNC,CPTII,, | Performed by: PODIATRIST

## 2020-11-17 PROCEDURE — 3008F BODY MASS INDEX DOCD: CPT | Mod: HCNC,CPTII,, | Performed by: PODIATRIST

## 2020-11-17 NOTE — PROGRESS NOTES
[facility] - Right Foot    Dystrophic nail        Patient presents to the clinic for non-covered routine foot care.   Patient understands this is not typically a covered service every 4-6 weeks and patient is aware of responsibility of payment. Pedal pulses are palpable. No known risk factors requiring routine foot care.  nails and calluses/corns were reduced and trimmed bilaterally. Patient tolerated well.       Continue toe spacers and shoe modification.     Follow up 4 weeks Proc B.

## 2020-11-18 ENCOUNTER — PATIENT MESSAGE (OUTPATIENT)
Dept: INTERNAL MEDICINE | Facility: CLINIC | Age: 75
End: 2020-11-18

## 2020-11-18 DIAGNOSIS — Z12.11 SCREENING FOR MALIGNANT NEOPLASM OF COLON: Primary | ICD-10-CM

## 2020-11-19 ENCOUNTER — HOSPITAL ENCOUNTER (OUTPATIENT)
Dept: PULMONOLOGY | Facility: CLINIC | Age: 75
Discharge: HOME | End: 2020-11-19
Payer: MEDICARE

## 2020-11-19 ENCOUNTER — TELEPHONE (OUTPATIENT)
Dept: CARDIOLOGY | Facility: CLINIC | Age: 75
End: 2020-11-19

## 2020-11-19 DIAGNOSIS — I50.20 HEART FAILURE, SYSTOLIC: Primary | ICD-10-CM

## 2020-11-19 DIAGNOSIS — I49.3 VENTRICULAR PREMATURE BEATS: ICD-10-CM

## 2020-11-19 DIAGNOSIS — R00.2 PALPITATIONS: ICD-10-CM

## 2020-11-19 DIAGNOSIS — I48.0 PAF (PAROXYSMAL ATRIAL FIBRILLATION): ICD-10-CM

## 2020-11-19 DIAGNOSIS — Z79.899 AT RISK FOR AMIODARONE TOXICITY WITH LONG TERM USE: ICD-10-CM

## 2020-11-19 DIAGNOSIS — Z91.89 AT RISK FOR AMIODARONE TOXICITY WITH LONG TERM USE: ICD-10-CM

## 2020-11-19 DIAGNOSIS — Z79.899 HIGH RISK MEDICATION USE: ICD-10-CM

## 2020-11-19 DIAGNOSIS — I42.8 OTHER CARDIOMYOPATHY: ICD-10-CM

## 2020-11-19 LAB
DLCO ADJ PRE: 17.71 ML/(MIN*MMHG)
DLCO SINGLE BREATH LLN: 16.29
DLCO SINGLE BREATH PRE REF: 77.5 %
DLCO SINGLE BREATH REF: 22.03
DLCOC SBVA LLN: 2.82
DLCOC SBVA PRE REF: 97.3 %
DLCOC SBVA REF: 4.16
DLCOC SINGLE BREATH LLN: 16.29
DLCOC SINGLE BREATH PRE REF: 80.4 %
DLCOC SINGLE BREATH REF: 22.03
DLCOCSBVAULN: 5.49
DLCOCSINGLEBREATHULN: 27.76
DLCOSINGLEBREATHULN: 27.76
DLCOVA LLN: 2.82
DLCOVA PRE REF: 93.9 %
DLCOVA PRE: 3.9 ML/(MIN*MMHG*L)
DLCOVA REF: 4.16
DLCOVAULN: 5.49
DLVAADJ PRE: 4.05 ML/(MIN*MMHG*L)
FEF 25 75 LLN: 0.8
FEF 25 75 PRE REF: 99.9 %
FEF 25 75 REF: 1.8
FEV05 LLN: 0.89
FEV05 REF: 1.75
FEV1 FVC LLN: 64
FEV1 FVC PRE REF: 99.8 %
FEV1 FVC REF: 77
FEV1 LLN: 1.6
FEV1 PRE REF: 91 %
FEV1 REF: 2.23
FVC LLN: 2.09
FVC PRE REF: 90.2 %
FVC REF: 2.92
IVC PRE: 2.51 L
IVC SINGLE BREATH LLN: 2.09
IVC SINGLE BREATH PRE REF: 86 %
IVC SINGLE BREATH REF: 2.92
IVCSINGLEBREATHULN: 3.79
PEF LLN: 3.84
PEF PRE REF: 92.6 %
PEF REF: 5.66
PHYSICIAN COMMENT: NORMAL
PRE DLCO: 17.08 ML/(MIN*MMHG)
PRE FEF 25 75: 1.8 L/S
PRE FET 100: 7.66 SEC
PRE FEV05 REF: 94.6 %
PRE FEV1 FVC: 77.18 %
PRE FEV1: 2.03 L
PRE FEV5: 1.66 L
PRE FVC: 2.63 L
PRE PEF: 5.25 L/S
VA PRE: 4.38 L
VA SINGLE BREATH LLN: 5.15
VA SINGLE BREATH PRE REF: 85.1 %
VA SINGLE BREATH REF: 5.15
VASINGLEBREATHULN: 5.15

## 2020-11-19 PROCEDURE — 94729 PR C02/MEMBANE DIFFUSE CAPACITY: ICD-10-PCS | Mod: HCNC,S$GLB,, | Performed by: INTERNAL MEDICINE

## 2020-11-19 PROCEDURE — 94729 DIFFUSING CAPACITY: CPT | Mod: HCNC,S$GLB,, | Performed by: INTERNAL MEDICINE

## 2020-11-19 PROCEDURE — 94010 BREATHING CAPACITY TEST: ICD-10-PCS | Mod: HCNC,S$GLB,, | Performed by: INTERNAL MEDICINE

## 2020-11-19 PROCEDURE — 94010 BREATHING CAPACITY TEST: CPT | Mod: HCNC,S$GLB,, | Performed by: INTERNAL MEDICINE

## 2020-11-19 NOTE — TELEPHONE ENCOUNTER
Pt notified pb      ----- Message from Norbert Lemons MD sent at 11/19/2020  1:21 PM CST -----  Reviewed results. All OK. Please open telephone encounter, call patient and inform him/ her of results,then document in encounter.  Please do not route back to me.   Thanks.  Looks good   Keep same amio dose

## 2020-11-19 NOTE — TELEPHONE ENCOUNTER
----- Message from Norbert Lemons MD sent at 11/19/2020  1:21 PM CST -----  Reviewed results. All OK. Please open telephone encounter, call patient and inform him/ her of results,then document in encounter.  Please do not route back to me.   Thanks.  Looks good   Keep same amio dose

## 2020-11-23 ENCOUNTER — TELEPHONE (OUTPATIENT)
Dept: CARDIOLOGY | Facility: CLINIC | Age: 75
End: 2020-11-23

## 2020-11-23 NOTE — TELEPHONE ENCOUNTER
Pt notified and she read on portal pb    ----- Message from Norbert Lemons MD sent at 11/20/2020  8:01 AM CST -----  See comments below and call patient to discuss.   Please close encounter when done -- no need to route back to me.  Thanks  Please call her MONDAY and tell her all is normal  I already released the results to her portal and tacked on a message   I just want to test if she received and read.  tx

## 2020-11-27 ENCOUNTER — INFUSION (OUTPATIENT)
Dept: INFECTIOUS DISEASES | Facility: HOSPITAL | Age: 75
End: 2020-11-27
Attending: INTERNAL MEDICINE
Payer: MEDICARE

## 2020-11-27 ENCOUNTER — CLINICAL SUPPORT (OUTPATIENT)
Dept: INTERNAL MEDICINE | Facility: CLINIC | Age: 75
End: 2020-11-27
Payer: MEDICARE

## 2020-11-27 ENCOUNTER — HOSPITAL ENCOUNTER (OUTPATIENT)
Dept: CARDIOLOGY | Facility: HOSPITAL | Age: 75
Discharge: HOME | End: 2020-11-27
Attending: INTERNAL MEDICINE
Payer: MEDICARE

## 2020-11-27 VITALS
OXYGEN SATURATION: 98 % | HEART RATE: 57 BPM | BODY MASS INDEX: 21.12 KG/M2 | RESPIRATION RATE: 18 BRPM | TEMPERATURE: 98 F | DIASTOLIC BLOOD PRESSURE: 81 MMHG | HEIGHT: 67 IN | WEIGHT: 134.56 LBS | SYSTOLIC BLOOD PRESSURE: 129 MMHG

## 2020-11-27 VITALS
SYSTOLIC BLOOD PRESSURE: 138 MMHG | DIASTOLIC BLOOD PRESSURE: 74 MMHG | WEIGHT: 134 LBS | HEIGHT: 67 IN | HEART RATE: 66 BPM | BODY MASS INDEX: 21.03 KG/M2

## 2020-11-27 DIAGNOSIS — I50.22 CHRONIC SYSTOLIC HEART FAILURE: ICD-10-CM

## 2020-11-27 DIAGNOSIS — M06.042 RHEUMATOID ARTHRITIS INVOLVING BOTH HANDS WITH NEGATIVE RHEUMATOID FACTOR: Primary | ICD-10-CM

## 2020-11-27 DIAGNOSIS — M06.041 RHEUMATOID ARTHRITIS INVOLVING BOTH HANDS WITH NEGATIVE RHEUMATOID FACTOR: Primary | ICD-10-CM

## 2020-11-27 DIAGNOSIS — M85.80 OSTEOPENIA, UNSPECIFIED LOCATION: ICD-10-CM

## 2020-11-27 LAB
ASCENDING AORTA: 3.42 CM
AV INDEX (PROSTH): 0.69
AV MEAN GRADIENT: 5 MMHG
AV PEAK GRADIENT: 8 MMHG
AV VALVE AREA: 2.73 CM2
AV VELOCITY RATIO: 0.64
BSA FOR ECHO PROCEDURE: 1.69 M2
CV ECHO LV RWT: 0.36 CM
DOP CALC AO PEAK VEL: 1.41 M/S
DOP CALC AO VTI: 24.37 CM
DOP CALC LVOT AREA: 3.9 CM2
DOP CALC LVOT DIAMETER: 2.24 CM
DOP CALC LVOT PEAK VEL: 0.9 M/S
DOP CALC LVOT STROKE VOLUME: 66.41 CM3
DOP CALCLVOT PEAK VEL VTI: 16.86 CM
E WAVE DECELERATION TIME: 242.87 MSEC
E/A RATIO: 0.64
E/E' RATIO: 6.71 M/S
ECHO LV POSTERIOR WALL: 0.9 CM (ref 0.6–1.1)
FRACTIONAL SHORTENING: 29 % (ref 28–44)
INTERVENTRICULAR SEPTUM: 0.9 CM (ref 0.6–1.1)
LA MAJOR: 4.7 CM
LA MINOR: 4.69 CM
LA WIDTH: 3.84 CM
LEFT ATRIUM SIZE: 3.36 CM
LEFT ATRIUM VOLUME INDEX: 30.2 ML/M2
LEFT ATRIUM VOLUME: 51.49 CM3
LEFT INTERNAL DIMENSION IN SYSTOLE: 3.56 CM (ref 2.1–4)
LEFT VENTRICLE DIASTOLIC VOLUME INDEX: 77.63 ML/M2
LEFT VENTRICLE DIASTOLIC VOLUME: 132.41 ML
LEFT VENTRICLE MASS INDEX: 93 G/M2
LEFT VENTRICLE SYSTOLIC VOLUME INDEX: 31.1 ML/M2
LEFT VENTRICLE SYSTOLIC VOLUME: 52.97 ML
LEFT VENTRICULAR INTERNAL DIMENSION IN DIASTOLE: 5 CM (ref 3.5–6)
LEFT VENTRICULAR MASS: 158.21 G
LV LATERAL E/E' RATIO: 5.88 M/S
LV SEPTAL E/E' RATIO: 7.83 M/S
MV PEAK A VEL: 0.74 M/S
MV PEAK E VEL: 0.47 M/S
MV STENOSIS PRESSURE HALF TIME: 70.43 MS
MV VALVE AREA P 1/2 METHOD: 3.12 CM2
PISA TR MAX VEL: 2.19 M/S
PULM VEIN S/D RATIO: 1.56
PV PEAK D VEL: 0.27 M/S
PV PEAK S VEL: 0.42 M/S
RA MAJOR: 4 CM
RA PRESSURE: 3 MMHG
RA WIDTH: 2.95 CM
RIGHT VENTRICULAR END-DIASTOLIC DIMENSION: 2.83 CM
RV TISSUE DOPPLER FREE WALL SYSTOLIC VELOCITY 1 (APICAL 4 CHAMBER VIEW): 6.72 CM/S
SINUS: 3.3 CM
STJ: 3.1 CM
TDI LATERAL: 0.08 M/S
TDI SEPTAL: 0.06 M/S
TDI: 0.07 M/S
TR MAX PG: 19 MMHG
TRICUSPID ANNULAR PLANE SYSTOLIC EXCURSION: 1.22 CM
TV REST PULMONARY ARTERY PRESSURE: 22 MMHG

## 2020-11-27 PROCEDURE — 25000003 PHARM REV CODE 250: Mod: HCNC | Performed by: STUDENT IN AN ORGANIZED HEALTH CARE EDUCATION/TRAINING PROGRAM

## 2020-11-27 PROCEDURE — 96372 THER/PROPH/DIAG INJ SC/IM: CPT | Mod: HCNC,S$GLB,, | Performed by: INTERNAL MEDICINE

## 2020-11-27 PROCEDURE — 96372 PR INJECTION,THERAP/PROPH/DIAG2ST, IM OR SUBCUT: ICD-10-PCS | Mod: HCNC,S$GLB,, | Performed by: INTERNAL MEDICINE

## 2020-11-27 PROCEDURE — 63600175 PHARM REV CODE 636 W HCPCS: Mod: JG,HCNC | Performed by: STUDENT IN AN ORGANIZED HEALTH CARE EDUCATION/TRAINING PROGRAM

## 2020-11-27 PROCEDURE — 96365 THER/PROPH/DIAG IV INF INIT: CPT | Mod: HCNC

## 2020-11-27 PROCEDURE — 93306 TTE W/DOPPLER COMPLETE: CPT | Mod: HCNC

## 2020-11-27 PROCEDURE — 93306 TTE W/DOPPLER COMPLETE: CPT | Mod: 26,HCNC,, | Performed by: INTERNAL MEDICINE

## 2020-11-27 PROCEDURE — 96367 TX/PROPH/DG ADDL SEQ IV INF: CPT | Mod: HCNC

## 2020-11-27 PROCEDURE — 93306 ECHO (CUPID ONLY): ICD-10-PCS | Mod: 26,HCNC,, | Performed by: INTERNAL MEDICINE

## 2020-11-27 RX ORDER — ACETAMINOPHEN 325 MG/1
650 TABLET ORAL
Status: COMPLETED | OUTPATIENT
Start: 2020-11-27 | End: 2020-11-27

## 2020-11-27 RX ORDER — ACETAMINOPHEN 325 MG/1
650 TABLET ORAL
Status: CANCELLED | OUTPATIENT
Start: 2020-12-25

## 2020-11-27 RX ORDER — ZOLEDRONIC ACID 5 MG/100ML
5 INJECTION, SOLUTION INTRAVENOUS
Status: CANCELLED | OUTPATIENT
Start: 2020-12-25

## 2020-11-27 RX ORDER — HEPARIN 100 UNIT/ML
500 SYRINGE INTRAVENOUS
Status: CANCELLED | OUTPATIENT
Start: 2020-12-25

## 2020-11-27 RX ORDER — SODIUM CHLORIDE 0.9 % (FLUSH) 0.9 %
10 SYRINGE (ML) INJECTION
Status: CANCELLED | OUTPATIENT
Start: 2020-12-25

## 2020-11-27 RX ORDER — SODIUM CHLORIDE 0.9 % (FLUSH) 0.9 %
10 SYRINGE (ML) INJECTION
Status: DISCONTINUED | OUTPATIENT
Start: 2020-11-27 | End: 2020-11-27 | Stop reason: HOSPADM

## 2020-11-27 RX ADMIN — ACETAMINOPHEN 650 MG: 325 TABLET ORAL at 01:11

## 2020-11-27 RX ADMIN — SODIUM CHLORIDE 750 MG: 9 INJECTION, SOLUTION INTRAVENOUS at 02:11

## 2020-11-27 RX ADMIN — CYANOCOBALAMIN 1000 MCG: 1000 INJECTION, SOLUTION INTRAMUSCULAR; SUBCUTANEOUS at 01:11

## 2020-11-27 RX ADMIN — DIPHENHYDRAMINE HYDROCHLORIDE 25 MG: 50 INJECTION INTRAMUSCULAR; INTRAVENOUS at 02:11

## 2020-11-28 ENCOUNTER — HOSPITAL ENCOUNTER (OUTPATIENT)
Dept: SLEEP MEDICINE | Facility: OTHER | Age: 75
Discharge: HOME | End: 2020-11-28
Payer: MEDICARE

## 2020-11-28 DIAGNOSIS — G47.33 OSA (OBSTRUCTIVE SLEEP APNEA): ICD-10-CM

## 2020-11-28 DIAGNOSIS — G47.61 PERIODIC LIMB MOVEMENT DISORDER (PLMD): Primary | ICD-10-CM

## 2020-11-28 PROCEDURE — 95810 POLYSOM 6/> YRS 4/> PARAM: CPT | Mod: 26,HCNC,, | Performed by: INTERNAL MEDICINE

## 2020-11-28 PROCEDURE — 95810 PR POLYSOMNOGRAPHY, 4 OR MORE: ICD-10-PCS | Mod: 26,HCNC,, | Performed by: INTERNAL MEDICINE

## 2020-11-28 PROCEDURE — 95810 POLYSOM 6/> YRS 4/> PARAM: CPT | Mod: HCNC

## 2020-11-29 NOTE — PROGRESS NOTES
Rizwana Block to [facility] on 11/28/2020 for an overnight baseline study. Tech went over nighttime bed routine with pt. Then educated pt on pt set up procedures: the electrodes, EMG wires, pulse oximeter, RIP belts, nasal cannula + thermistor duties and their placement. Pt also educated on CPAP therapy. All of patients questions were answered prior to the start of the study.        PMH: GAURAV, on PAP therapy at home, HTN,CHF,arrhythmia    Soft to moderate snoring observed.     Pt did have some apneas and hypopneas. AHI  ~ 12    EKG Lead II appears with frequent PAC's,PVC's, possible skipped beat, and couplet    TIR: for bathroom    Post care giving instructions given to pt in AM. Pt also received thank you letter about PSG results.

## 2020-12-01 ENCOUNTER — TELEPHONE (OUTPATIENT)
Dept: CARDIOLOGY | Facility: CLINIC | Age: 75
End: 2020-12-01

## 2020-12-02 ENCOUNTER — PATIENT MESSAGE (OUTPATIENT)
Dept: CARDIOLOGY | Facility: CLINIC | Age: 75
End: 2020-12-02

## 2020-12-02 ENCOUNTER — PATIENT MESSAGE (OUTPATIENT)
Dept: RHEUMATOLOGY | Facility: CLINIC | Age: 75
End: 2020-12-02

## 2020-12-07 ENCOUNTER — TELEPHONE (OUTPATIENT)
Dept: CARDIOLOGY | Facility: CLINIC | Age: 75
End: 2020-12-07

## 2020-12-07 NOTE — TELEPHONE ENCOUNTER
Please review her sleep study and her echo    ----- Message from Christianne Garber sent at 12/7/2020 12:56 PM CST -----  ..Type:  Patient Returning Call    Who Called:pt   Who Left Message for Patient:  Does the patient know what this is regarding?: results   Would the patient rather a call back or a response via [facility]?  Call back   Best Call Back Number: [phone]  Additional Information:  review results

## 2020-12-09 NOTE — PROCEDURES
[id number]   Sex: Female Study Date: 2020   : 1945 Clinic #: [id number]   Age: 75 Referring Physician: Harvey Abreu NP   Height: 67.0 in Referring Physician #    Weight: 134.0 lbs Sleep Specialist:    BGarimaM.I.: 21.0 Sleep Specialist #    Hypopnea rule: AASM 1B Scoring Tech: ADONAY Ross   Total AHI: 12.5 Recording Tech: ADONAY May   Lowest O2 sat: 79.0% Recording Location: [facility]     Sleep architecture: This is a baseline polysomnogram. At lights out, the patient fell asleep in 7.0 minutes and slept for 85.8% of the time. Total sleep time (TST) was 475.8 minutes. 10.0% of TST was in Stage N1 sleep, 18.5% TST in slow wave sleep, and 20.0% TST in REM sleep. The REM latency was 72.5 minutes.     Respiratory: Snoring was present. There was significant GAURAV (obstructive sleep apnea) based on AHI (apnea hypopnea index) criteria. The overall AHI was 12.5 with an oxygen talib of 79.0%.  The supine AHI was 22.9 and the REM AHI was 33.5.    Motor movement / Parasomnia:   There were frequent limb movements of sleep noted, occasionally associated with arousals.   The total limb movement index was 31.0 (5.9 with arousal).    Cardiac: Cardiac rhythm monitoring revealed a normal sinus rhythm with occasional PVCs.      IMPRESSION:  1. Mild GAURAV  2. Moderate Periodic Limb Movement Disorder    RECOMMENDATION:  1. Therapeutic options include CPAP or oral appliance.  2. If significant daytime sleepiness is present after effective treatment of GAURAV, consideration could be given to a trial of a dopaminergic agent for Periodic Limb Movement Disorder.  3. The patient has follow up with Sleep Medicine.

## 2020-12-10 ENCOUNTER — PATIENT MESSAGE (OUTPATIENT)
Dept: INTERNAL MEDICINE | Facility: CLINIC | Age: 75
End: 2020-12-10

## 2020-12-10 ENCOUNTER — TELEPHONE (OUTPATIENT)
Dept: INTERNAL MEDICINE | Facility: CLINIC | Age: 75
End: 2020-12-10

## 2020-12-10 ENCOUNTER — LAB VISIT (OUTPATIENT)
Dept: INTERNAL MEDICINE | Facility: CLINIC | Age: 75
End: 2020-12-10
Payer: MEDICARE

## 2020-12-10 DIAGNOSIS — Z91.89 AT INCREASED RISK OF EXPOSURE TO COVID-19 VIRUS: Primary | ICD-10-CM

## 2020-12-10 DIAGNOSIS — Z91.89 AT INCREASED RISK OF EXPOSURE TO COVID-19 VIRUS: ICD-10-CM

## 2020-12-10 PROCEDURE — U0003 INFECTIOUS AGENT DETECTION BY NUCLEIC ACID (DNA OR RNA); SEVERE ACUTE RESPIRATORY SYNDROME CORONAVIRUS 2 (SARS-COV-2) (CORONAVIRUS DISEASE [COVID-19]), AMPLIFIED PROBE TECHNIQUE, MAKING USE OF HIGH THROUGHPUT TECHNOLOGIES AS DESCRIBED BY CMS-2020-01-R: HCPCS | Mod: HCNC

## 2020-12-10 NOTE — TELEPHONE ENCOUNTER
----- Message from Ashly Calhoun sent at 12/10/2020 10:04 AM CST -----  Contact: [phone]  Patient is returning a phone call.  Who left a message for the patient: Evan  Does patient know what this is regarding:  Covid test  Comments:

## 2020-12-10 NOTE — TELEPHONE ENCOUNTER
Pt calling for order for covid test . Office has several people in it who are positive . Baldemar veliz

## 2020-12-10 NOTE — TELEPHONE ENCOUNTER
----- Message from Ashly Calhoun sent at 12/10/2020  9:49 AM CST -----  Contact: [phone]  Patient called to follow up on the message sent via [facility]. Patient needs to know if she should get tested for Covid. Please call and advise

## 2020-12-11 ENCOUNTER — PATIENT MESSAGE (OUTPATIENT)
Dept: INTERNAL MEDICINE | Facility: CLINIC | Age: 75
End: 2020-12-11

## 2020-12-11 LAB — SARS-COV-2 RNA RESP QL NAA+PROBE: NOT DETECTED

## 2020-12-12 ENCOUNTER — PATIENT MESSAGE (OUTPATIENT)
Dept: INFECTIOUS DISEASES | Facility: CLINIC | Age: 75
End: 2020-12-12

## 2020-12-13 ENCOUNTER — PATIENT MESSAGE (OUTPATIENT)
Dept: SURGERY | Facility: CLINIC | Age: 75
End: 2020-12-13

## 2020-12-15 ENCOUNTER — OFFICE VISIT (OUTPATIENT)
Dept: PODIATRY | Facility: CLINIC | Age: 75
End: 2020-12-15
Payer: MEDICARE

## 2020-12-15 VITALS — HEIGHT: 67 IN | BODY MASS INDEX: 21.03 KG/M2 | WEIGHT: 134 LBS

## 2020-12-15 DIAGNOSIS — L60.3 DYSTROPHIC NAIL: Primary | ICD-10-CM

## 2020-12-15 DIAGNOSIS — M06.9 RHEUMATOID ARTHRITIS: ICD-10-CM

## 2020-12-15 DIAGNOSIS — L60.2 LONG TOENAIL: ICD-10-CM

## 2020-12-15 PROCEDURE — 17999 UNLISTD PX SKN MUC MEMB SUBQ: CPT | Mod: CSM,HCNC,S$GLB, | Performed by: PODIATRIST

## 2020-12-15 PROCEDURE — 1126F AMNT PAIN NOTED NONE PRSNT: CPT | Mod: HCNC,,, | Performed by: PODIATRIST

## 2020-12-15 PROCEDURE — 3008F PR BODY MASS INDEX (BMI) DOCUMENTED: ICD-10-PCS | Mod: HCNC,CPTII,, | Performed by: PODIATRIST

## 2020-12-15 PROCEDURE — 99999 PR PBB SHADOW E&M-EST. PATIENT-LVL III: CPT | Mod: PBBFAC,HCNC,, | Performed by: PODIATRIST

## 2020-12-15 PROCEDURE — 3008F BODY MASS INDEX DOCD: CPT | Mod: HCNC,CPTII,, | Performed by: PODIATRIST

## 2020-12-15 PROCEDURE — 1101F PT FALLS ASSESS-DOCD LE1/YR: CPT | Mod: HCNC,CPTII,, | Performed by: PODIATRIST

## 2020-12-15 PROCEDURE — 17999 PR NON-COVERED FOOT CARE: ICD-10-PCS | Mod: CSM,HCNC,S$GLB, | Performed by: PODIATRIST

## 2020-12-15 PROCEDURE — 99499 UNLISTED E&M SERVICE: CPT | Mod: HCNC,,, | Performed by: PODIATRIST

## 2020-12-15 PROCEDURE — 99999 PR PBB SHADOW E&M-EST. PATIENT-LVL III: ICD-10-PCS | Mod: PBBFAC,HCNC,, | Performed by: PODIATRIST

## 2020-12-15 PROCEDURE — 1126F PR PAIN SEVERITY QUANTIFIED, NO PAIN PRESENT: ICD-10-PCS | Mod: HCNC,,, | Performed by: PODIATRIST

## 2020-12-15 PROCEDURE — 1101F PR PT FALLS ASSESS DOC 0-1 FALLS W/OUT INJ PAST YR: ICD-10-PCS | Mod: HCNC,CPTII,, | Performed by: PODIATRIST

## 2020-12-15 PROCEDURE — 99499 NO LOS: ICD-10-PCS | Mod: HCNC,,, | Performed by: PODIATRIST

## 2020-12-15 PROCEDURE — 3288F PR FALLS RISK ASSESSMENT DOCUMENTED: ICD-10-PCS | Mod: HCNC,CPTII,, | Performed by: PODIATRIST

## 2020-12-15 PROCEDURE — 3288F FALL RISK ASSESSMENT DOCD: CPT | Mod: HCNC,CPTII,, | Performed by: PODIATRIST

## 2020-12-15 RX ORDER — LEFLUNOMIDE 20 MG/1
20 TABLET ORAL DAILY
Qty: 30 TABLET | Refills: 1 | Status: SHIPPED | OUTPATIENT
Start: 2020-12-15 | End: 2021-04-09 | Stop reason: SDUPTHER

## 2020-12-15 NOTE — PROGRESS NOTES
Dystrophic nail    Long toenail        Patient presents to the clinic for non-covered routine foot care.   Patient understands this is not typically a covered service every 4-6 weeks and patient is aware of responsibility of payment. Pedal pulses are palpable. No known risk factors requiring routine foot care.  nails and calluses/corns were reduced and trimmed bilaterally. Patient tolerated well.       Continue toe spacers and shoe modification.

## 2020-12-17 ENCOUNTER — PATIENT MESSAGE (OUTPATIENT)
Dept: INTERNAL MEDICINE | Facility: CLINIC | Age: 75
End: 2020-12-17

## 2020-12-17 ENCOUNTER — PATIENT MESSAGE (OUTPATIENT)
Dept: CARDIOLOGY | Facility: CLINIC | Age: 75
End: 2020-12-17

## 2020-12-18 ENCOUNTER — TELEPHONE (OUTPATIENT)
Dept: INTERNAL MEDICINE | Facility: CLINIC | Age: 75
End: 2020-12-18

## 2020-12-18 ENCOUNTER — TELEPHONE (OUTPATIENT)
Dept: CARDIOLOGY | Facility: CLINIC | Age: 75
End: 2020-12-18

## 2020-12-18 ENCOUNTER — DOCUMENTATION ONLY (OUTPATIENT)
Dept: INTERNAL MEDICINE | Facility: CLINIC | Age: 75
End: 2020-12-18

## 2020-12-18 DIAGNOSIS — Z12.11 SCREENING FOR MALIGNANT NEOPLASM OF COLON: Primary | ICD-10-CM

## 2020-12-18 NOTE — TELEPHONE ENCOUNTER
Patient calling for refill of Lasix 40mg daily PRN for swelling and KCL 8mEq daily. Made aware these were removed from med record at last visit. Patient reports has still been taking them and would like a refill. Message routed to Dr. Mabry for review.

## 2020-12-18 NOTE — TELEPHONE ENCOUNTER
----- Message from Ashly Calhoun sent at 12/18/2020  8:59 AM CST -----  Contact: [phone]  Patient states the GI department does not want to do her colonoscopy due to her heart condition. Patient just want to do the home test. Please call and advise

## 2020-12-21 RX ORDER — FUROSEMIDE 40 MG/1
40 TABLET ORAL DAILY PRN
Qty: 30 TABLET | Refills: 6 | Status: SHIPPED | OUTPATIENT
Start: 2020-12-21 | End: 2021-02-22 | Stop reason: SDUPTHER

## 2020-12-21 RX ORDER — POTASSIUM CHLORIDE 600 MG/1
8 TABLET, FILM COATED, EXTENDED RELEASE ORAL DAILY
Qty: 30 TABLET | Refills: 6 | Status: SHIPPED | OUTPATIENT
Start: 2020-12-21 | End: 2022-04-25 | Stop reason: SDUPTHER

## 2020-12-28 ENCOUNTER — CLINICAL SUPPORT (OUTPATIENT)
Dept: INTERNAL MEDICINE | Facility: CLINIC | Age: 75
End: 2020-12-28
Payer: MEDICARE

## 2020-12-28 ENCOUNTER — INFUSION (OUTPATIENT)
Dept: INFECTIOUS DISEASES | Facility: HOSPITAL | Age: 75
End: 2020-12-28
Attending: INTERNAL MEDICINE
Payer: MEDICARE

## 2020-12-28 VITALS
SYSTOLIC BLOOD PRESSURE: 117 MMHG | DIASTOLIC BLOOD PRESSURE: 65 MMHG | HEART RATE: 69 BPM | RESPIRATION RATE: 18 BRPM | TEMPERATURE: 98 F | OXYGEN SATURATION: 100 % | BODY MASS INDEX: 20.92 KG/M2 | HEIGHT: 67 IN | WEIGHT: 133.31 LBS

## 2020-12-28 DIAGNOSIS — M06.041 RHEUMATOID ARTHRITIS INVOLVING BOTH HANDS WITH NEGATIVE RHEUMATOID FACTOR: Primary | ICD-10-CM

## 2020-12-28 DIAGNOSIS — E53.8 B12 DEFICIENCY: Primary | ICD-10-CM

## 2020-12-28 DIAGNOSIS — M06.042 RHEUMATOID ARTHRITIS INVOLVING BOTH HANDS WITH NEGATIVE RHEUMATOID FACTOR: Primary | ICD-10-CM

## 2020-12-28 DIAGNOSIS — M85.80 OSTEOPENIA, UNSPECIFIED LOCATION: ICD-10-CM

## 2020-12-28 PROCEDURE — 96372 PR INJECTION,THERAP/PROPH/DIAG2ST, IM OR SUBCUT: ICD-10-PCS | Mod: HCNC,S$GLB,, | Performed by: INTERNAL MEDICINE

## 2020-12-28 PROCEDURE — 25000003 PHARM REV CODE 250: Mod: HCNC | Performed by: STUDENT IN AN ORGANIZED HEALTH CARE EDUCATION/TRAINING PROGRAM

## 2020-12-28 PROCEDURE — 63600175 PHARM REV CODE 636 W HCPCS: Mod: JG,HCNC | Performed by: STUDENT IN AN ORGANIZED HEALTH CARE EDUCATION/TRAINING PROGRAM

## 2020-12-28 PROCEDURE — 96367 TX/PROPH/DG ADDL SEQ IV INF: CPT | Mod: HCNC

## 2020-12-28 PROCEDURE — 96372 THER/PROPH/DIAG INJ SC/IM: CPT | Mod: HCNC,S$GLB,, | Performed by: INTERNAL MEDICINE

## 2020-12-28 PROCEDURE — 96365 THER/PROPH/DIAG IV INF INIT: CPT | Mod: HCNC

## 2020-12-28 RX ORDER — HEPARIN 100 UNIT/ML
500 SYRINGE INTRAVENOUS
Status: CANCELLED | OUTPATIENT
Start: 2021-01-18

## 2020-12-28 RX ORDER — ZOLEDRONIC ACID 5 MG/100ML
5 INJECTION, SOLUTION INTRAVENOUS
Status: CANCELLED | OUTPATIENT
Start: 2021-01-18

## 2020-12-28 RX ORDER — ACETAMINOPHEN 325 MG/1
650 TABLET ORAL
Status: CANCELLED | OUTPATIENT
Start: 2021-01-18

## 2020-12-28 RX ORDER — ACETAMINOPHEN 325 MG/1
650 TABLET ORAL
Status: COMPLETED | OUTPATIENT
Start: 2020-12-28 | End: 2020-12-28

## 2020-12-28 RX ORDER — SODIUM CHLORIDE 0.9 % (FLUSH) 0.9 %
10 SYRINGE (ML) INJECTION
Status: CANCELLED | OUTPATIENT
Start: 2021-01-18

## 2020-12-28 RX ORDER — SODIUM CHLORIDE 0.9 % (FLUSH) 0.9 %
10 SYRINGE (ML) INJECTION
Status: DISCONTINUED | OUTPATIENT
Start: 2020-12-28 | End: 2020-12-28 | Stop reason: HOSPADM

## 2020-12-28 RX ADMIN — DIPHENHYDRAMINE HYDROCHLORIDE 25 MG: 50 INJECTION INTRAMUSCULAR; INTRAVENOUS at 02:12

## 2020-12-28 RX ADMIN — SODIUM CHLORIDE 750 MG: 9 INJECTION, SOLUTION INTRAVENOUS at 02:12

## 2020-12-28 RX ADMIN — CYANOCOBALAMIN 1000 MCG: 1000 INJECTION, SOLUTION INTRAMUSCULAR at 01:12

## 2020-12-28 RX ADMIN — ACETAMINOPHEN 650 MG: 325 TABLET ORAL at 02:12

## 2020-12-28 NOTE — PROGRESS NOTES
Used 2 pt identifiers and reviewed allergies prior to giving B12 injection in the L/deltoid,  then asked pt to wait 15 mins post injection for s/sx of adverse reactions.

## 2020-12-29 ENCOUNTER — LAB VISIT (OUTPATIENT)
Dept: LAB | Facility: HOSPITAL | Age: 75
End: 2020-12-29
Attending: INTERNAL MEDICINE
Payer: MEDICARE

## 2020-12-29 DIAGNOSIS — Z12.11 SCREENING FOR MALIGNANT NEOPLASM OF COLON: ICD-10-CM

## 2020-12-29 LAB — HEMOCCULT STL QL IA: NEGATIVE

## 2020-12-29 PROCEDURE — 82274 ASSAY TEST FOR BLOOD FECAL: CPT | Mod: HCNC

## 2021-01-01 NOTE — TELEPHONE ENCOUNTER
Assessment:     Healthy 4 m o  male infant  1  Encounter for vaccination  DTAP HIB IPV COMBINED VACCINE IM    PNEUMOCOCCAL CONJUGATE VACCINE 13-VALENT GREATER THAN 6 MONTHS    ROTAVIRUS VACCINE PENTAVALENT 3 DOSE ORAL   2  Screening for depression            Plan:         1  Anticipatory guidance discussed  routine    2  Development: appropriate for age    1  Immunizations today: per orders  4  Follow-up visit in 2 months for next well child visit, or sooner as needed  Subjective:     Lashaun Land is a 3 m o  male who is brought in for this well child visit  Current Issues:  none    Well Child Assessment:  History was provided by the mother  Bob Hughes lives with his mother, father and brother  Interval problems include recent illness  Interval problems do not include lack of social support or recent injury  (Cold last week )     Nutrition  Types of milk consumed include formula  Nutritional intake in addition to milk/formula: Neosure  Formula - Types of formula consumed include premature (Neosure)  Formula consumed per feeding (oz): 4-5oz  Frequency of formula feedings: 3-4  hours  Feeding problems do not include burping poorly, spitting up or vomiting  Dental  The patient has teething symptoms  Tooth eruption is not evident  Elimination  Urination occurs more than 6 times per 24 hours  Bowel movements occur once per 24 hours  Stools have a loose consistency  Elimination problems do not include colic, constipation, diarrhea, gas or urinary symptoms  Sleep  The patient sleeps in his bassinet  Child falls asleep while in caretaker's arms  Sleep positions include supine  Average sleep duration is 10 (wakes 3 times to feed) hours  Safety  Home is child-proofed? yes  There is no smoking in the home  Home has working smoke alarms? yes  Home has working carbon monoxide alarms? yes  There is an appropriate car seat in use  Screening  Immunizations are not up-to-date   There are no risk factors for Pt notified again that there is no order for echo and have sent a request to dr. eladia cruz    ----- Message from Fabienne Ruff sent at 10/26/2020  4:20 PM CDT -----  Contact: pt  Pt requesting a call back from Bekah regarding her echo. She states that she has an appt on 11/[facility] on [facility], she is ok with being scheduled there around 10am. Please call pt back at [phone]       hearing loss  There are no risk factors for anemia  Social  The caregiver enjoys the child  Childcare is provided at child's home  The childcare provider is a parent  No birth history on file  The following portions of the patient's history were reviewed and updated as appropriate:   He   Patient Active Problem List    Diagnosis Date Noted    Hyperbilirubinemia of prematurity 2021     weight check, 628 days old 2021    Hyperbilirubinemia requiring phototherapy 2021    Mother positive for group B Streptococcus colonization 2021    SGA (small for gestational age) 2021     He has No Known Allergies       Developmental 2 Months Appropriate     Question Response Comments    Follows visually through range of 90 degrees Yes Yes on 2021 (Age - 2mo)    Lifts head momentarily Yes Yes on 2021 (Age - 2mo)    Social smile Yes Yes on 2021 (Age - 2mo)            Objective:     Growth parameters are noted and are appropriate for age  Wt Readings from Last 1 Encounters:   21 5 84 kg (12 lb 14 oz) (4 %, Z= -1 80)*     * Growth percentiles are based on WHO (Boys, 0-2 years) data  Ht Readings from Last 1 Encounters:   21 23 31" (59 2 cm) (<1 %, Z= -2 57)*     * Growth percentiles are based on WHO (Boys, 0-2 years) data  17 %ile (Z= -0 93) based on WHO (Boys, 0-2 years) head circumference-for-age based on Head Circumference recorded on 2021 from contact on 2021      Vitals:    21 1054   Weight: 5 84 kg (12 lb 14 oz)   Height: 23 31" (59 2 cm)   HC: 41 cm (16 14")       Physical Exam  General: awake, alert, behavior appropriate for age and no distress  Head: normocephalic, atraumatic, anterior fontanel is open and flat  Ears: external exam is normal; canals are bilaterally without exudate or inflammation; tympanic membranes are intact with light reflex and landmarks visible; no noted effusion  Eyes: red reflex is symmetric and present, extraocular movements are intact; pupils are equal and reactive to light; no noted discharge or injection  Nose: nares patent, no discharge  Oropharynx: oral cavity is without lesions, palate normal; moist mucosal membranes; tonsils are symmetric and without erythema or exudate  Neck: supple  Chest: regular rate, lungs clear to auscultation; no wheezes/crackles appreciated; no increased work of breathing  Cardiac: regular rate and rhythm; s1 and s2 present; no murmurs, symmetric femoral pulses, well perfused  Abdomen: round, soft, normoactive bs throughout, nontender/nondistended; no hepatosplenomegaly appreciated  Genitals: neri 1, normal anatomy  Musculoskeletal: symmetric movement u/e and l/e, no edema noted; negative o/b  Skin: no lesions noted  Neuro: developmentally appropriate; no focal deficits noted

## 2021-01-02 ENCOUNTER — PATIENT MESSAGE (OUTPATIENT)
Dept: RHEUMATOLOGY | Facility: CLINIC | Age: 76
End: 2021-01-02

## 2021-01-05 ENCOUNTER — PATIENT OUTREACH (OUTPATIENT)
Dept: OTHER | Facility: OTHER | Age: 76
End: 2021-01-05

## 2021-01-05 ENCOUNTER — PATIENT MESSAGE (OUTPATIENT)
Dept: INTERNAL MEDICINE | Facility: CLINIC | Age: 76
End: 2021-01-05

## 2021-01-08 ENCOUNTER — IMMUNIZATION (OUTPATIENT)
Dept: INTERNAL MEDICINE | Facility: CLINIC | Age: 76
End: 2021-01-08
Payer: MEDICARE

## 2021-01-08 DIAGNOSIS — Z23 NEED FOR VACCINATION: ICD-10-CM

## 2021-01-08 PROCEDURE — 91300 COVID-19, MRNA, LNP-S, PF, 30 MCG/0.3 ML DOSE VACCINE: CPT | Mod: PBBFAC | Performed by: INTERNAL MEDICINE

## 2021-01-11 ENCOUNTER — OFFICE VISIT (OUTPATIENT)
Dept: PODIATRY | Facility: CLINIC | Age: 76
End: 2021-01-11
Payer: MEDICARE

## 2021-01-11 VITALS
HEIGHT: 67 IN | SYSTOLIC BLOOD PRESSURE: 103 MMHG | WEIGHT: 133 LBS | BODY MASS INDEX: 20.88 KG/M2 | HEART RATE: 67 BPM | DIASTOLIC BLOOD PRESSURE: 63 MMHG

## 2021-01-11 DIAGNOSIS — L60.2 LONG TOENAIL: ICD-10-CM

## 2021-01-11 DIAGNOSIS — L60.3 DYSTROPHIC NAIL: Primary | ICD-10-CM

## 2021-01-11 DIAGNOSIS — L84 HELOMA MOLLE: ICD-10-CM

## 2021-01-11 PROCEDURE — 99999 PR PBB SHADOW E&M-EST. PATIENT-LVL IV: ICD-10-PCS | Mod: PBBFAC,HCNC,, | Performed by: PODIATRIST

## 2021-01-11 PROCEDURE — 1101F PR PT FALLS ASSESS DOC 0-1 FALLS W/OUT INJ PAST YR: ICD-10-PCS | Mod: HCNC,CPTII,, | Performed by: PODIATRIST

## 2021-01-11 PROCEDURE — 1101F PT FALLS ASSESS-DOCD LE1/YR: CPT | Mod: HCNC,CPTII,, | Performed by: PODIATRIST

## 2021-01-11 PROCEDURE — 3288F FALL RISK ASSESSMENT DOCD: CPT | Mod: HCNC,CPTII,, | Performed by: PODIATRIST

## 2021-01-11 PROCEDURE — 99499 NO LOS: ICD-10-PCS | Mod: HCNC,,, | Performed by: PODIATRIST

## 2021-01-11 PROCEDURE — 17999 PR NON-COVERED FOOT CARE: ICD-10-PCS | Mod: CSM,HCNC,S$GLB, | Performed by: PODIATRIST

## 2021-01-11 PROCEDURE — 17999 UNLISTD PX SKN MUC MEMB SUBQ: CPT | Mod: CSM,HCNC,S$GLB, | Performed by: PODIATRIST

## 2021-01-11 PROCEDURE — 1126F AMNT PAIN NOTED NONE PRSNT: CPT | Mod: HCNC,,, | Performed by: PODIATRIST

## 2021-01-11 PROCEDURE — 3288F PR FALLS RISK ASSESSMENT DOCUMENTED: ICD-10-PCS | Mod: HCNC,CPTII,, | Performed by: PODIATRIST

## 2021-01-11 PROCEDURE — 3008F PR BODY MASS INDEX (BMI) DOCUMENTED: ICD-10-PCS | Mod: HCNC,CPTII,, | Performed by: PODIATRIST

## 2021-01-11 PROCEDURE — 99499 UNLISTED E&M SERVICE: CPT | Mod: HCNC,,, | Performed by: PODIATRIST

## 2021-01-11 PROCEDURE — 3008F BODY MASS INDEX DOCD: CPT | Mod: HCNC,CPTII,, | Performed by: PODIATRIST

## 2021-01-11 PROCEDURE — 1126F PR PAIN SEVERITY QUANTIFIED, NO PAIN PRESENT: ICD-10-PCS | Mod: HCNC,,, | Performed by: PODIATRIST

## 2021-01-11 PROCEDURE — 99999 PR PBB SHADOW E&M-EST. PATIENT-LVL IV: CPT | Mod: PBBFAC,HCNC,, | Performed by: PODIATRIST

## 2021-01-14 ENCOUNTER — PATIENT MESSAGE (OUTPATIENT)
Dept: ADMINISTRATIVE | Facility: OTHER | Age: 76
End: 2021-01-14

## 2021-01-21 ENCOUNTER — PATIENT MESSAGE (OUTPATIENT)
Dept: INFECTIOUS DISEASES | Facility: CLINIC | Age: 76
End: 2021-01-21

## 2021-01-22 ENCOUNTER — CLINICAL SUPPORT (OUTPATIENT)
Dept: INTERNAL MEDICINE | Facility: CLINIC | Age: 76
End: 2021-01-22
Payer: MEDICARE

## 2021-01-22 ENCOUNTER — TELEPHONE (OUTPATIENT)
Dept: INTERNAL MEDICINE | Facility: CLINIC | Age: 76
End: 2021-01-22

## 2021-01-22 ENCOUNTER — INFUSION (OUTPATIENT)
Dept: INFECTIOUS DISEASES | Facility: HOSPITAL | Age: 76
End: 2021-01-22
Attending: INTERNAL MEDICINE
Payer: MEDICARE

## 2021-01-22 VITALS
DIASTOLIC BLOOD PRESSURE: 75 MMHG | TEMPERATURE: 98 F | HEART RATE: 60 BPM | SYSTOLIC BLOOD PRESSURE: 130 MMHG | RESPIRATION RATE: 16 BRPM | OXYGEN SATURATION: 96 %

## 2021-01-22 DIAGNOSIS — M06.042 RHEUMATOID ARTHRITIS INVOLVING BOTH HANDS WITH NEGATIVE RHEUMATOID FACTOR: Primary | ICD-10-CM

## 2021-01-22 DIAGNOSIS — M85.80 OSTEOPENIA, UNSPECIFIED LOCATION: ICD-10-CM

## 2021-01-22 DIAGNOSIS — M06.041 RHEUMATOID ARTHRITIS INVOLVING BOTH HANDS WITH NEGATIVE RHEUMATOID FACTOR: Primary | ICD-10-CM

## 2021-01-22 DIAGNOSIS — Z12.31 SCREENING MAMMOGRAM, ENCOUNTER FOR: Primary | ICD-10-CM

## 2021-01-22 PROCEDURE — 96372 PR INJECTION,THERAP/PROPH/DIAG2ST, IM OR SUBCUT: ICD-10-PCS | Mod: HCNC,S$GLB,, | Performed by: INTERNAL MEDICINE

## 2021-01-22 PROCEDURE — 63600175 PHARM REV CODE 636 W HCPCS: Mod: HCNC | Performed by: STUDENT IN AN ORGANIZED HEALTH CARE EDUCATION/TRAINING PROGRAM

## 2021-01-22 PROCEDURE — 25000003 PHARM REV CODE 250: Mod: HCNC | Performed by: STUDENT IN AN ORGANIZED HEALTH CARE EDUCATION/TRAINING PROGRAM

## 2021-01-22 PROCEDURE — 96372 THER/PROPH/DIAG INJ SC/IM: CPT | Mod: HCNC,S$GLB,, | Performed by: INTERNAL MEDICINE

## 2021-01-22 PROCEDURE — 96367 TX/PROPH/DG ADDL SEQ IV INF: CPT | Mod: HCNC

## 2021-01-22 PROCEDURE — 96365 THER/PROPH/DIAG IV INF INIT: CPT | Mod: HCNC

## 2021-01-22 RX ORDER — HEPARIN 100 UNIT/ML
500 SYRINGE INTRAVENOUS
Status: CANCELLED | OUTPATIENT
Start: 2021-01-25

## 2021-01-22 RX ORDER — ZOLEDRONIC ACID 5 MG/100ML
5 INJECTION, SOLUTION INTRAVENOUS
Status: CANCELLED | OUTPATIENT
Start: 2021-01-25

## 2021-01-22 RX ORDER — SODIUM CHLORIDE 0.9 % (FLUSH) 0.9 %
10 SYRINGE (ML) INJECTION
Status: DISCONTINUED | OUTPATIENT
Start: 2021-01-22 | End: 2021-01-22 | Stop reason: HOSPADM

## 2021-01-22 RX ORDER — ACETAMINOPHEN 325 MG/1
650 TABLET ORAL
Status: COMPLETED | OUTPATIENT
Start: 2021-01-22 | End: 2021-01-22

## 2021-01-22 RX ORDER — ACETAMINOPHEN 325 MG/1
650 TABLET ORAL
Status: CANCELLED | OUTPATIENT
Start: 2021-01-25

## 2021-01-22 RX ORDER — SODIUM CHLORIDE 0.9 % (FLUSH) 0.9 %
10 SYRINGE (ML) INJECTION
Status: CANCELLED | OUTPATIENT
Start: 2021-01-25

## 2021-01-22 RX ORDER — ZOLEDRONIC ACID 5 MG/100ML
5 INJECTION, SOLUTION INTRAVENOUS
Status: DISCONTINUED | OUTPATIENT
Start: 2021-01-22 | End: 2021-01-22 | Stop reason: HOSPADM

## 2021-01-22 RX ADMIN — DIPHENHYDRAMINE HYDROCHLORIDE 25 MG: 50 INJECTION INTRAMUSCULAR; INTRAVENOUS at 01:01

## 2021-01-22 RX ADMIN — SODIUM CHLORIDE 750 MG: 9 INJECTION, SOLUTION INTRAVENOUS at 01:01

## 2021-01-22 RX ADMIN — ACETAMINOPHEN 650 MG: 325 TABLET ORAL at 01:01

## 2021-01-22 RX ADMIN — CYANOCOBALAMIN 1000 MCG: 1000 INJECTION, SOLUTION INTRAMUSCULAR; SUBCUTANEOUS at 12:01

## 2021-01-29 ENCOUNTER — IMMUNIZATION (OUTPATIENT)
Dept: INTERNAL MEDICINE | Facility: CLINIC | Age: 76
End: 2021-01-29
Payer: MEDICARE

## 2021-01-29 DIAGNOSIS — Z23 NEED FOR VACCINATION: Primary | ICD-10-CM

## 2021-01-29 PROCEDURE — 91300 COVID-19, MRNA, LNP-S, PF, 30 MCG/0.3 ML DOSE VACCINE: CPT | Mod: PBBFAC | Performed by: INTERNAL MEDICINE

## 2021-01-29 PROCEDURE — 0002A COVID-19, MRNA, LNP-S, PF, 30 MCG/0.3 ML DOSE VACCINE: CPT | Mod: PBBFAC | Performed by: INTERNAL MEDICINE

## 2021-02-07 ENCOUNTER — PATIENT OUTREACH (OUTPATIENT)
Dept: ADMINISTRATIVE | Facility: OTHER | Age: 76
End: 2021-02-07

## 2021-02-08 ENCOUNTER — OFFICE VISIT (OUTPATIENT)
Dept: PODIATRY | Facility: CLINIC | Age: 76
End: 2021-02-08
Payer: MEDICARE

## 2021-02-08 VITALS
DIASTOLIC BLOOD PRESSURE: 64 MMHG | WEIGHT: 132.94 LBS | BODY MASS INDEX: 20.87 KG/M2 | HEIGHT: 67 IN | SYSTOLIC BLOOD PRESSURE: 121 MMHG | HEART RATE: 67 BPM

## 2021-02-08 DIAGNOSIS — M06.042 RHEUMATOID ARTHRITIS INVOLVING BOTH HANDS WITH NEGATIVE RHEUMATOID FACTOR: ICD-10-CM

## 2021-02-08 DIAGNOSIS — I48.0 PAF (PAROXYSMAL ATRIAL FIBRILLATION): ICD-10-CM

## 2021-02-08 DIAGNOSIS — R20.2 PARESTHESIA OF BOTH LOWER EXTREMITIES: ICD-10-CM

## 2021-02-08 DIAGNOSIS — B35.1 DERMATOPHYTOSIS OF NAIL: Primary | ICD-10-CM

## 2021-02-08 DIAGNOSIS — M06.041 RHEUMATOID ARTHRITIS INVOLVING BOTH HANDS WITH NEGATIVE RHEUMATOID FACTOR: ICD-10-CM

## 2021-02-08 PROCEDURE — 1101F PR PT FALLS ASSESS DOC 0-1 FALLS W/OUT INJ PAST YR: ICD-10-PCS | Mod: CPTII,S$GLB,, | Performed by: PODIATRIST

## 2021-02-08 PROCEDURE — 99499 RISK ADDL DX/OHS AUDIT: ICD-10-PCS | Mod: S$GLB,,, | Performed by: PODIATRIST

## 2021-02-08 PROCEDURE — 3008F BODY MASS INDEX DOCD: CPT | Mod: CPTII,S$GLB,, | Performed by: PODIATRIST

## 2021-02-08 PROCEDURE — 99999 PR PBB SHADOW E&M-EST. PATIENT-LVL IV: ICD-10-PCS | Mod: PBBFAC,,, | Performed by: PODIATRIST

## 2021-02-08 PROCEDURE — 1126F AMNT PAIN NOTED NONE PRSNT: CPT | Mod: S$GLB,,, | Performed by: PODIATRIST

## 2021-02-08 PROCEDURE — 3288F FALL RISK ASSESSMENT DOCD: CPT | Mod: CPTII,S$GLB,, | Performed by: PODIATRIST

## 2021-02-08 PROCEDURE — 3288F PR FALLS RISK ASSESSMENT DOCUMENTED: ICD-10-PCS | Mod: CPTII,S$GLB,, | Performed by: PODIATRIST

## 2021-02-08 PROCEDURE — 1126F PR PAIN SEVERITY QUANTIFIED, NO PAIN PRESENT: ICD-10-PCS | Mod: S$GLB,,, | Performed by: PODIATRIST

## 2021-02-08 PROCEDURE — 11721 ROUTINE FOOT CARE: ICD-10-PCS | Mod: Q9,S$GLB,, | Performed by: PODIATRIST

## 2021-02-08 PROCEDURE — 99999 PR PBB SHADOW E&M-EST. PATIENT-LVL IV: CPT | Mod: PBBFAC,,, | Performed by: PODIATRIST

## 2021-02-08 PROCEDURE — 99499 UNLISTED E&M SERVICE: CPT | Mod: S$GLB,,, | Performed by: PODIATRIST

## 2021-02-08 PROCEDURE — 1101F PT FALLS ASSESS-DOCD LE1/YR: CPT | Mod: CPTII,S$GLB,, | Performed by: PODIATRIST

## 2021-02-08 PROCEDURE — 3008F PR BODY MASS INDEX (BMI) DOCUMENTED: ICD-10-PCS | Mod: CPTII,S$GLB,, | Performed by: PODIATRIST

## 2021-02-08 PROCEDURE — 11721 DEBRIDE NAIL 6 OR MORE: CPT | Mod: Q9,S$GLB,, | Performed by: PODIATRIST

## 2021-02-11 ENCOUNTER — PATIENT MESSAGE (OUTPATIENT)
Dept: CARDIOLOGY | Facility: CLINIC | Age: 76
End: 2021-02-11

## 2021-02-19 ENCOUNTER — HOSPITAL ENCOUNTER (OUTPATIENT)
Dept: RADIOLOGY | Facility: HOSPITAL | Age: 76
Discharge: HOME | End: 2021-02-19
Attending: INTERNAL MEDICINE
Payer: MEDICARE

## 2021-02-19 ENCOUNTER — CLINICAL SUPPORT (OUTPATIENT)
Dept: INTERNAL MEDICINE | Facility: CLINIC | Age: 76
End: 2021-02-19
Payer: MEDICARE

## 2021-02-19 ENCOUNTER — INFUSION (OUTPATIENT)
Dept: INFECTIOUS DISEASES | Facility: HOSPITAL | Age: 76
End: 2021-02-19
Attending: INTERNAL MEDICINE
Payer: MEDICARE

## 2021-02-19 VITALS
WEIGHT: 131.38 LBS | DIASTOLIC BLOOD PRESSURE: 69 MMHG | TEMPERATURE: 98 F | HEIGHT: 67 IN | HEART RATE: 69 BPM | SYSTOLIC BLOOD PRESSURE: 128 MMHG | BODY MASS INDEX: 20.62 KG/M2

## 2021-02-19 DIAGNOSIS — Z12.31 SCREENING MAMMOGRAM, ENCOUNTER FOR: ICD-10-CM

## 2021-02-19 DIAGNOSIS — M06.041 RHEUMATOID ARTHRITIS INVOLVING BOTH HANDS WITH NEGATIVE RHEUMATOID FACTOR: Primary | ICD-10-CM

## 2021-02-19 DIAGNOSIS — M06.9 RHEUMATOID ARTHRITIS: ICD-10-CM

## 2021-02-19 DIAGNOSIS — M85.80 OSTEOPENIA, UNSPECIFIED LOCATION: ICD-10-CM

## 2021-02-19 DIAGNOSIS — M06.042 RHEUMATOID ARTHRITIS INVOLVING BOTH HANDS WITH NEGATIVE RHEUMATOID FACTOR: Primary | ICD-10-CM

## 2021-02-19 PROCEDURE — 77063 MAMMO DIGITAL SCREENING BILAT WITH TOMO: ICD-10-PCS | Mod: 26,,, | Performed by: RADIOLOGY

## 2021-02-19 PROCEDURE — 25000003 PHARM REV CODE 250: Performed by: STUDENT IN AN ORGANIZED HEALTH CARE EDUCATION/TRAINING PROGRAM

## 2021-02-19 PROCEDURE — 96372 PR INJECTION,THERAP/PROPH/DIAG2ST, IM OR SUBCUT: ICD-10-PCS | Mod: S$GLB,,, | Performed by: INTERNAL MEDICINE

## 2021-02-19 PROCEDURE — 63600175 PHARM REV CODE 636 W HCPCS: Mod: JG | Performed by: STUDENT IN AN ORGANIZED HEALTH CARE EDUCATION/TRAINING PROGRAM

## 2021-02-19 PROCEDURE — 77067 SCR MAMMO BI INCL CAD: CPT | Mod: 26,,, | Performed by: RADIOLOGY

## 2021-02-19 PROCEDURE — 96367 TX/PROPH/DG ADDL SEQ IV INF: CPT

## 2021-02-19 PROCEDURE — 77067 SCR MAMMO BI INCL CAD: CPT | Mod: TC

## 2021-02-19 PROCEDURE — 77067 MAMMO DIGITAL SCREENING BILAT WITH TOMO: ICD-10-PCS | Mod: 26,,, | Performed by: RADIOLOGY

## 2021-02-19 PROCEDURE — 77063 BREAST TOMOSYNTHESIS BI: CPT | Mod: 26,,, | Performed by: RADIOLOGY

## 2021-02-19 PROCEDURE — 96365 THER/PROPH/DIAG IV INF INIT: CPT

## 2021-02-19 PROCEDURE — 96372 THER/PROPH/DIAG INJ SC/IM: CPT | Mod: S$GLB,,, | Performed by: INTERNAL MEDICINE

## 2021-02-19 RX ORDER — HEPARIN 100 UNIT/ML
500 SYRINGE INTRAVENOUS
Status: CANCELLED | OUTPATIENT
Start: 2021-03-19

## 2021-02-19 RX ORDER — LEFLUNOMIDE 20 MG/1
20 TABLET ORAL DAILY
Qty: 30 TABLET | Refills: 1 | OUTPATIENT
Start: 2021-02-19

## 2021-02-19 RX ORDER — SODIUM CHLORIDE 0.9 % (FLUSH) 0.9 %
10 SYRINGE (ML) INJECTION
Status: CANCELLED | OUTPATIENT
Start: 2021-03-19

## 2021-02-19 RX ORDER — ACETAMINOPHEN 325 MG/1
650 TABLET ORAL
Status: CANCELLED | OUTPATIENT
Start: 2021-03-19

## 2021-02-19 RX ORDER — SODIUM CHLORIDE 0.9 % (FLUSH) 0.9 %
10 SYRINGE (ML) INJECTION
Status: DISCONTINUED | OUTPATIENT
Start: 2021-02-19 | End: 2021-02-19 | Stop reason: HOSPADM

## 2021-02-19 RX ORDER — HEPARIN 100 UNIT/ML
500 SYRINGE INTRAVENOUS
Status: DISCONTINUED | OUTPATIENT
Start: 2021-02-19 | End: 2021-02-19 | Stop reason: HOSPADM

## 2021-02-19 RX ORDER — ACETAMINOPHEN 325 MG/1
650 TABLET ORAL
Status: COMPLETED | OUTPATIENT
Start: 2021-02-19 | End: 2021-02-19

## 2021-02-19 RX ORDER — ZOLEDRONIC ACID 5 MG/100ML
5 INJECTION, SOLUTION INTRAVENOUS
Status: CANCELLED | OUTPATIENT
Start: 2021-03-19

## 2021-02-19 RX ADMIN — ACETAMINOPHEN 650 MG: 325 TABLET ORAL at 02:02

## 2021-02-19 RX ADMIN — SODIUM CHLORIDE 750 MG: 9 INJECTION, SOLUTION INTRAVENOUS at 02:02

## 2021-02-19 RX ADMIN — CYANOCOBALAMIN 1000 MCG: 1000 INJECTION, SOLUTION INTRAMUSCULAR; SUBCUTANEOUS at 01:02

## 2021-02-19 RX ADMIN — DIPHENHYDRAMINE HYDROCHLORIDE 25 MG: 50 INJECTION INTRAMUSCULAR; INTRAVENOUS at 02:02

## 2021-02-22 RX ORDER — FUROSEMIDE 40 MG/1
40 TABLET ORAL DAILY PRN
Qty: 30 TABLET | Refills: 6 | Status: SHIPPED | OUTPATIENT
Start: 2021-02-22 | End: 2022-03-15

## 2021-02-23 ENCOUNTER — PATIENT MESSAGE (OUTPATIENT)
Dept: RHEUMATOLOGY | Facility: CLINIC | Age: 76
End: 2021-02-23

## 2021-03-02 ENCOUNTER — PATIENT MESSAGE (OUTPATIENT)
Dept: INTERNAL MEDICINE | Facility: CLINIC | Age: 76
End: 2021-03-02

## 2021-03-05 ENCOUNTER — PATIENT MESSAGE (OUTPATIENT)
Dept: OPTOMETRY | Facility: CLINIC | Age: 76
End: 2021-03-05

## 2021-03-08 ENCOUNTER — OFFICE VISIT (OUTPATIENT)
Dept: PODIATRY | Facility: CLINIC | Age: 76
End: 2021-03-08
Payer: MEDICARE

## 2021-03-08 VITALS
HEART RATE: 68 BPM | SYSTOLIC BLOOD PRESSURE: 107 MMHG | WEIGHT: 131 LBS | DIASTOLIC BLOOD PRESSURE: 67 MMHG | BODY MASS INDEX: 20.56 KG/M2 | HEIGHT: 67 IN

## 2021-03-08 DIAGNOSIS — L84 HELOMA DURUM: ICD-10-CM

## 2021-03-08 DIAGNOSIS — L60.3 DYSTROPHIC NAIL: Primary | ICD-10-CM

## 2021-03-08 PROCEDURE — 3074F PR MOST RECENT SYSTOLIC BLOOD PRESSURE < 130 MM HG: ICD-10-PCS | Mod: CPTII,S$GLB,, | Performed by: PODIATRIST

## 2021-03-08 PROCEDURE — 99999 PR PBB SHADOW E&M-EST. PATIENT-LVL III: ICD-10-PCS | Mod: PBBFAC,,, | Performed by: PODIATRIST

## 2021-03-08 PROCEDURE — 3078F PR MOST RECENT DIASTOLIC BLOOD PRESSURE < 80 MM HG: ICD-10-PCS | Mod: CPTII,S$GLB,, | Performed by: PODIATRIST

## 2021-03-08 PROCEDURE — 1159F PR MEDICATION LIST DOCUMENTED IN MEDICAL RECORD: ICD-10-PCS | Mod: S$GLB,,, | Performed by: PODIATRIST

## 2021-03-08 PROCEDURE — 3288F PR FALLS RISK ASSESSMENT DOCUMENTED: ICD-10-PCS | Mod: CPTII,S$GLB,, | Performed by: PODIATRIST

## 2021-03-08 PROCEDURE — 99212 PR OFFICE/OUTPT VISIT, EST, LEVL II, 10-19 MIN: ICD-10-PCS | Mod: S$GLB,,, | Performed by: PODIATRIST

## 2021-03-08 PROCEDURE — 1101F PT FALLS ASSESS-DOCD LE1/YR: CPT | Mod: CPTII,S$GLB,, | Performed by: PODIATRIST

## 2021-03-08 PROCEDURE — 3008F BODY MASS INDEX DOCD: CPT | Mod: CPTII,S$GLB,, | Performed by: PODIATRIST

## 2021-03-08 PROCEDURE — 99999 PR PBB SHADOW E&M-EST. PATIENT-LVL III: CPT | Mod: PBBFAC,,, | Performed by: PODIATRIST

## 2021-03-08 PROCEDURE — 1101F PR PT FALLS ASSESS DOC 0-1 FALLS W/OUT INJ PAST YR: ICD-10-PCS | Mod: CPTII,S$GLB,, | Performed by: PODIATRIST

## 2021-03-08 PROCEDURE — 3074F SYST BP LT 130 MM HG: CPT | Mod: CPTII,S$GLB,, | Performed by: PODIATRIST

## 2021-03-08 PROCEDURE — 1126F AMNT PAIN NOTED NONE PRSNT: CPT | Mod: S$GLB,,, | Performed by: PODIATRIST

## 2021-03-08 PROCEDURE — 3008F PR BODY MASS INDEX (BMI) DOCUMENTED: ICD-10-PCS | Mod: CPTII,S$GLB,, | Performed by: PODIATRIST

## 2021-03-08 PROCEDURE — 1159F MED LIST DOCD IN RCRD: CPT | Mod: S$GLB,,, | Performed by: PODIATRIST

## 2021-03-08 PROCEDURE — 99212 OFFICE O/P EST SF 10 MIN: CPT | Mod: S$GLB,,, | Performed by: PODIATRIST

## 2021-03-08 PROCEDURE — 1126F PR PAIN SEVERITY QUANTIFIED, NO PAIN PRESENT: ICD-10-PCS | Mod: S$GLB,,, | Performed by: PODIATRIST

## 2021-03-08 PROCEDURE — 3288F FALL RISK ASSESSMENT DOCD: CPT | Mod: CPTII,S$GLB,, | Performed by: PODIATRIST

## 2021-03-08 PROCEDURE — 3078F DIAST BP <80 MM HG: CPT | Mod: CPTII,S$GLB,, | Performed by: PODIATRIST

## 2021-03-18 ENCOUNTER — PATIENT MESSAGE (OUTPATIENT)
Dept: RESEARCH | Facility: HOSPITAL | Age: 76
End: 2021-03-18

## 2021-03-19 ENCOUNTER — CLINICAL SUPPORT (OUTPATIENT)
Dept: INTERNAL MEDICINE | Facility: CLINIC | Age: 76
End: 2021-03-19
Payer: MEDICARE

## 2021-03-19 ENCOUNTER — INFUSION (OUTPATIENT)
Dept: INFECTIOUS DISEASES | Facility: HOSPITAL | Age: 76
End: 2021-03-19
Attending: INTERNAL MEDICINE
Payer: MEDICARE

## 2021-03-19 VITALS
HEART RATE: 87 BPM | TEMPERATURE: 99 F | BODY MASS INDEX: 20.39 KG/M2 | OXYGEN SATURATION: 98 % | SYSTOLIC BLOOD PRESSURE: 134 MMHG | HEIGHT: 67 IN | RESPIRATION RATE: 17 BRPM | DIASTOLIC BLOOD PRESSURE: 72 MMHG | WEIGHT: 129.94 LBS

## 2021-03-19 DIAGNOSIS — M06.042 RHEUMATOID ARTHRITIS INVOLVING BOTH HANDS WITH NEGATIVE RHEUMATOID FACTOR: Primary | ICD-10-CM

## 2021-03-19 DIAGNOSIS — M06.041 RHEUMATOID ARTHRITIS INVOLVING BOTH HANDS WITH NEGATIVE RHEUMATOID FACTOR: Primary | ICD-10-CM

## 2021-03-19 DIAGNOSIS — M85.80 OSTEOPENIA, UNSPECIFIED LOCATION: ICD-10-CM

## 2021-03-19 PROCEDURE — 96372 THER/PROPH/DIAG INJ SC/IM: CPT | Mod: S$GLB,,, | Performed by: INTERNAL MEDICINE

## 2021-03-19 PROCEDURE — 63600175 PHARM REV CODE 636 W HCPCS: Performed by: STUDENT IN AN ORGANIZED HEALTH CARE EDUCATION/TRAINING PROGRAM

## 2021-03-19 PROCEDURE — 96367 TX/PROPH/DG ADDL SEQ IV INF: CPT

## 2021-03-19 PROCEDURE — 25000003 PHARM REV CODE 250: Performed by: STUDENT IN AN ORGANIZED HEALTH CARE EDUCATION/TRAINING PROGRAM

## 2021-03-19 PROCEDURE — 96365 THER/PROPH/DIAG IV INF INIT: CPT

## 2021-03-19 PROCEDURE — 96372 PR INJECTION,THERAP/PROPH/DIAG2ST, IM OR SUBCUT: ICD-10-PCS | Mod: S$GLB,,, | Performed by: INTERNAL MEDICINE

## 2021-03-19 RX ORDER — SODIUM CHLORIDE 0.9 % (FLUSH) 0.9 %
10 SYRINGE (ML) INJECTION
Status: CANCELLED | OUTPATIENT
Start: 2021-04-16

## 2021-03-19 RX ORDER — SODIUM CHLORIDE 0.9 % (FLUSH) 0.9 %
10 SYRINGE (ML) INJECTION
Status: DISCONTINUED | OUTPATIENT
Start: 2021-03-19 | End: 2021-03-19 | Stop reason: HOSPADM

## 2021-03-19 RX ORDER — ACETAMINOPHEN 325 MG/1
650 TABLET ORAL
Status: CANCELLED | OUTPATIENT
Start: 2021-04-16

## 2021-03-19 RX ORDER — HEPARIN 100 UNIT/ML
500 SYRINGE INTRAVENOUS
Status: CANCELLED | OUTPATIENT
Start: 2021-04-16

## 2021-03-19 RX ORDER — ZOLEDRONIC ACID 5 MG/100ML
5 INJECTION, SOLUTION INTRAVENOUS
Status: CANCELLED | OUTPATIENT
Start: 2021-04-16

## 2021-03-19 RX ORDER — ACETAMINOPHEN 325 MG/1
650 TABLET ORAL
Status: COMPLETED | OUTPATIENT
Start: 2021-03-19 | End: 2021-03-19

## 2021-03-19 RX ORDER — HEPARIN 100 UNIT/ML
500 SYRINGE INTRAVENOUS
Status: DISCONTINUED | OUTPATIENT
Start: 2021-03-19 | End: 2021-03-19 | Stop reason: HOSPADM

## 2021-03-19 RX ADMIN — ACETAMINOPHEN 650 MG: 325 TABLET ORAL at 02:03

## 2021-03-19 RX ADMIN — CYANOCOBALAMIN 1000 MCG: 1000 INJECTION, SOLUTION INTRAMUSCULAR; SUBCUTANEOUS at 01:03

## 2021-03-19 RX ADMIN — SODIUM CHLORIDE 750 MG: 9 INJECTION, SOLUTION INTRAVENOUS at 02:03

## 2021-03-19 RX ADMIN — DIPHENHYDRAMINE HYDROCHLORIDE 25 MG: 50 INJECTION INTRAMUSCULAR; INTRAVENOUS at 02:03

## 2021-03-26 ENCOUNTER — PATIENT MESSAGE (OUTPATIENT)
Dept: RESEARCH | Facility: HOSPITAL | Age: 76
End: 2021-03-26

## 2021-04-09 ENCOUNTER — TELEPHONE (OUTPATIENT)
Dept: RHEUMATOLOGY | Facility: CLINIC | Age: 76
End: 2021-04-09

## 2021-04-09 ENCOUNTER — PATIENT MESSAGE (OUTPATIENT)
Dept: RHEUMATOLOGY | Facility: CLINIC | Age: 76
End: 2021-04-09

## 2021-04-09 DIAGNOSIS — M06.9 RHEUMATOID ARTHRITIS: ICD-10-CM

## 2021-04-09 DIAGNOSIS — I50.22 CHRONIC SYSTOLIC HEART FAILURE: ICD-10-CM

## 2021-04-09 DIAGNOSIS — I42.8 OTHER CARDIOMYOPATHY: ICD-10-CM

## 2021-04-09 RX ORDER — SACUBITRIL AND VALSARTAN 24; 26 MG/1; MG/1
1 TABLET, FILM COATED ORAL 2 TIMES DAILY
Qty: 60 TABLET | Refills: 6 | Status: SHIPPED | OUTPATIENT
Start: 2021-04-09 | End: 2022-01-15 | Stop reason: SDUPTHER

## 2021-04-09 RX ORDER — LEFLUNOMIDE 20 MG/1
20 TABLET ORAL DAILY
Qty: 30 TABLET | Refills: 0 | Status: SHIPPED | OUTPATIENT
Start: 2021-04-09 | End: 2021-05-04 | Stop reason: SDUPTHER

## 2021-04-12 ENCOUNTER — TELEPHONE (OUTPATIENT)
Dept: RHEUMATOLOGY | Facility: HOSPITAL | Age: 76
End: 2021-04-12

## 2021-04-12 ENCOUNTER — PATIENT MESSAGE (OUTPATIENT)
Dept: RHEUMATOLOGY | Facility: CLINIC | Age: 76
End: 2021-04-12

## 2021-04-12 ENCOUNTER — LAB VISIT (OUTPATIENT)
Dept: LAB | Facility: HOSPITAL | Age: 76
End: 2021-04-12
Attending: INTERNAL MEDICINE
Payer: MEDICARE

## 2021-04-12 DIAGNOSIS — E83.51 HYPOCALCEMIA: Primary | ICD-10-CM

## 2021-04-12 DIAGNOSIS — Z79.899 OTHER LONG TERM (CURRENT) DRUG THERAPY: ICD-10-CM

## 2021-04-12 DIAGNOSIS — D64.9 ANEMIA, UNSPECIFIED TYPE: ICD-10-CM

## 2021-04-12 DIAGNOSIS — M06.9 RHEUMATOID ARTHRITIS INVOLVING MULTIPLE SITES: ICD-10-CM

## 2021-04-12 LAB
ALBUMIN SERPL BCP-MCNC: 3.5 G/DL (ref 3.5–5.2)
ALP SERPL-CCNC: 58 U/L (ref 55–135)
ALT SERPL W/O P-5'-P-CCNC: 16 U/L (ref 10–44)
ANION GAP SERPL CALC-SCNC: 9 MMOL/L (ref 8–16)
AST SERPL-CCNC: 24 U/L (ref 10–40)
BASOPHILS # BLD AUTO: 0.04 K/UL (ref 0–0.2)
BASOPHILS NFR BLD: 0.8 % (ref 0–1.9)
BILIRUB SERPL-MCNC: 0.5 MG/DL (ref 0.1–1)
BUN SERPL-MCNC: 18 MG/DL (ref 8–23)
CALCIUM SERPL-MCNC: 8.5 MG/DL (ref 8.7–10.5)
CHLORIDE SERPL-SCNC: 106 MMOL/L (ref 95–110)
CO2 SERPL-SCNC: 26 MMOL/L (ref 23–29)
CREAT SERPL-MCNC: 0.8 MG/DL (ref 0.5–1.4)
CRP SERPL-MCNC: 0.3 MG/L (ref 0–8.2)
DIFFERENTIAL METHOD: ABNORMAL
EOSINOPHIL # BLD AUTO: 0.2 K/UL (ref 0–0.5)
EOSINOPHIL NFR BLD: 2.8 % (ref 0–8)
ERYTHROCYTE [DISTWIDTH] IN BLOOD BY AUTOMATED COUNT: 12.9 % (ref 11.5–14.5)
ERYTHROCYTE [SEDIMENTATION RATE] IN BLOOD BY WESTERGREN METHOD: 4 MM/HR (ref 0–36)
EST. GFR  (AFRICAN AMERICAN): >60 ML/MIN/1.73 M^2
EST. GFR  (NON AFRICAN AMERICAN): >60 ML/MIN/1.73 M^2
GLUCOSE SERPL-MCNC: 86 MG/DL (ref 70–110)
HCT VFR BLD AUTO: 34.2 % (ref 37–48.5)
HGB BLD-MCNC: 10.6 G/DL (ref 12–16)
IMM GRANULOCYTES # BLD AUTO: 0.01 K/UL (ref 0–0.04)
IMM GRANULOCYTES NFR BLD AUTO: 0.2 % (ref 0–0.5)
LYMPHOCYTES # BLD AUTO: 1.7 K/UL (ref 1–4.8)
LYMPHOCYTES NFR BLD: 31.4 % (ref 18–48)
MCH RBC QN AUTO: 31.5 PG (ref 27–31)
MCHC RBC AUTO-ENTMCNC: 31 G/DL (ref 32–36)
MCV RBC AUTO: 102 FL (ref 82–98)
MONOCYTES # BLD AUTO: 0.5 K/UL (ref 0.3–1)
MONOCYTES NFR BLD: 8.5 % (ref 4–15)
NEUTROPHILS # BLD AUTO: 3 K/UL (ref 1.8–7.7)
NEUTROPHILS NFR BLD: 56.3 % (ref 38–73)
NRBC BLD-RTO: 0 /100 WBC
PLATELET # BLD AUTO: 210 K/UL (ref 150–450)
PMV BLD AUTO: 12.5 FL (ref 9.2–12.9)
POTASSIUM SERPL-SCNC: 4.6 MMOL/L (ref 3.5–5.1)
PROT SERPL-MCNC: 6.2 G/DL (ref 6–8.4)
RBC # BLD AUTO: 3.37 M/UL (ref 4–5.4)
SODIUM SERPL-SCNC: 141 MMOL/L (ref 136–145)
WBC # BLD AUTO: 5.28 K/UL (ref 3.9–12.7)

## 2021-04-12 PROCEDURE — 36415 COLL VENOUS BLD VENIPUNCTURE: CPT | Performed by: INTERNAL MEDICINE

## 2021-04-12 PROCEDURE — 85025 COMPLETE CBC W/AUTO DIFF WBC: CPT | Performed by: INTERNAL MEDICINE

## 2021-04-12 PROCEDURE — 85652 RBC SED RATE AUTOMATED: CPT | Performed by: INTERNAL MEDICINE

## 2021-04-12 PROCEDURE — 80053 COMPREHEN METABOLIC PANEL: CPT | Performed by: INTERNAL MEDICINE

## 2021-04-12 PROCEDURE — 86140 C-REACTIVE PROTEIN: CPT | Performed by: INTERNAL MEDICINE

## 2021-04-14 NOTE — PROGRESS NOTES
Anesthesia Pre-Procedure Evaluation    Patient: Phyllis Vela   MRN: [id number] : 1969        Preoperative Diagnosis: Postlaminectomy syndrome of lumbar region [M96.1]   Procedure : Procedure(s):  Replacement of spinal cord stimulator electrode and placement of spinal cord stimulator generator/battery over buttock, removal of old spinal cord stimulator system     Past Medical History:   Diagnosis Date     ADD (attention deficit disorder) 2013     ADHD (attention deficit hyperactivity disorder)      Chronic back pain 2012     DDD (degenerative disc disease), lumbar 2011     Depressive disorder      Diabetes (H)     border line     Generalized anxiety disorder 2011     Head injury     concussion as a child     Hyperglycemia 2010     Hyperglyceridemia 2010     Insomnia 10/31/2013     Lumbar stenosis 2011     Other chronic pain      PONV (postoperative nausea and vomiting)      Postlaminectomy syndrome of lumbar region       Past Surgical History:   Procedure Laterality Date     BACK SURGERY Right 2015    Right L4/5 hemilaminectomy, discectomy     BREAST SURGERY Right 06/10/2004    breast biopsy      SECTION        SECTION        SECTION       CHOLECYSTECTOMY       INJECT SACROILIAC JOINT Right 2017    Procedure: INJECT SACROILIAC JOINT;  Right Sacroiliac Joint Injection;  Surgeon: Felix Aponte MD;  Location: [facility]     INSERT STIMULATOR DORSAL COLUMN Bilateral 2018    Procedure: INSERT STIMULATOR DORSAL COLUMN;  Bilateral Spinal Cord Stimulator Implant;  Surgeon: Felix Aponte MD;  Location: [facility] OR     INSERT STIMULATOR DORSAL COLUMN TRIAL Bilateral 2018    Procedure: INSERT STIMULATOR DORSAL COLUMN TRIAL;  Insertion Bilateral Dorsal Column Stimulator (Trial)    ;  Surgeon: Felix Aponte MD;  Location: [facility] OR      Allergies   Allergen Reactions     Sulfa Drugs  Arrived for Orencia infusion. Pre-meds of Tylenol 650mg and Benadryl 25mg IVPB 30mins prior to infusion. Tolerated infusion without any difficulty, left in NAD.            Hives     Amitriptyline      Other reaction(s): Other, see comments  Hair loss     Lyrica [Pregabalin]      Other reaction(s): Edema,generalized  Other reaction(s): Edema     Nortriptyline      Other reaction(s): Alopecia  Other reaction(s): Other, see comments  Hairloss, increased blood pressure per patient     Amoxicillin Rash     And itching      Clindamycin Rash     Airway impermeant      Gabapentin Rash      Social History     Tobacco Use     Smoking status: Current Every Day Smoker     Packs/day: 0.50     Years: 25.00     Pack years: 12.50     Types: Cigarettes     Start date: 10/30/1990     Smokeless tobacco: Never Used   Substance Use Topics     Alcohol use: Yes     Alcohol/week: 2.0 standard drinks     Types: 2 Cans of beer per week     Comment: WEEKLY      Wt Readings from Last 1 Encounters:   03/04/21 74.8 kg (165 lb)        Anesthesia Evaluation   Pt has had prior anesthetic. Type: General.    History of anesthetic complications  - PONV.  Difficulty breathing after epidural for delivery.    ROS/MED HX  ENT/Pulmonary: Comment: Continues to smoke 1/2 PPD    (+) AVERY risk factors, observed stopped breathing, allergic rhinitis, tobacco use, Current use,     Neurologic: Comment: ADD  Spinal cord stimulator, depleted battery        Cardiovascular:     (+) -----No previous cardiac testing  (-) taking anticoagulants/antiplatelets   METS/Exercise Tolerance: 4 - Raking leaves, gardening Comment: Recently more limited due to knee pain.   Hematologic:  - neg hematologic  ROS     Musculoskeletal: Comment: DDD s/p 2015 spine surgery c/b post-laminectomy syndrome  Recent knee injury s/p injections      GI/Hepatic:  - neg GI/hepatic ROS     Renal/Genitourinary:  - neg Renal ROS     Endo:     (+) type II DM, Last HgA1c: 6.1, date: 1/20/21, Not using insulin, - not using insulin pump.     Psychiatric/Substance Use: Comment: Tramadol    (+) psychiatric history anxiety and depression     Infectious Disease:     (+) MRSA,      Malignancy:  - neg malignancy ROS     Other:      (+) LMP: 3/27/21, , H/O Chronic Pain,        Physical Exam    Airway  airway exam normal           Respiratory Devices and Support         Dental  no notable dental history         Cardiovascular   cardiovascular exam normal          Pulmonary   pulmonary exam normal            Other findings: Virtual visit    OUTSIDE LABS:  CBC:   Lab Results   Component Value Date    WBC 8.2 12/09/2014    WBC 5.1 11/09/2008    HGB 13.4 12/09/2014    HGB 12.7 11/09/2008    HCT 40.0 12/09/2014    HCT 38.0 11/09/2008     12/09/2014     11/09/2008     BMP:   Lab Results   Component Value Date     12/09/2014     11/09/2008    POTASSIUM 4.0 12/09/2014    POTASSIUM 4.4 11/09/2008    CHLORIDE 103 12/09/2014    CHLORIDE 105 11/09/2008    CO2 29 12/09/2014    CO2 25 11/09/2008    BUN 8 12/09/2014    BUN 7 11/09/2008    CR 0.82 12/09/2014    CR 0.70 11/09/2008    GLC 89 12/09/2014    GLC 94 11/09/2008     COAGS: No results found for: PTT, INR, FIBR  POC:   Lab Results   Component Value Date     (H) 07/02/2018    HCG Negative 07/02/2018     HEPATIC: No results found for: ALBUMIN, PROTTOTAL, ALT, AST, GGT, ALKPHOS, BILITOTAL, BILIDIRECT, KORINA  OTHER:   Lab Results   Component Value Date    SUSAN 9.0 12/09/2014       Anesthesia Plan    ASA Status:  2   NPO Status:  NPO Appropriate    Anesthesia Type: MAC.     - Reason for MAC: straight local not clinically adequate   Induction: Intravenous.   Maintenance: TIVA.        Consents    Anesthesia Plan(s) and associated risks, benefits, and realistic alternatives discussed. Questions answered and patient/representative(s) expressed understanding.     - Discussed with:  Patient      - Specific Concerns: PONV, sore throat, airway injury, major complications.        Postoperative Care    Pain management: IV analgesics, Oral pain medications, Multi-modal analgesia.   PONV prophylaxis: Ondansetron (or other 5HT-3),  Background Propofol Infusion     Comments:              PAC Discussion and Assessment    ASA Classification: 3  Case is suitable for: ASC  Anesthetic techniques and relevant risks discussed: MAC with GA as backup  Invasive monitoring and risk discussed: No    Possibility and Risk of blood transfusion discussed: No            PAC Resident/NP Anesthesia Assessment: Phyllis Vela is a 51 year old female scheduled to undergo Replacement of spinal cord stimulator electrode and placement of spinal cord stimulator generator/battery over buttock, removal of old spinal cord stimulator system with Dr. Cam on 4/28/21. She has the following specific operative considerations:   - RCRI : No serious cardiac risks.   - VTE risk: 0.26%  - AVERY # of risks 1-2/8 = Low risk  - Risk of PONV score = 4.  If > 2, anti-emetic intervention recommended. If 3 or > anti emetic intervention recommended with two or more meds    --Post laminectomy syndrome s/p spinal cord stimulator, now with depleted battery. Above procedure planned for replacement with MAC. Patient comfortable with plan. Tramadol and Flexeril at HS.  --PONV. Significant history. Reports no problems with last anesthesia in 2018. Final decisions regarding prophylaxis by Anesthesia on DOS.   --No cardiac history, symptoms or meds. Typically good activity tolerance, but injured her knee in 10/2020, so somewhat limited at this time.   --Current smoker 1/2 PPD X 25 years. Denies pulmonary symptoms. Seasonal allergies. Will take Zyrtec on DOS. Occasional use of albuterol related to allergies, but none recently.   --Pre DIABETES MELLITUS. Last A1c  6.1. Does not take metformin.  --History of anxiety and depression. No meds at this time.   --ADHD. Will hold Adderall on DOS.   --Requested to speak to nurse coordinator with Dr. Cam regarding post op concerns. Message provided.         Reviewed and Signed by PAC Mid-Level Provider/Resident  Mid-Level Provider/Resident: Alia Espinal  AMIRAH, CNS  Date: 4/14/21  Time: 12:00pm                               AMIRAH Sarabia CNS

## 2021-04-15 ENCOUNTER — TELEPHONE (OUTPATIENT)
Dept: RHEUMATOLOGY | Facility: CLINIC | Age: 76
End: 2021-04-15

## 2021-04-16 ENCOUNTER — INFUSION (OUTPATIENT)
Dept: INFECTIOUS DISEASES | Facility: HOSPITAL | Age: 76
End: 2021-04-16
Attending: INTERNAL MEDICINE
Payer: MEDICARE

## 2021-04-16 ENCOUNTER — CLINICAL SUPPORT (OUTPATIENT)
Dept: INTERNAL MEDICINE | Facility: CLINIC | Age: 76
End: 2021-04-16
Payer: MEDICARE

## 2021-04-16 ENCOUNTER — TELEPHONE (OUTPATIENT)
Dept: INTERNAL MEDICINE | Facility: CLINIC | Age: 76
End: 2021-04-16

## 2021-04-16 VITALS
WEIGHT: 132.06 LBS | DIASTOLIC BLOOD PRESSURE: 76 MMHG | HEIGHT: 67 IN | HEART RATE: 66 BPM | BODY MASS INDEX: 20.73 KG/M2 | SYSTOLIC BLOOD PRESSURE: 117 MMHG | TEMPERATURE: 98 F | RESPIRATION RATE: 18 BRPM | OXYGEN SATURATION: 98 %

## 2021-04-16 DIAGNOSIS — M06.041 RHEUMATOID ARTHRITIS INVOLVING BOTH HANDS WITH NEGATIVE RHEUMATOID FACTOR: Primary | ICD-10-CM

## 2021-04-16 DIAGNOSIS — M06.042 RHEUMATOID ARTHRITIS INVOLVING BOTH HANDS WITH NEGATIVE RHEUMATOID FACTOR: Primary | ICD-10-CM

## 2021-04-16 DIAGNOSIS — M85.80 OSTEOPENIA, UNSPECIFIED LOCATION: ICD-10-CM

## 2021-04-16 PROCEDURE — 96372 THER/PROPH/DIAG INJ SC/IM: CPT | Mod: S$GLB,,, | Performed by: INTERNAL MEDICINE

## 2021-04-16 PROCEDURE — 25000003 PHARM REV CODE 250: Performed by: STUDENT IN AN ORGANIZED HEALTH CARE EDUCATION/TRAINING PROGRAM

## 2021-04-16 PROCEDURE — 96372 PR INJECTION,THERAP/PROPH/DIAG2ST, IM OR SUBCUT: ICD-10-PCS | Mod: S$GLB,,, | Performed by: INTERNAL MEDICINE

## 2021-04-16 PROCEDURE — 96365 THER/PROPH/DIAG IV INF INIT: CPT

## 2021-04-16 PROCEDURE — 63600175 PHARM REV CODE 636 W HCPCS: Mod: JG | Performed by: STUDENT IN AN ORGANIZED HEALTH CARE EDUCATION/TRAINING PROGRAM

## 2021-04-16 PROCEDURE — 96367 TX/PROPH/DG ADDL SEQ IV INF: CPT

## 2021-04-16 RX ORDER — HEPARIN 100 UNIT/ML
500 SYRINGE INTRAVENOUS
Status: CANCELLED | OUTPATIENT
Start: 2021-05-14

## 2021-04-16 RX ORDER — SODIUM CHLORIDE 0.9 % (FLUSH) 0.9 %
10 SYRINGE (ML) INJECTION
Status: DISCONTINUED | OUTPATIENT
Start: 2021-04-16 | End: 2021-04-16 | Stop reason: HOSPADM

## 2021-04-16 RX ORDER — SODIUM CHLORIDE 0.9 % (FLUSH) 0.9 %
10 SYRINGE (ML) INJECTION
Status: CANCELLED | OUTPATIENT
Start: 2021-05-14

## 2021-04-16 RX ORDER — ACETAMINOPHEN 325 MG/1
650 TABLET ORAL
Status: CANCELLED | OUTPATIENT
Start: 2021-05-14

## 2021-04-16 RX ORDER — ACETAMINOPHEN 325 MG/1
650 TABLET ORAL
Status: COMPLETED | OUTPATIENT
Start: 2021-04-16 | End: 2021-04-16

## 2021-04-16 RX ORDER — ZOLEDRONIC ACID 5 MG/100ML
5 INJECTION, SOLUTION INTRAVENOUS
Status: DISCONTINUED | OUTPATIENT
Start: 2021-04-16 | End: 2021-04-16 | Stop reason: HOSPADM

## 2021-04-16 RX ORDER — ZOLEDRONIC ACID 5 MG/100ML
5 INJECTION, SOLUTION INTRAVENOUS
Status: CANCELLED | OUTPATIENT
Start: 2021-05-14

## 2021-04-16 RX ADMIN — DIPHENHYDRAMINE HYDROCHLORIDE 25 MG: 50 INJECTION INTRAMUSCULAR; INTRAVENOUS at 02:04

## 2021-04-16 RX ADMIN — SODIUM CHLORIDE 750 MG: 9 INJECTION, SOLUTION INTRAVENOUS at 02:04

## 2021-04-16 RX ADMIN — CYANOCOBALAMIN 1000 MCG: 1000 INJECTION, SOLUTION INTRAMUSCULAR; SUBCUTANEOUS at 02:04

## 2021-04-16 RX ADMIN — ACETAMINOPHEN 650 MG: 325 TABLET ORAL at 02:04

## 2021-04-27 ENCOUNTER — PATIENT MESSAGE (OUTPATIENT)
Dept: INTERNAL MEDICINE | Facility: CLINIC | Age: 76
End: 2021-04-27

## 2021-04-27 ENCOUNTER — LAB VISIT (OUTPATIENT)
Dept: LAB | Facility: HOSPITAL | Age: 76
End: 2021-04-27
Attending: INTERNAL MEDICINE
Payer: MEDICARE

## 2021-04-27 ENCOUNTER — PATIENT MESSAGE (OUTPATIENT)
Dept: RHEUMATOLOGY | Facility: CLINIC | Age: 76
End: 2021-04-27

## 2021-04-27 DIAGNOSIS — E83.51 HYPOCALCEMIA: ICD-10-CM

## 2021-04-27 DIAGNOSIS — D64.9 ANEMIA, UNSPECIFIED TYPE: ICD-10-CM

## 2021-04-27 DIAGNOSIS — Z79.899 OTHER LONG TERM (CURRENT) DRUG THERAPY: ICD-10-CM

## 2021-04-27 DIAGNOSIS — D64.9 ANEMIA, UNSPECIFIED TYPE: Primary | ICD-10-CM

## 2021-04-27 LAB
25(OH)D3+25(OH)D2 SERPL-MCNC: 69 NG/ML (ref 30–96)
CA-I BLDV-SCNC: 1.17 MMOL/L (ref 1.06–1.42)
CALCIUM SERPL-MCNC: 8.7 MG/DL (ref 8.7–10.5)
FERRITIN SERPL-MCNC: 241 NG/ML (ref 20–300)
FOLATE SERPL-MCNC: 14.6 NG/ML (ref 4–24)
IRON SERPL-MCNC: 88 UG/DL (ref 30–160)
MAGNESIUM SERPL-MCNC: 1.9 MG/DL (ref 1.6–2.6)
PHOSPHATE SERPL-MCNC: 3.3 MG/DL (ref 2.7–4.5)
PTH-INTACT SERPL-MCNC: 56 PG/ML (ref 9–77)
SATURATED IRON: 29 % (ref 20–50)
TOTAL IRON BINDING CAPACITY: 299 UG/DL (ref 250–450)
TRANSFERRIN SERPL-MCNC: 202 MG/DL (ref 200–375)
TSH SERPL DL<=0.005 MIU/L-ACNC: 2.29 UIU/ML (ref 0.4–4)
VIT B12 SERPL-MCNC: 617 PG/ML (ref 210–950)

## 2021-04-27 PROCEDURE — 36415 COLL VENOUS BLD VENIPUNCTURE: CPT | Performed by: INTERNAL MEDICINE

## 2021-04-27 PROCEDURE — 83970 ASSAY OF PARATHORMONE: CPT | Performed by: INTERNAL MEDICINE

## 2021-04-27 PROCEDURE — 82306 VITAMIN D 25 HYDROXY: CPT | Performed by: INTERNAL MEDICINE

## 2021-04-27 PROCEDURE — 83735 ASSAY OF MAGNESIUM: CPT | Performed by: INTERNAL MEDICINE

## 2021-04-27 PROCEDURE — 82607 VITAMIN B-12: CPT | Performed by: INTERNAL MEDICINE

## 2021-04-27 PROCEDURE — 84238 ASSAY NONENDOCRINE RECEPTOR: CPT | Performed by: INTERNAL MEDICINE

## 2021-04-27 PROCEDURE — 84100 ASSAY OF PHOSPHORUS: CPT | Performed by: INTERNAL MEDICINE

## 2021-04-27 PROCEDURE — 82310 ASSAY OF CALCIUM: CPT | Performed by: INTERNAL MEDICINE

## 2021-04-27 PROCEDURE — 82746 ASSAY OF FOLIC ACID SERUM: CPT | Performed by: INTERNAL MEDICINE

## 2021-04-27 PROCEDURE — 83540 ASSAY OF IRON: CPT | Performed by: INTERNAL MEDICINE

## 2021-04-27 PROCEDURE — 82728 ASSAY OF FERRITIN: CPT | Performed by: INTERNAL MEDICINE

## 2021-04-27 PROCEDURE — 84443 ASSAY THYROID STIM HORMONE: CPT | Performed by: INTERNAL MEDICINE

## 2021-04-27 PROCEDURE — 82330 ASSAY OF CALCIUM: CPT | Performed by: INTERNAL MEDICINE

## 2021-04-28 ENCOUNTER — PATIENT MESSAGE (OUTPATIENT)
Dept: RHEUMATOLOGY | Facility: CLINIC | Age: 76
End: 2021-04-28

## 2021-04-28 LAB — STFR SERPL-MCNC: 3.6 MG/L (ref 1.8–4.6)

## 2021-04-29 ENCOUNTER — TELEPHONE (OUTPATIENT)
Dept: RHEUMATOLOGY | Facility: CLINIC | Age: 76
End: 2021-04-29

## 2021-05-03 ENCOUNTER — OFFICE VISIT (OUTPATIENT)
Dept: PODIATRY | Facility: CLINIC | Age: 76
End: 2021-05-03
Payer: MEDICARE

## 2021-05-03 ENCOUNTER — PATIENT MESSAGE (OUTPATIENT)
Dept: RHEUMATOLOGY | Facility: CLINIC | Age: 76
End: 2021-05-03

## 2021-05-03 ENCOUNTER — PES CALL (OUTPATIENT)
Dept: ADMINISTRATIVE | Facility: CLINIC | Age: 76
End: 2021-05-03

## 2021-05-03 VITALS
DIASTOLIC BLOOD PRESSURE: 74 MMHG | SYSTOLIC BLOOD PRESSURE: 158 MMHG | WEIGHT: 132 LBS | HEIGHT: 67 IN | BODY MASS INDEX: 20.72 KG/M2 | HEART RATE: 65 BPM

## 2021-05-03 DIAGNOSIS — L60.2 LONG TOENAIL: ICD-10-CM

## 2021-05-03 DIAGNOSIS — L60.3 DYSTROPHIC NAIL: Primary | ICD-10-CM

## 2021-05-03 PROCEDURE — 17999 PR NON-COVERED FOOT CARE: ICD-10-PCS | Mod: CSM,S$GLB,, | Performed by: PODIATRIST

## 2021-05-03 PROCEDURE — 3288F FALL RISK ASSESSMENT DOCD: CPT | Mod: CPTII,,, | Performed by: PODIATRIST

## 2021-05-03 PROCEDURE — 17999 UNLISTD PX SKN MUC MEMB SUBQ: CPT | Mod: CSM,S$GLB,, | Performed by: PODIATRIST

## 2021-05-03 PROCEDURE — 99499 NO LOS: ICD-10-PCS | Mod: ,,, | Performed by: PODIATRIST

## 2021-05-03 PROCEDURE — 99999 PR PBB SHADOW E&M-EST. PATIENT-LVL III: ICD-10-PCS | Mod: PBBFAC,,, | Performed by: PODIATRIST

## 2021-05-03 PROCEDURE — 99999 PR PBB SHADOW E&M-EST. PATIENT-LVL III: CPT | Mod: PBBFAC,,, | Performed by: PODIATRIST

## 2021-05-03 PROCEDURE — 99499 UNLISTED E&M SERVICE: CPT | Mod: ,,, | Performed by: PODIATRIST

## 2021-05-03 PROCEDURE — 1126F AMNT PAIN NOTED NONE PRSNT: CPT | Mod: ,,, | Performed by: PODIATRIST

## 2021-05-03 PROCEDURE — 1101F PT FALLS ASSESS-DOCD LE1/YR: CPT | Mod: CPTII,,, | Performed by: PODIATRIST

## 2021-05-03 PROCEDURE — 3008F PR BODY MASS INDEX (BMI) DOCUMENTED: ICD-10-PCS | Mod: CPTII,,, | Performed by: PODIATRIST

## 2021-05-03 PROCEDURE — 3008F BODY MASS INDEX DOCD: CPT | Mod: CPTII,,, | Performed by: PODIATRIST

## 2021-05-03 PROCEDURE — 1101F PR PT FALLS ASSESS DOC 0-1 FALLS W/OUT INJ PAST YR: ICD-10-PCS | Mod: CPTII,,, | Performed by: PODIATRIST

## 2021-05-03 PROCEDURE — 3288F PR FALLS RISK ASSESSMENT DOCUMENTED: ICD-10-PCS | Mod: CPTII,,, | Performed by: PODIATRIST

## 2021-05-03 PROCEDURE — 1126F PR PAIN SEVERITY QUANTIFIED, NO PAIN PRESENT: ICD-10-PCS | Mod: ,,, | Performed by: PODIATRIST

## 2021-05-04 ENCOUNTER — LAB VISIT (OUTPATIENT)
Dept: LAB | Facility: HOSPITAL | Age: 76
End: 2021-05-04
Attending: INTERNAL MEDICINE
Payer: MEDICARE

## 2021-05-04 ENCOUNTER — OFFICE VISIT (OUTPATIENT)
Dept: RHEUMATOLOGY | Facility: CLINIC | Age: 76
End: 2021-05-04
Payer: MEDICARE

## 2021-05-04 VITALS
DIASTOLIC BLOOD PRESSURE: 69 MMHG | WEIGHT: 132 LBS | SYSTOLIC BLOOD PRESSURE: 98 MMHG | HEART RATE: 72 BPM | BODY MASS INDEX: 20 KG/M2 | HEIGHT: 68 IN

## 2021-05-04 DIAGNOSIS — M06.042 RHEUMATOID ARTHRITIS INVOLVING BOTH HANDS WITH NEGATIVE RHEUMATOID FACTOR: ICD-10-CM

## 2021-05-04 DIAGNOSIS — M06.041 RHEUMATOID ARTHRITIS INVOLVING BOTH HANDS WITH NEGATIVE RHEUMATOID FACTOR: ICD-10-CM

## 2021-05-04 DIAGNOSIS — M06.9 RHEUMATOID ARTHRITIS: ICD-10-CM

## 2021-05-04 DIAGNOSIS — I73.00 RAYNAUD'S PHENOMENON WITHOUT GANGRENE: ICD-10-CM

## 2021-05-04 DIAGNOSIS — I73.00 RAYNAUD'S PHENOMENON WITHOUT GANGRENE: Primary | ICD-10-CM

## 2021-05-04 DIAGNOSIS — D64.9 ANEMIA, UNSPECIFIED TYPE: ICD-10-CM

## 2021-05-04 PROCEDURE — 86038 ANTINUCLEAR ANTIBODIES: CPT | Performed by: INTERNAL MEDICINE

## 2021-05-04 PROCEDURE — 1101F PR PT FALLS ASSESS DOC 0-1 FALLS W/OUT INJ PAST YR: ICD-10-PCS | Mod: CPTII,S$GLB,, | Performed by: INTERNAL MEDICINE

## 2021-05-04 PROCEDURE — 86235 NUCLEAR ANTIGEN ANTIBODY: CPT | Performed by: INTERNAL MEDICINE

## 2021-05-04 PROCEDURE — 83520 IMMUNOASSAY QUANT NOS NONAB: CPT | Mod: 59 | Performed by: INTERNAL MEDICINE

## 2021-05-04 PROCEDURE — 99999 PR PBB SHADOW E&M-EST. PATIENT-LVL III: CPT | Mod: PBBFAC,,, | Performed by: INTERNAL MEDICINE

## 2021-05-04 PROCEDURE — 99213 OFFICE O/P EST LOW 20 MIN: CPT | Mod: S$GLB,,, | Performed by: INTERNAL MEDICINE

## 2021-05-04 PROCEDURE — 83516 IMMUNOASSAY NONANTIBODY: CPT | Mod: 59 | Performed by: INTERNAL MEDICINE

## 2021-05-04 PROCEDURE — 99999 PR PBB SHADOW E&M-EST. PATIENT-LVL III: ICD-10-PCS | Mod: PBBFAC,,, | Performed by: INTERNAL MEDICINE

## 2021-05-04 PROCEDURE — 3008F PR BODY MASS INDEX (BMI) DOCUMENTED: ICD-10-PCS | Mod: CPTII,S$GLB,, | Performed by: INTERNAL MEDICINE

## 2021-05-04 PROCEDURE — 3288F PR FALLS RISK ASSESSMENT DOCUMENTED: ICD-10-PCS | Mod: CPTII,S$GLB,, | Performed by: INTERNAL MEDICINE

## 2021-05-04 PROCEDURE — 83516 IMMUNOASSAY NONANTIBODY: CPT | Performed by: INTERNAL MEDICINE

## 2021-05-04 PROCEDURE — 3288F FALL RISK ASSESSMENT DOCD: CPT | Mod: CPTII,S$GLB,, | Performed by: INTERNAL MEDICINE

## 2021-05-04 PROCEDURE — 86235 NUCLEAR ANTIGEN ANTIBODY: CPT | Mod: 59 | Performed by: INTERNAL MEDICINE

## 2021-05-04 PROCEDURE — 1159F MED LIST DOCD IN RCRD: CPT | Mod: S$GLB,,, | Performed by: INTERNAL MEDICINE

## 2021-05-04 PROCEDURE — 1126F AMNT PAIN NOTED NONE PRSNT: CPT | Mod: S$GLB,,, | Performed by: INTERNAL MEDICINE

## 2021-05-04 PROCEDURE — 36415 COLL VENOUS BLD VENIPUNCTURE: CPT | Performed by: INTERNAL MEDICINE

## 2021-05-04 PROCEDURE — 3008F BODY MASS INDEX DOCD: CPT | Mod: CPTII,S$GLB,, | Performed by: INTERNAL MEDICINE

## 2021-05-04 PROCEDURE — 1101F PT FALLS ASSESS-DOCD LE1/YR: CPT | Mod: CPTII,S$GLB,, | Performed by: INTERNAL MEDICINE

## 2021-05-04 PROCEDURE — 1126F PR PAIN SEVERITY QUANTIFIED, NO PAIN PRESENT: ICD-10-PCS | Mod: S$GLB,,, | Performed by: INTERNAL MEDICINE

## 2021-05-04 PROCEDURE — 99213 PR OFFICE/OUTPT VISIT, EST, LEVL III, 20-29 MIN: ICD-10-PCS | Mod: S$GLB,,, | Performed by: INTERNAL MEDICINE

## 2021-05-04 PROCEDURE — 1159F PR MEDICATION LIST DOCUMENTED IN MEDICAL RECORD: ICD-10-PCS | Mod: S$GLB,,, | Performed by: INTERNAL MEDICINE

## 2021-05-04 RX ORDER — LEFLUNOMIDE 20 MG/1
20 TABLET ORAL DAILY
Qty: 90 TABLET | Refills: 0 | Status: SHIPPED | OUTPATIENT
Start: 2021-05-04 | End: 2021-08-05 | Stop reason: SDUPTHER

## 2021-05-04 ASSESSMENT — ROUTINE ASSESSMENT OF PATIENT INDEX DATA (RAPID3)
AM STIFFNESS SCORE: 0, NO
PAIN SCORE: 0.5
PSYCHOLOGICAL DISTRESS SCORE: 0
PATIENT GLOBAL ASSESSMENT SCORE: 0
TOTAL RAPID3 SCORE: 0.17
FATIGUE SCORE: 1
MDHAQ FUNCTION SCORE: 0

## 2021-05-05 ENCOUNTER — PATIENT MESSAGE (OUTPATIENT)
Dept: RHEUMATOLOGY | Facility: CLINIC | Age: 76
End: 2021-05-05

## 2021-05-05 LAB
ANA SER QL IF: NORMAL
ANTI SM/RNP ANTIBODY: 0.07 RATIO (ref 0–0.99)
ANTI-SM/RNP INTERPRETATION: NEGATIVE
ENA SCL70 IGG SER IA-ACNC: <0.2 U

## 2021-05-07 ENCOUNTER — PATIENT MESSAGE (OUTPATIENT)
Dept: RHEUMATOLOGY | Facility: CLINIC | Age: 76
End: 2021-05-07

## 2021-05-07 LAB — ENA SCL70 AB SER-ACNC: 1 UNITS

## 2021-05-08 ENCOUNTER — PATIENT MESSAGE (OUTPATIENT)
Dept: INTERNAL MEDICINE | Facility: CLINIC | Age: 76
End: 2021-05-08

## 2021-05-08 DIAGNOSIS — F43.20 ADJUSTMENT DISORDER, UNSPECIFIED TYPE: Primary | ICD-10-CM

## 2021-05-14 ENCOUNTER — PATIENT MESSAGE (OUTPATIENT)
Dept: RHEUMATOLOGY | Facility: CLINIC | Age: 76
End: 2021-05-14

## 2021-05-14 ENCOUNTER — INFUSION (OUTPATIENT)
Dept: INFECTIOUS DISEASES | Facility: HOSPITAL | Age: 76
End: 2021-05-14
Attending: INTERNAL MEDICINE
Payer: MEDICARE

## 2021-05-14 ENCOUNTER — CLINICAL SUPPORT (OUTPATIENT)
Dept: INTERNAL MEDICINE | Facility: CLINIC | Age: 76
End: 2021-05-14
Payer: MEDICARE

## 2021-05-14 ENCOUNTER — PATIENT MESSAGE (OUTPATIENT)
Dept: INTERNAL MEDICINE | Facility: CLINIC | Age: 76
End: 2021-05-14

## 2021-05-14 VITALS
RESPIRATION RATE: 18 BRPM | BODY MASS INDEX: 20.61 KG/M2 | HEIGHT: 67 IN | TEMPERATURE: 98 F | DIASTOLIC BLOOD PRESSURE: 68 MMHG | HEART RATE: 70 BPM | WEIGHT: 131.31 LBS | OXYGEN SATURATION: 100 % | SYSTOLIC BLOOD PRESSURE: 132 MMHG

## 2021-05-14 DIAGNOSIS — M06.042 RHEUMATOID ARTHRITIS INVOLVING BOTH HANDS WITH NEGATIVE RHEUMATOID FACTOR: Primary | ICD-10-CM

## 2021-05-14 DIAGNOSIS — M06.041 RHEUMATOID ARTHRITIS INVOLVING BOTH HANDS WITH NEGATIVE RHEUMATOID FACTOR: Primary | ICD-10-CM

## 2021-05-14 DIAGNOSIS — M85.80 OSTEOPENIA, UNSPECIFIED LOCATION: ICD-10-CM

## 2021-05-14 LAB — RNAP III AB SER-ACNC: <20 UNITS

## 2021-05-14 PROCEDURE — 63600175 PHARM REV CODE 636 W HCPCS: Mod: JG | Performed by: INTERNAL MEDICINE

## 2021-05-14 PROCEDURE — 25000003 PHARM REV CODE 250: Performed by: INTERNAL MEDICINE

## 2021-05-14 PROCEDURE — 96372 PR INJECTION,THERAP/PROPH/DIAG2ST, IM OR SUBCUT: ICD-10-PCS | Mod: S$GLB,,, | Performed by: INTERNAL MEDICINE

## 2021-05-14 PROCEDURE — 96367 TX/PROPH/DG ADDL SEQ IV INF: CPT

## 2021-05-14 PROCEDURE — 96372 THER/PROPH/DIAG INJ SC/IM: CPT | Mod: S$GLB,,, | Performed by: INTERNAL MEDICINE

## 2021-05-14 PROCEDURE — 96365 THER/PROPH/DIAG IV INF INIT: CPT

## 2021-05-14 RX ORDER — ACETAMINOPHEN 325 MG/1
650 TABLET ORAL
Status: CANCELLED | OUTPATIENT
Start: 2021-06-11

## 2021-05-14 RX ORDER — SODIUM CHLORIDE 0.9 % (FLUSH) 0.9 %
10 SYRINGE (ML) INJECTION
Status: CANCELLED | OUTPATIENT
Start: 2021-06-11

## 2021-05-14 RX ORDER — SODIUM CHLORIDE 0.9 % (FLUSH) 0.9 %
10 SYRINGE (ML) INJECTION
Status: DISCONTINUED | OUTPATIENT
Start: 2021-05-14 | End: 2021-05-14 | Stop reason: HOSPADM

## 2021-05-14 RX ORDER — HEPARIN 100 UNIT/ML
500 SYRINGE INTRAVENOUS
Status: CANCELLED | OUTPATIENT
Start: 2021-06-11

## 2021-05-14 RX ORDER — ACETAMINOPHEN 325 MG/1
650 TABLET ORAL
Status: COMPLETED | OUTPATIENT
Start: 2021-05-14 | End: 2021-05-14

## 2021-05-14 RX ORDER — HEPARIN 100 UNIT/ML
500 SYRINGE INTRAVENOUS
Status: DISCONTINUED | OUTPATIENT
Start: 2021-05-14 | End: 2021-05-14 | Stop reason: HOSPADM

## 2021-05-14 RX ADMIN — CYANOCOBALAMIN 1000 MCG: 1000 INJECTION, SOLUTION INTRAMUSCULAR; SUBCUTANEOUS at 01:05

## 2021-05-14 RX ADMIN — SODIUM CHLORIDE 750 MG: 9 INJECTION, SOLUTION INTRAVENOUS at 03:05

## 2021-05-14 RX ADMIN — ACETAMINOPHEN 650 MG: 325 TABLET ORAL at 02:05

## 2021-05-14 RX ADMIN — DIPHENHYDRAMINE HYDROCHLORIDE 25 MG: 50 INJECTION INTRAMUSCULAR; INTRAVENOUS at 02:05

## 2021-05-17 LAB

## 2021-05-26 RX ORDER — ATORVASTATIN CALCIUM 10 MG/1
TABLET, FILM COATED ORAL
Qty: 30 TABLET | Refills: 6 | Status: SHIPPED | OUTPATIENT
Start: 2021-05-26 | End: 2022-01-14

## 2021-05-31 ENCOUNTER — OFFICE VISIT (OUTPATIENT)
Dept: PODIATRY | Facility: CLINIC | Age: 76
End: 2021-05-31
Payer: MEDICARE

## 2021-05-31 DIAGNOSIS — L84 HELOMA MOLLE: ICD-10-CM

## 2021-05-31 DIAGNOSIS — L60.2 LONG TOENAIL: ICD-10-CM

## 2021-05-31 DIAGNOSIS — L60.3 DYSTROPHIC NAIL: Primary | ICD-10-CM

## 2021-05-31 PROCEDURE — 99499 NO LOS: ICD-10-PCS | Mod: ,,, | Performed by: PODIATRIST

## 2021-05-31 PROCEDURE — 99999 PR PBB SHADOW E&M-EST. PATIENT-LVL I: ICD-10-PCS | Mod: PBBFAC,,, | Performed by: PODIATRIST

## 2021-05-31 PROCEDURE — 99499 UNLISTED E&M SERVICE: CPT | Mod: ,,, | Performed by: PODIATRIST

## 2021-05-31 PROCEDURE — 99999 PR PBB SHADOW E&M-EST. PATIENT-LVL I: CPT | Mod: PBBFAC,,, | Performed by: PODIATRIST

## 2021-05-31 PROCEDURE — 17999 PR NON-COVERED FOOT CARE: ICD-10-PCS | Mod: CSM,S$GLB,, | Performed by: PODIATRIST

## 2021-05-31 PROCEDURE — 17999 UNLISTD PX SKN MUC MEMB SUBQ: CPT | Mod: CSM,S$GLB,, | Performed by: PODIATRIST

## 2021-06-11 ENCOUNTER — INFUSION (OUTPATIENT)
Dept: INFECTIOUS DISEASES | Facility: HOSPITAL | Age: 76
End: 2021-06-11
Attending: INTERNAL MEDICINE
Payer: MEDICARE

## 2021-06-11 ENCOUNTER — TELEPHONE (OUTPATIENT)
Dept: HEMATOLOGY/ONCOLOGY | Facility: CLINIC | Age: 76
End: 2021-06-11

## 2021-06-11 ENCOUNTER — CLINICAL SUPPORT (OUTPATIENT)
Dept: INTERNAL MEDICINE | Facility: CLINIC | Age: 76
End: 2021-06-11
Payer: MEDICARE

## 2021-06-11 VITALS
WEIGHT: 129.63 LBS | DIASTOLIC BLOOD PRESSURE: 63 MMHG | OXYGEN SATURATION: 96 % | HEIGHT: 67 IN | HEART RATE: 55 BPM | BODY MASS INDEX: 20.35 KG/M2 | SYSTOLIC BLOOD PRESSURE: 114 MMHG | RESPIRATION RATE: 18 BRPM | TEMPERATURE: 98 F

## 2021-06-11 DIAGNOSIS — M06.041 RHEUMATOID ARTHRITIS INVOLVING BOTH HANDS WITH NEGATIVE RHEUMATOID FACTOR: Primary | ICD-10-CM

## 2021-06-11 DIAGNOSIS — M85.80 OSTEOPENIA, UNSPECIFIED LOCATION: ICD-10-CM

## 2021-06-11 DIAGNOSIS — M06.042 RHEUMATOID ARTHRITIS INVOLVING BOTH HANDS WITH NEGATIVE RHEUMATOID FACTOR: Primary | ICD-10-CM

## 2021-06-11 PROCEDURE — 96375 TX/PRO/DX INJ NEW DRUG ADDON: CPT

## 2021-06-11 PROCEDURE — 63600175 PHARM REV CODE 636 W HCPCS: Performed by: INTERNAL MEDICINE

## 2021-06-11 PROCEDURE — 96372 THER/PROPH/DIAG INJ SC/IM: CPT | Mod: S$GLB,,, | Performed by: INTERNAL MEDICINE

## 2021-06-11 PROCEDURE — 25000003 PHARM REV CODE 250: Performed by: INTERNAL MEDICINE

## 2021-06-11 PROCEDURE — 96372 PR INJECTION,THERAP/PROPH/DIAG2ST, IM OR SUBCUT: ICD-10-PCS | Mod: S$GLB,,, | Performed by: INTERNAL MEDICINE

## 2021-06-11 PROCEDURE — 96365 THER/PROPH/DIAG IV INF INIT: CPT

## 2021-06-11 RX ORDER — ACETAMINOPHEN 325 MG/1
650 TABLET ORAL
Status: CANCELLED | OUTPATIENT
Start: 2021-07-09

## 2021-06-11 RX ORDER — ACETAMINOPHEN 325 MG/1
650 TABLET ORAL
Status: COMPLETED | OUTPATIENT
Start: 2021-06-11 | End: 2021-06-11

## 2021-06-11 RX ORDER — SODIUM CHLORIDE 0.9 % (FLUSH) 0.9 %
10 SYRINGE (ML) INJECTION
Status: DISCONTINUED | OUTPATIENT
Start: 2021-06-11 | End: 2021-06-11 | Stop reason: HOSPADM

## 2021-06-11 RX ORDER — HEPARIN 100 UNIT/ML
500 SYRINGE INTRAVENOUS
Status: CANCELLED | OUTPATIENT
Start: 2021-07-09

## 2021-06-11 RX ORDER — HEPARIN 100 UNIT/ML
500 SYRINGE INTRAVENOUS
Status: DISCONTINUED | OUTPATIENT
Start: 2021-06-11 | End: 2021-06-11 | Stop reason: HOSPADM

## 2021-06-11 RX ORDER — SODIUM CHLORIDE 0.9 % (FLUSH) 0.9 %
10 SYRINGE (ML) INJECTION
Status: CANCELLED | OUTPATIENT
Start: 2021-07-09

## 2021-06-11 RX ADMIN — SODIUM CHLORIDE 750 MG: 9 INJECTION, SOLUTION INTRAVENOUS at 02:06

## 2021-06-11 RX ADMIN — ACETAMINOPHEN 650 MG: 325 TABLET ORAL at 02:06

## 2021-06-11 RX ADMIN — CYANOCOBALAMIN 1000 MCG: 1000 INJECTION, SOLUTION INTRAMUSCULAR; SUBCUTANEOUS at 01:06

## 2021-06-11 RX ADMIN — DIPHENHYDRAMINE HYDROCHLORIDE 25 MG: 50 INJECTION INTRAMUSCULAR; INTRAVENOUS at 02:06

## 2021-06-28 ENCOUNTER — OFFICE VISIT (OUTPATIENT)
Dept: PODIATRY | Facility: CLINIC | Age: 76
End: 2021-06-28
Payer: MEDICARE

## 2021-06-28 VITALS
SYSTOLIC BLOOD PRESSURE: 101 MMHG | HEIGHT: 67 IN | DIASTOLIC BLOOD PRESSURE: 59 MMHG | BODY MASS INDEX: 20.25 KG/M2 | HEART RATE: 68 BPM | WEIGHT: 129 LBS

## 2021-06-28 DIAGNOSIS — L60.3 DYSTROPHIC NAIL: Primary | ICD-10-CM

## 2021-06-28 PROCEDURE — 1126F AMNT PAIN NOTED NONE PRSNT: CPT | Mod: ,,, | Performed by: PODIATRIST

## 2021-06-28 PROCEDURE — 1101F PR PT FALLS ASSESS DOC 0-1 FALLS W/OUT INJ PAST YR: ICD-10-PCS | Mod: CPTII,,, | Performed by: PODIATRIST

## 2021-06-28 PROCEDURE — 1101F PT FALLS ASSESS-DOCD LE1/YR: CPT | Mod: CPTII,,, | Performed by: PODIATRIST

## 2021-06-28 PROCEDURE — 99999 PR PBB SHADOW E&M-EST. PATIENT-LVL IV: CPT | Mod: PBBFAC,,, | Performed by: PODIATRIST

## 2021-06-28 PROCEDURE — 99499 NO LOS: ICD-10-PCS | Mod: ,,, | Performed by: PODIATRIST

## 2021-06-28 PROCEDURE — 1126F PR PAIN SEVERITY QUANTIFIED, NO PAIN PRESENT: ICD-10-PCS | Mod: ,,, | Performed by: PODIATRIST

## 2021-06-28 PROCEDURE — 99499 UNLISTED E&M SERVICE: CPT | Mod: ,,, | Performed by: PODIATRIST

## 2021-06-28 PROCEDURE — 17999 PR NON-COVERED FOOT CARE: ICD-10-PCS | Mod: CSM,S$GLB,, | Performed by: PODIATRIST

## 2021-06-28 PROCEDURE — 99999 PR PBB SHADOW E&M-EST. PATIENT-LVL IV: ICD-10-PCS | Mod: PBBFAC,,, | Performed by: PODIATRIST

## 2021-06-28 PROCEDURE — 3288F PR FALLS RISK ASSESSMENT DOCUMENTED: ICD-10-PCS | Mod: CPTII,,, | Performed by: PODIATRIST

## 2021-06-28 PROCEDURE — 3288F FALL RISK ASSESSMENT DOCD: CPT | Mod: CPTII,,, | Performed by: PODIATRIST

## 2021-06-28 PROCEDURE — 17999 UNLISTD PX SKN MUC MEMB SUBQ: CPT | Mod: CSM,S$GLB,, | Performed by: PODIATRIST

## 2021-06-28 RX ORDER — DIPHENHYDRAMINE HYDROCHLORIDE 50 MG/ML
INJECTION INTRAMUSCULAR; INTRAVENOUS
COMMUNITY
Start: 2021-03-19 | End: 2022-02-04

## 2021-07-08 ENCOUNTER — TELEPHONE (OUTPATIENT)
Dept: INTERNAL MEDICINE | Facility: CLINIC | Age: 76
End: 2021-07-08

## 2021-07-08 ENCOUNTER — INFUSION (OUTPATIENT)
Dept: INFECTIOUS DISEASES | Facility: HOSPITAL | Age: 76
End: 2021-07-08
Attending: INTERNAL MEDICINE
Payer: MEDICARE

## 2021-07-08 VITALS
HEIGHT: 67 IN | SYSTOLIC BLOOD PRESSURE: 111 MMHG | WEIGHT: 132.5 LBS | RESPIRATION RATE: 18 BRPM | DIASTOLIC BLOOD PRESSURE: 65 MMHG | BODY MASS INDEX: 20.8 KG/M2 | HEART RATE: 54 BPM | TEMPERATURE: 98 F | OXYGEN SATURATION: 97 %

## 2021-07-08 DIAGNOSIS — M06.041 RHEUMATOID ARTHRITIS INVOLVING BOTH HANDS WITH NEGATIVE RHEUMATOID FACTOR: Primary | ICD-10-CM

## 2021-07-08 DIAGNOSIS — M85.80 OSTEOPENIA, UNSPECIFIED LOCATION: ICD-10-CM

## 2021-07-08 DIAGNOSIS — M06.042 RHEUMATOID ARTHRITIS INVOLVING BOTH HANDS WITH NEGATIVE RHEUMATOID FACTOR: Primary | ICD-10-CM

## 2021-07-08 PROCEDURE — 63600175 PHARM REV CODE 636 W HCPCS: Performed by: INTERNAL MEDICINE

## 2021-07-08 PROCEDURE — 25000003 PHARM REV CODE 250: Performed by: INTERNAL MEDICINE

## 2021-07-08 PROCEDURE — 96367 TX/PROPH/DG ADDL SEQ IV INF: CPT

## 2021-07-08 PROCEDURE — 96365 THER/PROPH/DIAG IV INF INIT: CPT

## 2021-07-08 RX ORDER — HEPARIN 100 UNIT/ML
500 SYRINGE INTRAVENOUS
Status: CANCELLED | OUTPATIENT
Start: 2021-08-05

## 2021-07-08 RX ORDER — ACETAMINOPHEN 325 MG/1
650 TABLET ORAL
Status: COMPLETED | OUTPATIENT
Start: 2021-07-08 | End: 2021-07-08

## 2021-07-08 RX ORDER — HEPARIN 100 UNIT/ML
500 SYRINGE INTRAVENOUS
Status: DISCONTINUED | OUTPATIENT
Start: 2021-07-08 | End: 2021-07-08 | Stop reason: HOSPADM

## 2021-07-08 RX ORDER — SODIUM CHLORIDE 0.9 % (FLUSH) 0.9 %
10 SYRINGE (ML) INJECTION
Status: DISCONTINUED | OUTPATIENT
Start: 2021-07-08 | End: 2021-07-08 | Stop reason: HOSPADM

## 2021-07-08 RX ORDER — SODIUM CHLORIDE 0.9 % (FLUSH) 0.9 %
10 SYRINGE (ML) INJECTION
Status: CANCELLED | OUTPATIENT
Start: 2021-08-05

## 2021-07-08 RX ORDER — ACETAMINOPHEN 325 MG/1
650 TABLET ORAL
Status: CANCELLED | OUTPATIENT
Start: 2021-08-05

## 2021-07-08 RX ADMIN — ACETAMINOPHEN 650 MG: 325 TABLET ORAL at 02:07

## 2021-07-08 RX ADMIN — DIPHENHYDRAMINE HYDROCHLORIDE 25 MG: 50 INJECTION INTRAMUSCULAR; INTRAVENOUS at 02:07

## 2021-07-08 RX ADMIN — SODIUM CHLORIDE 750 MG: 9 INJECTION, SOLUTION INTRAVENOUS at 03:07

## 2021-07-16 ENCOUNTER — OFFICE VISIT (OUTPATIENT)
Dept: HEMATOLOGY/ONCOLOGY | Facility: CLINIC | Age: 76
End: 2021-07-16
Payer: MEDICARE

## 2021-07-16 ENCOUNTER — CLINICAL SUPPORT (OUTPATIENT)
Dept: INTERNAL MEDICINE | Facility: CLINIC | Age: 76
End: 2021-07-16
Payer: MEDICARE

## 2021-07-16 ENCOUNTER — TELEPHONE (OUTPATIENT)
Dept: INTERNAL MEDICINE | Facility: CLINIC | Age: 76
End: 2021-07-16

## 2021-07-16 VITALS
BODY MASS INDEX: 20.19 KG/M2 | TEMPERATURE: 98 F | OXYGEN SATURATION: 95 % | DIASTOLIC BLOOD PRESSURE: 60 MMHG | HEIGHT: 67 IN | HEART RATE: 70 BPM | SYSTOLIC BLOOD PRESSURE: 87 MMHG | RESPIRATION RATE: 12 BRPM | WEIGHT: 128.63 LBS

## 2021-07-16 DIAGNOSIS — D64.9 ANEMIA, UNSPECIFIED TYPE: ICD-10-CM

## 2021-07-16 DIAGNOSIS — D75.89 MACROCYTOSIS: ICD-10-CM

## 2021-07-16 DIAGNOSIS — M06.042 RHEUMATOID ARTHRITIS INVOLVING BOTH HANDS WITH NEGATIVE RHEUMATOID FACTOR: Primary | ICD-10-CM

## 2021-07-16 DIAGNOSIS — M06.041 RHEUMATOID ARTHRITIS INVOLVING BOTH HANDS WITH NEGATIVE RHEUMATOID FACTOR: Primary | ICD-10-CM

## 2021-07-16 PROCEDURE — 1126F PR PAIN SEVERITY QUANTIFIED, NO PAIN PRESENT: ICD-10-PCS | Mod: CPTII,S$GLB,, | Performed by: INTERNAL MEDICINE

## 2021-07-16 PROCEDURE — 1159F PR MEDICATION LIST DOCUMENTED IN MEDICAL RECORD: ICD-10-PCS | Mod: CPTII,S$GLB,, | Performed by: INTERNAL MEDICINE

## 2021-07-16 PROCEDURE — 99499 UNLISTED E&M SERVICE: CPT | Mod: HCNC,S$GLB,, | Performed by: INTERNAL MEDICINE

## 2021-07-16 PROCEDURE — 99205 PR OFFICE/OUTPT VISIT, NEW, LEVL V, 60-74 MIN: ICD-10-PCS | Mod: S$GLB,,, | Performed by: INTERNAL MEDICINE

## 2021-07-16 PROCEDURE — 1126F AMNT PAIN NOTED NONE PRSNT: CPT | Mod: CPTII,S$GLB,, | Performed by: INTERNAL MEDICINE

## 2021-07-16 PROCEDURE — 96372 THER/PROPH/DIAG INJ SC/IM: CPT | Mod: S$GLB,,, | Performed by: INTERNAL MEDICINE

## 2021-07-16 PROCEDURE — 3078F PR MOST RECENT DIASTOLIC BLOOD PRESSURE < 80 MM HG: ICD-10-PCS | Mod: CPTII,S$GLB,, | Performed by: INTERNAL MEDICINE

## 2021-07-16 PROCEDURE — 99999 PR PBB SHADOW E&M-EST. PATIENT-LVL IV: ICD-10-PCS | Mod: PBBFAC,,, | Performed by: INTERNAL MEDICINE

## 2021-07-16 PROCEDURE — 3074F SYST BP LT 130 MM HG: CPT | Mod: CPTII,S$GLB,, | Performed by: INTERNAL MEDICINE

## 2021-07-16 PROCEDURE — 1159F MED LIST DOCD IN RCRD: CPT | Mod: CPTII,S$GLB,, | Performed by: INTERNAL MEDICINE

## 2021-07-16 PROCEDURE — 99205 OFFICE O/P NEW HI 60 MIN: CPT | Mod: S$GLB,,, | Performed by: INTERNAL MEDICINE

## 2021-07-16 PROCEDURE — 99499 RISK ADDL DX/OHS AUDIT: ICD-10-PCS | Mod: HCNC,S$GLB,, | Performed by: INTERNAL MEDICINE

## 2021-07-16 PROCEDURE — 3288F PR FALLS RISK ASSESSMENT DOCUMENTED: ICD-10-PCS | Mod: CPTII,S$GLB,, | Performed by: INTERNAL MEDICINE

## 2021-07-16 PROCEDURE — 3078F DIAST BP <80 MM HG: CPT | Mod: CPTII,S$GLB,, | Performed by: INTERNAL MEDICINE

## 2021-07-16 PROCEDURE — 96372 PR INJECTION,THERAP/PROPH/DIAG2ST, IM OR SUBCUT: ICD-10-PCS | Mod: S$GLB,,, | Performed by: INTERNAL MEDICINE

## 2021-07-16 PROCEDURE — 1101F PT FALLS ASSESS-DOCD LE1/YR: CPT | Mod: CPTII,S$GLB,, | Performed by: INTERNAL MEDICINE

## 2021-07-16 PROCEDURE — 3074F PR MOST RECENT SYSTOLIC BLOOD PRESSURE < 130 MM HG: ICD-10-PCS | Mod: CPTII,S$GLB,, | Performed by: INTERNAL MEDICINE

## 2021-07-16 PROCEDURE — 99999 PR PBB SHADOW E&M-EST. PATIENT-LVL IV: CPT | Mod: PBBFAC,,, | Performed by: INTERNAL MEDICINE

## 2021-07-16 PROCEDURE — 1101F PR PT FALLS ASSESS DOC 0-1 FALLS W/OUT INJ PAST YR: ICD-10-PCS | Mod: CPTII,S$GLB,, | Performed by: INTERNAL MEDICINE

## 2021-07-16 PROCEDURE — 3288F FALL RISK ASSESSMENT DOCD: CPT | Mod: CPTII,S$GLB,, | Performed by: INTERNAL MEDICINE

## 2021-07-16 RX ADMIN — CYANOCOBALAMIN 1000 MCG: 1000 INJECTION, SOLUTION INTRAMUSCULAR; SUBCUTANEOUS at 10:07

## 2021-07-20 ENCOUNTER — OFFICE VISIT (OUTPATIENT)
Dept: INTERNAL MEDICINE | Facility: CLINIC | Age: 76
End: 2021-07-20
Payer: MEDICARE

## 2021-07-20 ENCOUNTER — PATIENT MESSAGE (OUTPATIENT)
Dept: INTERNAL MEDICINE | Facility: CLINIC | Age: 76
End: 2021-07-20

## 2021-07-20 ENCOUNTER — PATIENT MESSAGE (OUTPATIENT)
Dept: CARDIOLOGY | Facility: CLINIC | Age: 76
End: 2021-07-20

## 2021-07-20 ENCOUNTER — TELEPHONE (OUTPATIENT)
Dept: INTERNAL MEDICINE | Facility: CLINIC | Age: 76
End: 2021-07-20

## 2021-07-20 VITALS
HEART RATE: 72 BPM | OXYGEN SATURATION: 95 % | DIASTOLIC BLOOD PRESSURE: 56 MMHG | BODY MASS INDEX: 20.8 KG/M2 | WEIGHT: 129.44 LBS | HEIGHT: 66 IN | SYSTOLIC BLOOD PRESSURE: 98 MMHG

## 2021-07-20 DIAGNOSIS — M81.0 OSTEOPOROSIS, UNSPECIFIED OSTEOPOROSIS TYPE, UNSPECIFIED PATHOLOGICAL FRACTURE PRESENCE: Primary | ICD-10-CM

## 2021-07-20 DIAGNOSIS — N28.1 KIDNEY CYST, ACQUIRED: ICD-10-CM

## 2021-07-20 DIAGNOSIS — Z12.11 SCREENING FOR COLON CANCER: ICD-10-CM

## 2021-07-20 DIAGNOSIS — R91.1 SOLITARY PULMONARY NODULE: ICD-10-CM

## 2021-07-20 DIAGNOSIS — I48.20 ATRIAL FIBRILLATION, CHRONIC: ICD-10-CM

## 2021-07-20 DIAGNOSIS — R79.9 ABNORMAL FINDING OF BLOOD CHEMISTRY, UNSPECIFIED: ICD-10-CM

## 2021-07-20 DIAGNOSIS — Z00.00 ROUTINE GENERAL MEDICAL EXAMINATION AT A HEALTH CARE FACILITY: Primary | ICD-10-CM

## 2021-07-20 PROCEDURE — 99397 PER PM REEVAL EST PAT 65+ YR: CPT | Mod: S$GLB,,, | Performed by: INTERNAL MEDICINE

## 2021-07-20 PROCEDURE — 99499 RISK ADDL DX/OHS AUDIT: ICD-10-PCS | Mod: HCNC,S$GLB,, | Performed by: INTERNAL MEDICINE

## 2021-07-20 PROCEDURE — 3288F PR FALLS RISK ASSESSMENT DOCUMENTED: ICD-10-PCS | Mod: CPTII,S$GLB,, | Performed by: INTERNAL MEDICINE

## 2021-07-20 PROCEDURE — 3078F PR MOST RECENT DIASTOLIC BLOOD PRESSURE < 80 MM HG: ICD-10-PCS | Mod: CPTII,S$GLB,, | Performed by: INTERNAL MEDICINE

## 2021-07-20 PROCEDURE — 3288F FALL RISK ASSESSMENT DOCD: CPT | Mod: CPTII,S$GLB,, | Performed by: INTERNAL MEDICINE

## 2021-07-20 PROCEDURE — 99397 PR PREVENTIVE VISIT,EST,65 & OVER: ICD-10-PCS | Mod: S$GLB,,, | Performed by: INTERNAL MEDICINE

## 2021-07-20 PROCEDURE — 1126F PR PAIN SEVERITY QUANTIFIED, NO PAIN PRESENT: ICD-10-PCS | Mod: CPTII,S$GLB,, | Performed by: INTERNAL MEDICINE

## 2021-07-20 PROCEDURE — 1101F PR PT FALLS ASSESS DOC 0-1 FALLS W/OUT INJ PAST YR: ICD-10-PCS | Mod: CPTII,S$GLB,, | Performed by: INTERNAL MEDICINE

## 2021-07-20 PROCEDURE — 1126F AMNT PAIN NOTED NONE PRSNT: CPT | Mod: CPTII,S$GLB,, | Performed by: INTERNAL MEDICINE

## 2021-07-20 PROCEDURE — 3074F SYST BP LT 130 MM HG: CPT | Mod: CPTII,S$GLB,, | Performed by: INTERNAL MEDICINE

## 2021-07-20 PROCEDURE — 3078F DIAST BP <80 MM HG: CPT | Mod: CPTII,S$GLB,, | Performed by: INTERNAL MEDICINE

## 2021-07-20 PROCEDURE — 3074F PR MOST RECENT SYSTOLIC BLOOD PRESSURE < 130 MM HG: ICD-10-PCS | Mod: CPTII,S$GLB,, | Performed by: INTERNAL MEDICINE

## 2021-07-20 PROCEDURE — 1101F PT FALLS ASSESS-DOCD LE1/YR: CPT | Mod: CPTII,S$GLB,, | Performed by: INTERNAL MEDICINE

## 2021-07-20 PROCEDURE — 99999 PR PBB SHADOW E&M-EST. PATIENT-LVL IV: CPT | Mod: PBBFAC,,, | Performed by: INTERNAL MEDICINE

## 2021-07-20 PROCEDURE — 99499 UNLISTED E&M SERVICE: CPT | Mod: HCNC,S$GLB,, | Performed by: INTERNAL MEDICINE

## 2021-07-20 PROCEDURE — 99999 PR PBB SHADOW E&M-EST. PATIENT-LVL IV: ICD-10-PCS | Mod: PBBFAC,,, | Performed by: INTERNAL MEDICINE

## 2021-07-20 RX ORDER — CYANOCOBALAMIN 1000 UG/ML
INJECTION, SOLUTION INTRAMUSCULAR; SUBCUTANEOUS
COMMUNITY
Start: 2021-03-19

## 2021-07-20 RX ORDER — CARVEDILOL 3.12 MG/1
3.12 TABLET ORAL 2 TIMES DAILY WITH MEALS
Qty: 180 TABLET | Refills: 4 | Status: SHIPPED | OUTPATIENT
Start: 2021-07-20 | End: 2022-03-07 | Stop reason: SDUPTHER

## 2021-07-21 DIAGNOSIS — I50.22 CHRONIC SYSTOLIC HEART FAILURE: Primary | ICD-10-CM

## 2021-07-21 DIAGNOSIS — Z79.899 HIGH RISK MEDICATION USE: ICD-10-CM

## 2021-07-25 PROBLEM — D75.89 MACROCYTOSIS: Status: ACTIVE | Noted: 2021-07-25

## 2021-07-29 ENCOUNTER — TELEPHONE (OUTPATIENT)
Dept: RHEUMATOLOGY | Facility: CLINIC | Age: 76
End: 2021-07-29

## 2021-07-29 ENCOUNTER — LAB VISIT (OUTPATIENT)
Dept: LAB | Facility: HOSPITAL | Age: 76
End: 2021-07-29
Attending: INTERNAL MEDICINE
Payer: MEDICARE

## 2021-07-29 ENCOUNTER — PATIENT MESSAGE (OUTPATIENT)
Dept: RHEUMATOLOGY | Facility: CLINIC | Age: 76
End: 2021-07-29

## 2021-07-29 DIAGNOSIS — R79.9 ABNORMAL FINDING OF BLOOD CHEMISTRY, UNSPECIFIED: ICD-10-CM

## 2021-07-29 DIAGNOSIS — Z79.899 HIGH RISK MEDICATION USE: ICD-10-CM

## 2021-07-29 DIAGNOSIS — M06.9 RHEUMATOID ARTHRITIS INVOLVING MULTIPLE SITES: ICD-10-CM

## 2021-07-29 DIAGNOSIS — I50.22 CHRONIC SYSTOLIC HEART FAILURE: ICD-10-CM

## 2021-07-29 DIAGNOSIS — I48.20 ATRIAL FIBRILLATION, CHRONIC: ICD-10-CM

## 2021-07-29 LAB
ALBUMIN SERPL BCP-MCNC: 3.5 G/DL (ref 3.5–5.2)
ALP SERPL-CCNC: 52 U/L (ref 55–135)
ALT SERPL W/O P-5'-P-CCNC: 20 U/L (ref 10–44)
ANION GAP SERPL CALC-SCNC: 8 MMOL/L (ref 8–16)
AST SERPL-CCNC: 26 U/L (ref 10–40)
BASOPHILS # BLD AUTO: 0.04 K/UL (ref 0–0.2)
BASOPHILS NFR BLD: 0.7 % (ref 0–1.9)
BILIRUB SERPL-MCNC: 0.6 MG/DL (ref 0.1–1)
BUN SERPL-MCNC: 16 MG/DL (ref 8–23)
CALCIUM SERPL-MCNC: 9.2 MG/DL (ref 8.7–10.5)
CHLORIDE SERPL-SCNC: 109 MMOL/L (ref 95–110)
CHOLEST SERPL-MCNC: 139 MG/DL (ref 120–199)
CHOLEST/HDLC SERPL: 2.1 {RATIO} (ref 2–5)
CO2 SERPL-SCNC: 26 MMOL/L (ref 23–29)
CREAT SERPL-MCNC: 0.8 MG/DL (ref 0.5–1.4)
CRP SERPL-MCNC: 0.3 MG/L (ref 0–8.2)
DIFFERENTIAL METHOD: ABNORMAL
EOSINOPHIL # BLD AUTO: 0.1 K/UL (ref 0–0.5)
EOSINOPHIL NFR BLD: 1.8 % (ref 0–8)
ERYTHROCYTE [DISTWIDTH] IN BLOOD BY AUTOMATED COUNT: 13.4 % (ref 11.5–14.5)
ERYTHROCYTE [SEDIMENTATION RATE] IN BLOOD BY WESTERGREN METHOD: <2 MM/HR (ref 0–36)
EST. GFR  (AFRICAN AMERICAN): >60 ML/MIN/1.73 M^2
EST. GFR  (NON AFRICAN AMERICAN): >60 ML/MIN/1.73 M^2
GLUCOSE SERPL-MCNC: 88 MG/DL (ref 70–110)
HCT VFR BLD AUTO: 35.1 % (ref 37–48.5)
HDLC SERPL-MCNC: 66 MG/DL (ref 40–75)
HDLC SERPL: 47.5 % (ref 20–50)
HGB BLD-MCNC: 10.9 G/DL (ref 12–16)
IMM GRANULOCYTES # BLD AUTO: 0.01 K/UL (ref 0–0.04)
IMM GRANULOCYTES NFR BLD AUTO: 0.2 % (ref 0–0.5)
LDLC SERPL CALC-MCNC: 61.2 MG/DL (ref 63–159)
LYMPHOCYTES # BLD AUTO: 1.4 K/UL (ref 1–4.8)
LYMPHOCYTES NFR BLD: 23.4 % (ref 18–48)
MCH RBC QN AUTO: 31.7 PG (ref 27–31)
MCHC RBC AUTO-ENTMCNC: 31.1 G/DL (ref 32–36)
MCV RBC AUTO: 102 FL (ref 82–98)
MONOCYTES # BLD AUTO: 0.5 K/UL (ref 0.3–1)
MONOCYTES NFR BLD: 8 % (ref 4–15)
NEUTROPHILS # BLD AUTO: 4 K/UL (ref 1.8–7.7)
NEUTROPHILS NFR BLD: 65.9 % (ref 38–73)
NONHDLC SERPL-MCNC: 73 MG/DL
NRBC BLD-RTO: 0 /100 WBC
PLATELET # BLD AUTO: 216 K/UL (ref 150–450)
PMV BLD AUTO: 12.8 FL (ref 9.2–12.9)
POTASSIUM SERPL-SCNC: 5 MMOL/L (ref 3.5–5.1)
PROT SERPL-MCNC: 6.1 G/DL (ref 6–8.4)
RBC # BLD AUTO: 3.44 M/UL (ref 4–5.4)
SODIUM SERPL-SCNC: 143 MMOL/L (ref 136–145)
TRIGL SERPL-MCNC: 59 MG/DL (ref 30–150)
WBC # BLD AUTO: 6.1 K/UL (ref 3.9–12.7)

## 2021-07-29 PROCEDURE — 85025 COMPLETE CBC W/AUTO DIFF WBC: CPT | Performed by: INTERNAL MEDICINE

## 2021-07-29 PROCEDURE — 80053 COMPREHEN METABOLIC PANEL: CPT | Performed by: INTERNAL MEDICINE

## 2021-07-29 PROCEDURE — 80151 DRUG ASSAY AMIODARONE: CPT | Performed by: INTERNAL MEDICINE

## 2021-07-29 PROCEDURE — 86140 C-REACTIVE PROTEIN: CPT | Performed by: INTERNAL MEDICINE

## 2021-07-29 PROCEDURE — 85652 RBC SED RATE AUTOMATED: CPT | Performed by: INTERNAL MEDICINE

## 2021-07-29 PROCEDURE — 80061 LIPID PANEL: CPT | Performed by: INTERNAL MEDICINE

## 2021-07-29 PROCEDURE — 36415 COLL VENOUS BLD VENIPUNCTURE: CPT | Performed by: INTERNAL MEDICINE

## 2021-08-02 ENCOUNTER — TELEPHONE (OUTPATIENT)
Dept: CARDIOLOGY | Facility: CLINIC | Age: 76
End: 2021-08-02

## 2021-08-02 LAB
AMIODARONE SERPL-SCNC: 0.5 UG/ML (ref 1–3)
N-DESETHYL-AMIODARONE: 0.3 UG/ML

## 2021-08-03 ENCOUNTER — PATIENT MESSAGE (OUTPATIENT)
Dept: RHEUMATOLOGY | Facility: CLINIC | Age: 76
End: 2021-08-03

## 2021-08-03 ENCOUNTER — TELEPHONE (OUTPATIENT)
Dept: RHEUMATOLOGY | Facility: CLINIC | Age: 76
End: 2021-08-03

## 2021-08-05 ENCOUNTER — OFFICE VISIT (OUTPATIENT)
Dept: RHEUMATOLOGY | Facility: CLINIC | Age: 76
End: 2021-08-05
Payer: MEDICARE

## 2021-08-05 VITALS
WEIGHT: 130 LBS | SYSTOLIC BLOOD PRESSURE: 103 MMHG | HEIGHT: 67 IN | HEART RATE: 65 BPM | BODY MASS INDEX: 20.4 KG/M2 | DIASTOLIC BLOOD PRESSURE: 74 MMHG

## 2021-08-05 DIAGNOSIS — E78.00 PURE HYPERCHOLESTEROLEMIA: ICD-10-CM

## 2021-08-05 DIAGNOSIS — M06.041 RHEUMATOID ARTHRITIS INVOLVING BOTH HANDS WITH NEGATIVE RHEUMATOID FACTOR: ICD-10-CM

## 2021-08-05 DIAGNOSIS — M85.80 OSTEOPENIA, UNSPECIFIED LOCATION: ICD-10-CM

## 2021-08-05 DIAGNOSIS — I10 ESSENTIAL HYPERTENSION: ICD-10-CM

## 2021-08-05 DIAGNOSIS — M06.9 RHEUMATOID ARTHRITIS: ICD-10-CM

## 2021-08-05 DIAGNOSIS — M06.042 RHEUMATOID ARTHRITIS INVOLVING BOTH HANDS WITH NEGATIVE RHEUMATOID FACTOR: ICD-10-CM

## 2021-08-05 PROCEDURE — 1126F PR PAIN SEVERITY QUANTIFIED, NO PAIN PRESENT: ICD-10-PCS | Mod: CPTII,S$GLB,, | Performed by: INTERNAL MEDICINE

## 2021-08-05 PROCEDURE — 1101F PR PT FALLS ASSESS DOC 0-1 FALLS W/OUT INJ PAST YR: ICD-10-PCS | Mod: CPTII,S$GLB,, | Performed by: INTERNAL MEDICINE

## 2021-08-05 PROCEDURE — 3074F PR MOST RECENT SYSTOLIC BLOOD PRESSURE < 130 MM HG: ICD-10-PCS | Mod: CPTII,S$GLB,, | Performed by: INTERNAL MEDICINE

## 2021-08-05 PROCEDURE — 99999 PR PBB SHADOW E&M-EST. PATIENT-LVL IV: ICD-10-PCS | Mod: PBBFAC,,, | Performed by: INTERNAL MEDICINE

## 2021-08-05 PROCEDURE — 1101F PT FALLS ASSESS-DOCD LE1/YR: CPT | Mod: CPTII,S$GLB,, | Performed by: INTERNAL MEDICINE

## 2021-08-05 PROCEDURE — 3288F FALL RISK ASSESSMENT DOCD: CPT | Mod: CPTII,S$GLB,, | Performed by: INTERNAL MEDICINE

## 2021-08-05 PROCEDURE — 3074F SYST BP LT 130 MM HG: CPT | Mod: CPTII,S$GLB,, | Performed by: INTERNAL MEDICINE

## 2021-08-05 PROCEDURE — 1159F MED LIST DOCD IN RCRD: CPT | Mod: CPTII,S$GLB,, | Performed by: INTERNAL MEDICINE

## 2021-08-05 PROCEDURE — 3078F DIAST BP <80 MM HG: CPT | Mod: CPTII,S$GLB,, | Performed by: INTERNAL MEDICINE

## 2021-08-05 PROCEDURE — 99214 PR OFFICE/OUTPT VISIT, EST, LEVL IV, 30-39 MIN: ICD-10-PCS | Mod: S$GLB,,, | Performed by: INTERNAL MEDICINE

## 2021-08-05 PROCEDURE — 99999 PR PBB SHADOW E&M-EST. PATIENT-LVL IV: CPT | Mod: PBBFAC,,, | Performed by: INTERNAL MEDICINE

## 2021-08-05 PROCEDURE — 1159F PR MEDICATION LIST DOCUMENTED IN MEDICAL RECORD: ICD-10-PCS | Mod: CPTII,S$GLB,, | Performed by: INTERNAL MEDICINE

## 2021-08-05 PROCEDURE — 3078F PR MOST RECENT DIASTOLIC BLOOD PRESSURE < 80 MM HG: ICD-10-PCS | Mod: CPTII,S$GLB,, | Performed by: INTERNAL MEDICINE

## 2021-08-05 PROCEDURE — 99214 OFFICE O/P EST MOD 30 MIN: CPT | Mod: S$GLB,,, | Performed by: INTERNAL MEDICINE

## 2021-08-05 PROCEDURE — 1126F AMNT PAIN NOTED NONE PRSNT: CPT | Mod: CPTII,S$GLB,, | Performed by: INTERNAL MEDICINE

## 2021-08-05 PROCEDURE — 3288F PR FALLS RISK ASSESSMENT DOCUMENTED: ICD-10-PCS | Mod: CPTII,S$GLB,, | Performed by: INTERNAL MEDICINE

## 2021-08-05 RX ORDER — ZOLEDRONIC ACID 5 MG/100ML
5 INJECTION, SOLUTION INTRAVENOUS
Status: CANCELLED | OUTPATIENT
Start: 2021-08-05

## 2021-08-05 RX ORDER — SODIUM CHLORIDE 0.9 % (FLUSH) 0.9 %
10 SYRINGE (ML) INJECTION
Status: CANCELLED | OUTPATIENT
Start: 2021-08-05

## 2021-08-05 RX ORDER — LEFLUNOMIDE 20 MG/1
20 TABLET ORAL DAILY
Qty: 90 TABLET | Refills: 0 | Status: SHIPPED | OUTPATIENT
Start: 2021-08-05 | End: 2022-01-05

## 2021-08-05 RX ORDER — HEPARIN 100 UNIT/ML
500 SYRINGE INTRAVENOUS
Status: CANCELLED | OUTPATIENT
Start: 2021-08-05

## 2021-08-06 ENCOUNTER — INFUSION (OUTPATIENT)
Dept: INFECTIOUS DISEASES | Facility: HOSPITAL | Age: 76
End: 2021-08-06
Attending: INTERNAL MEDICINE
Payer: MEDICARE

## 2021-08-06 ENCOUNTER — CLINICAL SUPPORT (OUTPATIENT)
Dept: INTERNAL MEDICINE | Facility: CLINIC | Age: 76
End: 2021-08-06
Payer: MEDICARE

## 2021-08-06 VITALS
RESPIRATION RATE: 18 BRPM | TEMPERATURE: 98 F | SYSTOLIC BLOOD PRESSURE: 116 MMHG | OXYGEN SATURATION: 97 % | DIASTOLIC BLOOD PRESSURE: 63 MMHG | HEART RATE: 68 BPM | HEIGHT: 67 IN | WEIGHT: 127.44 LBS | BODY MASS INDEX: 20 KG/M2

## 2021-08-06 DIAGNOSIS — M06.042 RHEUMATOID ARTHRITIS INVOLVING BOTH HANDS WITH NEGATIVE RHEUMATOID FACTOR: Primary | ICD-10-CM

## 2021-08-06 DIAGNOSIS — M85.80 OSTEOPENIA, UNSPECIFIED LOCATION: ICD-10-CM

## 2021-08-06 DIAGNOSIS — M06.041 RHEUMATOID ARTHRITIS INVOLVING BOTH HANDS WITH NEGATIVE RHEUMATOID FACTOR: Primary | ICD-10-CM

## 2021-08-06 PROCEDURE — 96367 TX/PROPH/DG ADDL SEQ IV INF: CPT

## 2021-08-06 PROCEDURE — 25000003 PHARM REV CODE 250: Performed by: INTERNAL MEDICINE

## 2021-08-06 PROCEDURE — 96372 PR INJECTION,THERAP/PROPH/DIAG2ST, IM OR SUBCUT: ICD-10-PCS | Mod: S$GLB,,, | Performed by: INTERNAL MEDICINE

## 2021-08-06 PROCEDURE — 96372 THER/PROPH/DIAG INJ SC/IM: CPT | Mod: S$GLB,,, | Performed by: INTERNAL MEDICINE

## 2021-08-06 PROCEDURE — 63600175 PHARM REV CODE 636 W HCPCS: Mod: JA,JG | Performed by: INTERNAL MEDICINE

## 2021-08-06 PROCEDURE — 96365 THER/PROPH/DIAG IV INF INIT: CPT

## 2021-08-06 RX ORDER — HEPARIN 100 UNIT/ML
500 SYRINGE INTRAVENOUS
Status: CANCELLED | OUTPATIENT
Start: 2021-09-03

## 2021-08-06 RX ORDER — SODIUM CHLORIDE 0.9 % (FLUSH) 0.9 %
10 SYRINGE (ML) INJECTION
Status: CANCELLED | OUTPATIENT
Start: 2021-09-03

## 2021-08-06 RX ORDER — ZOLEDRONIC ACID 5 MG/100ML
5 INJECTION, SOLUTION INTRAVENOUS
Status: CANCELLED | OUTPATIENT
Start: 2021-09-03

## 2021-08-06 RX ORDER — ACETAMINOPHEN 325 MG/1
650 TABLET ORAL
Status: COMPLETED | OUTPATIENT
Start: 2021-08-06 | End: 2021-08-06

## 2021-08-06 RX ORDER — SODIUM CHLORIDE 0.9 % (FLUSH) 0.9 %
10 SYRINGE (ML) INJECTION
Status: DISCONTINUED | OUTPATIENT
Start: 2021-08-06 | End: 2021-08-06 | Stop reason: HOSPADM

## 2021-08-06 RX ORDER — ACETAMINOPHEN 325 MG/1
650 TABLET ORAL
Status: CANCELLED | OUTPATIENT
Start: 2021-09-03

## 2021-08-06 RX ADMIN — ACETAMINOPHEN 650 MG: 325 TABLET ORAL at 02:08

## 2021-08-06 RX ADMIN — SODIUM CHLORIDE 750 MG: 9 INJECTION, SOLUTION INTRAVENOUS at 02:08

## 2021-08-06 RX ADMIN — DIPHENHYDRAMINE HYDROCHLORIDE 25 MG: 50 INJECTION INTRAMUSCULAR; INTRAVENOUS at 02:08

## 2021-08-06 RX ADMIN — CYANOCOBALAMIN 1000 MCG: 1000 INJECTION, SOLUTION INTRAMUSCULAR; SUBCUTANEOUS at 01:08

## 2021-08-07 ENCOUNTER — HOSPITAL ENCOUNTER (OUTPATIENT)
Dept: RADIOLOGY | Facility: OTHER | Age: 76
Discharge: HOME | End: 2021-08-07
Attending: INTERNAL MEDICINE
Payer: MEDICARE

## 2021-08-07 DIAGNOSIS — N28.1 KIDNEY CYST, ACQUIRED: ICD-10-CM

## 2021-08-07 PROCEDURE — 71250 CT THORAX DX C-: CPT | Mod: 26,,, | Performed by: RADIOLOGY

## 2021-08-07 PROCEDURE — 76770 US EXAM ABDO BACK WALL COMP: CPT | Mod: TC

## 2021-08-07 PROCEDURE — 71250 CT CHEST WITHOUT CONTRAST: ICD-10-PCS | Mod: 26,,, | Performed by: RADIOLOGY

## 2021-08-07 PROCEDURE — 71250 CT THORAX DX C-: CPT | Mod: TC

## 2021-08-07 PROCEDURE — 76770 US RETROPERITONEAL COMPLETE: ICD-10-PCS | Mod: 26,,, | Performed by: RADIOLOGY

## 2021-08-07 PROCEDURE — 76770 US EXAM ABDO BACK WALL COMP: CPT | Mod: 26,,, | Performed by: RADIOLOGY

## 2021-08-08 ENCOUNTER — PATIENT MESSAGE (OUTPATIENT)
Dept: CARDIOLOGY | Facility: CLINIC | Age: 76
End: 2021-08-08

## 2021-08-08 ENCOUNTER — PATIENT MESSAGE (OUTPATIENT)
Dept: INTERNAL MEDICINE | Facility: CLINIC | Age: 76
End: 2021-08-08

## 2021-08-09 RX ORDER — AMOXICILLIN AND CLAVULANATE POTASSIUM 875; 125 MG/1; MG/1
1 TABLET, FILM COATED ORAL 2 TIMES DAILY
Qty: 20 TABLET | Refills: 0 | Status: SHIPPED | OUTPATIENT
Start: 2021-08-09 | End: 2021-08-19

## 2021-08-13 ENCOUNTER — PATIENT MESSAGE (OUTPATIENT)
Dept: INTERNAL MEDICINE | Facility: CLINIC | Age: 76
End: 2021-08-13

## 2021-08-13 ENCOUNTER — PATIENT MESSAGE (OUTPATIENT)
Dept: RHEUMATOLOGY | Facility: CLINIC | Age: 76
End: 2021-08-13

## 2021-08-16 ENCOUNTER — OFFICE VISIT (OUTPATIENT)
Dept: PODIATRY | Facility: CLINIC | Age: 76
End: 2021-08-16
Payer: MEDICARE

## 2021-08-16 VITALS
HEIGHT: 67 IN | HEART RATE: 63 BPM | BODY MASS INDEX: 19.93 KG/M2 | WEIGHT: 127 LBS | SYSTOLIC BLOOD PRESSURE: 130 MMHG | DIASTOLIC BLOOD PRESSURE: 63 MMHG

## 2021-08-16 DIAGNOSIS — L84 HELOMA MOLLE: ICD-10-CM

## 2021-08-16 DIAGNOSIS — L60.3 DYSTROPHIC NAIL: Primary | ICD-10-CM

## 2021-08-16 PROCEDURE — 3075F SYST BP GE 130 - 139MM HG: CPT | Mod: CPTII,S$GLB,, | Performed by: PODIATRIST

## 2021-08-16 PROCEDURE — 1101F PR PT FALLS ASSESS DOC 0-1 FALLS W/OUT INJ PAST YR: ICD-10-PCS | Mod: CPTII,S$GLB,, | Performed by: PODIATRIST

## 2021-08-16 PROCEDURE — 99999 PR PBB SHADOW E&M-EST. PATIENT-LVL IV: CPT | Mod: PBBFAC,,, | Performed by: PODIATRIST

## 2021-08-16 PROCEDURE — 3078F PR MOST RECENT DIASTOLIC BLOOD PRESSURE < 80 MM HG: ICD-10-PCS | Mod: CPTII,S$GLB,, | Performed by: PODIATRIST

## 2021-08-16 PROCEDURE — 1160F RVW MEDS BY RX/DR IN RCRD: CPT | Mod: CPTII,S$GLB,, | Performed by: PODIATRIST

## 2021-08-16 PROCEDURE — 1101F PT FALLS ASSESS-DOCD LE1/YR: CPT | Mod: CPTII,S$GLB,, | Performed by: PODIATRIST

## 2021-08-16 PROCEDURE — 3288F PR FALLS RISK ASSESSMENT DOCUMENTED: ICD-10-PCS | Mod: CPTII,S$GLB,, | Performed by: PODIATRIST

## 2021-08-16 PROCEDURE — 3075F PR MOST RECENT SYSTOLIC BLOOD PRESS GE 130-139MM HG: ICD-10-PCS | Mod: CPTII,S$GLB,, | Performed by: PODIATRIST

## 2021-08-16 PROCEDURE — 1126F AMNT PAIN NOTED NONE PRSNT: CPT | Mod: CPTII,S$GLB,, | Performed by: PODIATRIST

## 2021-08-16 PROCEDURE — 1159F MED LIST DOCD IN RCRD: CPT | Mod: CPTII,S$GLB,, | Performed by: PODIATRIST

## 2021-08-16 PROCEDURE — 3288F FALL RISK ASSESSMENT DOCD: CPT | Mod: CPTII,S$GLB,, | Performed by: PODIATRIST

## 2021-08-16 PROCEDURE — 99213 OFFICE O/P EST LOW 20 MIN: CPT | Mod: S$GLB,,, | Performed by: PODIATRIST

## 2021-08-16 PROCEDURE — 3078F DIAST BP <80 MM HG: CPT | Mod: CPTII,S$GLB,, | Performed by: PODIATRIST

## 2021-08-16 PROCEDURE — 1159F PR MEDICATION LIST DOCUMENTED IN MEDICAL RECORD: ICD-10-PCS | Mod: CPTII,S$GLB,, | Performed by: PODIATRIST

## 2021-08-16 PROCEDURE — 99213 PR OFFICE/OUTPT VISIT, EST, LEVL III, 20-29 MIN: ICD-10-PCS | Mod: S$GLB,,, | Performed by: PODIATRIST

## 2021-08-16 PROCEDURE — 1126F PR PAIN SEVERITY QUANTIFIED, NO PAIN PRESENT: ICD-10-PCS | Mod: CPTII,S$GLB,, | Performed by: PODIATRIST

## 2021-08-16 PROCEDURE — 1160F PR REVIEW ALL MEDS BY PRESCRIBER/CLIN PHARMACIST DOCUMENTED: ICD-10-PCS | Mod: CPTII,S$GLB,, | Performed by: PODIATRIST

## 2021-08-16 PROCEDURE — 99999 PR PBB SHADOW E&M-EST. PATIENT-LVL IV: ICD-10-PCS | Mod: PBBFAC,,, | Performed by: PODIATRIST

## 2021-08-16 RX ORDER — ABATACEPT 250 MG/15ML
INJECTION, POWDER, LYOPHILIZED, FOR SOLUTION INTRAVENOUS
COMMUNITY
Start: 2021-05-14

## 2021-08-17 ENCOUNTER — PATIENT MESSAGE (OUTPATIENT)
Dept: RHEUMATOLOGY | Facility: CLINIC | Age: 76
End: 2021-08-17

## 2021-08-17 ENCOUNTER — TELEPHONE (OUTPATIENT)
Dept: RHEUMATOLOGY | Facility: CLINIC | Age: 76
End: 2021-08-17

## 2021-08-17 LAB — NONINV COLON CA DNA+OCC BLD SCRN STL QL: NEGATIVE

## 2021-08-19 ENCOUNTER — PATIENT MESSAGE (OUTPATIENT)
Dept: PULMONOLOGY | Facility: CLINIC | Age: 76
End: 2021-08-19

## 2021-08-28 ENCOUNTER — IMMUNIZATION (OUTPATIENT)
Dept: INTERNAL MEDICINE | Facility: CLINIC | Age: 76
End: 2021-08-28
Payer: MEDICARE

## 2021-08-28 DIAGNOSIS — Z23 NEED FOR VACCINATION: Primary | ICD-10-CM

## 2021-08-28 PROCEDURE — 0003A COVID-19, MRNA, LNP-S, PF, 30 MCG/0.3 ML DOSE VACCINE: ICD-10-PCS | Mod: CV19,,, | Performed by: INTERNAL MEDICINE

## 2021-08-28 PROCEDURE — 91300 COVID-19, MRNA, LNP-S, PF, 30 MCG/0.3 ML DOSE VACCINE: CPT | Mod: ,,, | Performed by: INTERNAL MEDICINE

## 2021-08-28 PROCEDURE — 91300 COVID-19, MRNA, LNP-S, PF, 30 MCG/0.3 ML DOSE VACCINE: ICD-10-PCS | Mod: ,,, | Performed by: INTERNAL MEDICINE

## 2021-08-28 PROCEDURE — 0003A COVID-19, MRNA, LNP-S, PF, 30 MCG/0.3 ML DOSE VACCINE: CPT | Mod: CV19,,, | Performed by: INTERNAL MEDICINE

## 2021-09-07 ENCOUNTER — PATIENT MESSAGE (OUTPATIENT)
Dept: INFECTIOUS DISEASES | Facility: HOSPITAL | Age: 76
End: 2021-09-07

## 2021-09-08 ENCOUNTER — INFUSION (OUTPATIENT)
Dept: INFECTIOUS DISEASES | Facility: HOSPITAL | Age: 76
End: 2021-09-08
Attending: INTERNAL MEDICINE
Payer: MEDICARE

## 2021-09-08 VITALS
OXYGEN SATURATION: 95 % | HEART RATE: 59 BPM | DIASTOLIC BLOOD PRESSURE: 68 MMHG | RESPIRATION RATE: 16 BRPM | WEIGHT: 129.31 LBS | SYSTOLIC BLOOD PRESSURE: 115 MMHG | TEMPERATURE: 98 F | BODY MASS INDEX: 20.29 KG/M2 | HEIGHT: 67 IN

## 2021-09-08 DIAGNOSIS — M85.80 OSTEOPENIA, UNSPECIFIED LOCATION: ICD-10-CM

## 2021-09-08 DIAGNOSIS — M06.041 RHEUMATOID ARTHRITIS INVOLVING BOTH HANDS WITH NEGATIVE RHEUMATOID FACTOR: Primary | ICD-10-CM

## 2021-09-08 DIAGNOSIS — M06.042 RHEUMATOID ARTHRITIS INVOLVING BOTH HANDS WITH NEGATIVE RHEUMATOID FACTOR: Primary | ICD-10-CM

## 2021-09-08 PROCEDURE — 63600175 PHARM REV CODE 636 W HCPCS: Performed by: INTERNAL MEDICINE

## 2021-09-08 PROCEDURE — 96365 THER/PROPH/DIAG IV INF INIT: CPT

## 2021-09-08 PROCEDURE — 25000003 PHARM REV CODE 250: Performed by: INTERNAL MEDICINE

## 2021-09-08 PROCEDURE — 96367 TX/PROPH/DG ADDL SEQ IV INF: CPT

## 2021-09-08 RX ORDER — ACETAMINOPHEN 325 MG/1
650 TABLET ORAL
Status: COMPLETED | OUTPATIENT
Start: 2021-09-08 | End: 2021-09-08

## 2021-09-08 RX ORDER — ACETAMINOPHEN 325 MG/1
650 TABLET ORAL
Status: CANCELLED | OUTPATIENT
Start: 2021-09-29

## 2021-09-08 RX ORDER — SODIUM CHLORIDE 0.9 % (FLUSH) 0.9 %
10 SYRINGE (ML) INJECTION
Status: DISCONTINUED | OUTPATIENT
Start: 2021-09-08 | End: 2021-09-08 | Stop reason: HOSPADM

## 2021-09-08 RX ORDER — HEPARIN 100 UNIT/ML
500 SYRINGE INTRAVENOUS
Status: CANCELLED | OUTPATIENT
Start: 2021-09-29

## 2021-09-08 RX ORDER — ZOLEDRONIC ACID 5 MG/100ML
5 INJECTION, SOLUTION INTRAVENOUS
Status: CANCELLED | OUTPATIENT
Start: 2021-09-29

## 2021-09-08 RX ORDER — SODIUM CHLORIDE 0.9 % (FLUSH) 0.9 %
10 SYRINGE (ML) INJECTION
Status: CANCELLED | OUTPATIENT
Start: 2021-09-29

## 2021-09-08 RX ADMIN — ACETAMINOPHEN 650 MG: 325 TABLET ORAL at 02:09

## 2021-09-08 RX ADMIN — DIPHENHYDRAMINE HYDROCHLORIDE 25 MG: 50 INJECTION INTRAMUSCULAR; INTRAVENOUS at 02:09

## 2021-09-08 RX ADMIN — SODIUM CHLORIDE 750 MG: 9 INJECTION, SOLUTION INTRAVENOUS at 03:09

## 2021-09-09 ENCOUNTER — TELEPHONE (OUTPATIENT)
Dept: INTERNAL MEDICINE | Facility: CLINIC | Age: 76
End: 2021-09-09

## 2021-09-09 ENCOUNTER — CLINICAL SUPPORT (OUTPATIENT)
Dept: INTERNAL MEDICINE | Facility: CLINIC | Age: 76
End: 2021-09-09
Payer: MEDICARE

## 2021-09-09 DIAGNOSIS — L72.3 SEBACEOUS CYST: Primary | ICD-10-CM

## 2021-09-09 PROCEDURE — 96372 PR INJECTION,THERAP/PROPH/DIAG2ST, IM OR SUBCUT: ICD-10-PCS | Mod: S$GLB,,, | Performed by: INTERNAL MEDICINE

## 2021-09-09 PROCEDURE — 96372 THER/PROPH/DIAG INJ SC/IM: CPT | Mod: S$GLB,,, | Performed by: INTERNAL MEDICINE

## 2021-09-09 RX ADMIN — CYANOCOBALAMIN 1000 MCG: 1000 INJECTION, SOLUTION INTRAMUSCULAR; SUBCUTANEOUS at 11:09

## 2021-09-13 ENCOUNTER — OFFICE VISIT (OUTPATIENT)
Dept: PODIATRY | Facility: CLINIC | Age: 76
End: 2021-09-13
Payer: MEDICARE

## 2021-09-13 DIAGNOSIS — L84 HELOMA MOLLE: ICD-10-CM

## 2021-09-13 DIAGNOSIS — L60.3 DYSTROPHIC NAIL: Primary | ICD-10-CM

## 2021-09-13 PROCEDURE — 99499 NO LOS: ICD-10-PCS | Mod: ,,, | Performed by: PODIATRIST

## 2021-09-13 PROCEDURE — 1159F PR MEDICATION LIST DOCUMENTED IN MEDICAL RECORD: ICD-10-PCS | Mod: CPTII,,, | Performed by: PODIATRIST

## 2021-09-13 PROCEDURE — 1160F RVW MEDS BY RX/DR IN RCRD: CPT | Mod: CPTII,,, | Performed by: PODIATRIST

## 2021-09-13 PROCEDURE — 1159F MED LIST DOCD IN RCRD: CPT | Mod: CPTII,,, | Performed by: PODIATRIST

## 2021-09-13 PROCEDURE — 4010F PR ACE/ARB THEARPY RXD/TAKEN: ICD-10-PCS | Mod: CPTII,,, | Performed by: PODIATRIST

## 2021-09-13 PROCEDURE — 17999 PR NON-COVERED FOOT CARE: ICD-10-PCS | Mod: CSM,S$GLB,, | Performed by: PODIATRIST

## 2021-09-13 PROCEDURE — 99999 PR PBB SHADOW E&M-EST. PATIENT-LVL II: CPT | Mod: PBBFAC,,, | Performed by: PODIATRIST

## 2021-09-13 PROCEDURE — 17999 UNLISTD PX SKN MUC MEMB SUBQ: CPT | Mod: CSM,S$GLB,, | Performed by: PODIATRIST

## 2021-09-13 PROCEDURE — 1160F PR REVIEW ALL MEDS BY PRESCRIBER/CLIN PHARMACIST DOCUMENTED: ICD-10-PCS | Mod: CPTII,,, | Performed by: PODIATRIST

## 2021-09-13 PROCEDURE — 99499 UNLISTED E&M SERVICE: CPT | Mod: ,,, | Performed by: PODIATRIST

## 2021-09-13 PROCEDURE — 99999 PR PBB SHADOW E&M-EST. PATIENT-LVL II: ICD-10-PCS | Mod: PBBFAC,,, | Performed by: PODIATRIST

## 2021-09-13 PROCEDURE — 4010F ACE/ARB THERAPY RXD/TAKEN: CPT | Mod: CPTII,,, | Performed by: PODIATRIST

## 2021-10-08 ENCOUNTER — IMMUNIZATION (OUTPATIENT)
Dept: INTERNAL MEDICINE | Facility: CLINIC | Age: 76
End: 2021-10-08
Payer: MEDICARE

## 2021-10-08 ENCOUNTER — INFUSION (OUTPATIENT)
Dept: INFECTIOUS DISEASES | Facility: HOSPITAL | Age: 76
End: 2021-10-08
Attending: INTERNAL MEDICINE
Payer: MEDICARE

## 2021-10-08 ENCOUNTER — CLINICAL SUPPORT (OUTPATIENT)
Dept: INTERNAL MEDICINE | Facility: CLINIC | Age: 76
End: 2021-10-08
Payer: MEDICARE

## 2021-10-08 ENCOUNTER — TELEPHONE (OUTPATIENT)
Dept: INFECTIOUS DISEASES | Facility: HOSPITAL | Age: 76
End: 2021-10-08

## 2021-10-08 VITALS
BODY MASS INDEX: 19.93 KG/M2 | OXYGEN SATURATION: 96 % | HEART RATE: 70 BPM | WEIGHT: 127 LBS | HEIGHT: 67 IN | SYSTOLIC BLOOD PRESSURE: 116 MMHG | DIASTOLIC BLOOD PRESSURE: 72 MMHG | TEMPERATURE: 98 F | RESPIRATION RATE: 17 BRPM

## 2021-10-08 DIAGNOSIS — M85.80 OSTEOPENIA, UNSPECIFIED LOCATION: ICD-10-CM

## 2021-10-08 DIAGNOSIS — M06.042 RHEUMATOID ARTHRITIS INVOLVING BOTH HANDS WITH NEGATIVE RHEUMATOID FACTOR: Primary | ICD-10-CM

## 2021-10-08 DIAGNOSIS — M06.041 RHEUMATOID ARTHRITIS INVOLVING BOTH HANDS WITH NEGATIVE RHEUMATOID FACTOR: Primary | ICD-10-CM

## 2021-10-08 PROCEDURE — 96372 PR INJECTION,THERAP/PROPH/DIAG2ST, IM OR SUBCUT: ICD-10-PCS | Mod: S$GLB,,, | Performed by: INTERNAL MEDICINE

## 2021-10-08 PROCEDURE — G0008 ADMIN INFLUENZA VIRUS VAC: HCPCS | Mod: S$GLB,,, | Performed by: INTERNAL MEDICINE

## 2021-10-08 PROCEDURE — 63600175 PHARM REV CODE 636 W HCPCS: Mod: JA,JG | Performed by: INTERNAL MEDICINE

## 2021-10-08 PROCEDURE — 90694 FLU VACCINE - QUADRIVALENT - ADJUVANTED: ICD-10-PCS | Mod: S$GLB,,, | Performed by: INTERNAL MEDICINE

## 2021-10-08 PROCEDURE — G0008 FLU VACCINE - QUADRIVALENT - ADJUVANTED: ICD-10-PCS | Mod: S$GLB,,, | Performed by: INTERNAL MEDICINE

## 2021-10-08 PROCEDURE — 96367 TX/PROPH/DG ADDL SEQ IV INF: CPT

## 2021-10-08 PROCEDURE — 96372 THER/PROPH/DIAG INJ SC/IM: CPT | Mod: S$GLB,,, | Performed by: INTERNAL MEDICINE

## 2021-10-08 PROCEDURE — 96365 THER/PROPH/DIAG IV INF INIT: CPT

## 2021-10-08 PROCEDURE — 90694 VACC AIIV4 NO PRSRV 0.5ML IM: CPT | Mod: S$GLB,,, | Performed by: INTERNAL MEDICINE

## 2021-10-08 PROCEDURE — 25000003 PHARM REV CODE 250: Performed by: INTERNAL MEDICINE

## 2021-10-08 RX ORDER — SODIUM CHLORIDE 0.9 % (FLUSH) 0.9 %
10 SYRINGE (ML) INJECTION
Status: CANCELLED | OUTPATIENT
Start: 2021-11-05

## 2021-10-08 RX ORDER — HEPARIN 100 UNIT/ML
500 SYRINGE INTRAVENOUS
Status: CANCELLED | OUTPATIENT
Start: 2021-11-05

## 2021-10-08 RX ORDER — ACETAMINOPHEN 325 MG/1
650 TABLET ORAL
Status: CANCELLED | OUTPATIENT
Start: 2021-11-05

## 2021-10-08 RX ORDER — SODIUM CHLORIDE 0.9 % (FLUSH) 0.9 %
10 SYRINGE (ML) INJECTION
Status: DISCONTINUED | OUTPATIENT
Start: 2021-10-08 | End: 2021-10-08 | Stop reason: HOSPADM

## 2021-10-08 RX ORDER — ACETAMINOPHEN 325 MG/1
650 TABLET ORAL
Status: COMPLETED | OUTPATIENT
Start: 2021-10-08 | End: 2021-10-08

## 2021-10-08 RX ORDER — ZOLEDRONIC ACID 5 MG/100ML
5 INJECTION, SOLUTION INTRAVENOUS
Status: CANCELLED | OUTPATIENT
Start: 2021-11-05

## 2021-10-08 RX ORDER — HEPARIN 100 UNIT/ML
500 SYRINGE INTRAVENOUS
Status: DISCONTINUED | OUTPATIENT
Start: 2021-10-08 | End: 2021-10-08 | Stop reason: HOSPADM

## 2021-10-08 RX ADMIN — CYANOCOBALAMIN 1000 MCG: 1000 INJECTION, SOLUTION INTRAMUSCULAR; SUBCUTANEOUS at 01:10

## 2021-10-08 RX ADMIN — ACETAMINOPHEN 650 MG: 325 TABLET ORAL at 11:10

## 2021-10-08 RX ADMIN — DIPHENHYDRAMINE HYDROCHLORIDE 25 MG: 50 INJECTION INTRAMUSCULAR; INTRAVENOUS at 11:10

## 2021-10-08 RX ADMIN — SODIUM CHLORIDE 750 MG: 9 INJECTION, SOLUTION INTRAVENOUS at 12:10

## 2021-10-19 ENCOUNTER — OFFICE VISIT (OUTPATIENT)
Dept: PULMONOLOGY | Facility: CLINIC | Age: 76
End: 2021-10-19
Payer: MEDICARE

## 2021-10-19 VITALS
TEMPERATURE: 97 F | OXYGEN SATURATION: 91 % | HEART RATE: 65 BPM | WEIGHT: 127.44 LBS | SYSTOLIC BLOOD PRESSURE: 110 MMHG | HEIGHT: 67 IN | DIASTOLIC BLOOD PRESSURE: 62 MMHG | BODY MASS INDEX: 20 KG/M2

## 2021-10-19 DIAGNOSIS — J84.9 ILD (INTERSTITIAL LUNG DISEASE): ICD-10-CM

## 2021-10-19 DIAGNOSIS — R91.1 PULMONARY NODULE: ICD-10-CM

## 2021-10-19 PROCEDURE — 99214 PR OFFICE/OUTPT VISIT, EST, LEVL IV, 30-39 MIN: ICD-10-PCS | Mod: HCNC,S$GLB,, | Performed by: INTERNAL MEDICINE

## 2021-10-19 PROCEDURE — 1126F PR PAIN SEVERITY QUANTIFIED, NO PAIN PRESENT: ICD-10-PCS | Mod: HCNC,CPTII,S$GLB, | Performed by: INTERNAL MEDICINE

## 2021-10-19 PROCEDURE — 1126F AMNT PAIN NOTED NONE PRSNT: CPT | Mod: HCNC,CPTII,S$GLB, | Performed by: INTERNAL MEDICINE

## 2021-10-19 PROCEDURE — 4010F PR ACE/ARB THEARPY RXD/TAKEN: ICD-10-PCS | Mod: HCNC,CPTII,S$GLB, | Performed by: INTERNAL MEDICINE

## 2021-10-19 PROCEDURE — 1159F MED LIST DOCD IN RCRD: CPT | Mod: HCNC,CPTII,S$GLB, | Performed by: INTERNAL MEDICINE

## 2021-10-19 PROCEDURE — 1101F PR PT FALLS ASSESS DOC 0-1 FALLS W/OUT INJ PAST YR: ICD-10-PCS | Mod: HCNC,CPTII,S$GLB, | Performed by: INTERNAL MEDICINE

## 2021-10-19 PROCEDURE — 3078F PR MOST RECENT DIASTOLIC BLOOD PRESSURE < 80 MM HG: ICD-10-PCS | Mod: HCNC,CPTII,S$GLB, | Performed by: INTERNAL MEDICINE

## 2021-10-19 PROCEDURE — 99499 UNLISTED E&M SERVICE: CPT | Mod: S$GLB,,, | Performed by: INTERNAL MEDICINE

## 2021-10-19 PROCEDURE — 99999 PR PBB SHADOW E&M-EST. PATIENT-LVL IV: ICD-10-PCS | Mod: PBBFAC,HCNC,, | Performed by: INTERNAL MEDICINE

## 2021-10-19 PROCEDURE — 4010F ACE/ARB THERAPY RXD/TAKEN: CPT | Mod: HCNC,CPTII,S$GLB, | Performed by: INTERNAL MEDICINE

## 2021-10-19 PROCEDURE — 3074F SYST BP LT 130 MM HG: CPT | Mod: HCNC,CPTII,S$GLB, | Performed by: INTERNAL MEDICINE

## 2021-10-19 PROCEDURE — 1101F PT FALLS ASSESS-DOCD LE1/YR: CPT | Mod: HCNC,CPTII,S$GLB, | Performed by: INTERNAL MEDICINE

## 2021-10-19 PROCEDURE — 3074F PR MOST RECENT SYSTOLIC BLOOD PRESSURE < 130 MM HG: ICD-10-PCS | Mod: HCNC,CPTII,S$GLB, | Performed by: INTERNAL MEDICINE

## 2021-10-19 PROCEDURE — 3288F FALL RISK ASSESSMENT DOCD: CPT | Mod: HCNC,CPTII,S$GLB, | Performed by: INTERNAL MEDICINE

## 2021-10-19 PROCEDURE — 3078F DIAST BP <80 MM HG: CPT | Mod: HCNC,CPTII,S$GLB, | Performed by: INTERNAL MEDICINE

## 2021-10-19 PROCEDURE — 99499 RISK ADDL DX/OHS AUDIT: ICD-10-PCS | Mod: S$GLB,,, | Performed by: INTERNAL MEDICINE

## 2021-10-19 PROCEDURE — 1159F PR MEDICATION LIST DOCUMENTED IN MEDICAL RECORD: ICD-10-PCS | Mod: HCNC,CPTII,S$GLB, | Performed by: INTERNAL MEDICINE

## 2021-10-19 PROCEDURE — 3288F PR FALLS RISK ASSESSMENT DOCUMENTED: ICD-10-PCS | Mod: HCNC,CPTII,S$GLB, | Performed by: INTERNAL MEDICINE

## 2021-10-19 PROCEDURE — 99999 PR PBB SHADOW E&M-EST. PATIENT-LVL IV: CPT | Mod: PBBFAC,HCNC,, | Performed by: INTERNAL MEDICINE

## 2021-10-19 PROCEDURE — 99214 OFFICE O/P EST MOD 30 MIN: CPT | Mod: HCNC,S$GLB,, | Performed by: INTERNAL MEDICINE

## 2021-10-20 ENCOUNTER — HOSPITAL ENCOUNTER (OUTPATIENT)
Dept: PULMONOLOGY | Facility: CLINIC | Age: 76
Discharge: HOME | End: 2021-10-20
Payer: MEDICARE

## 2021-10-20 DIAGNOSIS — J84.9 ILD (INTERSTITIAL LUNG DISEASE): ICD-10-CM

## 2021-10-20 PROCEDURE — 94729 PR C02/MEMBANE DIFFUSE CAPACITY: ICD-10-PCS | Mod: HCNC,S$GLB,, | Performed by: INTERNAL MEDICINE

## 2021-10-20 PROCEDURE — 94727 GAS DIL/WSHOT DETER LNG VOL: CPT | Mod: HCNC,S$GLB,, | Performed by: INTERNAL MEDICINE

## 2021-10-20 PROCEDURE — 94060 PR EVAL OF BRONCHOSPASM: ICD-10-PCS | Mod: HCNC,S$GLB,, | Performed by: INTERNAL MEDICINE

## 2021-10-20 PROCEDURE — 94060 EVALUATION OF WHEEZING: CPT | Mod: HCNC,S$GLB,, | Performed by: INTERNAL MEDICINE

## 2021-10-20 PROCEDURE — 94729 DIFFUSING CAPACITY: CPT | Mod: HCNC,S$GLB,, | Performed by: INTERNAL MEDICINE

## 2021-10-20 PROCEDURE — 94727 PR PULM FUNCTION TEST BY GAS: ICD-10-PCS | Mod: HCNC,S$GLB,, | Performed by: INTERNAL MEDICINE

## 2021-10-21 ENCOUNTER — OFFICE VISIT (OUTPATIENT)
Dept: PODIATRY | Facility: CLINIC | Age: 76
End: 2021-10-21
Payer: MEDICARE

## 2021-10-21 VITALS
HEIGHT: 67 IN | BODY MASS INDEX: 20 KG/M2 | SYSTOLIC BLOOD PRESSURE: 113 MMHG | WEIGHT: 127.44 LBS | HEART RATE: 71 BPM | DIASTOLIC BLOOD PRESSURE: 55 MMHG

## 2021-10-21 DIAGNOSIS — L84 CORN OR CALLUS: Primary | ICD-10-CM

## 2021-10-21 DIAGNOSIS — L60.3 DYSTROPHIC NAIL: ICD-10-CM

## 2021-10-21 DIAGNOSIS — Z98.890 HISTORY OF FOOT SURGERY: ICD-10-CM

## 2021-10-21 PROCEDURE — 1101F PR PT FALLS ASSESS DOC 0-1 FALLS W/OUT INJ PAST YR: ICD-10-PCS | Mod: HCNC,CPTII,S$GLB, | Performed by: PODIATRIST

## 2021-10-21 PROCEDURE — 99213 PR OFFICE/OUTPT VISIT, EST, LEVL III, 20-29 MIN: ICD-10-PCS | Mod: HCNC,S$GLB,, | Performed by: PODIATRIST

## 2021-10-21 PROCEDURE — 3288F PR FALLS RISK ASSESSMENT DOCUMENTED: ICD-10-PCS | Mod: HCNC,CPTII,S$GLB, | Performed by: PODIATRIST

## 2021-10-21 PROCEDURE — 1126F AMNT PAIN NOTED NONE PRSNT: CPT | Mod: HCNC,CPTII,S$GLB, | Performed by: PODIATRIST

## 2021-10-21 PROCEDURE — 99999 PR PBB SHADOW E&M-EST. PATIENT-LVL IV: CPT | Mod: PBBFAC,HCNC,, | Performed by: PODIATRIST

## 2021-10-21 PROCEDURE — 3078F PR MOST RECENT DIASTOLIC BLOOD PRESSURE < 80 MM HG: ICD-10-PCS | Mod: HCNC,CPTII,S$GLB, | Performed by: PODIATRIST

## 2021-10-21 PROCEDURE — 4010F ACE/ARB THERAPY RXD/TAKEN: CPT | Mod: HCNC,CPTII,S$GLB, | Performed by: PODIATRIST

## 2021-10-21 PROCEDURE — 3288F FALL RISK ASSESSMENT DOCD: CPT | Mod: HCNC,CPTII,S$GLB, | Performed by: PODIATRIST

## 2021-10-21 PROCEDURE — 4010F PR ACE/ARB THEARPY RXD/TAKEN: ICD-10-PCS | Mod: HCNC,CPTII,S$GLB, | Performed by: PODIATRIST

## 2021-10-21 PROCEDURE — 3074F PR MOST RECENT SYSTOLIC BLOOD PRESSURE < 130 MM HG: ICD-10-PCS | Mod: HCNC,CPTII,S$GLB, | Performed by: PODIATRIST

## 2021-10-21 PROCEDURE — 99999 PR PBB SHADOW E&M-EST. PATIENT-LVL IV: ICD-10-PCS | Mod: PBBFAC,HCNC,, | Performed by: PODIATRIST

## 2021-10-21 PROCEDURE — 1159F PR MEDICATION LIST DOCUMENTED IN MEDICAL RECORD: ICD-10-PCS | Mod: HCNC,CPTII,S$GLB, | Performed by: PODIATRIST

## 2021-10-21 PROCEDURE — 1159F MED LIST DOCD IN RCRD: CPT | Mod: HCNC,CPTII,S$GLB, | Performed by: PODIATRIST

## 2021-10-21 PROCEDURE — 1126F PR PAIN SEVERITY QUANTIFIED, NO PAIN PRESENT: ICD-10-PCS | Mod: HCNC,CPTII,S$GLB, | Performed by: PODIATRIST

## 2021-10-21 PROCEDURE — 3074F SYST BP LT 130 MM HG: CPT | Mod: HCNC,CPTII,S$GLB, | Performed by: PODIATRIST

## 2021-10-21 PROCEDURE — 1101F PT FALLS ASSESS-DOCD LE1/YR: CPT | Mod: HCNC,CPTII,S$GLB, | Performed by: PODIATRIST

## 2021-10-21 PROCEDURE — 1160F RVW MEDS BY RX/DR IN RCRD: CPT | Mod: HCNC,CPTII,S$GLB, | Performed by: PODIATRIST

## 2021-10-21 PROCEDURE — 1160F PR REVIEW ALL MEDS BY PRESCRIBER/CLIN PHARMACIST DOCUMENTED: ICD-10-PCS | Mod: HCNC,CPTII,S$GLB, | Performed by: PODIATRIST

## 2021-10-21 PROCEDURE — 3078F DIAST BP <80 MM HG: CPT | Mod: HCNC,CPTII,S$GLB, | Performed by: PODIATRIST

## 2021-10-21 PROCEDURE — 99213 OFFICE O/P EST LOW 20 MIN: CPT | Mod: HCNC,S$GLB,, | Performed by: PODIATRIST

## 2021-10-24 ENCOUNTER — PATIENT MESSAGE (OUTPATIENT)
Dept: PULMONOLOGY | Facility: CLINIC | Age: 76
End: 2021-10-24
Payer: MEDICARE

## 2021-11-03 ENCOUNTER — INFUSION (OUTPATIENT)
Dept: INFECTIOUS DISEASES | Facility: HOSPITAL | Age: 76
End: 2021-11-03
Attending: INTERNAL MEDICINE
Payer: MEDICARE

## 2021-11-03 VITALS
HEART RATE: 56 BPM | TEMPERATURE: 98 F | RESPIRATION RATE: 16 BRPM | WEIGHT: 127.75 LBS | OXYGEN SATURATION: 98 % | BODY MASS INDEX: 20.05 KG/M2 | HEIGHT: 67 IN | SYSTOLIC BLOOD PRESSURE: 127 MMHG | DIASTOLIC BLOOD PRESSURE: 79 MMHG

## 2021-11-03 DIAGNOSIS — M85.80 OSTEOPENIA, UNSPECIFIED LOCATION: ICD-10-CM

## 2021-11-03 DIAGNOSIS — M06.042 RHEUMATOID ARTHRITIS INVOLVING BOTH HANDS WITH NEGATIVE RHEUMATOID FACTOR: Primary | ICD-10-CM

## 2021-11-03 DIAGNOSIS — M06.041 RHEUMATOID ARTHRITIS INVOLVING BOTH HANDS WITH NEGATIVE RHEUMATOID FACTOR: Primary | ICD-10-CM

## 2021-11-03 PROCEDURE — 96365 THER/PROPH/DIAG IV INF INIT: CPT | Mod: HCNC

## 2021-11-03 PROCEDURE — 63600175 PHARM REV CODE 636 W HCPCS: Mod: JA,JG,HCNC | Performed by: INTERNAL MEDICINE

## 2021-11-03 PROCEDURE — 25000003 PHARM REV CODE 250: Mod: HCNC | Performed by: INTERNAL MEDICINE

## 2021-11-03 PROCEDURE — 96367 TX/PROPH/DG ADDL SEQ IV INF: CPT | Mod: HCNC

## 2021-11-03 RX ORDER — SODIUM CHLORIDE 0.9 % (FLUSH) 0.9 %
10 SYRINGE (ML) INJECTION
Status: DISCONTINUED | OUTPATIENT
Start: 2021-11-03 | End: 2021-11-03 | Stop reason: HOSPADM

## 2021-11-03 RX ORDER — SODIUM CHLORIDE 0.9 % (FLUSH) 0.9 %
10 SYRINGE (ML) INJECTION
Status: CANCELLED | OUTPATIENT
Start: 2021-12-01

## 2021-11-03 RX ORDER — ACETAMINOPHEN 325 MG/1
650 TABLET ORAL
Status: CANCELLED | OUTPATIENT
Start: 2021-12-01

## 2021-11-03 RX ORDER — HEPARIN 100 UNIT/ML
500 SYRINGE INTRAVENOUS
Status: CANCELLED | OUTPATIENT
Start: 2021-12-01

## 2021-11-03 RX ORDER — ACETAMINOPHEN 325 MG/1
650 TABLET ORAL
Status: COMPLETED | OUTPATIENT
Start: 2021-11-03 | End: 2021-11-03

## 2021-11-03 RX ORDER — ZOLEDRONIC ACID 5 MG/100ML
5 INJECTION, SOLUTION INTRAVENOUS
Status: CANCELLED | OUTPATIENT
Start: 2021-12-01

## 2021-11-03 RX ADMIN — ACETAMINOPHEN 650 MG: 325 TABLET ORAL at 02:11

## 2021-11-03 RX ADMIN — DIPHENHYDRAMINE HYDROCHLORIDE 25 MG: 50 INJECTION INTRAMUSCULAR; INTRAVENOUS at 02:11

## 2021-11-03 RX ADMIN — SODIUM CHLORIDE 750 MG: 9 INJECTION, SOLUTION INTRAVENOUS at 02:11

## 2021-11-11 ENCOUNTER — PATIENT MESSAGE (OUTPATIENT)
Dept: RHEUMATOLOGY | Facility: CLINIC | Age: 76
End: 2021-11-11
Payer: MEDICARE

## 2021-11-16 ENCOUNTER — PATIENT MESSAGE (OUTPATIENT)
Dept: INTERNAL MEDICINE | Facility: CLINIC | Age: 76
End: 2021-11-16
Payer: MEDICARE

## 2021-11-18 ENCOUNTER — OFFICE VISIT (OUTPATIENT)
Dept: INTERNAL MEDICINE | Facility: CLINIC | Age: 76
End: 2021-11-18
Payer: MEDICARE

## 2021-11-18 ENCOUNTER — OFFICE VISIT (OUTPATIENT)
Dept: ORTHOPEDICS | Facility: CLINIC | Age: 76
End: 2021-11-18
Payer: MEDICARE

## 2021-11-18 ENCOUNTER — CLINICAL SUPPORT (OUTPATIENT)
Dept: INTERNAL MEDICINE | Facility: CLINIC | Age: 76
End: 2021-11-18
Payer: MEDICARE

## 2021-11-18 ENCOUNTER — TELEPHONE (OUTPATIENT)
Dept: INTERNAL MEDICINE | Facility: CLINIC | Age: 76
End: 2021-11-18

## 2021-11-18 ENCOUNTER — HOSPITAL ENCOUNTER (OUTPATIENT)
Dept: RADIOLOGY | Facility: HOSPITAL | Age: 76
Discharge: HOME | End: 2021-11-18
Attending: INTERNAL MEDICINE
Payer: MEDICARE

## 2021-11-18 VITALS
SYSTOLIC BLOOD PRESSURE: 102 MMHG | HEART RATE: 46 BPM | HEIGHT: 67 IN | DIASTOLIC BLOOD PRESSURE: 71 MMHG | BODY MASS INDEX: 20.07 KG/M2 | OXYGEN SATURATION: 97 % | WEIGHT: 127.88 LBS

## 2021-11-18 VITALS — BODY MASS INDEX: 20.07 KG/M2 | HEIGHT: 67 IN | WEIGHT: 127.88 LBS

## 2021-11-18 DIAGNOSIS — S42.017A CLOSED NONDISPLACED FRACTURE OF STERNAL END OF RIGHT CLAVICLE, INITIAL ENCOUNTER: ICD-10-CM

## 2021-11-18 DIAGNOSIS — M25.511 PAIN OF RIGHT STERNOCLAVICULAR JOINT: ICD-10-CM

## 2021-11-18 DIAGNOSIS — M25.511 PAIN OF RIGHT STERNOCLAVICULAR JOINT: Primary | ICD-10-CM

## 2021-11-18 PROCEDURE — 73030 XR SHOULDER TRAUMA 3 VIEW RIGHT: ICD-10-PCS | Mod: 26,HCNC,RT, | Performed by: RADIOLOGY

## 2021-11-18 PROCEDURE — 3288F PR FALLS RISK ASSESSMENT DOCUMENTED: ICD-10-PCS | Mod: HCNC,CPTII,S$GLB, | Performed by: INTERNAL MEDICINE

## 2021-11-18 PROCEDURE — 1100F PR PT FALLS ASSESS DOC 2+ FALLS/FALL W/INJURY/YR: ICD-10-PCS | Mod: HCNC,CPTII,S$GLB, | Performed by: INTERNAL MEDICINE

## 2021-11-18 PROCEDURE — 1125F AMNT PAIN NOTED PAIN PRSNT: CPT | Mod: HCNC,CPTII,S$GLB, | Performed by: INTERNAL MEDICINE

## 2021-11-18 PROCEDURE — 1160F PR REVIEW ALL MEDS BY PRESCRIBER/CLIN PHARMACIST DOCUMENTED: ICD-10-PCS | Mod: HCNC,CPTII,S$GLB, | Performed by: PHYSICIAN ASSISTANT

## 2021-11-18 PROCEDURE — 73030 X-RAY EXAM OF SHOULDER: CPT | Mod: 26,HCNC,RT, | Performed by: RADIOLOGY

## 2021-11-18 PROCEDURE — 96372 PR INJECTION,THERAP/PROPH/DIAG2ST, IM OR SUBCUT: ICD-10-PCS | Mod: HCNC,S$GLB,, | Performed by: INTERNAL MEDICINE

## 2021-11-18 PROCEDURE — 1160F RVW MEDS BY RX/DR IN RCRD: CPT | Mod: HCNC,CPTII,S$GLB, | Performed by: PHYSICIAN ASSISTANT

## 2021-11-18 PROCEDURE — 1159F MED LIST DOCD IN RCRD: CPT | Mod: HCNC,CPTII,S$GLB, | Performed by: PHYSICIAN ASSISTANT

## 2021-11-18 PROCEDURE — 1125F PR PAIN SEVERITY QUANTIFIED, PAIN PRESENT: ICD-10-PCS | Mod: HCNC,CPTII,S$GLB, | Performed by: INTERNAL MEDICINE

## 2021-11-18 PROCEDURE — 3074F SYST BP LT 130 MM HG: CPT | Mod: HCNC,CPTII,S$GLB, | Performed by: INTERNAL MEDICINE

## 2021-11-18 PROCEDURE — 1160F PR REVIEW ALL MEDS BY PRESCRIBER/CLIN PHARMACIST DOCUMENTED: ICD-10-PCS | Mod: HCNC,CPTII,S$GLB, | Performed by: INTERNAL MEDICINE

## 2021-11-18 PROCEDURE — 99999 PR PBB SHADOW E&M-EST. PATIENT-LVL IV: CPT | Mod: PBBFAC,HCNC,, | Performed by: PHYSICIAN ASSISTANT

## 2021-11-18 PROCEDURE — 1100F PTFALLS ASSESS-DOCD GE2>/YR: CPT | Mod: HCNC,CPTII,S$GLB, | Performed by: PHYSICIAN ASSISTANT

## 2021-11-18 PROCEDURE — 1100F PTFALLS ASSESS-DOCD GE2>/YR: CPT | Mod: HCNC,CPTII,S$GLB, | Performed by: INTERNAL MEDICINE

## 2021-11-18 PROCEDURE — 4010F PR ACE/ARB THEARPY RXD/TAKEN: ICD-10-PCS | Mod: HCNC,CPTII,S$GLB, | Performed by: INTERNAL MEDICINE

## 2021-11-18 PROCEDURE — 1125F AMNT PAIN NOTED PAIN PRSNT: CPT | Mod: HCNC,CPTII,S$GLB, | Performed by: PHYSICIAN ASSISTANT

## 2021-11-18 PROCEDURE — 3074F PR MOST RECENT SYSTOLIC BLOOD PRESSURE < 130 MM HG: ICD-10-PCS | Mod: HCNC,CPTII,S$GLB, | Performed by: INTERNAL MEDICINE

## 2021-11-18 PROCEDURE — 1100F PR PT FALLS ASSESS DOC 2+ FALLS/FALL W/INJURY/YR: ICD-10-PCS | Mod: HCNC,CPTII,S$GLB, | Performed by: PHYSICIAN ASSISTANT

## 2021-11-18 PROCEDURE — 4010F ACE/ARB THERAPY RXD/TAKEN: CPT | Mod: HCNC,CPTII,S$GLB, | Performed by: INTERNAL MEDICINE

## 2021-11-18 PROCEDURE — 99999 PR PBB SHADOW E&M-EST. PATIENT-LVL IV: ICD-10-PCS | Mod: PBBFAC,HCNC,, | Performed by: PHYSICIAN ASSISTANT

## 2021-11-18 PROCEDURE — 99999 PR PBB SHADOW E&M-EST. PATIENT-LVL V: CPT | Mod: PBBFAC,HCNC,, | Performed by: INTERNAL MEDICINE

## 2021-11-18 PROCEDURE — 4010F PR ACE/ARB THEARPY RXD/TAKEN: ICD-10-PCS | Mod: HCNC,CPTII,S$GLB, | Performed by: PHYSICIAN ASSISTANT

## 2021-11-18 PROCEDURE — 3078F DIAST BP <80 MM HG: CPT | Mod: HCNC,CPTII,S$GLB, | Performed by: INTERNAL MEDICINE

## 2021-11-18 PROCEDURE — 3288F FALL RISK ASSESSMENT DOCD: CPT | Mod: HCNC,CPTII,S$GLB, | Performed by: PHYSICIAN ASSISTANT

## 2021-11-18 PROCEDURE — 3078F PR MOST RECENT DIASTOLIC BLOOD PRESSURE < 80 MM HG: ICD-10-PCS | Mod: HCNC,CPTII,S$GLB, | Performed by: INTERNAL MEDICINE

## 2021-11-18 PROCEDURE — 73030 X-RAY EXAM OF SHOULDER: CPT | Mod: TC,HCNC,RT

## 2021-11-18 PROCEDURE — 3288F FALL RISK ASSESSMENT DOCD: CPT | Mod: HCNC,CPTII,S$GLB, | Performed by: INTERNAL MEDICINE

## 2021-11-18 PROCEDURE — 3288F PR FALLS RISK ASSESSMENT DOCUMENTED: ICD-10-PCS | Mod: HCNC,CPTII,S$GLB, | Performed by: PHYSICIAN ASSISTANT

## 2021-11-18 PROCEDURE — 99203 OFFICE O/P NEW LOW 30 MIN: CPT | Mod: HCNC,S$GLB,, | Performed by: PHYSICIAN ASSISTANT

## 2021-11-18 PROCEDURE — 4010F ACE/ARB THERAPY RXD/TAKEN: CPT | Mod: HCNC,CPTII,S$GLB, | Performed by: PHYSICIAN ASSISTANT

## 2021-11-18 PROCEDURE — 1159F PR MEDICATION LIST DOCUMENTED IN MEDICAL RECORD: ICD-10-PCS | Mod: HCNC,CPTII,S$GLB, | Performed by: INTERNAL MEDICINE

## 2021-11-18 PROCEDURE — 99203 PR OFFICE/OUTPT VISIT, NEW, LEVL III, 30-44 MIN: ICD-10-PCS | Mod: HCNC,S$GLB,, | Performed by: PHYSICIAN ASSISTANT

## 2021-11-18 PROCEDURE — 1159F PR MEDICATION LIST DOCUMENTED IN MEDICAL RECORD: ICD-10-PCS | Mod: HCNC,CPTII,S$GLB, | Performed by: PHYSICIAN ASSISTANT

## 2021-11-18 PROCEDURE — 1125F PR PAIN SEVERITY QUANTIFIED, PAIN PRESENT: ICD-10-PCS | Mod: HCNC,CPTII,S$GLB, | Performed by: PHYSICIAN ASSISTANT

## 2021-11-18 PROCEDURE — 1159F MED LIST DOCD IN RCRD: CPT | Mod: HCNC,CPTII,S$GLB, | Performed by: INTERNAL MEDICINE

## 2021-11-18 PROCEDURE — 96372 THER/PROPH/DIAG INJ SC/IM: CPT | Mod: HCNC,S$GLB,, | Performed by: INTERNAL MEDICINE

## 2021-11-18 PROCEDURE — 99999 PR PBB SHADOW E&M-EST. PATIENT-LVL V: ICD-10-PCS | Mod: PBBFAC,HCNC,, | Performed by: INTERNAL MEDICINE

## 2021-11-18 PROCEDURE — 99213 OFFICE O/P EST LOW 20 MIN: CPT | Mod: HCNC,S$GLB,, | Performed by: INTERNAL MEDICINE

## 2021-11-18 PROCEDURE — 99213 PR OFFICE/OUTPT VISIT, EST, LEVL III, 20-29 MIN: ICD-10-PCS | Mod: HCNC,S$GLB,, | Performed by: INTERNAL MEDICINE

## 2021-11-18 PROCEDURE — 1160F RVW MEDS BY RX/DR IN RCRD: CPT | Mod: HCNC,CPTII,S$GLB, | Performed by: INTERNAL MEDICINE

## 2021-11-18 RX ORDER — ACETAMINOPHEN AND CODEINE PHOSPHATE 300; 15 MG/1; MG/1
1 TABLET ORAL EVERY 4 HOURS PRN
Qty: 20 TABLET | Refills: 0 | Status: SHIPPED | OUTPATIENT
Start: 2021-11-18 | End: 2021-11-28

## 2021-11-18 RX ADMIN — CYANOCOBALAMIN 1000 MCG: 1000 INJECTION, SOLUTION INTRAMUSCULAR at 04:11

## 2021-11-19 ENCOUNTER — TELEPHONE (OUTPATIENT)
Dept: ORTHOPEDICS | Facility: CLINIC | Age: 76
End: 2021-11-19
Payer: MEDICARE

## 2021-11-19 ENCOUNTER — PATIENT MESSAGE (OUTPATIENT)
Dept: INTERNAL MEDICINE | Facility: CLINIC | Age: 76
End: 2021-11-19
Payer: MEDICARE

## 2021-11-19 RX ORDER — TRAMADOL HYDROCHLORIDE 50 MG/1
50 TABLET ORAL EVERY 6 HOURS PRN
Qty: 20 TABLET | Refills: 0 | Status: SHIPPED | OUTPATIENT
Start: 2021-11-19 | End: 2022-04-04

## 2021-11-20 ENCOUNTER — PATIENT MESSAGE (OUTPATIENT)
Dept: ORTHOPEDICS | Facility: CLINIC | Age: 76
End: 2021-11-20
Payer: MEDICARE

## 2021-11-23 ENCOUNTER — TELEPHONE (OUTPATIENT)
Dept: RHEUMATOLOGY | Facility: CLINIC | Age: 76
End: 2021-11-23

## 2021-11-23 ENCOUNTER — OFFICE VISIT (OUTPATIENT)
Dept: RHEUMATOLOGY | Facility: CLINIC | Age: 76
End: 2021-11-23
Payer: MEDICARE

## 2021-11-23 ENCOUNTER — HOSPITAL ENCOUNTER (OUTPATIENT)
Dept: RADIOLOGY | Facility: CLINIC | Age: 76
Discharge: HOME | End: 2021-11-23
Attending: INTERNAL MEDICINE
Payer: MEDICARE

## 2021-11-23 VITALS
HEART RATE: 46 BPM | HEIGHT: 67 IN | WEIGHT: 130 LBS | SYSTOLIC BLOOD PRESSURE: 133 MMHG | DIASTOLIC BLOOD PRESSURE: 83 MMHG | BODY MASS INDEX: 20.4 KG/M2

## 2021-11-23 DIAGNOSIS — M81.0 OSTEOPOROSIS, UNSPECIFIED OSTEOPOROSIS TYPE, UNSPECIFIED PATHOLOGICAL FRACTURE PRESENCE: ICD-10-CM

## 2021-11-23 DIAGNOSIS — M06.09 RHEUMATOID ARTHRITIS OF MULTIPLE SITES WITH NEGATIVE RHEUMATOID FACTOR: Primary | ICD-10-CM

## 2021-11-23 PROCEDURE — 99999 PR PBB SHADOW E&M-EST. PATIENT-LVL IV: CPT | Mod: PBBFAC,HCNC,, | Performed by: INTERNAL MEDICINE

## 2021-11-23 PROCEDURE — 77080 DEXA BONE DENSITY SPINE HIP: ICD-10-PCS | Mod: 26,HCNC,, | Performed by: INTERNAL MEDICINE

## 2021-11-23 PROCEDURE — 4010F PR ACE/ARB THEARPY RXD/TAKEN: ICD-10-PCS | Mod: HCNC,CPTII,S$GLB, | Performed by: INTERNAL MEDICINE

## 2021-11-23 PROCEDURE — 4010F ACE/ARB THERAPY RXD/TAKEN: CPT | Mod: HCNC,CPTII,S$GLB, | Performed by: INTERNAL MEDICINE

## 2021-11-23 PROCEDURE — 77080 DXA BONE DENSITY AXIAL: CPT | Mod: 26,HCNC,, | Performed by: INTERNAL MEDICINE

## 2021-11-23 PROCEDURE — 99213 OFFICE O/P EST LOW 20 MIN: CPT | Mod: HCNC,S$GLB,, | Performed by: INTERNAL MEDICINE

## 2021-11-23 PROCEDURE — 77080 DXA BONE DENSITY AXIAL: CPT | Mod: TC,HCNC

## 2021-11-23 PROCEDURE — 99999 PR PBB SHADOW E&M-EST. PATIENT-LVL IV: ICD-10-PCS | Mod: PBBFAC,HCNC,, | Performed by: INTERNAL MEDICINE

## 2021-11-23 PROCEDURE — 99213 PR OFFICE/OUTPT VISIT, EST, LEVL III, 20-29 MIN: ICD-10-PCS | Mod: HCNC,S$GLB,, | Performed by: INTERNAL MEDICINE

## 2021-11-25 ENCOUNTER — PATIENT MESSAGE (OUTPATIENT)
Dept: RHEUMATOLOGY | Facility: CLINIC | Age: 76
End: 2021-11-25
Payer: MEDICARE

## 2021-11-29 ENCOUNTER — TELEPHONE (OUTPATIENT)
Dept: RHEUMATOLOGY | Facility: CLINIC | Age: 76
End: 2021-11-29
Payer: MEDICARE

## 2021-12-03 ENCOUNTER — HOSPITAL ENCOUNTER (OUTPATIENT)
Dept: RADIOLOGY | Facility: HOSPITAL | Age: 76
Discharge: HOME | End: 2021-12-03
Attending: PHYSICIAN ASSISTANT
Payer: MEDICARE

## 2021-12-03 ENCOUNTER — OFFICE VISIT (OUTPATIENT)
Dept: ORTHOPEDICS | Facility: CLINIC | Age: 76
End: 2021-12-03
Payer: MEDICARE

## 2021-12-03 ENCOUNTER — INFUSION (OUTPATIENT)
Dept: INFECTIOUS DISEASES | Facility: HOSPITAL | Age: 76
End: 2021-12-03
Attending: INTERNAL MEDICINE
Payer: MEDICARE

## 2021-12-03 ENCOUNTER — PATIENT MESSAGE (OUTPATIENT)
Dept: RHEUMATOLOGY | Facility: CLINIC | Age: 76
End: 2021-12-03
Payer: MEDICARE

## 2021-12-03 VITALS
DIASTOLIC BLOOD PRESSURE: 77 MMHG | HEART RATE: 66 BPM | BODY MASS INDEX: 20.41 KG/M2 | WEIGHT: 130.06 LBS | RESPIRATION RATE: 18 BRPM | SYSTOLIC BLOOD PRESSURE: 119 MMHG | HEIGHT: 67 IN

## 2021-12-03 VITALS
WEIGHT: 127.44 LBS | DIASTOLIC BLOOD PRESSURE: 65 MMHG | RESPIRATION RATE: 16 BRPM | TEMPERATURE: 98 F | OXYGEN SATURATION: 95 % | SYSTOLIC BLOOD PRESSURE: 121 MMHG | BODY MASS INDEX: 19.84 KG/M2 | HEART RATE: 58 BPM

## 2021-12-03 DIAGNOSIS — M06.041 RHEUMATOID ARTHRITIS INVOLVING BOTH HANDS WITH NEGATIVE RHEUMATOID FACTOR: Primary | ICD-10-CM

## 2021-12-03 DIAGNOSIS — M85.80 OSTEOPENIA, UNSPECIFIED LOCATION: ICD-10-CM

## 2021-12-03 DIAGNOSIS — S42.017D CLOSED NONDISPLACED FRACTURE OF STERNAL END OF RIGHT CLAVICLE WITH ROUTINE HEALING, SUBSEQUENT ENCOUNTER: ICD-10-CM

## 2021-12-03 DIAGNOSIS — S42.017D CLOSED NONDISPLACED FRACTURE OF STERNAL END OF RIGHT CLAVICLE WITH ROUTINE HEALING, SUBSEQUENT ENCOUNTER: Primary | ICD-10-CM

## 2021-12-03 DIAGNOSIS — M06.042 RHEUMATOID ARTHRITIS INVOLVING BOTH HANDS WITH NEGATIVE RHEUMATOID FACTOR: Primary | ICD-10-CM

## 2021-12-03 PROCEDURE — 96367 TX/PROPH/DG ADDL SEQ IV INF: CPT | Mod: HCNC

## 2021-12-03 PROCEDURE — 99999 PR PBB SHADOW E&M-EST. PATIENT-LVL IV: CPT | Mod: PBBFAC,HCNC,, | Performed by: PHYSICIAN ASSISTANT

## 2021-12-03 PROCEDURE — 73000 X-RAY EXAM OF COLLAR BONE: CPT | Mod: 26,HCNC,RT, | Performed by: RADIOLOGY

## 2021-12-03 PROCEDURE — 4010F PR ACE/ARB THEARPY RXD/TAKEN: ICD-10-PCS | Mod: HCNC,CPTII,S$GLB, | Performed by: PHYSICIAN ASSISTANT

## 2021-12-03 PROCEDURE — 73000 X-RAY EXAM OF COLLAR BONE: CPT | Mod: TC,HCNC,RT

## 2021-12-03 PROCEDURE — 99213 OFFICE O/P EST LOW 20 MIN: CPT | Mod: HCNC,S$GLB,, | Performed by: PHYSICIAN ASSISTANT

## 2021-12-03 PROCEDURE — 99213 PR OFFICE/OUTPT VISIT, EST, LEVL III, 20-29 MIN: ICD-10-PCS | Mod: HCNC,S$GLB,, | Performed by: PHYSICIAN ASSISTANT

## 2021-12-03 PROCEDURE — 96365 THER/PROPH/DIAG IV INF INIT: CPT | Mod: HCNC

## 2021-12-03 PROCEDURE — 63600175 PHARM REV CODE 636 W HCPCS: Mod: JA,JG,HCNC | Performed by: INTERNAL MEDICINE

## 2021-12-03 PROCEDURE — 99999 PR PBB SHADOW E&M-EST. PATIENT-LVL IV: ICD-10-PCS | Mod: PBBFAC,HCNC,, | Performed by: PHYSICIAN ASSISTANT

## 2021-12-03 PROCEDURE — 73000 XR CLAVICLE RIGHT: ICD-10-PCS | Mod: 26,HCNC,RT, | Performed by: RADIOLOGY

## 2021-12-03 PROCEDURE — 25000003 PHARM REV CODE 250: Mod: HCNC | Performed by: INTERNAL MEDICINE

## 2021-12-03 PROCEDURE — 4010F ACE/ARB THERAPY RXD/TAKEN: CPT | Mod: HCNC,CPTII,S$GLB, | Performed by: PHYSICIAN ASSISTANT

## 2021-12-03 RX ORDER — HEPARIN 100 UNIT/ML
500 SYRINGE INTRAVENOUS
Status: CANCELLED | OUTPATIENT
Start: 2021-12-31

## 2021-12-03 RX ORDER — ACETAMINOPHEN 325 MG/1
650 TABLET ORAL
Status: CANCELLED | OUTPATIENT
Start: 2021-12-31

## 2021-12-03 RX ORDER — ACETAMINOPHEN 325 MG/1
650 TABLET ORAL
Status: COMPLETED | OUTPATIENT
Start: 2021-12-03 | End: 2021-12-03

## 2021-12-03 RX ORDER — SODIUM CHLORIDE 0.9 % (FLUSH) 0.9 %
10 SYRINGE (ML) INJECTION
Status: DISCONTINUED | OUTPATIENT
Start: 2021-12-03 | End: 2021-12-03 | Stop reason: HOSPADM

## 2021-12-03 RX ORDER — SODIUM CHLORIDE 0.9 % (FLUSH) 0.9 %
10 SYRINGE (ML) INJECTION
Status: CANCELLED | OUTPATIENT
Start: 2021-12-31

## 2021-12-03 RX ADMIN — ACETAMINOPHEN 650 MG: 325 TABLET ORAL at 09:12

## 2021-12-03 RX ADMIN — SODIUM CHLORIDE 750 MG: 9 INJECTION, SOLUTION INTRAVENOUS at 10:12

## 2021-12-03 RX ADMIN — DIPHENHYDRAMINE HYDROCHLORIDE 25 MG: 50 INJECTION INTRAMUSCULAR; INTRAVENOUS at 09:12

## 2021-12-07 ENCOUNTER — PATIENT MESSAGE (OUTPATIENT)
Dept: ORTHOPEDICS | Facility: CLINIC | Age: 76
End: 2021-12-07
Payer: MEDICARE

## 2021-12-07 ENCOUNTER — TELEPHONE (OUTPATIENT)
Dept: RHEUMATOLOGY | Facility: CLINIC | Age: 76
End: 2021-12-07
Payer: MEDICARE

## 2021-12-07 RX ORDER — ABATACEPT 125 MG/ML
125 INJECTION, SOLUTION SUBCUTANEOUS
Qty: 4 ML | Refills: 2 | OUTPATIENT
Start: 2021-12-07 | End: 2022-02-23

## 2021-12-16 ENCOUNTER — SPECIALTY PHARMACY (OUTPATIENT)
Dept: PHARMACY | Facility: CLINIC | Age: 76
End: 2021-12-16
Payer: MEDICARE

## 2021-12-16 ENCOUNTER — PATIENT MESSAGE (OUTPATIENT)
Dept: RHEUMATOLOGY | Facility: CLINIC | Age: 76
End: 2021-12-16
Payer: MEDICARE

## 2021-12-16 RX ORDER — SODIUM CHLORIDE 0.9 % (FLUSH) 0.9 %
10 SYRINGE (ML) INJECTION
Status: CANCELLED | OUTPATIENT
Start: 2022-01-03

## 2021-12-16 RX ORDER — HEPARIN 100 UNIT/ML
500 SYRINGE INTRAVENOUS
Status: CANCELLED | OUTPATIENT
Start: 2022-01-03

## 2021-12-17 NOTE — TELEPHONE ENCOUNTER
Orencia approved until 12/31/22. Medicare Pt D insurance without LIS. Copay $983.61 at 004. Forwarding to financial assistance team.

## 2022-01-04 ENCOUNTER — TELEPHONE (OUTPATIENT)
Dept: INTERNAL MEDICINE | Facility: CLINIC | Age: 77
End: 2022-01-04
Payer: MEDICARE

## 2022-01-04 NOTE — TELEPHONE ENCOUNTER
----- Message from Beckie Lares sent at 1/4/2022  8:10 AM CST -----  Contact: [phone]  Pt called to inquire about scheduling her B-12 shot. Please Advise

## 2022-01-04 NOTE — TELEPHONE ENCOUNTER
Spoke with patient and she will call back when she finds out what date her infusion will be on to correlate the time with the B-12 Shot she would like to take both on the the same day. Patient stated an verbal understanding.

## 2022-01-05 ENCOUNTER — PATIENT MESSAGE (OUTPATIENT)
Dept: PULMONOLOGY | Facility: CLINIC | Age: 77
End: 2022-01-05
Payer: MEDICARE

## 2022-01-05 ENCOUNTER — PATIENT MESSAGE (OUTPATIENT)
Dept: ADMINISTRATIVE | Facility: CLINIC | Age: 77
End: 2022-01-05
Payer: MEDICARE

## 2022-01-05 ENCOUNTER — TELEPHONE (OUTPATIENT)
Dept: ORTHOPEDICS | Facility: CLINIC | Age: 77
End: 2022-01-05
Payer: MEDICARE

## 2022-01-05 ENCOUNTER — PATIENT MESSAGE (OUTPATIENT)
Dept: RHEUMATOLOGY | Facility: CLINIC | Age: 77
End: 2022-01-05
Payer: MEDICARE

## 2022-01-05 ENCOUNTER — LAB VISIT (OUTPATIENT)
Dept: PRIMARY CARE CLINIC | Facility: CLINIC | Age: 77
End: 2022-01-05
Payer: MEDICARE

## 2022-01-05 ENCOUNTER — TELEPHONE (OUTPATIENT)
Dept: RHEUMATOLOGY | Facility: CLINIC | Age: 77
End: 2022-01-05
Payer: MEDICARE

## 2022-01-05 DIAGNOSIS — Z20.822 CONTACT WITH AND (SUSPECTED) EXPOSURE TO COVID-19: ICD-10-CM

## 2022-01-05 LAB
CTP QC/QA: YES
SARS-COV-2 AG RESP QL IA.RAPID: POSITIVE

## 2022-01-05 PROCEDURE — 87811 SARS-COV-2 COVID19 W/OPTIC: CPT | Mod: HCNC

## 2022-01-05 NOTE — TELEPHONE ENCOUNTER
Spoke with pt. Called and Informed patient of appointment on 1/7/22 @ 10:30 am. Patient states verbal understanding and has no further questions.

## 2022-01-06 ENCOUNTER — PATIENT MESSAGE (OUTPATIENT)
Dept: ADMINISTRATIVE | Facility: OTHER | Age: 77
End: 2022-01-06
Payer: MEDICARE

## 2022-01-06 ENCOUNTER — TELEPHONE (OUTPATIENT)
Dept: HOME HEALTH SERVICES | Facility: CLINIC | Age: 77
End: 2022-01-06
Payer: MEDICARE

## 2022-01-06 ENCOUNTER — NURSE TRIAGE (OUTPATIENT)
Dept: ADMINISTRATIVE | Facility: CLINIC | Age: 77
End: 2022-01-06
Payer: MEDICARE

## 2022-01-06 ENCOUNTER — PATIENT MESSAGE (OUTPATIENT)
Dept: INTERNAL MEDICINE | Facility: CLINIC | Age: 77
End: 2022-01-06
Payer: MEDICARE

## 2022-01-06 ENCOUNTER — INFUSION (OUTPATIENT)
Dept: INFECTIOUS DISEASES | Facility: HOSPITAL | Age: 77
End: 2022-01-06
Attending: INTERNAL MEDICINE
Payer: MEDICARE

## 2022-01-06 ENCOUNTER — PATIENT MESSAGE (OUTPATIENT)
Dept: PULMONOLOGY | Facility: CLINIC | Age: 77
End: 2022-01-06
Payer: MEDICARE

## 2022-01-06 ENCOUNTER — TELEPHONE (OUTPATIENT)
Dept: ADMINISTRATIVE | Facility: CLINIC | Age: 77
End: 2022-01-06
Payer: MEDICARE

## 2022-01-06 VITALS
SYSTOLIC BLOOD PRESSURE: 92 MMHG | WEIGHT: 127 LBS | RESPIRATION RATE: 18 BRPM | HEART RATE: 63 BPM | HEIGHT: 67 IN | TEMPERATURE: 99 F | BODY MASS INDEX: 19.93 KG/M2 | OXYGEN SATURATION: 96 % | DIASTOLIC BLOOD PRESSURE: 56 MMHG

## 2022-01-06 DIAGNOSIS — U07.1 COVID: ICD-10-CM

## 2022-01-06 PROCEDURE — 63600175 PHARM REV CODE 636 W HCPCS: Mod: HCNC | Performed by: INTERNAL MEDICINE

## 2022-01-06 PROCEDURE — M0243 CASIRIVI AND IMDEVI INFUSION: HCPCS | Performed by: INTERNAL MEDICINE

## 2022-01-06 PROCEDURE — 25000003 PHARM REV CODE 250: Mod: HCNC | Performed by: INTERNAL MEDICINE

## 2022-01-06 RX ORDER — ONDANSETRON 4 MG/1
4 TABLET, ORALLY DISINTEGRATING ORAL ONCE AS NEEDED
Status: ACTIVE | OUTPATIENT
Start: 2022-01-06 | End: 2033-06-04

## 2022-01-06 RX ORDER — ALBUTEROL SULFATE 90 UG/1
2 AEROSOL, METERED RESPIRATORY (INHALATION)
Status: ACTIVE | OUTPATIENT
Start: 2022-01-06

## 2022-01-06 RX ORDER — DIPHENHYDRAMINE HYDROCHLORIDE 50 MG/ML
25 INJECTION INTRAMUSCULAR; INTRAVENOUS ONCE AS NEEDED
Status: ACTIVE | OUTPATIENT
Start: 2022-01-06 | End: 2033-06-04

## 2022-01-06 RX ORDER — SODIUM CHLORIDE 0.9 % (FLUSH) 0.9 %
10 SYRINGE (ML) INJECTION
Status: ACTIVE | OUTPATIENT
Start: 2022-01-06

## 2022-01-06 RX ORDER — EPINEPHRINE 0.3 MG/.3ML
0.3 INJECTION SUBCUTANEOUS
Status: ACTIVE | OUTPATIENT
Start: 2022-01-06

## 2022-01-06 RX ORDER — ACETAMINOPHEN 325 MG/1
650 TABLET ORAL ONCE AS NEEDED
Status: ACTIVE | OUTPATIENT
Start: 2022-01-06 | End: 2033-06-04

## 2022-01-06 RX ADMIN — CASIRIVIMAB AND IMDEVIMAB 600 MG: 600; 600 INJECTION, SOLUTION, CONCENTRATE INTRAVENOUS at 09:01

## 2022-01-06 NOTE — TELEPHONE ENCOUNTER
Pt escalated for O2 88 and MI 92 but I think they were transposed wrong . Pt called and she rechecked after cough and deep breathing and it went up to 95 and was holding Pt does C/o chest pressure and care advice ED/ UC /or pcp triage  Reason for Disposition   Chest pain or pressure    Additional Information   Negative: SEVERE difficulty breathing (e.g., struggling for each breath, speaks in single words)   Negative: Difficult to awaken or acting confused (e.g., disoriented, slurred speech)   Negative: Bluish (or gray) lips or face now   Negative: Shock suspected (e.g., cold/pale/clammy skin, too weak to stand, low BP, rapid pulse)   Negative: Sounds like a life-threatening emergency to the triager   Negative: SEVERE or constant chest pain or pressure (Exception: mild central chest pain, present only when coughing)   Negative: MODERATE difficulty breathing (e.g., speaks in phrases, SOB even at rest, pulse 100-120)   Negative: Headache and stiff neck (can't touch chin to chest)   Negative: MILD difficulty breathing (e.g., minimal/no SOB at rest, SOB with walking, pulse <100)    Protocols used: CORONAVIRUS (COVID-19) DIAGNOSED OR [id number]  were transposed wrong . Pt called and she rechecked after cough and deep breathing and it went up to 95 and was holding Pt does C/o chest pressure and care ad

## 2022-01-06 NOTE — TELEPHONE ENCOUNTER
Spoken with patient.  Main symptoms is headache.  S/p monoclonal antibodies infusion.  Recommend antiviral with molmupiravir

## 2022-01-06 NOTE — TELEPHONE ENCOUNTER
Called patient due to RN escalation in COVID Surveillance program. Pt escalated due to SOB and cough.    Patient location: [place], [place]    Vitals: Sp02 : 96%. P: 74. Temp: 99.1  Ambulatory Sp02 on the phone (if applicable): 97%    76 y.o. female with pertinent PMHx  ILD, a fib, htn, CHF, RA on day 4 of Covid symptoms. Positive Covid screen 1/5/22. CXR on NA. Home oxygen: no. COVID-19 Hospitalization History: NA. Received antibody infusion: yes. Fully vaccinated: yes. Remdesivir treatment day: NA. SpO2 goal on hospital discharge: NA.    HPI: Headache, sore throat, chest discomfort began on Monday 1/3/22. Tested on 1/5/22, tried to be tested earlier but lines were too long.      ROS: Denies worsening cough, light headedness, fever, chills, diaphoresis, chest pain, abdominal pain, emesis, diarrhea or further symptoms.     Assessment: Vitals appear WNL. During phone call, patient appears alert and oriented. Able to speak in full sentences without difficulty. No audible wheezing heard during call.     Plan: Continue home care, Rest and hydrate. Drink 8-10 glasses of water per day. Take tylenol or ibuprofen for fever. Encouraged sitting upright, deep breathing, cough, and ambulation.    Reviewed with patient the reasons for seeking emergency care. Pt aware that if Sp02 <94% or if they have any worsening symptoms, they need to go to the emergency department. If they are having a medical emergency, they will call 911. Otherwise, patient will continue to submit data as scheduled. Reviewed importance of wearing mask if self or family members leave the house.     Advised next steps: Continue care at home

## 2022-01-06 NOTE — TELEPHONE ENCOUNTER
Called to verify submission vitals pt escalated due to SOB 2/1 and she said that she put them in wrong pt denies needing anything at this time and will call if needs anything. Pt doing well just enrolled  Reason for Disposition   Information only question and nurse able to answer    Protocols used: INFORMATION ONLY CALL - NO TRIAGE-A-[place]

## 2022-01-06 NOTE — PROGRESS NOTES
Patient arrives for casirivimab/ imdevimab infusion. Ambulatory. Pt AAox3. No distress noted. RR even and unlabored.     Symptoms and onset date: 1/3/21  Headache  Sore throat  Congestion  Nausea    Tested COVID + on 1/5/21

## 2022-01-07 ENCOUNTER — PATIENT MESSAGE (OUTPATIENT)
Dept: ADMINISTRATIVE | Facility: OTHER | Age: 77
End: 2022-01-07
Payer: MEDICARE

## 2022-01-07 ENCOUNTER — PATIENT MESSAGE (OUTPATIENT)
Dept: PULMONOLOGY | Facility: CLINIC | Age: 77
End: 2022-01-07
Payer: MEDICARE

## 2022-01-07 ENCOUNTER — TELEPHONE (OUTPATIENT)
Dept: CARDIOLOGY | Facility: CLINIC | Age: 77
End: 2022-01-07
Payer: MEDICARE

## 2022-01-07 NOTE — TELEPHONE ENCOUNTER
Pt has covid offered a virtual appt she declined it.  Set up for a later date pb        ----- Message from Rosalia Sanford sent at 1/7/2022  8:17 AM CST -----  .Type:  Sooner Apoointment Request    Caller is requesting a sooner appointment.  Caller declined first available appointment listed below.  Caller will not accept being placed on the waitlist and is requesting a message be sent to doctor.  Name of Caller:pt   When is the first available appointment?1/10/22  Symptoms:annual   Would the patient rather a call back or a response via [facility]? Call   Best Call Back Number:.[phone]    Additional Information: pt needs to reschedule appt tested positive for covid. 1/5/22

## 2022-01-08 ENCOUNTER — NURSE TRIAGE (OUTPATIENT)
Dept: ADMINISTRATIVE | Facility: CLINIC | Age: 77
End: 2022-01-08
Payer: MEDICARE

## 2022-01-08 ENCOUNTER — PATIENT MESSAGE (OUTPATIENT)
Dept: ADMINISTRATIVE | Facility: OTHER | Age: 77
End: 2022-01-08
Payer: MEDICARE

## 2022-01-08 NOTE — TELEPHONE ENCOUNTER
Pt escalated due to reporting o2 level of 92-93% upon recheck o2 sat noted to be 95%, pt denies further needs at this time

## 2022-01-09 ENCOUNTER — PATIENT MESSAGE (OUTPATIENT)
Dept: ADMINISTRATIVE | Facility: OTHER | Age: 77
End: 2022-01-09
Payer: MEDICARE

## 2022-01-10 ENCOUNTER — PATIENT MESSAGE (OUTPATIENT)
Dept: ADMINISTRATIVE | Facility: OTHER | Age: 77
End: 2022-01-10
Payer: MEDICARE

## 2022-01-10 ENCOUNTER — PATIENT MESSAGE (OUTPATIENT)
Dept: ADMINISTRATIVE | Facility: CLINIC | Age: 77
End: 2022-01-10
Payer: MEDICARE

## 2022-01-10 ENCOUNTER — NURSE TRIAGE (OUTPATIENT)
Dept: ADMINISTRATIVE | Facility: CLINIC | Age: 77
End: 2022-01-10
Payer: MEDICARE

## 2022-01-10 NOTE — TELEPHONE ENCOUNTER
Received high Priority Escalation via COVID Surveillance Program. Patient submitted the following abnormal data HR 36 and repeat 43 Patient called. Patient reports the following feels fine. States she has heart rate issues and she is not concerned. Repeat Hr 64, sat 97%. Speaking in complete sentences without difficulty.  Repeat VS WNL. No additional triage required.    Advised to call back with concerns or worsening condition. Verbalized understanding.     Reason for Disposition   [1] Follow-up call to recent contact AND [2] information only call, no triage required    Additional Information   Negative: [1] Caller is not with the adult (patient) AND [2] reporting urgent symptoms   Negative: Lab result questions   Negative: Medication questions   Negative: Caller can't be reached by phone   Negative: Caller has already spoken to PCP or another triager   Negative: RN needs further essential information from caller in order to complete triage   Negative: Requesting regular office appointment   Negative: [1] Caller requesting NON-URGENT health information AND [2] PCP's office is the best resource    Protocols used: INFORMATION ONLY CALL - NO TRIAGE-A-

## 2022-01-11 ENCOUNTER — PATIENT MESSAGE (OUTPATIENT)
Dept: ADMINISTRATIVE | Facility: OTHER | Age: 77
End: 2022-01-11
Payer: MEDICARE

## 2022-01-11 ENCOUNTER — PATIENT MESSAGE (OUTPATIENT)
Dept: ADMINISTRATIVE | Facility: CLINIC | Age: 77
End: 2022-01-11
Payer: MEDICARE

## 2022-01-11 ENCOUNTER — PATIENT MESSAGE (OUTPATIENT)
Dept: INTERNAL MEDICINE | Facility: CLINIC | Age: 77
End: 2022-01-11
Payer: MEDICARE

## 2022-01-11 ENCOUNTER — TELEPHONE (OUTPATIENT)
Dept: ELECTROPHYSIOLOGY | Facility: CLINIC | Age: 77
End: 2022-01-11
Payer: MEDICARE

## 2022-01-11 ENCOUNTER — OFFICE VISIT (OUTPATIENT)
Dept: ORTHOPEDICS | Facility: CLINIC | Age: 77
End: 2022-01-11
Payer: MEDICARE

## 2022-01-11 ENCOUNTER — TELEPHONE (OUTPATIENT)
Dept: HOME HEALTH SERVICES | Facility: CLINIC | Age: 77
End: 2022-01-11
Payer: MEDICARE

## 2022-01-11 ENCOUNTER — HOSPITAL ENCOUNTER (OUTPATIENT)
Dept: RADIOLOGY | Facility: HOSPITAL | Age: 77
Discharge: HOME | End: 2022-01-11
Attending: PHYSICIAN ASSISTANT
Payer: MEDICARE

## 2022-01-11 ENCOUNTER — NURSE TRIAGE (OUTPATIENT)
Dept: ADMINISTRATIVE | Facility: CLINIC | Age: 77
End: 2022-01-11
Payer: MEDICARE

## 2022-01-11 VITALS — BODY MASS INDEX: 19.93 KG/M2 | HEIGHT: 67 IN | WEIGHT: 127 LBS

## 2022-01-11 DIAGNOSIS — S42.017D CLOSED NONDISPLACED FRACTURE OF STERNAL END OF RIGHT CLAVICLE WITH ROUTINE HEALING, SUBSEQUENT ENCOUNTER: Primary | ICD-10-CM

## 2022-01-11 DIAGNOSIS — S42.017D CLOSED NONDISPLACED FRACTURE OF STERNAL END OF RIGHT CLAVICLE WITH ROUTINE HEALING, SUBSEQUENT ENCOUNTER: ICD-10-CM

## 2022-01-11 DIAGNOSIS — R00.1 BRADYCARDIA: Primary | ICD-10-CM

## 2022-01-11 PROCEDURE — 73000 XR CLAVICLE RIGHT: ICD-10-PCS | Mod: 26,HCNC,RT, | Performed by: RADIOLOGY

## 2022-01-11 PROCEDURE — 99999 PR PBB SHADOW E&M-EST. PATIENT-LVL IV: CPT | Mod: PBBFAC,HCNC,, | Performed by: PHYSICIAN ASSISTANT

## 2022-01-11 PROCEDURE — 99213 OFFICE O/P EST LOW 20 MIN: CPT | Mod: HCNC,S$GLB,, | Performed by: PHYSICIAN ASSISTANT

## 2022-01-11 PROCEDURE — 99999 PR PBB SHADOW E&M-EST. PATIENT-LVL IV: ICD-10-PCS | Mod: PBBFAC,HCNC,, | Performed by: PHYSICIAN ASSISTANT

## 2022-01-11 PROCEDURE — 73000 X-RAY EXAM OF COLLAR BONE: CPT | Mod: 26,HCNC,RT, | Performed by: RADIOLOGY

## 2022-01-11 PROCEDURE — 73000 X-RAY EXAM OF COLLAR BONE: CPT | Mod: TC,HCNC,RT

## 2022-01-11 PROCEDURE — 99213 PR OFFICE/OUTPT VISIT, EST, LEVL III, 20-29 MIN: ICD-10-PCS | Mod: HCNC,S$GLB,, | Performed by: PHYSICIAN ASSISTANT

## 2022-01-11 PROCEDURE — 1126F AMNT PAIN NOTED NONE PRSNT: CPT | Mod: HCNC,CPTII,S$GLB, | Performed by: PHYSICIAN ASSISTANT

## 2022-01-11 PROCEDURE — 1159F MED LIST DOCD IN RCRD: CPT | Mod: HCNC,CPTII,S$GLB, | Performed by: PHYSICIAN ASSISTANT

## 2022-01-11 PROCEDURE — 1126F PR PAIN SEVERITY QUANTIFIED, NO PAIN PRESENT: ICD-10-PCS | Mod: HCNC,CPTII,S$GLB, | Performed by: PHYSICIAN ASSISTANT

## 2022-01-11 PROCEDURE — 1159F PR MEDICATION LIST DOCUMENTED IN MEDICAL RECORD: ICD-10-PCS | Mod: HCNC,CPTII,S$GLB, | Performed by: PHYSICIAN ASSISTANT

## 2022-01-11 NOTE — TELEPHONE ENCOUNTER
Pt tested positive for covid had to r/s appointment. No device or outside records. Time and date confirmed

## 2022-01-11 NOTE — PROGRESS NOTES
Subjective:      Patient ID: Rizwana Block is a 76 y.o. female.    Chief Complaint: Follow-up (RIGHT CLAVICLE)    Follow-up  Pertinent negatives include no chest pain, chills, coughing, fever or numbness.     76 year old female presents with chief complaint of right shoulder pain since 11-13-21. She tripped and fell at her home. She reports bruising at the chest. Pain is worse when laying down and getting dressed. She took tylenol with little relief.     12/03/21: Doing ok. Pain is controlled. Has been working on range of motion. She denied numbness or tingling.     01/11/22: Doing great. She has returned back to most activities. She stated that she will get some intermittent soreness but this is less.     Review of Systems   Constitutional: Negative for chills, fever and night sweats.   Cardiovascular: Negative for chest pain.   Respiratory: Negative for cough and shortness of breath.    Hematologic/Lymphatic: Does not bruise/bleed easily.   Gastrointestinal: Negative for heartburn.   Genitourinary: Negative for dysuria.   Neurological: Negative for numbness and paresthesias.   Psychiatric/Behavioral: Negative for altered mental status.   Allergic/Immunologic: Negative for persistent infections.         Objective:      General    Vitals reviewed.  Constitutional: She is oriented to person, place, and time. She appears well-developed and well-nourished.   Cardiovascular: Normal rate.    Neurological: She is alert and oriented to person, place, and time.             Right Hand/Wrist Exam     Range of Motion   The patient has normal right hand/wrist ROM.      Right Elbow Exam     Range of Motion   The patient has normal right elbow ROM.      Right Shoulder Exam     Range of Motion   Active abduction: normal   Passive abduction: normal   Forward Flexion: normal   Adduction: normal  External Rotation 0 degrees: normal   External Rotation 90 degrees: normal  Internal rotation 0 degrees: normal   Internal rotation 90  degrees: normal     Muscle Strength   The patient has normal right shoulder strength.    Other   Sensation: normal    Comments:  Bony prominence noted at the SCj but no threatening of the skin.     Vascular Exam     Right Pulses      Radial:                    2+            X-ray: reviewed by myself. Clavicle fracture seen closer to the sternal end.      Assessment:       Encounter Diagnosis   Name Primary?    Closed nondisplaced fracture of sternal end of right clavicle with routine healing, subsequent encounter Yes          Plan:       Discussed plan with the patient. Continue conservative treat,emt. She is to advance activity as tolerated.   Ice area as needed . No refill of pain medication needed. return to clinic if increase din symptoms  clavicle x-ray

## 2022-01-11 NOTE — TELEPHONE ENCOUNTER
Per Covid Surveillance Program, pt escalated high priority for abnormal vitals; SpO2=93% and LA=36.  Reports that she has an irregular heartbeat.  She reports that she feels like her heart is skipping beats.  She denies CP, SOB, fever, cough, and worsening symptoms.  Recheck SpO2=98% LA=58.  Per protocol, care advised is see in office today.  NT unable to schedule an urgent virtual visit.  Message routed to PCP.  OAC instruction provided.  Will handoff to LATIA as well.  Warm handoff to LATIA for further management.  ITZEL Hopkins NP notified via SecureChat.  Instructed to call OOC for worsening and/or questions/concerns.  VU.    Reason for Disposition   Skipped or extra beat(s) and occurs 4 or more times per minute    Additional Information   Negative: Passed out (i.e., lost consciousness, collapsed and was not responding)   Negative: Shock suspected (e.g., cold/pale/clammy skin, too weak to stand, low BP, rapid pulse)   Negative: Difficult to awaken or acting confused (e.g., disoriented, slurred speech)   Negative: Visible sweat on face or sweat dripping down face   Negative: Unable to walk, or can only walk with assistance (e.g., requires support)   Negative: Received SHOCK from implantable cardiac defibrillator and has persisting symptoms (i.e., palpitations, lightheadedness)   Negative: Dizziness, lightheadedness, or weakness and heart beating very rapidly (e.g., > 140 / minute)   Negative: Dizziness, lightheadedness, or weakness and heart beating very slowly (e.g., < 50 / minute)   Negative: Sounds like a life-threatening emergency to the triager   Negative: Difficulty breathing   Negative: Dizziness, lightheadedness, or weakness   Negative: Heart beating very rapidly (e.g., > 140 / minute) and present now (Exception: during exercise)   Negative: Heart beating very slowly (e.g., < 50 / minute) (Exception: athlete and heart rate normal for caller)   Negative: New or worsened shortness of breath with  activity (dyspnea on exertion)   Negative: Patient sounds very sick or weak to the triager   Negative: Wearing a 'Holter monitor' or 'cardiac event monitor'   Negative: Received SHOCK from implantable cardiac defibrillator (and now feels well)   Negative: Heart beating very rapidly (e.g., > 140 / minute) and not present now (Exception: during exercise)   Negative: Skipped or extra beat(s) and increases with exercise or exertion    Protocols used: HEART RATE AND HEARTBEAT [id number]

## 2022-01-11 NOTE — TELEPHONE ENCOUNTER
"Called patient due to RN escalation in COVID Surveillance program. Pt escalated due to "abnormal vitals; SpO2=93% and TN=36. Reports that she has an irregular heartbeat. She reports that she feels like her heart is skipping beats. She denies CP, SOB, fever, cough, and worsening symptoms. Recheck SpO2=98% TN=58. She is having skipped or extra beats 4 or more times/min."    Patient location: [place], [place]    Vitals: Sp02 : 98%. P: 58. Temp: 99  Ambulatory Sp02 on the phone (if applicable):     76 y.o. female with pertinent PMHx  ILD, a fib, htn, CHF, RA on day 9 of Covid symptoms. Positive Covid screen 1/5/22. CXR on NA. Home oxygen: no. COVID-19 Hospitalization History: NA. Received antibody infusion: no. Fully vaccinated: yes. Remdesivir treatment day: NA. SpO2 goal on hospital discharge: NA.    HPI: Patient has a history of bradycardia. She is in COVID surveillance program - first covid symptoms began on 1/2/22. She feels better than she had but her vital signs were disturbing today. HR measured at 36. She sees Dr Lemons and has seen Dr Mabry for cardiology, Dr Gutierrez is PCP. Had episode of bradycardia at last visit with Dr Kelly 11/2020, HR was 32; carvedilol was adjusted at that visit.      During call HR ranged from 68-42, taken with pulse oximeter.     ROS: Denies worsening cough, light headedness, fever, chills, diaphoresis, chest pain, abdominal pain, emesis, diarrhea or further symptoms.     Assessment: Vitals appear WNL. During phone call, patient appears alert and oriented. Able to speak in full sentences without difficulty. No audible wheezing heard during call. She sounds well but is anxious due to bradycardia.     Plan: Urgent follow up with cardiology scheduled. Seeing Dr. Lyman tomorrow. Hold PM dose of carvedilol today only.     Reviewed with patient the reasons for seeking emergency care. Pt aware that if Sp02 <94% or if they have any worsening symptoms, they need to go to the emergency " department. If they are having a medical emergency, they will call 911. Otherwise, patient will continue to submit data as scheduled. Reviewed importance of wearing mask if self or family members leave the house.     Advised next steps: Next day appointment with cardiology scheduled. ER precautions given. Care at home acceptable at this time.

## 2022-01-12 ENCOUNTER — PATIENT MESSAGE (OUTPATIENT)
Dept: OTHER | Facility: OTHER | Age: 77
End: 2022-01-12
Payer: MEDICARE

## 2022-01-12 ENCOUNTER — NURSE TRIAGE (OUTPATIENT)
Dept: ADMINISTRATIVE | Facility: CLINIC | Age: 77
End: 2022-01-12
Payer: MEDICARE

## 2022-01-12 ENCOUNTER — TELEPHONE (OUTPATIENT)
Dept: CARDIOLOGY | Facility: CLINIC | Age: 77
End: 2022-01-12
Payer: MEDICARE

## 2022-01-12 ENCOUNTER — PATIENT MESSAGE (OUTPATIENT)
Dept: ADMINISTRATIVE | Facility: OTHER | Age: 77
End: 2022-01-12
Payer: MEDICARE

## 2022-01-12 ENCOUNTER — PATIENT MESSAGE (OUTPATIENT)
Dept: ADMINISTRATIVE | Facility: CLINIC | Age: 77
End: 2022-01-12
Payer: MEDICARE

## 2022-01-12 ENCOUNTER — PATIENT MESSAGE (OUTPATIENT)
Dept: CARDIOLOGY | Facility: CLINIC | Age: 77
End: 2022-01-12
Payer: MEDICARE

## 2022-01-12 DIAGNOSIS — R00.1 BRADYCARDIA: Primary | ICD-10-CM

## 2022-01-12 NOTE — TELEPHONE ENCOUNTER
Pt called for SOB rating and no answer pt left vm and sent my chart message to call OOC if any problems  Reason for Disposition   Message left on unidentified voice mail. Phone number verified.    Protocols used: NO CONTACT OR DUPLICATE CONTACT CALL-A-OH

## 2022-01-12 NOTE — TELEPHONE ENCOUNTER
----- Message from Gretchen Lopez sent at 1/12/2022  8:05 AM CST -----  Contact: Self   Pt states when she spoke with an on-call nurse she was told her heart beat was irregular. Pt was told to follow up with provider. Please advise

## 2022-01-12 NOTE — TELEPHONE ENCOUNTER
Pt has been dx with covid and she is having bradycardia. Do you want to changes any of her meds? She states that at time to time she does feel irregualr heart beats. Pt states at times she does have lightheadness please advise pb        ----- Message from Ines Hadley sent at 1/12/2022 10:49 AM CST -----  Contact: [phone]  Patient is returning a phone call.    Who left a message for the patient: Shonna    Does patient know what this is regarding:  yes    Would you like a call back, or a response through your [facility] portal?:   call  Comments:

## 2022-01-13 ENCOUNTER — PATIENT MESSAGE (OUTPATIENT)
Dept: ADMINISTRATIVE | Facility: OTHER | Age: 77
End: 2022-01-13
Payer: MEDICARE

## 2022-01-13 ENCOUNTER — TELEPHONE (OUTPATIENT)
Dept: INTERNAL MEDICINE | Facility: CLINIC | Age: 77
End: 2022-01-13
Payer: MEDICARE

## 2022-01-13 ENCOUNTER — NURSE TRIAGE (OUTPATIENT)
Dept: ADMINISTRATIVE | Facility: CLINIC | Age: 77
End: 2022-01-13
Payer: MEDICARE

## 2022-01-13 NOTE — TELEPHONE ENCOUNTER
Pt escalated for HR of 39. Pt states she sometimes has bradycardia. Cardiologist is aware and will be placing a Holter monitor on Wed.  Pt is only taking Carvedilol once daily since speaking with cards 2 days ago. Pt had to climb stairs to get pulse ox to recheck vitals and HR is now 77 with SpO2 os 98. Pt denies any dizziness/lightheadedness, CP, new or worsening SOB. Pt has history of HF. Recommendation is see today in office. Pt declines disposition. States she is okay. If she feels worse she will go in to be seen. Has appt scheduled 19th for EKG and holter. Pt has number to OOC for further concerns. Will continue to monitor.    Reason for Disposition   History of heart disease (i.e., heart attack, bypass surgery, angina, angioplasty) (Exception: brief heartbeat symptoms that went away and now feels well)    Additional Information   Negative: Passed out (i.e., lost consciousness, collapsed and was not responding)   Negative: Shock suspected (e.g., cold/pale/clammy skin, too weak to stand, low BP, rapid pulse)   Negative: Difficult to awaken or acting confused (e.g., disoriented, slurred speech)   Negative: Visible sweat on face or sweat dripping down face   Negative: Unable to walk, or can only walk with assistance (e.g., requires support)   Negative: Received SHOCK from implantable cardiac defibrillator and has persisting symptoms (i.e., palpitations, lightheadedness)   Negative: Dizziness, lightheadedness, or weakness and heart beating very rapidly (e.g., > 140 / minute)   Negative: Dizziness, lightheadedness, or weakness and heart beating very slowly (e.g., < 50 / minute)   Negative: Sounds like a life-threatening emergency to the triager   Negative: Chest pain   Negative: Difficulty breathing   Negative: Dizziness, lightheadedness, or weakness   Negative: Heart beating very rapidly (e.g., > 140 / minute) and present now (Exception: during exercise)   Negative: Heart beating very slowly (e.g., <  50 / minute) (Exception: athlete and heart rate normal for caller)   Negative: New or worsened shortness of breath with activity (dyspnea on exertion)   Negative: Patient sounds very sick or weak to the triager   Negative: Wearing a 'Holter monitor' or 'cardiac event monitor'   Negative: Received SHOCK from implantable cardiac defibrillator (and now feels well)   Negative: Heart beating very rapidly (e.g., > 140 / minute) and not present now (Exception: during exercise)   Negative: Skipped or extra beat(s) and increases with exercise or exertion   Negative: Skipped or extra beat(s) and occurs 4 or more times per minute    Protocols used: HEART RATE AND HEARTBEAT [id number]

## 2022-01-13 NOTE — TELEPHONE ENCOUNTER
Spoke with pt, she has HR of 39, she states that she is in constant contact with Dr. Zapata office. She will have test done next week. Pt thinks this is all from Covid. Pt had call coming in from Cardiology so she asked me to get back with her tomorrow.

## 2022-01-13 NOTE — TELEPHONE ENCOUNTER
Pt feels no different walked 2 miles today - has cut her cardivol but still feels no different     ststed sometimes her heartrate stays 38- 41 for 3- 4 hours before coming up. Pt ststed this was normal for her  Urged pt to go to ER but pt said she was fine - just wanted to make sure that DR Murray knew about this    Pt also ststed that she will keep a close watch of her HR and knows it is best to go to ER but plans to stay at home for now    HR now is in 70s and pt feels fine!

## 2022-01-14 ENCOUNTER — PATIENT MESSAGE (OUTPATIENT)
Dept: ADMINISTRATIVE | Facility: OTHER | Age: 77
End: 2022-01-14
Payer: MEDICARE

## 2022-01-14 RX ORDER — ATORVASTATIN CALCIUM 10 MG/1
10 TABLET, FILM COATED ORAL DAILY
Qty: 30 TABLET | Refills: 6 | Status: SHIPPED | OUTPATIENT
Start: 2022-01-14 | End: 2022-04-29 | Stop reason: SDUPTHER

## 2022-01-14 NOTE — TELEPHONE ENCOUNTER
She should stop her coreg for now   If heart rate is staying low with any symptoms then she should go to ED. Needs holter and then we will make next decisions     Mana    Message text      The patient has been notified of this information and all questions answered.    Pt stated she walked 17 minute mile - 3 1/2 today and her heart rate only got up to 71- pt verbalized understanding to go to ER if HR <60 and symptomatic   Holter and EKG scheduled for 1/19

## 2022-01-15 ENCOUNTER — PATIENT MESSAGE (OUTPATIENT)
Dept: ADMINISTRATIVE | Facility: OTHER | Age: 77
End: 2022-01-15
Payer: MEDICARE

## 2022-01-15 DIAGNOSIS — I50.22 CHRONIC SYSTOLIC HEART FAILURE: ICD-10-CM

## 2022-01-15 DIAGNOSIS — I42.8 OTHER CARDIOMYOPATHY: ICD-10-CM

## 2022-01-16 ENCOUNTER — PATIENT MESSAGE (OUTPATIENT)
Dept: ADMINISTRATIVE | Facility: OTHER | Age: 77
End: 2022-01-16
Payer: MEDICARE

## 2022-01-17 ENCOUNTER — PATIENT MESSAGE (OUTPATIENT)
Dept: ADMINISTRATIVE | Facility: OTHER | Age: 77
End: 2022-01-17
Payer: MEDICARE

## 2022-01-17 ENCOUNTER — PATIENT MESSAGE (OUTPATIENT)
Dept: ADMINISTRATIVE | Facility: CLINIC | Age: 77
End: 2022-01-17
Payer: MEDICARE

## 2022-01-18 ENCOUNTER — TELEPHONE (OUTPATIENT)
Dept: INTERNAL MEDICINE | Facility: CLINIC | Age: 77
End: 2022-01-18
Payer: MEDICARE

## 2022-01-18 ENCOUNTER — PATIENT MESSAGE (OUTPATIENT)
Dept: ADMINISTRATIVE | Facility: OTHER | Age: 77
End: 2022-01-18
Payer: MEDICARE

## 2022-01-18 ENCOUNTER — NURSE TRIAGE (OUTPATIENT)
Dept: ADMINISTRATIVE | Facility: CLINIC | Age: 77
End: 2022-01-18
Payer: MEDICARE

## 2022-01-18 ENCOUNTER — PATIENT MESSAGE (OUTPATIENT)
Dept: CARDIOLOGY | Facility: CLINIC | Age: 77
End: 2022-01-18
Payer: MEDICARE

## 2022-01-18 RX ORDER — SACUBITRIL AND VALSARTAN 24; 26 MG/1; MG/1
1 TABLET, FILM COATED ORAL 2 TIMES DAILY
Qty: 60 TABLET | Refills: 1 | Status: SHIPPED | OUTPATIENT
Start: 2022-01-18 | End: 2022-04-29 | Stop reason: SDUPTHER

## 2022-01-18 NOTE — TELEPHONE ENCOUNTER
Pt contacted for Surveillance escalation - PO2=93 pt denies any SOB or difficulty. Pt declines retake of VS states she is on another call for her  MD.  Info only protocol used and pt advised on home care. Pt instructed to call OOC for worsening of symptoms or health questions.

## 2022-01-18 NOTE — TELEPHONE ENCOUNTER
----- Message from Alejandra Freedman sent at 1/18/2022  2:45 PM CST -----  Contact: Self/[phone]  Pt said that she is calling in regards to needing to schedule her B-12 injection for 1/24 at 1:00 pm. Please advise

## 2022-01-18 NOTE — TELEPHONE ENCOUNTER
Pt escalated due to reports o2 level of 85%, and 74%. Upon recheck pt reported o2 level to be 95%. Pt denies further needs.    Reason for Disposition   Information only question and nurse able to answer    Protocols used: INFORMATION ONLY CALL - NO TRIAGE-A-OH

## 2022-01-19 ENCOUNTER — PATIENT MESSAGE (OUTPATIENT)
Dept: ADMINISTRATIVE | Facility: CLINIC | Age: 77
End: 2022-01-19
Payer: MEDICARE

## 2022-01-19 ENCOUNTER — HOSPITAL ENCOUNTER (OUTPATIENT)
Dept: CARDIOLOGY | Facility: CLINIC | Age: 77
Discharge: HOME | End: 2022-01-19
Payer: MEDICARE

## 2022-01-19 ENCOUNTER — CLINICAL SUPPORT (OUTPATIENT)
Dept: CARDIOLOGY | Facility: HOSPITAL | Age: 77
End: 2022-01-19
Attending: NURSE PRACTITIONER
Payer: MEDICARE

## 2022-01-19 ENCOUNTER — PATIENT MESSAGE (OUTPATIENT)
Dept: ADMINISTRATIVE | Facility: OTHER | Age: 77
End: 2022-01-19
Payer: MEDICARE

## 2022-01-19 DIAGNOSIS — R00.1 BRADYCARDIA: ICD-10-CM

## 2022-01-19 PROCEDURE — 93010 ELECTROCARDIOGRAM REPORT: CPT | Mod: HCNC,S$GLB,, | Performed by: INTERNAL MEDICINE

## 2022-01-19 PROCEDURE — 93005 ELECTROCARDIOGRAM TRACING: CPT | Mod: HCNC,S$GLB,, | Performed by: INTERNAL MEDICINE

## 2022-01-19 PROCEDURE — 93005 RHYTHM STRIP: ICD-10-PCS | Mod: HCNC,S$GLB,, | Performed by: INTERNAL MEDICINE

## 2022-01-19 PROCEDURE — 93225 XTRNL ECG REC<48 HRS REC: CPT | Mod: HCNC

## 2022-01-19 PROCEDURE — 93010 RHYTHM STRIP: ICD-10-PCS | Mod: HCNC,S$GLB,, | Performed by: INTERNAL MEDICINE

## 2022-01-19 PROCEDURE — 93227 HOLTER MONITOR - 48 HOUR (CUPID ONLY): ICD-10-PCS | Mod: HCNC,,, | Performed by: STUDENT IN AN ORGANIZED HEALTH CARE EDUCATION/TRAINING PROGRAM

## 2022-01-19 PROCEDURE — 93227 XTRNL ECG REC<48 HR R&I: CPT | Mod: HCNC,,, | Performed by: STUDENT IN AN ORGANIZED HEALTH CARE EDUCATION/TRAINING PROGRAM

## 2022-01-21 ENCOUNTER — PATIENT MESSAGE (OUTPATIENT)
Dept: CARDIOLOGY | Facility: CLINIC | Age: 77
End: 2022-01-21
Payer: MEDICARE

## 2022-01-22 ENCOUNTER — PATIENT MESSAGE (OUTPATIENT)
Dept: PULMONOLOGY | Facility: CLINIC | Age: 77
End: 2022-01-22
Payer: MEDICARE

## 2022-01-24 ENCOUNTER — OFFICE VISIT (OUTPATIENT)
Dept: PODIATRY | Facility: CLINIC | Age: 77
End: 2022-01-24
Payer: MEDICARE

## 2022-01-24 ENCOUNTER — INFUSION (OUTPATIENT)
Dept: INFECTIOUS DISEASES | Facility: HOSPITAL | Age: 77
End: 2022-01-24
Attending: INTERNAL MEDICINE
Payer: MEDICARE

## 2022-01-24 ENCOUNTER — CLINICAL SUPPORT (OUTPATIENT)
Dept: INTERNAL MEDICINE | Facility: CLINIC | Age: 77
End: 2022-01-24
Payer: MEDICARE

## 2022-01-24 VITALS
DIASTOLIC BLOOD PRESSURE: 78 MMHG | WEIGHT: 131.31 LBS | HEART RATE: 55 BPM | SYSTOLIC BLOOD PRESSURE: 123 MMHG | OXYGEN SATURATION: 98 % | BODY MASS INDEX: 20.56 KG/M2 | RESPIRATION RATE: 16 BRPM | TEMPERATURE: 97 F

## 2022-01-24 VITALS
SYSTOLIC BLOOD PRESSURE: 121 MMHG | DIASTOLIC BLOOD PRESSURE: 73 MMHG | HEART RATE: 69 BPM | HEIGHT: 67 IN | WEIGHT: 126 LBS | BODY MASS INDEX: 19.78 KG/M2

## 2022-01-24 DIAGNOSIS — M06.041 RHEUMATOID ARTHRITIS INVOLVING BOTH HANDS WITH NEGATIVE RHEUMATOID FACTOR: Primary | ICD-10-CM

## 2022-01-24 DIAGNOSIS — M06.042 RHEUMATOID ARTHRITIS INVOLVING BOTH HANDS WITH NEGATIVE RHEUMATOID FACTOR: Primary | ICD-10-CM

## 2022-01-24 DIAGNOSIS — L60.2 LONG TOENAIL: ICD-10-CM

## 2022-01-24 DIAGNOSIS — L84 HELOMA MOLLE: Primary | ICD-10-CM

## 2022-01-24 DIAGNOSIS — L60.3 DYSTROPHIC NAIL: ICD-10-CM

## 2022-01-24 DIAGNOSIS — Z98.890 HISTORY OF FOOT SURGERY: ICD-10-CM

## 2022-01-24 PROCEDURE — 3078F DIAST BP <80 MM HG: CPT | Mod: HCNC,CPTII,S$GLB, | Performed by: PODIATRIST

## 2022-01-24 PROCEDURE — 63600175 PHARM REV CODE 636 W HCPCS: Mod: JA,JG,HCNC | Performed by: INTERNAL MEDICINE

## 2022-01-24 PROCEDURE — 3074F PR MOST RECENT SYSTOLIC BLOOD PRESSURE < 130 MM HG: ICD-10-PCS | Mod: HCNC,CPTII,S$GLB, | Performed by: PODIATRIST

## 2022-01-24 PROCEDURE — 96367 TX/PROPH/DG ADDL SEQ IV INF: CPT | Mod: HCNC

## 2022-01-24 PROCEDURE — 3078F PR MOST RECENT DIASTOLIC BLOOD PRESSURE < 80 MM HG: ICD-10-PCS | Mod: HCNC,CPTII,S$GLB, | Performed by: PODIATRIST

## 2022-01-24 PROCEDURE — 1159F PR MEDICATION LIST DOCUMENTED IN MEDICAL RECORD: ICD-10-PCS | Mod: HCNC,CPTII,S$GLB, | Performed by: PODIATRIST

## 2022-01-24 PROCEDURE — 99213 PR OFFICE/OUTPT VISIT, EST, LEVL III, 20-29 MIN: ICD-10-PCS | Mod: HCNC,S$GLB,, | Performed by: PODIATRIST

## 2022-01-24 PROCEDURE — 96365 THER/PROPH/DIAG IV INF INIT: CPT | Mod: HCNC

## 2022-01-24 PROCEDURE — 96372 THER/PROPH/DIAG INJ SC/IM: CPT | Mod: HCNC,S$GLB,, | Performed by: INTERNAL MEDICINE

## 2022-01-24 PROCEDURE — 96372 PR INJECTION,THERAP/PROPH/DIAG2ST, IM OR SUBCUT: ICD-10-PCS | Mod: HCNC,S$GLB,, | Performed by: INTERNAL MEDICINE

## 2022-01-24 PROCEDURE — 1159F MED LIST DOCD IN RCRD: CPT | Mod: HCNC,CPTII,S$GLB, | Performed by: PODIATRIST

## 2022-01-24 PROCEDURE — 99999 PR PBB SHADOW E&M-EST. PATIENT-LVL V: CPT | Mod: PBBFAC,HCNC,, | Performed by: PODIATRIST

## 2022-01-24 PROCEDURE — 3074F SYST BP LT 130 MM HG: CPT | Mod: HCNC,CPTII,S$GLB, | Performed by: PODIATRIST

## 2022-01-24 PROCEDURE — 99213 OFFICE O/P EST LOW 20 MIN: CPT | Mod: HCNC,S$GLB,, | Performed by: PODIATRIST

## 2022-01-24 PROCEDURE — 99999 PR PBB SHADOW E&M-EST. PATIENT-LVL V: ICD-10-PCS | Mod: PBBFAC,HCNC,, | Performed by: PODIATRIST

## 2022-01-24 PROCEDURE — 1100F PR PT FALLS ASSESS DOC 2+ FALLS/FALL W/INJURY/YR: ICD-10-PCS | Mod: HCNC,CPTII,S$GLB, | Performed by: PODIATRIST

## 2022-01-24 PROCEDURE — 1126F PR PAIN SEVERITY QUANTIFIED, NO PAIN PRESENT: ICD-10-PCS | Mod: HCNC,CPTII,S$GLB, | Performed by: PODIATRIST

## 2022-01-24 PROCEDURE — 25000003 PHARM REV CODE 250: Mod: HCNC | Performed by: INTERNAL MEDICINE

## 2022-01-24 PROCEDURE — 1126F AMNT PAIN NOTED NONE PRSNT: CPT | Mod: HCNC,CPTII,S$GLB, | Performed by: PODIATRIST

## 2022-01-24 PROCEDURE — 3288F FALL RISK ASSESSMENT DOCD: CPT | Mod: HCNC,CPTII,S$GLB, | Performed by: PODIATRIST

## 2022-01-24 PROCEDURE — 3288F PR FALLS RISK ASSESSMENT DOCUMENTED: ICD-10-PCS | Mod: HCNC,CPTII,S$GLB, | Performed by: PODIATRIST

## 2022-01-24 PROCEDURE — 1100F PTFALLS ASSESS-DOCD GE2>/YR: CPT | Mod: HCNC,CPTII,S$GLB, | Performed by: PODIATRIST

## 2022-01-24 RX ORDER — ACETAMINOPHEN 325 MG/1
650 TABLET ORAL
Status: CANCELLED
Start: 2022-02-07

## 2022-01-24 RX ORDER — SODIUM CHLORIDE 0.9 % (FLUSH) 0.9 %
10 SYRINGE (ML) INJECTION
Status: DISCONTINUED | OUTPATIENT
Start: 2022-01-24 | End: 2022-01-24 | Stop reason: HOSPADM

## 2022-01-24 RX ORDER — UREA 200 MG/G
1 CREAM TOPICAL DAILY
Qty: 75 G | Refills: 10 | Status: SHIPPED | OUTPATIENT
Start: 2022-01-24 | End: 2022-04-21 | Stop reason: SDUPTHER

## 2022-01-24 RX ORDER — INFLUENZA VACCINE, ADJUVANTED 15; 15; 15; 15 UG/.5ML; UG/.5ML; UG/.5ML; UG/.5ML
INJECTION, SUSPENSION INTRAMUSCULAR
COMMUNITY
Start: 2021-10-08

## 2022-01-24 RX ORDER — ACETAMINOPHEN 325 MG/1
650 TABLET ORAL
Status: COMPLETED | OUTPATIENT
Start: 2022-01-24 | End: 2022-01-24

## 2022-01-24 RX ADMIN — DIPHENHYDRAMINE HYDROCHLORIDE 25 MG: 50 INJECTION INTRAMUSCULAR; INTRAVENOUS at 01:01

## 2022-01-24 RX ADMIN — CYANOCOBALAMIN 1000 MCG: 1000 INJECTION, SOLUTION INTRAMUSCULAR at 02:01

## 2022-01-24 RX ADMIN — ACETAMINOPHEN 650 MG: 325 TABLET ORAL at 01:01

## 2022-01-24 RX ADMIN — SODIUM CHLORIDE 750 MG: 9 INJECTION, SOLUTION INTRAVENOUS at 02:01

## 2022-01-24 NOTE — PROGRESS NOTES
"  Chief Concern:  Toe pain    HPI: Rizwana is a 76 y.o. female who presents to the clinic for evaluation and treatment of feet.   The patient's chief concern is dystrophic nails and pain in the right 4th intermetatarsal space.    Has hx of R ankle "fusion" however still has motion in the ankle.         PCP: Sri Gutierrez MD        Current shoe gear:   flat shoes             Patient Active Problem List   Diagnosis    Ventricular premature beats    Hammertoe    Palpitations    Costochondritis    Heart failure, systolic    Precordial pain    Bradycardia    Cardiomyopathy    Non-rheumatic mitral regurgitation    Essential hypertension    S/P colonoscopy    Osteopenia    Pure hypercholesterolemia    At risk for amiodarone toxicity with long term use    Dysuria    Vaginal atrophy    Vaginal burning    Open wound of right foot    GAURAV (obstructive sleep apnea)    Osteoarthritis of hand    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    Low back pain    Pulmonary nodule    PAF (paroxysmal atrial fibrillation)    High risk medication use    Periodic limb movement disorder (PLMD)    Raynaud's phenomenon    Anemia    Macrocytosis    ILD (interstitial lung disease)    OP (osteoporosis)             Current Outpatient Medications   Medication Sig Dispense Refill    abatacept (ORENCIA CLICKJECT) 125 mg/mL AtIn Inject 125 mg into the skin every 7 days. 4 mL 2    amiodarone (PACERONE) 200 MG Tab TAKE ONE TABLET BY MOUTH ONCE DAILY 90 tablet 3    aspirin (ECOTRIN) 81 MG EC tablet Take 81 mg by mouth once daily.      atorvastatin (LIPITOR) 10 MG tablet Take 1 tablet (10 mg total) by mouth once daily. 30 tablet 6    atorvastatin (LIPITOR) 10 MG tablet TAKE ONE TABLET BY MOUTH ONCE DAILY 23 tablet 6    carvediloL (COREG) 3.125 MG tablet Take 1 tablet (3.125 mg total) by mouth 2 (two) times daily with meals. 180 tablet 4    cholecalciferol, vitamin D3, (VITAMIN D3) 25 " mcg (1,000 unit) capsule Take 1,000 Units by mouth once daily.      co-enzyme Q-10 30 mg capsule Take 10 mg by mouth once daily.       cyanocobalamin 1,000 mcg/mL injection       diphenhydrAMINE (BENADRYL) 50 mg/mL injection       FLUAD QUAD 2021-22,65Y UP,,PF, 60 mcg (15 mcg x 4)/0.5 mL Syrg       furosemide (LASIX) 40 MG tablet Take 1 tablet (40 mg total) by mouth daily as needed. 30 tablet 6    leflunomide (ARAVA) 20 MG Tab TAKE ONE TABLET BY MOUTH ONCE DAILY 90 tablet 0    melatonin 10 mg Cap 30 mg every evening.      MULTIVITAMIN WITH MINERALS (HAIR,SKIN AND NAILS ORAL) Take by mouth once daily.      ORENCIA, WITH MALTOSE, 250 mg SolR injection       potassium chloride (KLOR-CON) 8 MEQ TbSR Take 1 tablet (8 mEq total) by mouth once daily. 30 tablet 6    pulse oximeter (PULSE OXIMETER) device by Apply Externally route 2 (two) times a day. Use twice daily at 8 AM and 3 PM and [facility] the [facility] as directed. 1 each 0    sacubitriL-valsartan (ENTRESTO) 24-26 mg per tablet Take 1 tablet by mouth 2 (two) times daily. Patient needs to schedule an appointment in the consult cardiology clinic. 60 tablet 1    traMADoL (ULTRAM) 50 mg tablet Take 1 tablet (50 mg total) by mouth every 6 (six) hours as needed for Pain. 20 tablet 0    TURMERIC ORAL Take by mouth.      UNABLE TO FIND 2 capsules 2 (two) times a day. Nutrafol      urea 20 % Crea Apply 1 application topically once daily. To toenails 75 g 10     Current Facility-Administered Medications   Medication Dose Route Frequency Provider Last Rate Last Admin    acetaminophen tablet 650 mg  650 mg Oral Once PRN Gurjit Zaidi MD        albuterol inhaler 2 puff  2 puff Inhalation Q20 Min PRN Gurjit Zaidi MD        cyanocobalamin injection 1,000 mcg  1,000 mcg Intramuscular Q30 Days Sri Gutierrez MD   1,000 mcg at 11/18/21 1650    diphenhydrAMINE capsule 25 mg  25 mg Oral Q6H PRN Darryl Javier MD        diphenhydrAMINE  "injection 25 mg  25 mg Intravenous Once PRN Gurjit aZidi MD        EPINEPHrine (EPIPEN) 0.3 mg/0.3 mL pen injection 0.3 mg  0.3 mg Intramuscular PRN Gurjit Zaidi MD        methylPREDNISolone sodium succinate injection 40 mg  40 mg Intravenous Once PRN Gurjit Zaidi MD        ondansetron disintegrating tablet 4 mg  4 mg Oral Once PRN Gurjit Zaidi MD        sodium chloride 0.9% 500 mL flush bag   Intravenous PRN Gurjit Zaidi MD        sodium chloride 0.9% flush 10 mL  10 mL Intravenous PRN Gurjit Zaidi MD           Review of patient's allergies indicates:   Allergen Reactions    Dilaudid [hydromorphone (bulk)]      Severe nausea/vomiting    Morphine      Severe nausea/vomiting       Review of Systems   Constitution: Negative for chills and fever.   Cardiovascular: Positive for leg swelling. Negative for chest pain and claudication.   Respiratory: Negative for cough and shortness of breath.    Skin: Positive for dry skin and nail changes.   Musculoskeletal: Positive for arthritis, joint pain and stiffness (ankle R).   Gastrointestinal: Negative for nausea and vomiting.   Neurological: Negative for numbness and paresthesias.   Psychiatric/Behavioral: Negative for altered mental status.           Objective:         Vitals:    01/24/22 1146   BP: 121/73   Pulse: 69   Weight: 57.2 kg (126 lb)   Height: 5' 7" (1.702 m)           Physical Exam   Constitutional: She is oriented to person, place, and time. She appears well-developed and well-nourished.   HENT:   Head: Normocephalic.   Cardiovascular: Intact distal pulses.    Pulses:       Dorsalis pedis pulses are 2+ on the right side, and 2+ on the left side.        Posterior tibial pulses are 2+ on the right side, and 2+ on the left side.   CRT < 3 sec to tips of toes. No edema noted to b/l LE. No vericosities noted to b/l LEs.      Pulmonary/Chest: No respiratory distress.   Musculoskeletal:   Gastrocnemius equinus noted to b/l " ankles with decreased DF noted on exam. MMT 5/5 in DF/PF/Inv/Ev resistance with no reproduction of pain in any direction. Passive range of motion of ankle and pedal joints is painless. Adequate pedal joint ROM.     Hypermobility noted to 1st ray b/l with near complete collapse of medial longitudinal arch b/l (R>L) with forefoot loading. Functional pes planus foot type b/l.     Semi-reducible hammertoe contractures noted to toes 2-4 b/l-asymptomatic.     Mild stiffness noted to R ankle compared to L however still gets 5-7 deg of DF in R ankle.     Palpable bony prominence noted to dorsal and dorsal medial 1st met cuneiform joint, mild, asymptomatic.    Neurological: She is alert and oriented to person, place, and time. She has normal strength. No sensory deficit.   Light touch, proprioception, and sharp/dull sensation are all intact bilaterally.      Skin: Skin is warm, dry and intact. No erythema.   No open lesions, lacerations or wounds noted. Nails are thickened, elongated, discolored yellow/brown with subungual debris and brittleness to R 1-5 and L 1-5. Interdigital spaces clean, dry and intact b/l. No erythema noted to b/l foot. Skin texture normal. Pedal hair adequate. Skin temperature normal b/l foot.                Assessment:         ICD-10-CM ICD-9-CM    1. Heloma molle - Right Foot  L84 700    2. Dystrophic nail  L60.3 703.8 urea 20 % Crea   3. Long toenail  L60.2 703.8 urea 20 % Crea   4. History of foot surgery  Z98.890 V15.29            Plan:         I counseled the patient on her conditions, their implications and medical management.    Shoe and activity modification as needed for relief.     Urea 20% to nails daily    General nail care measures for abnormal nails include:    ? Keeping nails trimmed short    ? Avoiding trauma     ? Avoiding contact irritants     ? Keeping nails dry (avoiding wet work)    ? Avoiding all nail cosmetics

## 2022-01-24 NOTE — PROGRESS NOTES
Arrived for Orencia infusion. Pt received Tylenol 650mg and Benadryl 25mg IVPB 30mins prior to infusion. Pt tolerated infusion well and left in NAD.

## 2022-01-25 ENCOUNTER — TELEPHONE (OUTPATIENT)
Dept: INTERNAL MEDICINE | Facility: CLINIC | Age: 77
End: 2022-01-25
Payer: MEDICARE

## 2022-01-25 NOTE — TELEPHONE ENCOUNTER
----- Message from Noreen Garibay sent at 1/25/2022 11:40 AM CST -----  Contact: [phone]  Pt is calling for Franny she states she is trying to schedule her B-!2 and she is asking for you to call her back. She hung up before I finished the message

## 2022-01-27 LAB
OHS CV EVENT MONITOR DAY: 0
OHS CV HOLTER LENGTH DECIMAL HOURS: 48
OHS CV HOLTER LENGTH HOURS: 48
OHS CV HOLTER LENGTH MINUTES: 0
OHS CV HOLTER SINUS AVERAGE HR: 71
OHS CV HOLTER SINUS MAX HR: 112
OHS CV HOLTER SINUS MIN HR: 54

## 2022-01-28 ENCOUNTER — TELEPHONE (OUTPATIENT)
Dept: CARDIOLOGY | Facility: CLINIC | Age: 77
End: 2022-01-28
Payer: MEDICARE

## 2022-01-28 NOTE — TELEPHONE ENCOUNTER
Pt wants to go back to coreg?       ----- Message from Riddhi Vasquez sent at 1/28/2022  1:04 PM CST -----  Contact: self  Rizwana Block would like a call back at [phone], in regards to results of the Holter .

## 2022-01-28 NOTE — TELEPHONE ENCOUNTER
----- Message from SYLVIA Perez-ACACIA sent at 1/27/2022  9:47 AM CST -----  Can we find out if there is any issues with her holter monitor from last week. The results have not been posted yet    Thanks  Mana

## 2022-01-31 NOTE — TELEPHONE ENCOUNTER
2022 BI: Complete Orencia    RX [employer]    Estimated Copay: $571.61, pt is in the initial benefits stage, this rx will put the pt in the coverage gap  [facility] is in network  PA approved from 12/17/21-12/31/22    Forwarding to FA for review

## 2022-02-02 NOTE — TELEPHONE ENCOUNTER
Made an outbound call to pt to clarify if she was on the Orencia infusion or SQ. Pt stated she does receive the infusions but would like the prefilled syringes if possible. I asked pt if she would like to start the PAP application. She stated she already submitted the infusion to the [facility] but it was denied due to due not spending 3% of her yearly household income on out-of-pocket (OOP) prescription expenses for the year. She said she had some expenses recently and will check the bills and call [facility] back if she wants us to re-apply for the Orencia SQ pens.

## 2022-02-04 ENCOUNTER — OFFICE VISIT (OUTPATIENT)
Dept: CARDIOLOGY | Facility: CLINIC | Age: 77
End: 2022-02-04
Payer: MEDICARE

## 2022-02-04 VITALS — BODY MASS INDEX: 20.59 KG/M2 | WEIGHT: 131.19 LBS | HEIGHT: 67 IN

## 2022-02-04 DIAGNOSIS — E78.00 PURE HYPERCHOLESTEROLEMIA: ICD-10-CM

## 2022-02-04 DIAGNOSIS — M06.042 RHEUMATOID ARTHRITIS INVOLVING BOTH HANDS WITH NEGATIVE RHEUMATOID FACTOR: ICD-10-CM

## 2022-02-04 DIAGNOSIS — M06.041 RHEUMATOID ARTHRITIS INVOLVING BOTH HANDS WITH NEGATIVE RHEUMATOID FACTOR: ICD-10-CM

## 2022-02-04 DIAGNOSIS — R00.2 PALPITATIONS: ICD-10-CM

## 2022-02-04 DIAGNOSIS — I49.3 VENTRICULAR PREMATURE BEATS: Primary | ICD-10-CM

## 2022-02-04 DIAGNOSIS — G47.33 OSA (OBSTRUCTIVE SLEEP APNEA): ICD-10-CM

## 2022-02-04 DIAGNOSIS — I73.00 RAYNAUD'S PHENOMENON WITHOUT GANGRENE: ICD-10-CM

## 2022-02-04 DIAGNOSIS — I42.8 OTHER CARDIOMYOPATHY: ICD-10-CM

## 2022-02-04 DIAGNOSIS — I34.0 NON-RHEUMATIC MITRAL REGURGITATION: ICD-10-CM

## 2022-02-04 DIAGNOSIS — R00.2 PALPITATIONS: Primary | ICD-10-CM

## 2022-02-04 DIAGNOSIS — Z91.89 AT RISK FOR AMIODARONE TOXICITY WITH LONG TERM USE: ICD-10-CM

## 2022-02-04 DIAGNOSIS — I10 ESSENTIAL HYPERTENSION: ICD-10-CM

## 2022-02-04 DIAGNOSIS — Z79.899 AT RISK FOR AMIODARONE TOXICITY WITH LONG TERM USE: ICD-10-CM

## 2022-02-04 PROCEDURE — 99215 OFFICE O/P EST HI 40 MIN: CPT | Mod: HCNC,95,, | Performed by: INTERNAL MEDICINE

## 2022-02-04 PROCEDURE — 1159F PR MEDICATION LIST DOCUMENTED IN MEDICAL RECORD: ICD-10-PCS | Mod: HCNC,CPTII,95, | Performed by: INTERNAL MEDICINE

## 2022-02-04 PROCEDURE — 1126F PR PAIN SEVERITY QUANTIFIED, NO PAIN PRESENT: ICD-10-PCS | Mod: HCNC,CPTII,95, | Performed by: INTERNAL MEDICINE

## 2022-02-04 PROCEDURE — 99499 UNLISTED E&M SERVICE: CPT | Mod: 95,,, | Performed by: INTERNAL MEDICINE

## 2022-02-04 PROCEDURE — 1126F AMNT PAIN NOTED NONE PRSNT: CPT | Mod: HCNC,CPTII,95, | Performed by: INTERNAL MEDICINE

## 2022-02-04 PROCEDURE — 99215 PR OFFICE/OUTPT VISIT, EST, LEVL V, 40-54 MIN: ICD-10-PCS | Mod: HCNC,95,, | Performed by: INTERNAL MEDICINE

## 2022-02-04 PROCEDURE — 99499 RISK ADDL DX/OHS AUDIT: ICD-10-PCS | Mod: 95,,, | Performed by: INTERNAL MEDICINE

## 2022-02-04 PROCEDURE — 1159F MED LIST DOCD IN RCRD: CPT | Mod: HCNC,CPTII,95, | Performed by: INTERNAL MEDICINE

## 2022-02-04 NOTE — PROGRESS NOTES
Subjective:   Patient ID:  Rizwana Block is a 76 y.o. female     The patient location is:  home  The chief complaint leading to consultation is:  PVCs    Visit type: audiovisual    Face to Face time with patient: 30 min  45 minutes of total time spent on the encounter, which includes face to face time and non-face to face time preparing to see the patient (eg, review of tests), Obtaining and/or reviewing separately obtained history, Documenting clinical information in the electronic or other health record, Independently interpreting results (not separately reported) and communicating results to the patient/family/caregiver, or Care coordination (not separately reported).     Each patient to whom he or she provides medical services by telemedicine is:  (1) informed of the relationship between the physician and patient and the respective role of any other health care provider with respect to management of the patient; and (2) notified that he or she may decline to receive medical services by telemedicine and may withdraw from such care at any time.      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 Ms. Block has been on long-term amiodarone for PVCs (inferior-posterior-septal PVC morphology). Her PVC burden was thought to have been related to her DCM. Her EF improved to the 50s after amiodarone use, and she was subsequently switched from amiodarone to Verapamil.      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  She later restarted with palps and some PVCs (albeit in lesser frequency than pre RFA) and Rx was added -- Verapamil first then back on amio      Echo (06/22/16) >>  EF of 40-45%, trivial-to-mild TR, trivial CT, upper limit of normal LVE, and a PASP of 26 mmHg.   24-Hour  "Holter (06/22/16) revealed a 13% PVC burden; 481 couplets, 73 triplets and short VTNS -- the large majority of the PVCs were MM. The triplets are dimorphic.  She had an RFA on 9/27/16  >>  1.  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  Effective lesions were at 12 o'clock level in the [place] view.  2.  Note that the ascending aorta and aortic arch appeared to be very unfolded (unusually so for the  patient's frame).  This may need to be evaluated further.     48-Hour Holter (11/09/16) revealed SR w/HRs varying between  bpm; average 72 bpm. There were very frequent polymorphic PVCs (totaling 9,632; averaging 200 per hour, ~5%), 133 couplets, and no episodes of VT. There were very rare PACs (totaling 20; averaging less than 1 per hour) and no episodes of sustained SVT. There was 1 episode of dizziness reported corresponding w/SR at 95 bpm.       Update June 2018:  Since the RFA she has been feeling a lot better with more energy, working full time,, back to gardening and swimming etc  Echo on 6/19/18    1 - Mildly to moderately depressed left ventricular systolic function (EF 40-45%).     2 - Wall motion abnormalities.     3 - Impaired LV relaxation, normal LAP (grade 1 diastolic dysfunction).     4 - Low normal right ventricular systolic function .     5 - Trivial to mild tricuspid regurgitation.     6 - Trivial pulmonic regurgitation.     7 - Atrial septal aneurysm .     8 - The estimated PA systolic pressure is 29 mmHg.       Update 1/9/19:  She feels well -- her only c/o is hair loss -- she thinks it's the amio  Takes lasix when her rings get tight -- @ once a week or so.   I have reviewed the actual image of the ECG tracing obtained today and it shows NSR with normal intervals     Update 8/15/2019:  She started Biotin supplements and her hair as well as her nails are "better".   She has been feeling well -- gets occ chest discomfort -- at times in the shower - lasts a few " "secs-- maybe a little longer-- she had similar c/o several years ago too  And her angios were normal  Once she woke up with a feeling pf "a punch in the chest". It was gone as soon as she woke up.   I have reviewed the actual image of the ECG tracing obtained today and it shows NSR with mild IVCD -- QRSd 125 msec  Her main concerns are related to RA      Update 5/28/2020:    I have obtained recent updates in her CV tests and amiodarone toxicity panel:  The echo revealed the following:  · Eccentric left ventricular hypertrophy.  · Low normal left ventricular systolic function. The estimated ejection fraction is 50%.  · Normal right ventricular systolic function.  · Normal LV diastolic function.  · Mild tricuspid regurgitation.  · The estimated PA systolic pressure is 21 mmHg.  · Normal central venous pressure (3 mmHg).  Amiodarone level was 0.3/0.3 on 100 mg of amiodarone a day.       Ref. Range 5/18/2020 08:25 5/21/2020 09:51   TSH Latest Ref Range: 0.400 - 4.000 uIU/mL 0.123 (L)     T3, Total Latest Ref Range: 60 - 180 ng/dL   75   Free T4 Latest Ref Range: 0.71 - 1.51 ng/dL 1.20        CMP was within normal limits.  DLCO was 66% of predicted and DLCO/VA was 96%.  Recent repeat Holter showed the following:  NSR  The patient slept from 22:00 until 06:00 with a heart rate of 78 bpm during waking hours, 68 bpm during sleep.  There were very frequent dimorphic PVCs totalling 70950 and averaging 238.54 per hour. The ventricular arrhythmias percentage was 5.6. There were 152 couplets. There were 1318 bigeminal cycles. There were 4 triplets.  Clinically, she is doing relatively well, and in fact, after having laid off exercise for year and a half she has restarted recently going back on the treadmill.  She can stay 30 min on the treadmill and then starts becoming a bit short of breath.  This is less than what she used to do but in all fairness, she probably is deconditioned at this point.  She has no leg edema, no chest " pain, occasional palpitations, no undue dyspnea on exertion-certainly not with usual ADLs, no orthopnea, no PND, no syncope and no presyncope.  She has recently seen Dr. Guerra at and she complained of fatigue.  She feels stiff in the morning, gets better and then gets a little tired in the afternoon.  There are no specific associated symptoms  Her social life is in upheaval.  A 2nd of 3 step sons has recently passed away and she is having a hard time coping.  In addition, her daughter is back in the [place].     >>  Overall she is doing well.  There is no bradycardia and therefore this is not the cause of her fatigue.  More than likely, this related to rheumatoid arthritis, aging and deconditioning.  On the other hand, her PVC count is a little bit excessive and in view of the fact that her amiodarone level is quite low, it is reasonable to increase the dose slightly.  Of note is that her ejection fraction is now at 50%.  It tends to drift down as the PVCs increase in frequency.  There is no evidence of CHF.  She has no evidence of amiodarone toxicity at this point.     Increase amiodarone to 200 mg a day 5 days a week (skip Wednesdays and Sundays).  In 6 months, repeat PFT/DLCO.  Continue using the treadmill and increase time on it as tolerated.     Update 11/9/2020:  She is now here stating that her heart rate has been in the 30's and she is symptomatic   She has been walking up to 4 miles a day but not as often due to CoViD issues   She has had an updated echo showing normal EF   She does have some PVCs and at times she ? Feels them. However they are not significantly disturbing       Update 2/4/2022:  Amio level on 7/29/21 was 0.5/0.3  TSH in June was 2.3  DLCO from 11/19/2020 was 86% of predicted.    Clinically:  She had a COVID infection and within a day after getting a positive test received the IV in antibody infusion (01/06/2022).  This was associated with an upper respiratory tract infection and what  "was termed to be pulmonary plugs".  She is now on the mend and she is planning to go back to the [facility] for exercise.  She thinks she can handle 2 to 3 5 flights of stairs without too much trouble but admits to some dyspnea after the 2nd floor.  She has been walking 30 minutes at 16 to 17 minute a mile clip.  She had a Holter monitor 2 weeks into her COVID illness and it revealed what was termed as 27% PVC burden.  My direct examination reveals that these so-called PVCs were in fact either low atrial ectopy or junctional premature beats/para Hisian PVCs.  Of interest is that, during her COVID illness, she had Healthcare monitoring when and whenever her pulse ox determines a low pulse rate, the nurses taking care of her would push the panic button and ask her to stop her beta-blocker or various medications and or send ambulances to pick her up to take her to the emergency room.  This despite the fact that she would be asymptomatic.  She finally decided to quit relying the numbers and ask them to not follow her up (she understands the concept of pulse deficit in that and he had her heart rate is not being reflected by the pulse oximeter due to the presence of premature beats).    Current Outpatient Medications   Medication Sig    abatacept (ORENCIA CLICKJECT) 125 mg/mL AtIn Inject 125 mg into the skin every 7 days.    amiodarone (PACERONE) 200 MG Tab TAKE ONE TABLET BY MOUTH ONCE DAILY    aspirin (ECOTRIN) 81 MG EC tablet Take 81 mg by mouth once daily.    atorvastatin (LIPITOR) 10 MG tablet Take 1 tablet (10 mg total) by mouth once daily.    atorvastatin (LIPITOR) 10 MG tablet TAKE ONE TABLET BY MOUTH ONCE DAILY    carvediloL (COREG) 3.125 MG tablet Take 1 tablet (3.125 mg total) by mouth 2 (two) times daily with meals.    cholecalciferol, vitamin D3, (VITAMIN D3) 25 mcg (1,000 unit) capsule Take 1,000 Units by mouth once daily.    co-enzyme Q-10 30 mg capsule Take 10 mg by mouth once daily.     furosemide " (LASIX) 40 MG tablet Take 1 tablet (40 mg total) by mouth daily as needed.    leflunomide (ARAVA) 20 MG Tab TAKE ONE TABLET BY MOUTH ONCE DAILY    melatonin 10 mg Cap 30 mg every evening.    MULTIVITAMIN WITH MINERALS (HAIR,SKIN AND NAILS ORAL) Take by mouth once daily.    ORENCIA, WITH MALTOSE, 250 mg SolR injection     pulse oximeter (PULSE OXIMETER) device by Apply Externally route 2 (two) times a day. Use twice daily at 8 AM and 3 PM and record the value in MyChart as directed.    sacubitriL-valsartan (ENTRESTO) 24-26 mg per tablet Take 1 tablet by mouth 2 (two) times daily. Patient needs to schedule an appointment in the consult cardiology clinic.    traMADoL (ULTRAM) 50 mg tablet Take 1 tablet (50 mg total) by mouth every 6 (six) hours as needed for Pain.    TURMERIC ORAL Take by mouth.    cyanocobalamin 1,000 mcg/mL injection     FLUAD QUAD 2021-22,65Y UP,,PF, 60 mcg (15 mcg x 4)/0.5 mL Syrg     potassium chloride (KLOR-CON) 8 MEQ TbSR Take 1 tablet (8 mEq total) by mouth once daily.    UNABLE TO FIND 2 capsules 2 (two) times a day. Nutrafol    urea 20 % Crea Apply 1 application topically once daily. To toenails (Patient not taking: Reported on 2/4/2022)     Current Facility-Administered Medications   Medication    acetaminophen tablet 650 mg    albuterol inhaler 2 puff    cyanocobalamin injection 1,000 mcg    diphenhydrAMINE capsule 25 mg    diphenhydrAMINE injection 25 mg    EPINEPHrine (EPIPEN) 0.3 mg/0.3 mL pen injection 0.3 mg    methylPREDNISolone sodium succinate injection 40 mg    ondansetron disintegrating tablet 4 mg    sodium chloride 0.9% 500 mL flush bag    sodium chloride 0.9% flush 10 mL     ROS  As to CV ROS in detail, see HPI, otherwise, negative CV ROS including current lack of palpitations, chest pain, undue dyspnea on exertion, shortness of breath, orthopnea, PND, leg edema, syncope, near-syncope, symptoms of TIA and or peripheral emboli and claudication.    Social  "History     Tobacco Use   Smoking Status Former Smoker    Packs/day: 0.25    Years: 24.00    Pack years: 6.00    Types: Cigarettes    Start date: 1961    Quit date: 1985    Years since quittin.1   Smokeless Tobacco Never Used        Objective:     Physical Exam  Ht 5' 7" (1.702 m)   Wt 59.5 kg (131 lb 2.8 oz)   LMP  (LMP Unknown)   BMI 20.54 kg/m²   BP well controlled as reviewed  in flowsheets in Epic  Appears to be in NAD,   Color is WNL (no pallor, no facial rashes), no obvious NVC. No obvious neck masses.   No hoarseness and no facial distortions.    No tachypnea.  No labored breathing.  Mood, affect, comprehension and speech are all WNL.   Patient denies leg edema.     reports previous alcohol use of about 5.0 standard drinks of alcohol per week.  Past Medical History:   Diagnosis Date    Arrhythmia     Chest pain 2016    3/2016: Began experience chest discomfort.    CHF (congestive heart failure)     Hypertension     Osteopenia     Reclast infusion 10/5/2010 and 10/20/2011    Rheumatoid arthritis 2019    Sleep apnea      Past Surgical History:   Procedure Laterality Date    ANKLE FUSION      right    bladder surgery      with mesh    BLEPHAROPLASTY W/ LASER      CATARACT EXTRACTION, BILATERAL      HAND SURGERY      benign cyst on left    HYSTERECTOMY      heavy bleeding    PATELLA FRACTURE SURGERY      right- plate in tibial plateau    TONSILLECTOMY       Family History   Problem Relation Age of Onset    Heart disease Mother 63    Stroke Father 49    Heart disease Father     Early death Father     Cancer Maternal Aunt         bone    Cancer Maternal Uncle         esophageal    Heart disease Paternal Uncle     SIDS Daughter     Breast cancer Neg Hx     Ovarian cancer Neg Hx     Cervical cancer Neg Hx     Endometrial cancer Neg Hx     Vaginal cancer Neg Hx        Assessment:    Recovering from COVID.  Holter obtained during COVID convalescence may " perhaps not be representative of her usual state of affairs.    1. Ventricular premature beats    2. Non-rheumatic mitral regurgitation    3. Essential hypertension    4. Other cardiomyopathy    5. Palpitations    6. Pure hypercholesterolemia    7. Raynaud's phenomenon without gangrene    8. 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    9. At risk for amiodarone toxicity with long term use    10. GAURAV (obstructive sleep apnea)        Plan:    Will obtain a repeat Holter in 2 months.  At the same time, we will update her echocardiogram as well as her PFT/DLCO.    No orders of the defined types were placed in this encounter.      Follow up in about 6 months (around 8/4/2022), or if symptoms worsen or fail to improve.    Medications Discontinued During This Encounter   Medication Reason    diphenhydrAMINE (BENADRYL) 50 mg/mL injection             Medication List with Changes/Refills   Current Medications    ABATACEPT (ORENCIA CLICKJECT) 125 MG/ML ATIN    Inject 125 mg into the skin every 7 days.    AMIODARONE (PACERONE) 200 MG TAB    TAKE ONE TABLET BY MOUTH ONCE DAILY    ASPIRIN (ECOTRIN) 81 MG EC TABLET    Take 81 mg by mouth once daily.    ATORVASTATIN (LIPITOR) 10 MG TABLET    Take 1 tablet (10 mg total) by mouth once daily.    ATORVASTATIN (LIPITOR) 10 MG TABLET    TAKE ONE TABLET BY MOUTH ONCE DAILY    CARVEDILOL (COREG) 3.125 MG TABLET    Take 1 tablet (3.125 mg total) by mouth 2 (two) times daily with meals.    CHOLECALCIFEROL, VITAMIN D3, (VITAMIN D3) 25 MCG (1,000 UNIT) CAPSULE    Take 1,000 Units by mouth once daily.    CO-ENZYME Q-10 30 MG CAPSULE    Take 10 mg by mouth once daily.     CYANOCOBALAMIN 1,000 MCG/ML INJECTION        FLUAD QUAD 2021-22,65Y UP,,PF, 60 MCG (15 MCG X 4)/0.5 ML SYRG        FUROSEMIDE (LASIX) 40 MG TABLET    Take 1 tablet (40 mg total) by mouth daily as needed.    LEFLUNOMIDE (ARAVA) 20 MG TAB    TAKE ONE TABLET BY MOUTH ONCE DAILY    MELATONIN 10 MG  CAP    30 mg every evening.    MULTIVITAMIN WITH MINERALS (HAIR,SKIN AND NAILS ORAL)    Take by mouth once daily.    ORENCIA, WITH MALTOSE, 250 MG SOLR INJECTION        POTASSIUM CHLORIDE (KLOR-CON) 8 MEQ TBSR    Take 1 tablet (8 mEq total) by mouth once daily.    PULSE OXIMETER (PULSE OXIMETER) DEVICE    by Apply Externally route 2 (two) times a day. Use twice daily at 8 AM and 3 PM and record the value in MyChart as directed.    SACUBITRIL-VALSARTAN (ENTRESTO) 24-26 MG PER TABLET    Take 1 tablet by mouth 2 (two) times daily. Patient needs to schedule an appointment in the consult cardiology clinic.    TRAMADOL (ULTRAM) 50 MG TABLET    Take 1 tablet (50 mg total) by mouth every 6 (six) hours as needed for Pain.    TURMERIC ORAL    Take by mouth.    UNABLE TO FIND    2 capsules 2 (two) times a day. Nutrafol    UREA 20 % CREA    Apply 1 application topically once daily. To toenails   Discontinued Medications    DIPHENHYDRAMINE (BENADRYL) 50 MG/ML INJECTION

## 2022-02-09 ENCOUNTER — PATIENT MESSAGE (OUTPATIENT)
Dept: CARDIOLOGY | Facility: CLINIC | Age: 77
End: 2022-02-09
Payer: MEDICARE

## 2022-02-09 DIAGNOSIS — Z79.899 ENCOUNTER FOR LONG-TERM (CURRENT) USE OF HIGH-RISK MEDICATION: Primary | ICD-10-CM

## 2022-02-09 DIAGNOSIS — I42.8 OTHER CARDIOMYOPATHY: ICD-10-CM

## 2022-02-10 DIAGNOSIS — R06.02 SHORTNESS OF BREATH: ICD-10-CM

## 2022-02-15 DIAGNOSIS — I42.8 OTHER CARDIOMYOPATHY: Primary | ICD-10-CM

## 2022-02-17 RX ORDER — CARVEDILOL 3.12 MG/1
3.12 TABLET ORAL 2 TIMES DAILY WITH MEALS
Qty: 60 TABLET | Refills: 11 | Status: SHIPPED | OUTPATIENT
Start: 2022-02-17 | End: 2022-03-07 | Stop reason: SDUPTHER

## 2022-02-22 ENCOUNTER — PATIENT MESSAGE (OUTPATIENT)
Dept: RHEUMATOLOGY | Facility: CLINIC | Age: 77
End: 2022-02-22
Payer: MEDICARE

## 2022-02-22 ENCOUNTER — OFFICE VISIT (OUTPATIENT)
Dept: PODIATRY | Facility: CLINIC | Age: 77
End: 2022-02-22
Payer: MEDICARE

## 2022-02-22 ENCOUNTER — CLINICAL SUPPORT (OUTPATIENT)
Dept: INTERNAL MEDICINE | Facility: CLINIC | Age: 77
End: 2022-02-22
Payer: MEDICARE

## 2022-02-22 ENCOUNTER — HOSPITAL ENCOUNTER (OUTPATIENT)
Dept: RADIOLOGY | Facility: HOSPITAL | Age: 77
Discharge: HOME | End: 2022-02-22
Attending: PODIATRIST
Payer: MEDICARE

## 2022-02-22 ENCOUNTER — INFUSION (OUTPATIENT)
Dept: INFECTIOUS DISEASES | Facility: HOSPITAL | Age: 77
End: 2022-02-22
Attending: INTERNAL MEDICINE
Payer: MEDICARE

## 2022-02-22 ENCOUNTER — TELEPHONE (OUTPATIENT)
Dept: CARDIOLOGY | Facility: CLINIC | Age: 77
End: 2022-02-22
Payer: MEDICARE

## 2022-02-22 VITALS
OXYGEN SATURATION: 99 % | TEMPERATURE: 99 F | HEART RATE: 74 BPM | BODY MASS INDEX: 20.22 KG/M2 | WEIGHT: 129.06 LBS | DIASTOLIC BLOOD PRESSURE: 58 MMHG | RESPIRATION RATE: 18 BRPM | SYSTOLIC BLOOD PRESSURE: 101 MMHG

## 2022-02-22 VITALS — BODY MASS INDEX: 20.56 KG/M2 | WEIGHT: 131 LBS | HEIGHT: 67 IN

## 2022-02-22 DIAGNOSIS — M79.671 RIGHT FOOT PAIN: Primary | ICD-10-CM

## 2022-02-22 DIAGNOSIS — M06.041 RHEUMATOID ARTHRITIS INVOLVING BOTH HANDS WITH NEGATIVE RHEUMATOID FACTOR: Primary | ICD-10-CM

## 2022-02-22 DIAGNOSIS — L84 HELOMA MOLLE: ICD-10-CM

## 2022-02-22 DIAGNOSIS — M79.671 RIGHT FOOT PAIN: ICD-10-CM

## 2022-02-22 DIAGNOSIS — Z98.890 HISTORY OF FOOT SURGERY: ICD-10-CM

## 2022-02-22 DIAGNOSIS — M06.042 RHEUMATOID ARTHRITIS INVOLVING BOTH HANDS WITH NEGATIVE RHEUMATOID FACTOR: Primary | ICD-10-CM

## 2022-02-22 PROCEDURE — 63600175 PHARM REV CODE 636 W HCPCS: Mod: JA,JG,HCNC | Performed by: INTERNAL MEDICINE

## 2022-02-22 PROCEDURE — 73630 X-RAY EXAM OF FOOT: CPT | Mod: TC,HCNC,RT

## 2022-02-22 PROCEDURE — 99999 PR PBB SHADOW E&M-EST. PATIENT-LVL II: ICD-10-PCS | Mod: PBBFAC,HCNC,, | Performed by: PODIATRIST

## 2022-02-22 PROCEDURE — 1159F MED LIST DOCD IN RCRD: CPT | Mod: HCNC,CPTII,S$GLB, | Performed by: PODIATRIST

## 2022-02-22 PROCEDURE — 25000003 PHARM REV CODE 250: Mod: HCNC | Performed by: INTERNAL MEDICINE

## 2022-02-22 PROCEDURE — 1160F RVW MEDS BY RX/DR IN RCRD: CPT | Mod: HCNC,CPTII,S$GLB, | Performed by: PODIATRIST

## 2022-02-22 PROCEDURE — 96365 THER/PROPH/DIAG IV INF INIT: CPT | Mod: HCNC

## 2022-02-22 PROCEDURE — 73630 XR FOOT COMPLETE 3 VIEW RIGHT: ICD-10-PCS | Mod: 26,HCNC,RT, | Performed by: RADIOLOGY

## 2022-02-22 PROCEDURE — 96372 THER/PROPH/DIAG INJ SC/IM: CPT | Mod: HCNC,S$GLB,, | Performed by: INTERNAL MEDICINE

## 2022-02-22 PROCEDURE — 1126F PR PAIN SEVERITY QUANTIFIED, NO PAIN PRESENT: ICD-10-PCS | Mod: HCNC,CPTII,S$GLB, | Performed by: PODIATRIST

## 2022-02-22 PROCEDURE — 1160F PR REVIEW ALL MEDS BY PRESCRIBER/CLIN PHARMACIST DOCUMENTED: ICD-10-PCS | Mod: HCNC,CPTII,S$GLB, | Performed by: PODIATRIST

## 2022-02-22 PROCEDURE — 96372 PR INJECTION,THERAP/PROPH/DIAG2ST, IM OR SUBCUT: ICD-10-PCS | Mod: HCNC,S$GLB,, | Performed by: INTERNAL MEDICINE

## 2022-02-22 PROCEDURE — 99213 OFFICE O/P EST LOW 20 MIN: CPT | Mod: HCNC,S$GLB,, | Performed by: PODIATRIST

## 2022-02-22 PROCEDURE — 99213 PR OFFICE/OUTPT VISIT, EST, LEVL III, 20-29 MIN: ICD-10-PCS | Mod: HCNC,S$GLB,, | Performed by: PODIATRIST

## 2022-02-22 PROCEDURE — 1126F AMNT PAIN NOTED NONE PRSNT: CPT | Mod: HCNC,CPTII,S$GLB, | Performed by: PODIATRIST

## 2022-02-22 PROCEDURE — 73630 X-RAY EXAM OF FOOT: CPT | Mod: 26,HCNC,RT, | Performed by: RADIOLOGY

## 2022-02-22 PROCEDURE — 96367 TX/PROPH/DG ADDL SEQ IV INF: CPT | Mod: HCNC

## 2022-02-22 PROCEDURE — 99999 PR PBB SHADOW E&M-EST. PATIENT-LVL II: CPT | Mod: PBBFAC,HCNC,, | Performed by: PODIATRIST

## 2022-02-22 PROCEDURE — 1159F PR MEDICATION LIST DOCUMENTED IN MEDICAL RECORD: ICD-10-PCS | Mod: HCNC,CPTII,S$GLB, | Performed by: PODIATRIST

## 2022-02-22 RX ORDER — SODIUM CHLORIDE 0.9 % (FLUSH) 0.9 %
10 SYRINGE (ML) INJECTION
Status: DISCONTINUED | OUTPATIENT
Start: 2022-02-22 | End: 2022-02-22 | Stop reason: HOSPADM

## 2022-02-22 RX ORDER — ACETAMINOPHEN 325 MG/1
650 TABLET ORAL
Status: COMPLETED | OUTPATIENT
Start: 2022-02-22 | End: 2022-02-22

## 2022-02-22 RX ORDER — ACETAMINOPHEN 325 MG/1
650 TABLET ORAL
Status: CANCELLED
Start: 2022-03-07

## 2022-02-22 RX ORDER — HEPARIN 100 UNIT/ML
500 SYRINGE INTRAVENOUS
Status: DISCONTINUED | OUTPATIENT
Start: 2022-02-22 | End: 2022-02-22 | Stop reason: HOSPADM

## 2022-02-22 RX ADMIN — SODIUM CHLORIDE 500 MG: 9 INJECTION, SOLUTION INTRAVENOUS at 03:02

## 2022-02-22 RX ADMIN — CYANOCOBALAMIN 1000 MCG: 1000 INJECTION, SOLUTION INTRAMUSCULAR at 01:02

## 2022-02-22 RX ADMIN — DIPHENHYDRAMINE HYDROCHLORIDE 25 MG: 50 INJECTION, SOLUTION INTRAMUSCULAR; INTRAVENOUS at 02:02

## 2022-02-22 RX ADMIN — ACETAMINOPHEN 650 MG: 325 TABLET ORAL at 02:02

## 2022-02-22 NOTE — TELEPHONE ENCOUNTER
Pt   and she is not certain on anything in her life right now she has an appt tomorrow with dr luis emery and she can dicuss with him what her thoughts are abour poc.  Advised to keep her appt tomorrow pb        ----- Message from Nicolas Farley sent at 2022 11:36 AM CST -----  Contact: self [phone]  Pt requesting a call back from Bekah.    Please call and advise

## 2022-02-22 NOTE — PROGRESS NOTES
"  Chief Concern:  Toe pain    HPI: Rizwana is a 76 y.o. female who presents to the clinic for evaluation and treatment of feet. right foot pain has been getting worse.  She reports pain at the plantar midfoot as well as the 5th toe.  Pain is worse with direct palpation.   No acute trauma reported to the foot otherwise.    Has hx of R ankle "fusion" however still has motion in the ankle.   Current shoe gear:   flat shoes      PCP: Sri Gutierrez MD              Patient Active Problem List   Diagnosis    Ventricular premature beats    Hammertoe    Palpitations    Costochondritis    Heart failure, systolic    Precordial pain    Bradycardia    Cardiomyopathy    Non-rheumatic mitral regurgitation    Essential hypertension    S/P colonoscopy    Osteopenia    Pure hypercholesterolemia    At risk for amiodarone toxicity with long term use    Dysuria    Vaginal atrophy    Vaginal burning    Open wound of right foot    GAURAV (obstructive sleep apnea)    Osteoarthritis of hand    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    Low back pain    Pulmonary nodule    PAF (paroxysmal atrial fibrillation)    High risk medication use    Periodic limb movement disorder (PLMD)    Raynaud's phenomenon    Anemia    Macrocytosis    ILD (interstitial lung disease)    OP (osteoporosis)             Current Outpatient Medications   Medication Sig Dispense Refill    abatacept (ORENCIA CLICKJECT) 125 mg/mL AtIn Inject 125 mg into the skin every 7 days. 4 mL 2    amiodarone (PACERONE) 200 MG Tab TAKE ONE TABLET BY MOUTH ONCE DAILY 90 tablet 3    aspirin (ECOTRIN) 81 MG EC tablet Take 81 mg by mouth once daily.      atorvastatin (LIPITOR) 10 MG tablet Take 1 tablet (10 mg total) by mouth once daily. 30 tablet 6    atorvastatin (LIPITOR) 10 MG tablet TAKE ONE TABLET BY MOUTH ONCE DAILY 23 tablet 6    carvediloL (COREG) 3.125 MG tablet Take 1 tablet (3.125 mg total) by mouth 2 (two) " times daily with meals. 180 tablet 4    carvediloL (COREG) 3.125 MG tablet Take 1 tablet (3.125 mg total) by mouth 2 (two) times daily with meals. 60 tablet 11    cholecalciferol, vitamin D3, (VITAMIN D3) 25 mcg (1,000 unit) capsule Take 1,000 Units by mouth once daily.      co-enzyme Q-10 30 mg capsule Take 10 mg by mouth once daily.       cyanocobalamin 1,000 mcg/mL injection       FLUAD QUAD 2021-22,65Y UP,,PF, 60 mcg (15 mcg x 4)/0.5 mL Syrg       furosemide (LASIX) 40 MG tablet Take 1 tablet (40 mg total) by mouth daily as needed. 30 tablet 6    leflunomide (ARAVA) 20 MG Tab TAKE ONE TABLET BY MOUTH ONCE DAILY 90 tablet 0    melatonin 10 mg Cap 30 mg every evening.      MULTIVITAMIN WITH MINERALS (HAIR,SKIN AND NAILS ORAL) Take by mouth once daily.      ORENCIA, WITH MALTOSE, 250 mg SolR injection       potassium chloride (KLOR-CON) 8 MEQ TbSR Take 1 tablet (8 mEq total) by mouth once daily. 30 tablet 6    pulse oximeter (PULSE OXIMETER) device by Apply Externally route 2 (two) times a day. Use twice daily at 8 AM and 3 PM and record the value [facility] as directed. 1 each 0    sacubitriL-valsartan (ENTRESTO) 24-26 mg per tablet Take 1 tablet by mouth 2 (two) times daily. Patient needs to schedule an appointment in the consult cardiology clinic. 60 tablet 1    traMADoL (ULTRAM) 50 mg tablet Take 1 tablet (50 mg total) by mouth every 6 (six) hours as needed for Pain. 20 tablet 0    TURMERIC ORAL Take by mouth.      UNABLE TO FIND 2 capsules 2 (two) times a day. Nutrafol      urea 20 % Crea Apply 1 application topically once daily. To toenails (Patient not taking: Reported on 2/4/2022) 75 g 10     Current Facility-Administered Medications   Medication Dose Route Frequency Provider Last Rate Last Admin    acetaminophen tablet 650 mg  650 mg Oral Once PRN Gurjit Zaidi MD        albuterol inhaler 2 puff  2 puff Inhalation Q20 Min PRN Gurjit Zaidi MD        cyanocobalamin injection  "1,000 mcg  1,000 mcg Intramuscular Q30 Days Sri Gutierrez MD   1,000 mcg at 01/24/22 1409    diphenhydrAMINE capsule 25 mg  25 mg Oral Q6H PRN Darryl Javier MD        diphenhydrAMINE injection 25 mg  25 mg Intravenous Once PRN Gurjit Zaidi MD        EPINEPHrine (EPIPEN) 0.3 mg/0.3 mL pen injection 0.3 mg  0.3 mg Intramuscular PRN Gurjit Zaidi MD        methylPREDNISolone sodium succinate injection 40 mg  40 mg Intravenous Once PRN Gurjit Zaidi MD        ondansetron disintegrating tablet 4 mg  4 mg Oral Once PRN Gurjit Zaidi MD        sodium chloride 0.9% 500 mL flush bag   Intravenous PRN Gurjit Zaidi MD        sodium chloride 0.9% flush 10 mL  10 mL Intravenous PRN Gurjit Zaidi MD           Review of patient's allergies indicates:   Allergen Reactions    Dilaudid [hydromorphone (bulk)]      Severe nausea/vomiting    Morphine      Severe nausea/vomiting       Review of Systems   Constitution: Negative for chills and fever.   Cardiovascular: Positive for leg swelling. Negative for chest pain and claudication.   Respiratory: Negative for cough and shortness of breath.    Skin: Positive for dry skin and nail changes.   Musculoskeletal: Positive for arthritis, joint pain and stiffness (ankle R).   Gastrointestinal: Negative for nausea and vomiting.   Neurological: Negative for numbness and paresthesias.   Psychiatric/Behavioral: Negative for altered mental status.           Objective:         Vitals:    02/22/22 1152   Weight: 59.4 kg (131 lb)   Height: 5' 7" (1.702 m)           Physical Exam   Constitutional: She is oriented to person, place, and time. She appears well-developed and well-nourished.     Cardiovascular: Intact distal pulses.     Dorsalis pedis pulses are 2+ on the right side, and 2+ on the left side.    Posterior tibial pulses are 2+ on the right side, and 2+ on the left side.   CRT < 3 sec to tips of toes. No edema noted to b/l LE. No vericosities " noted to b/l LEs.        Musculoskeletal:   Gastrocnemius equinus noted to b/l ankles with decreased DF noted on exam. MMT 5/5 in DF/PF/Inv/Ev resistance with no reproduction of pain in any direction. Passive range of motion of ankle and pedal joints is painless. Adequate pedal joint ROM.   Hypermobility noted to 1st ray b/l with near complete collapse of medial longitudinal arch b/l (R>L) with forefoot loading. Functional pes planus foot type b/l.   Semi-reducible hammertoe contractures noted to toes 2-4 b/l-asymptomatic.   Mild stiffness noted to R ankle compared to L however still gets 5-7 deg of DF in R ankle.   Palpable bony prominence noted to dorsal and dorsal medial 1st met cuneiform joint, mild, asymptomatic.      Neurological:   She is alert and oriented to person, place, and time. She has normal strength. No sensory deficit.   Light touch, proprioception, and sharp/dull sensation are all intact bilaterally.      Skin:   Skin is thin.  Interdigital maceration, slightly red at the right 4th interdigital space.  No active drainage.  No cellulitis.                  Assessment:         ICD-10-CM ICD-9-CM    1. Right foot pain  M79.671 729.5 X-Ray Foot Complete Right   2. Heloma molle - Right Foot  [id number]    3. History of foot surgery  Z98.890 V15.29            Plan:         I counseled the patient on her conditions, their implications and medical management.    Thick soft sole shoes. No barefoot walking.     Gentian violet to the right 4th interdigital space.     Xrays.  Follow up with results.

## 2022-02-23 DIAGNOSIS — D84.9 IMMUNOSUPPRESSED STATUS: ICD-10-CM

## 2022-02-24 ENCOUNTER — INFUSION (OUTPATIENT)
Dept: INFECTIOUS DISEASES | Facility: HOSPITAL | Age: 77
End: 2022-02-24
Attending: INTERNAL MEDICINE
Payer: MEDICARE

## 2022-02-24 VITALS
BODY MASS INDEX: 20.22 KG/M2 | SYSTOLIC BLOOD PRESSURE: 106 MMHG | HEART RATE: 70 BPM | OXYGEN SATURATION: 95 % | DIASTOLIC BLOOD PRESSURE: 61 MMHG | TEMPERATURE: 98 F | RESPIRATION RATE: 18 BRPM | WEIGHT: 129.06 LBS

## 2022-02-24 NOTE — PROGRESS NOTES
Patient arrived for 1st Prolia injection.  Upon arrival, patient's BP 79/52, 82/53, 88/56 and asymptomatic. Patient given approximately 5 cups of water and a small snack. Notified Dr. Javier. MD stated for patient to reschedule Prolia injection and to follow up with Dr. Gutierrez about hypotension. Patient verbalized understanding. BP increased to 106/61   P 70 prior to leaving and is asymptomatic.  Appointment rescheduled for 3/3/2021. Left unit in NAD.

## 2022-02-25 ENCOUNTER — TELEPHONE (OUTPATIENT)
Dept: CARDIOLOGY | Facility: HOSPITAL | Age: 77
End: 2022-02-25
Payer: MEDICARE

## 2022-02-25 ENCOUNTER — HOSPITAL ENCOUNTER (OUTPATIENT)
Dept: CARDIOLOGY | Facility: HOSPITAL | Age: 77
Discharge: HOME | End: 2022-02-25
Attending: INTERNAL MEDICINE
Payer: MEDICARE

## 2022-02-25 ENCOUNTER — OFFICE VISIT (OUTPATIENT)
Dept: CARDIOLOGY | Facility: CLINIC | Age: 77
End: 2022-02-25
Payer: MEDICARE

## 2022-02-25 VITALS
DIASTOLIC BLOOD PRESSURE: 50 MMHG | HEIGHT: 67 IN | OXYGEN SATURATION: 96 % | SYSTOLIC BLOOD PRESSURE: 90 MMHG | BODY MASS INDEX: 20.4 KG/M2 | WEIGHT: 130 LBS | HEART RATE: 66 BPM

## 2022-02-25 DIAGNOSIS — R00.2 PALPITATIONS: ICD-10-CM

## 2022-02-25 DIAGNOSIS — I42.8 OTHER CARDIOMYOPATHY: Primary | ICD-10-CM

## 2022-02-25 DIAGNOSIS — D75.89 MACROCYTOSIS: Primary | ICD-10-CM

## 2022-02-25 DIAGNOSIS — E78.00 PURE HYPERCHOLESTEROLEMIA: ICD-10-CM

## 2022-02-25 DIAGNOSIS — I42.8 OTHER CARDIOMYOPATHY: ICD-10-CM

## 2022-02-25 DIAGNOSIS — I49.2 JUNCTIONAL PREMATURE BEATS: Primary | ICD-10-CM

## 2022-02-25 DIAGNOSIS — I10 ESSENTIAL HYPERTENSION: ICD-10-CM

## 2022-02-25 DIAGNOSIS — I49.3 VENTRICULAR PREMATURE BEATS: ICD-10-CM

## 2022-02-25 DIAGNOSIS — Z91.89 AT RISK FOR AMIODARONE TOXICITY WITH LONG TERM USE: ICD-10-CM

## 2022-02-25 DIAGNOSIS — Z79.899 AT RISK FOR AMIODARONE TOXICITY WITH LONG TERM USE: ICD-10-CM

## 2022-02-25 DIAGNOSIS — I49.2 JUNCTIONAL PREMATURE BEATS: ICD-10-CM

## 2022-02-25 PROCEDURE — 1159F MED LIST DOCD IN RCRD: CPT | Mod: HCNC,CPTII,S$GLB, | Performed by: INTERNAL MEDICINE

## 2022-02-25 PROCEDURE — 93005 ELECTROCARDIOGRAM TRACING: CPT | Mod: HCNC

## 2022-02-25 PROCEDURE — 3288F PR FALLS RISK ASSESSMENT DOCUMENTED: ICD-10-PCS | Mod: HCNC,CPTII,S$GLB, | Performed by: INTERNAL MEDICINE

## 2022-02-25 PROCEDURE — 3074F SYST BP LT 130 MM HG: CPT | Mod: HCNC,CPTII,S$GLB, | Performed by: INTERNAL MEDICINE

## 2022-02-25 PROCEDURE — 99215 OFFICE O/P EST HI 40 MIN: CPT | Mod: HCNC,S$GLB,, | Performed by: INTERNAL MEDICINE

## 2022-02-25 PROCEDURE — 93010 EKG 12-LEAD: ICD-10-PCS | Mod: HCNC,,, | Performed by: INTERNAL MEDICINE

## 2022-02-25 PROCEDURE — 1101F PT FALLS ASSESS-DOCD LE1/YR: CPT | Mod: HCNC,CPTII,S$GLB, | Performed by: INTERNAL MEDICINE

## 2022-02-25 PROCEDURE — 93010 ELECTROCARDIOGRAM REPORT: CPT | Mod: HCNC,,, | Performed by: INTERNAL MEDICINE

## 2022-02-25 PROCEDURE — 3078F DIAST BP <80 MM HG: CPT | Mod: HCNC,CPTII,S$GLB, | Performed by: INTERNAL MEDICINE

## 2022-02-25 PROCEDURE — 99215 PR OFFICE/OUTPT VISIT, EST, LEVL V, 40-54 MIN: ICD-10-PCS | Mod: HCNC,S$GLB,, | Performed by: INTERNAL MEDICINE

## 2022-02-25 PROCEDURE — 99999 PR PBB SHADOW E&M-EST. PATIENT-LVL IV: ICD-10-PCS | Mod: PBBFAC,HCNC,, | Performed by: INTERNAL MEDICINE

## 2022-02-25 PROCEDURE — 3288F FALL RISK ASSESSMENT DOCD: CPT | Mod: HCNC,CPTII,S$GLB, | Performed by: INTERNAL MEDICINE

## 2022-02-25 PROCEDURE — 99999 PR PBB SHADOW E&M-EST. PATIENT-LVL IV: CPT | Mod: PBBFAC,HCNC,, | Performed by: INTERNAL MEDICINE

## 2022-02-25 PROCEDURE — 1160F PR REVIEW ALL MEDS BY PRESCRIBER/CLIN PHARMACIST DOCUMENTED: ICD-10-PCS | Mod: HCNC,CPTII,S$GLB, | Performed by: INTERNAL MEDICINE

## 2022-02-25 PROCEDURE — 1159F PR MEDICATION LIST DOCUMENTED IN MEDICAL RECORD: ICD-10-PCS | Mod: HCNC,CPTII,S$GLB, | Performed by: INTERNAL MEDICINE

## 2022-02-25 PROCEDURE — 1126F AMNT PAIN NOTED NONE PRSNT: CPT | Mod: HCNC,CPTII,S$GLB, | Performed by: INTERNAL MEDICINE

## 2022-02-25 PROCEDURE — 1126F PR PAIN SEVERITY QUANTIFIED, NO PAIN PRESENT: ICD-10-PCS | Mod: HCNC,CPTII,S$GLB, | Performed by: INTERNAL MEDICINE

## 2022-02-25 PROCEDURE — 3074F PR MOST RECENT SYSTOLIC BLOOD PRESSURE < 130 MM HG: ICD-10-PCS | Mod: HCNC,CPTII,S$GLB, | Performed by: INTERNAL MEDICINE

## 2022-02-25 PROCEDURE — 3078F PR MOST RECENT DIASTOLIC BLOOD PRESSURE < 80 MM HG: ICD-10-PCS | Mod: HCNC,CPTII,S$GLB, | Performed by: INTERNAL MEDICINE

## 2022-02-25 PROCEDURE — 1101F PR PT FALLS ASSESS DOC 0-1 FALLS W/OUT INJ PAST YR: ICD-10-PCS | Mod: HCNC,CPTII,S$GLB, | Performed by: INTERNAL MEDICINE

## 2022-02-25 PROCEDURE — 1160F RVW MEDS BY RX/DR IN RCRD: CPT | Mod: HCNC,CPTII,S$GLB, | Performed by: INTERNAL MEDICINE

## 2022-02-25 NOTE — PROGRESS NOTES
Subjective:   Patient ID:  Rizwana Block is a 76 y.o. female     Chief complaint:  Palpitations    HPI  Background:  Hx of PVCs s/p recent RFA, bradycardia, CM, HFrEF (EF 40-45%), non-rheumatic MR, and HTN. Ms. Block has been on long-term amiodarone for PVCs (inferior-posterior-septal PVC morphology). Her PVC burden was thought to have been related to her DCM. Her EF improved to the 50s after amiodarone use, and she was subsequently switched from amiodarone to Verapamil.      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  She later restarted with palps and some PVCs (albeit in lesser frequency than pre RFA) and Rx was added -- Verapamil first then back on amio      Echo (06/22/16) >>  EF of 40-45%, trivial-to-mild TR, trivial IL, upper limit of normal LVE, and a PASP of 26 mmHg.   24-Hour Holter (06/22/16) revealed a 13% PVC burden; 481 couplets, 73 triplets and short VTNS -- the large majority of the PVCs were MM. The triplets are dimorphic.  She had an RFA on 9/27/16  >>  1.  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  Effective lesions were at 12 o'clock level in the [place] view.  2.  Note that the ascending aorta and aortic arch appeared to be very unfolded (unusually so for the  patient's frame).  This may need to be evaluated further.     48-Hour Holter (11/09/16) revealed SR w/HRs varying between  bpm; average 72 bpm. There were very frequent polymorphic PVCs (totaling 9,632; averaging 200 per hour, ~5%), 133 couplets, and no episodes of VT. There were very rare PACs (totaling 20; averaging less than 1 per hour) and no episodes of sustained SVT. There was 1 episode of dizziness reported corresponding w/SR at 95 bpm.       Update June 2018:  Since the RFA she has been feeling a lot better with more  "energy, working full time,, back to gardening and swimming etc  Echo on 6/19/18    1 - Mildly to moderately depressed left ventricular systolic function (EF 40-45%).     2 - Wall motion abnormalities.     3 - Impaired LV relaxation, normal LAP (grade 1 diastolic dysfunction).     4 - Low normal right ventricular systolic function .     5 - Trivial to mild tricuspid regurgitation.     6 - Trivial pulmonic regurgitation.     7 - Atrial septal aneurysm .     8 - The estimated PA systolic pressure is 29 mmHg.       Update 1/9/19:  She feels well -- her only c/o is hair loss -- she thinks it's the amio  Takes lasix when her rings get tight -- @ once a week or so.   I have reviewed the actual image of the ECG tracing obtained today and it shows NSR with normal intervals     Update 8/15/2019:  She started Biotin supplements and her hair as well as her nails are "better".   She has been feeling well -- gets occ chest discomfort -- at times in the shower - lasts a few secs-- maybe a little longer-- she had similar c/o several years ago too  And her angios were normal  Once she woke up with a feeling pf "a punch in the chest". It was gone as soon as she woke up.   I have reviewed the actual image of the ECG tracing obtained today and it shows NSR with mild IVCD -- QRSd 125 msec  Her main concerns are related to RA      Update 5/28/2020:    I have obtained recent updates in her CV tests and amiodarone toxicity panel:  The echo revealed the following:  · Eccentric left ventricular hypertrophy.  · Low normal left ventricular systolic function. The estimated ejection fraction is 50%.  · Normal right ventricular systolic function.  · Normal LV diastolic function.  · Mild tricuspid regurgitation.  · The estimated PA systolic pressure is 21 mmHg.  · Normal central venous pressure (3 mmHg).  Amiodarone level was 0.3/0.3 on 100 mg of amiodarone a day.       Ref. Range 5/18/2020 08:25 5/21/2020 09:51   TSH Latest Ref Range: 0.400 - " 4.000 uIU/mL 0.123 (L)     T3, Total Latest Ref Range: 60 - 180 ng/dL   75   Free T4 Latest Ref Range: 0.71 - 1.51 ng/dL 1.20        CMP was within normal limits.  DLCO was 66% of predicted and DLCO/VA was 96%.  Recent repeat Holter showed the following:  NSR  The patient slept from 22:00 until 06:00 with a heart rate of 78 bpm during waking hours, 68 bpm during sleep.  There were very frequent dimorphic PVCs totalling 13817 and averaging 238.54 per hour. The ventricular arrhythmias percentage was 5.6. There were 152 couplets. There were 1318 bigeminal cycles. There were 4 triplets.  Clinically, she is doing relatively well, and in fact, after having laid off exercise for year and a half she has restarted recently going back on the treadmill.  She can stay 30 min on the treadmill and then starts becoming a bit short of breath.  This is less than what she used to do but in all fairness, she probably is deconditioned at this point.  She has no leg edema, no chest pain, occasional palpitations, no undue dyspnea on exertion-certainly not with usual ADLs, no orthopnea, no PND, no syncope and no presyncope.  She has recently seen Dr. Guerra at and she complained of fatigue.  She feels stiff in the morning, gets better and then gets a little tired in the afternoon.  There are no specific associated symptoms  Her social life is in upheaval.  A 2nd of 3 step sons has recently passed away and she is having a hard time coping.  In addition, her daughter is back in the [place].     >>  Overall she is doing well.  There is no bradycardia and therefore this is not the cause of her fatigue.  More than likely, this related to rheumatoid arthritis, aging and deconditioning.  On the other hand, her PVC count is a little bit excessive and in view of the fact that her amiodarone level is quite low, it is reasonable to increase the dose slightly.  Of note is that her ejection fraction is now at 50%.  It tends to drift down as the PVCs  "increase in frequency.  There is no evidence of CHF.  She has no evidence of amiodarone toxicity at this point.     Increase amiodarone to 200 mg a day 5 days a week (skip Wednesdays and Sundays).  In 6 months, repeat PFT/DLCO.  Continue using the treadmill and increase time on it as tolerated.     Update 11/9/2020:  She is now here stating that her heart rate has been in the 30's and she is symptomatic   She has been walking up to 4 miles a day but not as often due to CoViD issues   She has had an updated echo showing normal EF   She does have some PVCs and at times she ? Feels them. However they are not significantly disturbing         Update 2/4/2022:  Amio level on 7/29/21 was 0.5/0.3  TSH in June was 2.3  DLCO from 11/19/2020 was 86% of predicted.     Clinically:  She had a COVID infection and within a day after getting a positive test received the IV in antibody infusion (01/06/2022).  This was associated with an upper respiratory tract infection and what was termed to be pulmonary plugs".  She is now on the mend and she is planning to go back to the [facility] for exercise.  She thinks she can handle 2 to 3 5 flights of stairs without too much trouble but admits to some dyspnea after the 2nd floor.  She has been walking 30 minutes at 16 to 17 minute a mile clip.  She had a Holter monitor 2 weeks into her COVID illness and it revealed what was termed as 27% PVC burden.  My direct examination reveals that these so-called PVCs were in fact either low atrial ectopy or junctional premature beats/para Hisian PVCs.  Of interest is that, during her COVID illness, she had Healthcare monitoring when and whenever her pulse ox determines a low pulse rate, the nurses taking care of her would push the panic button and ask her to stop her beta-blocker or various medications and or send ambulances to pick her up to take her to the emergency room.  This despite the fact that she would be asymptomatic.  She finally decided to quit " relying the numbers and ask them to not follow her up (she understands the concept of pulse deficit in that and he had her heart rate is not being reflected by the pulse oximeter due to the presence of premature beats).  >>   Recovering from COVID.  Holter obtained during COVID convalescence may perhaps not be representative of her usual state of affairs.   >>   Will obtain a repeat Holter in 2 months.  At the same time, we will update her echocardiogram as well as her PFT/DLCO.    Update 2/25/2022:  None of the ordered tests were obtained as of yet as I had asked to delay them till April.  Unfortunately, she lost her  a few days ago after a prior CVA that occurred 2 weeks earlier and left him incapacitated.  She nurse him through his last days home with the help of hospice.  She obviously feels overwhelmed and depressed.  When the other hand, physically she is fine except for some palpitations.  She notices that her blood pressure is rather low but has not had significant issues with dizziness and so.  The low blood pressure for is correlated with her recent starting of Coreg.  Her children are asking her to move up to the [place] area with them.  She is conflicted but seems to be leaning towards leaving.    She exercises routinely.  She has lost about 60 lb over the past 3 years as a result of exercise and restructuring her diet.  She is currently following a vagal type diet and weight watchers.  Her weight has been stable as of late.  I have reviewed the actual image of the ECG tracing obtained today and it shows NSR with normal intervals to right bundle branch block PVCs are noted.  There are also frequent junctional beats.    Current Outpatient Medications   Medication Sig    amiodarone (PACERONE) 200 MG Tab TAKE ONE TABLET BY MOUTH ONCE DAILY    aspirin (ECOTRIN) 81 MG EC tablet Take 81 mg by mouth once daily.    atorvastatin (LIPITOR) 10 MG tablet Take 1 tablet (10 mg total) by mouth once daily.     atorvastatin (LIPITOR) 10 MG tablet TAKE ONE TABLET BY MOUTH ONCE DAILY    carvediloL (COREG) 3.125 MG tablet Take 1 tablet (3.125 mg total) by mouth 2 (two) times daily with meals.    carvediloL (COREG) 3.125 MG tablet Take 1 tablet (3.125 mg total) by mouth 2 (two) times daily with meals.    cholecalciferol, vitamin D3, (VITAMIN D3) 25 mcg (1,000 unit) capsule Take 1,000 Units by mouth once daily.    co-enzyme Q-10 30 mg capsule Take 10 mg by mouth once daily.     cyanocobalamin 1,000 mcg/mL injection     FLUAD QUAD 2021-22,65Y UP,,PF, 60 mcg (15 mcg x 4)/0.5 mL Syrg     leflunomide (ARAVA) 20 MG Tab TAKE ONE TABLET BY MOUTH ONCE DAILY    melatonin 10 mg Cap 30 mg every evening.    MULTIVITAMIN WITH MINERALS (HAIR,SKIN AND NAILS ORAL) Take by mouth once daily.    ORENCIA, WITH MALTOSE, 250 mg SolR injection     potassium chloride (KLOR-CON) 8 MEQ TbSR Take 1 tablet (8 mEq total) by mouth once daily.    pulse oximeter (PULSE OXIMETER) device by Apply Externally route 2 (two) times a day. Use twice daily at 8 AM and 3 PM and record the value in [facility] as directed.    sacubitriL-valsartan (ENTRESTO) 24-26 mg per tablet Take 1 tablet by mouth 2 (two) times daily. Patient needs to schedule an appointment in the consult cardiology clinic.    traMADoL (ULTRAM) 50 mg tablet Take 1 tablet (50 mg total) by mouth every 6 (six) hours as needed for Pain.    TURMERIC ORAL Take by mouth.    UNABLE TO FIND 2 capsules 2 (two) times a day. Nutrafol    urea 20 % Crea Apply 1 application topically once daily. To toenails    furosemide (LASIX) 40 MG tablet Take 1 tablet (40 mg total) by mouth daily as needed.     Current Facility-Administered Medications   Medication    acetaminophen tablet 650 mg    albuterol inhaler 2 puff    cyanocobalamin injection 1,000 mcg    diphenhydrAMINE capsule 25 mg    diphenhydrAMINE injection 25 mg    EPINEPHrine (EPIPEN) 0.3 mg/0.3 mL pen injection 0.3 mg     methylPREDNISolone sodium succinate injection 40 mg    ondansetron disintegrating tablet 4 mg    sodium chloride 0.9% 500 mL flush bag    sodium chloride 0.9% flush 10 mL     Review of Systems   Constitutional: Positive for malaise/fatigue. Negative for decreased appetite, weight gain and weight loss.   HENT: Negative for nosebleeds.    Eyes: Negative for blurred vision and visual disturbance.   Cardiovascular: Negative for chest pain, claudication, cyanosis, dyspnea on exertion, irregular heartbeat, leg swelling, near-syncope, orthopnea, palpitations, paroxysmal nocturnal dyspnea and syncope.   Respiratory: Negative for cough, shortness of breath and wheezing.    Endocrine: Negative for heat intolerance.   Skin: Negative for rash.   Musculoskeletal: Negative for muscle weakness and myalgias.   Gastrointestinal: Negative for abdominal pain, anorexia, melena, nausea and vomiting.   Genitourinary: Negative for menorrhagia.   Neurological: Positive for light-headedness and weakness. Negative for excessive daytime sleepiness, dizziness, headaches, loss of balance, seizures and vertigo.   Psychiatric/Behavioral: Negative for altered mental status and depression. The patient is not nervous/anxious.      Social History     Tobacco Use   Smoking Status Former Smoker    Packs/day: 0.25    Years: 24.00    Pack years: 6.00    Types: Cigarettes    Start date: 1961    Quit date: 1985    Years since quittin.1   Smokeless Tobacco Never Used        Objective:     Physical Exam  Vitals and nursing note reviewed.   Constitutional:       Appearance: She is well-developed.      Comments: She has lost 60 lb in the past 3 years but has been stable as of late.  This was done via exercise and switching to being in at but I am not sure that it looks healthy.    She is sad today but seems to be holding it together.   HENT:      Head: Normocephalic and atraumatic.      Right Ear: External ear normal.      Left Ear:  "External ear normal.   Neck:      Thyroid: No thyromegaly.   Cardiovascular:      Rate and Rhythm: Normal rate and regular rhythm.      Pulses: Intact distal pulses. No midsystolic click and no opening snap.           Carotid pulses are 2+ on the right side and 2+ on the left side.       Radial pulses are 2+ on the right side and 2+ on the left side.        Dorsalis pedis pulses are 2+ on the right side and 2+ on the left side.        Posterior tibial pulses are 2+ on the right side and 2+ on the left side.      Heart sounds: Normal heart sounds. No murmur heard.    No friction rub. No gallop. No S3 or S4 sounds.   Pulmonary:      Effort: Pulmonary effort is normal.      Breath sounds: Normal breath sounds.   Abdominal:      General: There is no distension.      Palpations: Abdomen is soft.      Tenderness: There is no abdominal tenderness.   Musculoskeletal:      Cervical back: Normal range of motion and neck supple.      Right lower leg: No swelling.      Left lower leg: No swelling.      Right ankle: No swelling.      Left ankle: No swelling.   Skin:     General: Skin is warm.      Findings: No rash.      Nails: There is no clubbing.   Neurological:      Mental Status: She is alert and oriented to person, place, and time.      Cranial Nerves: No cranial nerve deficit.      Gait: Gait normal.   Psychiatric:         Speech: Speech normal.         Behavior: Behavior normal.         Thought Content: Thought content normal.       BP (!) 90/50 (BP Location: Left arm, Patient Position: Sitting, BP Method: Medium (Manual))   Pulse 66   Ht 5' 7" (1.702 m)   Wt 59 kg (130 lb)   LMP  (LMP Unknown)   SpO2 96%   BMI 20.36 kg/m²      reports previous alcohol use of about 5.0 standard drinks of alcohol per week.  Past Medical History:   Diagnosis Date    Arrhythmia     Chest pain 5/27/2016    3/2016: Began experience chest discomfort.    CHF (congestive heart failure)     Hypertension     Osteopenia     Reclast " infusion 10/5/2010 and 10/20/2011    Rheumatoid arthritis 07/2019    Sleep apnea      Past Surgical History:   Procedure Laterality Date    ANKLE FUSION      right    bladder surgery      with mesh    BLEPHAROPLASTY W/ LASER      CATARACT EXTRACTION, BILATERAL      HAND SURGERY      benign cyst on left    HYSTERECTOMY      heavy bleeding    PATELLA FRACTURE SURGERY      right- plate in tibial plateau    TONSILLECTOMY       Family History   Problem Relation Age of Onset    Heart disease Mother 63    Stroke Father 49    Heart disease Father     Early death Father     Cancer Maternal Aunt         bone    Cancer Maternal Uncle         esophageal    Heart disease Paternal Uncle     SIDS Daughter     Breast cancer Neg Hx     Ovarian cancer Neg Hx     Cervical cancer Neg Hx     Endometrial cancer Neg Hx     Vaginal cancer Neg Hx        Assessment:      1. Junctional premature beats    2. Other cardiomyopathy    3. Ventricular premature beats    4. Essential hypertension    5. Palpitations    6. At risk for amiodarone toxicity with long term use    7. Pure hypercholesterolemia        Plan:    Will check for malnutrition and obtain pre albumin.  Follow up if symptoms worsen or fail to improve.  Keep late April appointment with Mrs. Jimenez make sure that echo Holter and PFT/DLCO are obtain the in mid April and are available at the time of the visit.  There are no discontinued medications.         Medication List with Changes/Refills   Current Medications    AMIODARONE (PACERONE) 200 MG TAB    TAKE ONE TABLET BY MOUTH ONCE DAILY    ASPIRIN (ECOTRIN) 81 MG EC TABLET    Take 81 mg by mouth once daily.    ATORVASTATIN (LIPITOR) 10 MG TABLET    Take 1 tablet (10 mg total) by mouth once daily.    ATORVASTATIN (LIPITOR) 10 MG TABLET    TAKE ONE TABLET BY MOUTH ONCE DAILY    CARVEDILOL (COREG) 3.125 MG TABLET    Take 1 tablet (3.125 mg total) by mouth 2 (two) times daily with meals.    CARVEDILOL (COREG) 3.125 MG  TABLET    Take 1 tablet (3.125 mg total) by mouth 2 (two) times daily with meals.    CHOLECALCIFEROL, VITAMIN D3, (VITAMIN D3) 25 MCG (1,000 UNIT) CAPSULE    Take 1,000 Units by mouth once daily.    CO-ENZYME Q-10 30 MG CAPSULE    Take 10 mg by mouth once daily.     CYANOCOBALAMIN 1,000 MCG/ML INJECTION        FLUAD QUAD 2021-22,65Y UP,,PF, 60 MCG (15 MCG X 4)/0.5 ML SYRG        FUROSEMIDE (LASIX) 40 MG TABLET    Take 1 tablet (40 mg total) by mouth daily as needed.    LEFLUNOMIDE (ARAVA) 20 MG TAB    TAKE ONE TABLET BY MOUTH ONCE DAILY    MELATONIN 10 MG CAP    30 mg every evening.    MULTIVITAMIN WITH MINERALS (HAIR,SKIN AND NAILS ORAL)    Take by mouth once daily.    ORENCIA, WITH MALTOSE, 250 MG SOLR INJECTION        POTASSIUM CHLORIDE (KLOR-CON) 8 MEQ TBSR    Take 1 tablet (8 mEq total) by mouth once daily.    PULSE OXIMETER (PULSE OXIMETER) DEVICE    by Apply Externally route 2 (two) times a day. Use twice daily at 8 AM and 3 PM and record the value in MyChart as directed.    SACUBITRIL-VALSARTAN (ENTRESTO) 24-26 MG PER TABLET    Take 1 tablet by mouth 2 (two) times daily. Patient needs to schedule an appointment in the consult cardiology clinic.    TRAMADOL (ULTRAM) 50 MG TABLET    Take 1 tablet (50 mg total) by mouth every 6 (six) hours as needed for Pain.    TURMERIC ORAL    Take by mouth.    UNABLE TO FIND    2 capsules 2 (two) times a day. Nutrafol    UREA 20 % CREA    Apply 1 application topically once daily. To toenails

## 2022-02-26 ENCOUNTER — PATIENT MESSAGE (OUTPATIENT)
Dept: CARDIOLOGY | Facility: CLINIC | Age: 77
End: 2022-02-26
Payer: MEDICARE

## 2022-02-28 ENCOUNTER — TELEPHONE (OUTPATIENT)
Dept: INTERNAL MEDICINE | Facility: CLINIC | Age: 77
End: 2022-02-28
Payer: MEDICARE

## 2022-02-28 DIAGNOSIS — Z12.31 SCREENING MAMMOGRAM, ENCOUNTER FOR: Primary | ICD-10-CM

## 2022-02-28 NOTE — TELEPHONE ENCOUNTER
----- Message from Joselyn Hamilton sent at 2/28/2022  8:29 AM CST -----  Who Called: Patient.    What is the reqeust in detail: Requesting call back to place mammo orders and have them scheduled. Patient mentioned that she received a letter informing that its time for another mammo. Please advise.     Can the clinic reply by [facility]? No    Best Call Back Number: [phone]    Additional Information: Please advise.

## 2022-03-02 ENCOUNTER — PATIENT MESSAGE (OUTPATIENT)
Dept: INTERNAL MEDICINE | Facility: CLINIC | Age: 77
End: 2022-03-02
Payer: MEDICARE

## 2022-03-02 RX ORDER — ALPRAZOLAM 0.25 MG/1
0.25 TABLET ORAL NIGHTLY PRN
Qty: 30 TABLET | Refills: 1 | Status: SHIPPED | OUTPATIENT
Start: 2022-03-02 | End: 2022-04-04 | Stop reason: SDUPTHER

## 2022-03-03 ENCOUNTER — HOSPITAL ENCOUNTER (OUTPATIENT)
Dept: RADIOLOGY | Facility: HOSPITAL | Age: 77
Discharge: HOME | End: 2022-03-03
Attending: INTERNAL MEDICINE
Payer: MEDICARE

## 2022-03-03 ENCOUNTER — INFUSION (OUTPATIENT)
Dept: INFECTIOUS DISEASES | Facility: HOSPITAL | Age: 77
End: 2022-03-03
Attending: INTERNAL MEDICINE
Payer: MEDICARE

## 2022-03-03 VITALS
SYSTOLIC BLOOD PRESSURE: 117 MMHG | HEIGHT: 67 IN | BODY MASS INDEX: 20.69 KG/M2 | TEMPERATURE: 97 F | RESPIRATION RATE: 20 BRPM | WEIGHT: 131.81 LBS | OXYGEN SATURATION: 98 % | HEART RATE: 74 BPM | DIASTOLIC BLOOD PRESSURE: 79 MMHG

## 2022-03-03 VITALS — BODY MASS INDEX: 20.4 KG/M2 | WEIGHT: 130 LBS | HEIGHT: 67 IN

## 2022-03-03 DIAGNOSIS — M81.0 OSTEOPOROSIS, UNSPECIFIED OSTEOPOROSIS TYPE, UNSPECIFIED PATHOLOGICAL FRACTURE PRESENCE: Primary | ICD-10-CM

## 2022-03-03 DIAGNOSIS — Z12.31 SCREENING MAMMOGRAM, ENCOUNTER FOR: ICD-10-CM

## 2022-03-03 PROCEDURE — 96372 THER/PROPH/DIAG INJ SC/IM: CPT | Mod: HCNC

## 2022-03-03 PROCEDURE — 77067 SCR MAMMO BI INCL CAD: CPT | Mod: 26,HCNC,, | Performed by: RADIOLOGY

## 2022-03-03 PROCEDURE — 77063 BREAST TOMOSYNTHESIS BI: CPT | Mod: 26,HCNC,, | Performed by: RADIOLOGY

## 2022-03-03 PROCEDURE — 77067 SCR MAMMO BI INCL CAD: CPT | Mod: TC,HCNC

## 2022-03-03 PROCEDURE — 63600175 PHARM REV CODE 636 W HCPCS: Mod: JG,HCNC | Performed by: INTERNAL MEDICINE

## 2022-03-03 PROCEDURE — 77063 MAMMO DIGITAL SCREENING BILAT WITH TOMO: ICD-10-PCS | Mod: 26,HCNC,, | Performed by: RADIOLOGY

## 2022-03-03 PROCEDURE — 77067 MAMMO DIGITAL SCREENING BILAT WITH TOMO: ICD-10-PCS | Mod: 26,HCNC,, | Performed by: RADIOLOGY

## 2022-03-03 PROCEDURE — 77063 BREAST TOMOSYNTHESIS BI: CPT | Mod: TC,HCNC

## 2022-03-03 RX ADMIN — DENOSUMAB 60 MG: 60 INJECTION SUBCUTANEOUS at 02:03

## 2022-03-03 NOTE — PROGRESS NOTES
Pt arrived for her first dose of Prolia. Pt reports taking Calcium and Vitamin D daily. Pt denies any dental work in the 3 months. Pt tolerated today's dose well. Pt observed for 15 minutes afterwards. Return appt provided. Pt left the unit in NAD.

## 2022-03-05 ENCOUNTER — PATIENT MESSAGE (OUTPATIENT)
Dept: OTHER | Facility: OTHER | Age: 77
End: 2022-03-05
Payer: MEDICARE

## 2022-03-07 ENCOUNTER — OFFICE VISIT (OUTPATIENT)
Dept: CARDIOLOGY | Facility: CLINIC | Age: 77
End: 2022-03-07
Payer: MEDICARE

## 2022-03-07 ENCOUNTER — PATIENT MESSAGE (OUTPATIENT)
Dept: INTERNAL MEDICINE | Facility: CLINIC | Age: 77
End: 2022-03-07
Payer: MEDICARE

## 2022-03-07 VITALS
OXYGEN SATURATION: 98 % | DIASTOLIC BLOOD PRESSURE: 70 MMHG | HEIGHT: 67 IN | BODY MASS INDEX: 20.45 KG/M2 | WEIGHT: 130.31 LBS | HEART RATE: 64 BPM | SYSTOLIC BLOOD PRESSURE: 118 MMHG

## 2022-03-07 DIAGNOSIS — I34.0 NON-RHEUMATIC MITRAL REGURGITATION: ICD-10-CM

## 2022-03-07 DIAGNOSIS — G47.33 OSA (OBSTRUCTIVE SLEEP APNEA): ICD-10-CM

## 2022-03-07 DIAGNOSIS — I48.0 PAF (PAROXYSMAL ATRIAL FIBRILLATION): ICD-10-CM

## 2022-03-07 DIAGNOSIS — I10 ESSENTIAL HYPERTENSION: ICD-10-CM

## 2022-03-07 DIAGNOSIS — I42.8 NICM (NONISCHEMIC CARDIOMYOPATHY): Primary | ICD-10-CM

## 2022-03-07 PROCEDURE — 3288F PR FALLS RISK ASSESSMENT DOCUMENTED: ICD-10-PCS | Mod: HCNC,CPTII,S$GLB, | Performed by: INTERNAL MEDICINE

## 2022-03-07 PROCEDURE — 99999 PR PBB SHADOW E&M-EST. PATIENT-LVL V: CPT | Mod: PBBFAC,HCNC,, | Performed by: INTERNAL MEDICINE

## 2022-03-07 PROCEDURE — 99999 PR PBB SHADOW E&M-EST. PATIENT-LVL V: ICD-10-PCS | Mod: PBBFAC,HCNC,, | Performed by: INTERNAL MEDICINE

## 2022-03-07 PROCEDURE — 1159F PR MEDICATION LIST DOCUMENTED IN MEDICAL RECORD: ICD-10-PCS | Mod: HCNC,CPTII,S$GLB, | Performed by: INTERNAL MEDICINE

## 2022-03-07 PROCEDURE — 1160F RVW MEDS BY RX/DR IN RCRD: CPT | Mod: HCNC,CPTII,S$GLB, | Performed by: INTERNAL MEDICINE

## 2022-03-07 PROCEDURE — 1126F PR PAIN SEVERITY QUANTIFIED, NO PAIN PRESENT: ICD-10-PCS | Mod: HCNC,CPTII,S$GLB, | Performed by: INTERNAL MEDICINE

## 2022-03-07 PROCEDURE — 1101F PT FALLS ASSESS-DOCD LE1/YR: CPT | Mod: HCNC,CPTII,S$GLB, | Performed by: INTERNAL MEDICINE

## 2022-03-07 PROCEDURE — 99214 PR OFFICE/OUTPT VISIT, EST, LEVL IV, 30-39 MIN: ICD-10-PCS | Mod: HCNC,S$GLB,, | Performed by: INTERNAL MEDICINE

## 2022-03-07 PROCEDURE — 1159F MED LIST DOCD IN RCRD: CPT | Mod: HCNC,CPTII,S$GLB, | Performed by: INTERNAL MEDICINE

## 2022-03-07 PROCEDURE — 1126F AMNT PAIN NOTED NONE PRSNT: CPT | Mod: HCNC,CPTII,S$GLB, | Performed by: INTERNAL MEDICINE

## 2022-03-07 PROCEDURE — 3074F PR MOST RECENT SYSTOLIC BLOOD PRESSURE < 130 MM HG: ICD-10-PCS | Mod: HCNC,CPTII,S$GLB, | Performed by: INTERNAL MEDICINE

## 2022-03-07 PROCEDURE — 3288F FALL RISK ASSESSMENT DOCD: CPT | Mod: HCNC,CPTII,S$GLB, | Performed by: INTERNAL MEDICINE

## 2022-03-07 PROCEDURE — 3078F DIAST BP <80 MM HG: CPT | Mod: HCNC,CPTII,S$GLB, | Performed by: INTERNAL MEDICINE

## 2022-03-07 PROCEDURE — 3074F SYST BP LT 130 MM HG: CPT | Mod: HCNC,CPTII,S$GLB, | Performed by: INTERNAL MEDICINE

## 2022-03-07 PROCEDURE — 3078F PR MOST RECENT DIASTOLIC BLOOD PRESSURE < 80 MM HG: ICD-10-PCS | Mod: HCNC,CPTII,S$GLB, | Performed by: INTERNAL MEDICINE

## 2022-03-07 PROCEDURE — 1160F PR REVIEW ALL MEDS BY PRESCRIBER/CLIN PHARMACIST DOCUMENTED: ICD-10-PCS | Mod: HCNC,CPTII,S$GLB, | Performed by: INTERNAL MEDICINE

## 2022-03-07 PROCEDURE — 1101F PR PT FALLS ASSESS DOC 0-1 FALLS W/OUT INJ PAST YR: ICD-10-PCS | Mod: HCNC,CPTII,S$GLB, | Performed by: INTERNAL MEDICINE

## 2022-03-07 PROCEDURE — 99214 OFFICE O/P EST MOD 30 MIN: CPT | Mod: HCNC,S$GLB,, | Performed by: INTERNAL MEDICINE

## 2022-03-07 RX ORDER — CARVEDILOL 3.12 MG/1
TABLET ORAL
Qty: 180 TABLET | Refills: 0 | Status: SHIPPED | OUTPATIENT
Start: 2022-03-07 | End: 2022-04-29 | Stop reason: SDUPTHER

## 2022-03-07 NOTE — PROGRESS NOTES
Subjective:   Patient ID:  Rizwana Block is a 76 y.o. female who presents for follow-up of Hypertension    Rizwana Block is a 75 y.o. female who presents for follow-up of Lung field abnormal finding on examination and Hyperkalemia (ER 11/02/20)     Ms. Block is a 73yo female with a history of frequent PVCs (s/p RFA 9/27/2016), cardiomyopathy (EF 35%) now  normalized, HFrEF, mild MR, and HTN       Echo 5/20:  · Eccentric left ventricular hypertrophy.  · Low normal left ventricular systolic function. The estimated ejection fraction is 50%.  · Normal right ventricular systolic function.  · Normal LV diastolic function.  · Mild tricuspid regurgitation.  · The estimated PA systolic pressure is 21 mmHg.  · Normal central venous pressure (3 mmHg).  HPI:   Feels well. No complaints  No chest pain, Orthopnea, PND of heart failure symptoms.   Denies palpitations or fluttering in the chest  Patient is walking 2.5 miles a day.    Non smoker  Mother had MI at age 68- sudden death. Cousin had MI at 33. Grandfather had MI in 40s. Father had CVA.      The 10-year ASCVD risk score (Nayeli PANFILO Jr., et al., 2013) is: 14.9%    Values used to calculate the score:      Age: 73 years      Sex: Female      Is Non- : No      Diabetic: No      Tobacco smoker: No      Systolic Blood Pressure: 128 mmHg      Is BP treated: Yes      HDL Cholesterol: 81 mg/dL      Total Cholesterol: 198 mg/dL     HPI:   No chest pain, Orthopnea, PND of heart failure symptoms. Occasional SOB on minimal exertion that may be new.   Denies palpitations or fluttering in the chest  Intentionally has lost 10 pounds.   Recent treatment for RA  With immunomodulators.     HPI:   Lost 50 pounds. Plant based.  No chest pain, Orthopnea, PND of heart failure symptoms.   Occasional  palpitations or fluttering in the chest  Amiodarone was increased by Dr. Arriaga because of increased PVC burden (she has had a failed prior ablation).  Patient's chart  "says AF but we reviewed prior EP notes and EKG I do not see evidence of AF.      HPI:   Patient was retested for GAURAV and she does not have sleep apnea any more  Patient is leaving for a travel.   Patient is under stress as her  passed after a massive stroke.   Patient's BP has been running high      Echo 2020  · The left ventricle is normal in size with low normal systolic function. The estimated ejection fraction is 50%.  · There are segmental left ventricular wall motion abnormalities.  · There is abnormal septal wall motion consistent with left bundle branch block.  · Normal left ventricular diastolic function.  · Normal right ventricular size with low normal right ventricular systolic function.  · Mild pulmonic regurgitation.  · Normal central venous pressure (3 mmHg).  · The estimated PA systolic pressure is 22 mmHg.         Patient Active Problem List   Diagnosis    Ventricular premature beats    Hammertoe    Palpitations    Costochondritis    Heart failure, systolic    Precordial pain    Bradycardia    Cardiomyopathy    Non-rheumatic mitral regurgitation    Essential hypertension    S/P colonoscopy    Osteopenia    Pure hypercholesterolemia    At risk for amiodarone toxicity with long term use    Dysuria    Vaginal atrophy    Vaginal burning    Open wound of right foot    GAURAV (obstructive sleep apnea)    Osteoarthritis of hand    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    Low back pain    Pulmonary nodule    PAF (paroxysmal atrial fibrillation)    High risk medication use    Periodic limb movement disorder (PLMD)    Raynaud's phenomenon    Anemia    Macrocytosis    ILD (interstitial lung disease)    OP (osteoporosis)    Junctional premature beats     /70   Pulse 64   Ht 5' 7" (1.702 m)   Wt 59.1 kg (130 lb 4.7 oz)   LMP  (LMP Unknown)   SpO2 98%   BMI 20.41 kg/m²   Body mass index is 20.41 kg/m².  CrCl cannot be calculated " (Patient's most recent lab result is older than the maximum 7 days allowed.).    Lab Results   Component Value Date     11/18/2021    K 4.1 11/18/2021     11/18/2021    CO2 30 (H) 11/18/2021    BUN 15 11/18/2021    CREATININE 0.8 11/18/2021    GLU 85 11/18/2021    MG 1.9 04/27/2021    AST 20 11/18/2021    ALT 13 11/18/2021    ALBUMIN 3.8 11/18/2021    PROT 6.6 11/18/2021    BILITOT 0.4 11/18/2021    WBC 6.31 11/18/2021    HGB 11.8 (L) 11/18/2021    HCT 38.3 11/18/2021    HCT 37 11/02/2020     (H) 11/18/2021     11/18/2021    INR 0.9 09/16/2016    TSH 2.288 04/27/2021    CHOL 139 07/29/2021    HDL 66 07/29/2021    LDLCALC 61.2 (L) 07/29/2021    TRIG 59 07/29/2021       Current Outpatient Medications   Medication Sig    ALPRAZolam (XANAX) 0.25 MG tablet Take 1 tablet (0.25 mg total) by mouth nightly as needed for Insomnia.    amiodarone (PACERONE) 200 MG Tab TAKE ONE TABLET BY MOUTH ONCE DAILY    aspirin (ECOTRIN) 81 MG EC tablet Take 81 mg by mouth once daily.    atorvastatin (LIPITOR) 10 MG tablet Take 1 tablet (10 mg total) by mouth once daily.    atorvastatin (LIPITOR) 10 MG tablet TAKE ONE TABLET BY MOUTH ONCE DAILY    cholecalciferol, vitamin D3, (VITAMIN D3) 25 mcg (1,000 unit) capsule Take 1,000 Units by mouth once daily.    co-enzyme Q-10 30 mg capsule Take 10 mg by mouth once daily.     cyanocobalamin 1,000 mcg/mL injection     FLUAD QUAD 2021-22,65Y UP,,PF, 60 mcg (15 mcg x 4)/0.5 mL Syrg     leflunomide (ARAVA) 20 MG Tab TAKE ONE TABLET BY MOUTH ONCE DAILY    melatonin 10 mg Cap 30 mg every evening.    MULTIVITAMIN WITH MINERALS (HAIR,SKIN AND NAILS ORAL) Take by mouth once daily.    ORENCIA, WITH MALTOSE, 250 mg SolR injection     potassium chloride (KLOR-CON) 8 MEQ TbSR Take 1 tablet (8 mEq total) by mouth once daily.    pulse oximeter (PULSE OXIMETER) device by Apply Externally route 2 (two) times a day. Use twice daily at 8 AM and 3 PM and record the value in  [employer] as directed.    sacubitriL-valsartan (ENTRESTO) 24-26 mg per tablet Take 1 tablet by mouth 2 (two) times daily. Patient needs to schedule an appointment in the consult cardiology clinic.    traMADoL (ULTRAM) 50 mg tablet Take 1 tablet (50 mg total) by mouth every 6 (six) hours as needed for Pain.    TURMERIC ORAL Take by mouth.    urea 20 % Crea Apply 1 application topically once daily. To toenails    carvediloL (COREG) 3.125 MG tablet Take 1 tablet (3.125 mg total) by mouth 2 (two) times daily with meals.    carvediloL (COREG) 3.125 MG tablet Take 1 tab twice a day. Take 1 extra tab if BP greater then 140  mmHG as needed.    furosemide (LASIX) 40 MG tablet Take 1 tablet (40 mg total) by mouth daily as needed.    UNABLE TO FIND 2 capsules 2 (two) times a day. Nutrafol     Current Facility-Administered Medications   Medication    acetaminophen tablet 650 mg    albuterol inhaler 2 puff    cyanocobalamin injection 1,000 mcg    diphenhydrAMINE capsule 25 mg    diphenhydrAMINE injection 25 mg    EPINEPHrine (EPIPEN) 0.3 mg/0.3 mL pen injection 0.3 mg    methylPREDNISolone sodium succinate injection 40 mg    ondansetron disintegrating tablet 4 mg    sodium chloride 0.9% 500 mL flush bag    sodium chloride 0.9% flush 10 mL       Review of Systems   Constitutional: Positive for weight loss. Negative for chills, decreased appetite, malaise/fatigue, night sweats and weight gain.   Eyes: Negative for blurred vision, double vision, visual disturbance and visual halos.   Cardiovascular: Negative for chest pain, claudication, cyanosis, dyspnea on exertion, irregular heartbeat, leg swelling, near-syncope, orthopnea, palpitations, paroxysmal nocturnal dyspnea and syncope.   Respiratory: Negative for cough, hemoptysis, snoring, sputum production and wheezing.    Endocrine: Negative for cold intolerance, heat intolerance, polydipsia and polyphagia.   Hematologic/Lymphatic: Negative for adenopathy and  bleeding problem. Does not bruise/bleed easily.   Skin: Negative for flushing, itching, poor wound healing and rash.   Musculoskeletal: Negative for arthritis, back pain, falls, gout, joint pain, joint swelling, muscle cramps, muscle weakness, myalgias, neck pain and stiffness.   Gastrointestinal: Negative for bloating, abdominal pain, anorexia, diarrhea, dysphagia, excessive appetite, flatus, hematemesis, jaundice, melena and nausea.   Genitourinary: Negative for hesitancy and incomplete emptying.   Neurological: Negative for aphonia, brief paralysis, difficulty with concentration, disturbances in coordination, excessive daytime sleepiness, dizziness, focal weakness, light-headedness, loss of balance and weakness.   Psychiatric/Behavioral: Negative for altered mental status, depression, hallucinations, hypervigilance, memory loss, substance abuse and suicidal ideas. The patient does not have insomnia and is not nervous/anxious.        Objective:   Physical Exam  Constitutional:       General: She is not in acute distress.     Appearance: She is well-developed. She is not diaphoretic.   HENT:      Head: Normocephalic and atraumatic.      Nose: Nose normal.      Mouth/Throat:      Pharynx: No oropharyngeal exudate.   Eyes:      General: No scleral icterus.        Right eye: No discharge.         Left eye: No discharge.      Conjunctiva/sclera: Conjunctivae normal.      Pupils: Pupils are equal, round, and reactive to light.   Neck:      Thyroid: No thyromegaly.      Vascular: No JVD.      Trachea: No tracheal deviation.   Cardiovascular:      Rate and Rhythm: Normal rate and regular rhythm.      Pulses: Intact distal pulses.      Heart sounds: Normal heart sounds. No murmur heard.    No friction rub. No gallop.   Pulmonary:      Effort: Pulmonary effort is normal. No respiratory distress.      Breath sounds: Normal breath sounds. No stridor. No wheezing or rales.   Chest:      Chest wall: No tenderness.   Abdominal:       General: Bowel sounds are normal. There is no distension.      Palpations: Abdomen is soft. There is no mass.      Tenderness: There is no abdominal tenderness. There is no guarding or rebound.   Musculoskeletal:         General: No tenderness. Normal range of motion.      Cervical back: Normal range of motion and neck supple.   Lymphadenopathy:      Cervical: No cervical adenopathy.   Skin:     General: Skin is warm.      Coloration: Skin is not pale.      Findings: No erythema or rash.   Neurological:      Mental Status: She is alert and oriented to person, place, and time.      Cranial Nerves: No cranial nerve deficit.      Motor: No abnormal muscle tone.      Coordination: Coordination normal.      Deep Tendon Reflexes: Reflexes are normal and symmetric.   Psychiatric:         Behavior: Behavior normal.         Thought Content: Thought content normal.         Judgment: Judgment normal.         Assessment:     1. NICM (nonischemic cardiomyopathy)    2. GAURAV (obstructive sleep apnea)    3. Essential hypertension    4. Non-rheumatic mitral regurgitation    5. PAF (paroxysmal atrial fibrillation)        Plan:     Patient's BP not too high to warrant another BP medication. I advised to take Coreg extra if she needs and continue to document blood pressure. Will repeat Echo to document normal BP before adding another agent.   Limit sodium intake to less then 2 gram sodium and 1500cc fluid restriction.  Graded exercise program as tolerated.  Call if  more than 3 lbs in 1 day or 5 lbs in 1 week.    Rizwana was seen today for hypertension.    Diagnoses and all orders for this visit:    NICM (nonischemic cardiomyopathy)    GAURAV (obstructive sleep apnea)    Essential hypertension    Non-rheumatic mitral regurgitation    PAF (paroxysmal atrial fibrillation)    Other orders  -     carvediloL (COREG) 3.125 MG tablet; Take 1 tab twice a day. Take 1 extra tab if BP greater then 140  mmHG as needed.      RTC 11 wks,

## 2022-03-08 NOTE — TELEPHONE ENCOUNTER
PLease contact the breast center and let them know that the report of her mammogram done 4 days ago has not been released.   See if you can gather any information  SF

## 2022-03-10 ENCOUNTER — PATIENT MESSAGE (OUTPATIENT)
Dept: INTERNAL MEDICINE | Facility: CLINIC | Age: 77
End: 2022-03-10
Payer: MEDICARE

## 2022-03-21 ENCOUNTER — INFUSION (OUTPATIENT)
Dept: INFECTIOUS DISEASES | Facility: HOSPITAL | Age: 77
End: 2022-03-21
Attending: INTERNAL MEDICINE
Payer: MEDICARE

## 2022-03-21 ENCOUNTER — PATIENT MESSAGE (OUTPATIENT)
Dept: RHEUMATOLOGY | Facility: CLINIC | Age: 77
End: 2022-03-21
Payer: MEDICARE

## 2022-03-21 ENCOUNTER — CLINICAL SUPPORT (OUTPATIENT)
Dept: INTERNAL MEDICINE | Facility: CLINIC | Age: 77
End: 2022-03-21
Payer: MEDICARE

## 2022-03-21 ENCOUNTER — PATIENT MESSAGE (OUTPATIENT)
Dept: CARDIOLOGY | Facility: CLINIC | Age: 77
End: 2022-03-21
Payer: MEDICARE

## 2022-03-21 VITALS
WEIGHT: 130.5 LBS | RESPIRATION RATE: 15 BRPM | HEART RATE: 63 BPM | HEIGHT: 67 IN | SYSTOLIC BLOOD PRESSURE: 108 MMHG | TEMPERATURE: 98 F | BODY MASS INDEX: 20.48 KG/M2 | OXYGEN SATURATION: 96 % | DIASTOLIC BLOOD PRESSURE: 68 MMHG

## 2022-03-21 DIAGNOSIS — M06.042 RHEUMATOID ARTHRITIS INVOLVING BOTH HANDS WITH NEGATIVE RHEUMATOID FACTOR: Primary | ICD-10-CM

## 2022-03-21 DIAGNOSIS — M06.041 RHEUMATOID ARTHRITIS INVOLVING BOTH HANDS WITH NEGATIVE RHEUMATOID FACTOR: Primary | ICD-10-CM

## 2022-03-21 PROCEDURE — 96367 TX/PROPH/DG ADDL SEQ IV INF: CPT | Mod: HCNC

## 2022-03-21 PROCEDURE — 63600175 PHARM REV CODE 636 W HCPCS: Mod: JA,JG,HCNC | Performed by: INTERNAL MEDICINE

## 2022-03-21 PROCEDURE — 25000003 PHARM REV CODE 250: Mod: HCNC | Performed by: INTERNAL MEDICINE

## 2022-03-21 PROCEDURE — 96372 THER/PROPH/DIAG INJ SC/IM: CPT | Mod: S$GLB,,, | Performed by: INTERNAL MEDICINE

## 2022-03-21 PROCEDURE — 96372 PR INJECTION,THERAP/PROPH/DIAG2ST, IM OR SUBCUT: ICD-10-PCS | Mod: S$GLB,,, | Performed by: INTERNAL MEDICINE

## 2022-03-21 PROCEDURE — 96365 THER/PROPH/DIAG IV INF INIT: CPT | Mod: HCNC

## 2022-03-21 RX ORDER — ACETAMINOPHEN 325 MG/1
650 TABLET ORAL
Status: COMPLETED | OUTPATIENT
Start: 2022-03-21 | End: 2022-03-21

## 2022-03-21 RX ORDER — DIPHENHYDRAMINE HYDROCHLORIDE 50 MG/ML
25 INJECTION INTRAMUSCULAR; INTRAVENOUS ONCE
Status: DISCONTINUED | OUTPATIENT
Start: 2022-03-21 | End: 2022-03-21 | Stop reason: SDUPTHER

## 2022-03-21 RX ORDER — ACETAMINOPHEN 325 MG/1
650 TABLET ORAL
Status: CANCELLED
Start: 2022-04-04

## 2022-03-21 RX ADMIN — SODIUM CHLORIDE 500 MG: 9 INJECTION, SOLUTION INTRAVENOUS at 02:03

## 2022-03-21 RX ADMIN — DIPHENHYDRAMINE HYDROCHLORIDE 25 MG: 50 INJECTION INTRAMUSCULAR; INTRAVENOUS at 02:03

## 2022-03-21 RX ADMIN — CYANOCOBALAMIN 1000 MCG: 1000 INJECTION, SOLUTION INTRAMUSCULAR at 12:03

## 2022-03-21 RX ADMIN — ACETAMINOPHEN 650 MG: 325 TABLET ORAL at 01:03

## 2022-03-21 NOTE — PROGRESS NOTES
Arrived for Orencia infusion. Pt received Tylenol 650mg and Benadryl 25mg IVPB 30mins prior to infusion. Pt tolerated infusion well and left in NAD. Return appt provided.

## 2022-03-21 NOTE — PLAN OF CARE
Problem: Infection  Goal: Absence of Infection Signs and Symptoms  Outcome: Ongoing, Progressing  Intervention: Prevent or Manage Infection  Flowsheets (Taken 3/21/2022 6001)  Infection Management: aseptic technique maintained  Isolation Precautions:   precautions initiated   precautions maintained

## 2022-03-22 ENCOUNTER — TELEPHONE (OUTPATIENT)
Dept: INTERNAL MEDICINE | Facility: CLINIC | Age: 77
End: 2022-03-22
Payer: MEDICARE

## 2022-03-22 ENCOUNTER — PATIENT MESSAGE (OUTPATIENT)
Dept: HEMATOLOGY/ONCOLOGY | Facility: CLINIC | Age: 77
End: 2022-03-22
Payer: MEDICARE

## 2022-03-22 ENCOUNTER — PATIENT MESSAGE (OUTPATIENT)
Dept: INTERNAL MEDICINE | Facility: CLINIC | Age: 77
End: 2022-03-22
Payer: MEDICARE

## 2022-03-22 NOTE — TELEPHONE ENCOUNTER
----- Message from Mary Kelly sent at 3/22/2022  7:34 AM CDT -----  Contact: AMY MANLEY [[id number]][phone]  Calling to get test results.  Name of test (lab, x-ray): lab  Date of test: 3/21  Where was the test performed: [facility]  Would you like a call back, or a response through your [facility] portal?: Call back  Comments:

## 2022-03-23 ENCOUNTER — OFFICE VISIT (OUTPATIENT)
Dept: PODIATRY | Facility: CLINIC | Age: 77
End: 2022-03-23
Payer: MEDICARE

## 2022-03-23 DIAGNOSIS — M79.671 RIGHT FOOT PAIN: Primary | ICD-10-CM

## 2022-03-23 DIAGNOSIS — Z98.890 HISTORY OF FOOT SURGERY: ICD-10-CM

## 2022-03-23 DIAGNOSIS — L84 HELOMA MOLLE: ICD-10-CM

## 2022-03-23 PROCEDURE — 99999 PR PBB SHADOW E&M-EST. PATIENT-LVL III: CPT | Mod: PBBFAC,,, | Performed by: PODIATRIST

## 2022-03-23 PROCEDURE — 1101F PT FALLS ASSESS-DOCD LE1/YR: CPT | Mod: CPTII,S$GLB,, | Performed by: PODIATRIST

## 2022-03-23 PROCEDURE — 3288F PR FALLS RISK ASSESSMENT DOCUMENTED: ICD-10-PCS | Mod: CPTII,S$GLB,, | Performed by: PODIATRIST

## 2022-03-23 PROCEDURE — 1159F PR MEDICATION LIST DOCUMENTED IN MEDICAL RECORD: ICD-10-PCS | Mod: CPTII,S$GLB,, | Performed by: PODIATRIST

## 2022-03-23 PROCEDURE — 99213 PR OFFICE/OUTPT VISIT, EST, LEVL III, 20-29 MIN: ICD-10-PCS | Mod: S$GLB,,, | Performed by: PODIATRIST

## 2022-03-23 PROCEDURE — 3288F FALL RISK ASSESSMENT DOCD: CPT | Mod: CPTII,S$GLB,, | Performed by: PODIATRIST

## 2022-03-23 PROCEDURE — 99999 PR PBB SHADOW E&M-EST. PATIENT-LVL III: ICD-10-PCS | Mod: PBBFAC,,, | Performed by: PODIATRIST

## 2022-03-23 PROCEDURE — 1101F PR PT FALLS ASSESS DOC 0-1 FALLS W/OUT INJ PAST YR: ICD-10-PCS | Mod: CPTII,S$GLB,, | Performed by: PODIATRIST

## 2022-03-23 PROCEDURE — 1159F MED LIST DOCD IN RCRD: CPT | Mod: CPTII,S$GLB,, | Performed by: PODIATRIST

## 2022-03-23 PROCEDURE — 99213 OFFICE O/P EST LOW 20 MIN: CPT | Mod: S$GLB,,, | Performed by: PODIATRIST

## 2022-03-23 NOTE — PROGRESS NOTES
"  Chief Concern:  Toe pain    HPI: Rizwana is a 76 y.o. female who presents to the clinic for evaluation and treatment of feet.  Main concern today of pain right 4th interdigital space, pain is somewhat alleviated with a soft toe spacer.  She also occasionally puts gentian violet in the area .  Patient is using urea 20% on her toenails.  The patient has history of multiple right foot surgeries.    No acute trauma reported to the foot otherwise.   Has hx of R ankle "fusion" however still has motion in the ankle.    Current shoe gear:  Soft walking shoes with custom insoles.  She will be moving to [place] in May.        PCP: Sri Gutierrez MD              Patient Active Problem List   Diagnosis    Ventricular premature beats    Hammertoe    Palpitations    Costochondritis    Heart failure, systolic    Precordial pain    Bradycardia    Cardiomyopathy    Non-rheumatic mitral regurgitation    Essential hypertension    S/P colonoscopy    Osteopenia    Pure hypercholesterolemia    At risk for amiodarone toxicity with long term use    Dysuria    Vaginal atrophy    Vaginal burning    Open wound of right foot    GAURAV (obstructive sleep apnea)    Osteoarthritis of hand    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    Low back pain    Pulmonary nodule    PAF (paroxysmal atrial fibrillation)    High risk medication use    Periodic limb movement disorder (PLMD)    Raynaud's phenomenon    Anemia    Macrocytosis    ILD (interstitial lung disease)    OP (osteoporosis)    Junctional premature beats             Current Outpatient Medications   Medication Sig Dispense Refill    ALPRAZolam (XANAX) 0.25 MG tablet Take 1 tablet (0.25 mg total) by mouth nightly as needed for Insomnia. 30 tablet 1    amiodarone (PACERONE) 200 MG Tab TAKE ONE TABLET BY MOUTH ONCE DAILY 90 tablet 3    aspirin (ECOTRIN) 81 MG EC tablet Take 81 mg by mouth once daily.      atorvastatin " (LIPITOR) 10 MG tablet Take 1 tablet (10 mg total) by mouth once daily. 30 tablet 6    atorvastatin (LIPITOR) 10 MG tablet TAKE ONE TABLET BY MOUTH ONCE DAILY 23 tablet 6    carvediloL (COREG) 3.125 MG tablet Take 1 tab twice a day. Take 1 extra tab if BP greater then 140  mmHG as needed. 180 tablet 0    cholecalciferol, vitamin D3, (VITAMIN D3) 25 mcg (1,000 unit) capsule Take 1,000 Units by mouth once daily.      co-enzyme Q-10 30 mg capsule Take 10 mg by mouth once daily.       cyanocobalamin 1,000 mcg/mL injection       FLUAD QUAD 2021-22,65Y UP,,PF, 60 mcg (15 mcg x 4)/0.5 mL Syrg       furosemide (LASIX) 40 MG tablet TAKE ONE TABLET BY MOUTH ONCE DAILY AS NEEDED 30 tablet 6    leflunomide (ARAVA) 20 MG Tab TAKE ONE TABLET BY MOUTH ONCE DAILY 90 tablet 0    melatonin 10 mg Cap 30 mg every evening.      MULTIVITAMIN WITH MINERALS (HAIR,SKIN AND NAILS ORAL) Take by mouth once daily.      ORENCIA, WITH MALTOSE, 250 mg SolR injection       potassium chloride (KLOR-CON) 8 MEQ TbSR Take 1 tablet (8 mEq total) by mouth once daily. 30 tablet 6    pulse oximeter (PULSE OXIMETER) device by Apply Externally route 2 (two) times a day. Use twice daily at 8 AM and 3 PM and record the value in [facility] as directed. 1 each 0    sacubitriL-valsartan (ENTRESTO) 24-26 mg per tablet Take 1 tablet by mouth 2 (two) times daily. Patient needs to schedule an appointment in the consult cardiology clinic. 60 tablet 1    traMADoL (ULTRAM) 50 mg tablet Take 1 tablet (50 mg total) by mouth every 6 (six) hours as needed for Pain. 20 tablet 0    TURMERIC ORAL Take by mouth.      UNABLE TO FIND 2 capsules 2 (two) times a day. Nutrafol      urea 20 % Crea Apply 1 application topically once daily. To toenails 75 g 10     Current Facility-Administered Medications   Medication Dose Route Frequency Provider Last Rate Last Admin    acetaminophen tablet 650 mg  650 mg Oral Once PRN Gurjit Zaidi MD        albuterol inhaler 2  puff  2 puff Inhalation Q20 Min PRN Gurjit Zaidi MD        cyanocobalamin injection 1,000 mcg  1,000 mcg Intramuscular Q30 Days Sri Gutierrez MD   1,000 mcg at 03/21/22 1249    diphenhydrAMINE capsule 25 mg  25 mg Oral Q6H PRN Darryl Javier MD        diphenhydrAMINE injection 25 mg  25 mg Intravenous Once PRN Gurjit Zaidi MD        EPINEPHrine (EPIPEN) 0.3 mg/0.3 mL pen injection 0.3 mg  0.3 mg Intramuscular PRN Gurjit Zaidi MD        methylPREDNISolone sodium succinate injection 40 mg  40 mg Intravenous Once PRN Gurjit Zaidi MD        ondansetron disintegrating tablet 4 mg  4 mg Oral Once PRN Gurjit Zaidi MD        sodium chloride 0.9% 500 mL flush bag   Intravenous PRN Gurjit Zaidi MD        sodium chloride 0.9% flush 10 mL  10 mL Intravenous PRN Gurjit Zaidi MD           Review of patient's allergies indicates:   Allergen Reactions    Dilaudid [hydromorphone (bulk)]      Severe nausea/vomiting    Morphine      Severe nausea/vomiting       Review of Systems   Constitution: Negative for chills and fever.   Cardiovascular: Positive for leg swelling. Negative for chest pain and claudication.   Respiratory: Negative for cough and shortness of breath.    Skin: Positive for dry skin and nail changes.   Musculoskeletal: Positive for arthritis, joint pain and stiffness (ankle R).   Gastrointestinal: Negative for nausea and vomiting.   Neurological: Negative for numbness and paresthesias.   Psychiatric/Behavioral: Negative for altered mental status.           Objective:         There were no vitals filed for this visit.        Physical Exam   Constitutional: She is oriented to person, place, and time. She appears well-developed and well-nourished.     Cardiovascular: Intact distal pulses.     Dorsalis pedis pulses are 2+ on the right side, and 2+ on the left side.    Posterior tibial pulses are 2+ on the right side, and 2+ on the left side.   CRT < 3 sec to tips  of toes. No edema noted to b/l LE. No vericosities noted to b/l LEs.        Musculoskeletal:   Gastrocnemius equinus noted to b/l ankles with decreased DF noted on exam. MMT 5/5 in DF/PF/Inv/Ev resistance with no reproduction of pain in any direction. Passive range of motion of ankle and pedal joints is painless. Adequate pedal joint ROM.   Hypermobility noted to 1st ray b/l with near complete collapse of medial longitudinal arch b/l (R>L) with forefoot loading. Functional pes planus foot type b/l.   Semi-reducible hammertoe contractures noted to toes 2-4 b/l-asymptomatic.   Mild stiffness noted to R ankle compared to L however still gets 5-7 deg of DF in R ankle.   Palpable bony prominence noted to dorsal and dorsal medial 1st met cuneiform joint, mild, asymptomatic.      Neurological:   She is alert and oriented to person, place, and time. She has normal strength. No sensory deficit.   Light touch, proprioception, and sharp/dull sensation are all intact bilaterally.      Skin:   Skin is thin.  Interdigital maceration, slightly red at the right 4th interdigital space.  No active drainage.  No cellulitis.                  Assessment:         ICD-10-CM ICD-9-CM    1. Right foot pain  M79.671 729.5    2. Heloma molle - Right Foot  L84 700    3. History of foot surgery  Z98.890 V15.29            Plan:         I counseled the patient on her conditions, their implications and medical management.    I reviewed recent foot x-rays with the patient.  Continue custom foot orthotics from [employer].  Consider thicker soft cushioning over the navicular area.    Continue Thick soft sole shoes. No barefoot walking.     Lamb's wool applied to the right 4th interdigital space.  Continued spacers daily.

## 2022-03-24 ENCOUNTER — OFFICE VISIT (OUTPATIENT)
Dept: RHEUMATOLOGY | Facility: CLINIC | Age: 77
End: 2022-03-24
Payer: MEDICARE

## 2022-03-24 ENCOUNTER — LAB VISIT (OUTPATIENT)
Dept: LAB | Facility: HOSPITAL | Age: 77
End: 2022-03-24
Attending: INTERNAL MEDICINE
Payer: MEDICARE

## 2022-03-24 VITALS
HEART RATE: 65 BPM | DIASTOLIC BLOOD PRESSURE: 77 MMHG | SYSTOLIC BLOOD PRESSURE: 116 MMHG | HEIGHT: 67 IN | BODY MASS INDEX: 20.72 KG/M2 | WEIGHT: 132 LBS

## 2022-03-24 DIAGNOSIS — E53.8 B12 DEFICIENCY: ICD-10-CM

## 2022-03-24 DIAGNOSIS — M06.9 RHEUMATOID ARTHRITIS, INVOLVING UNSPECIFIED SITE, UNSPECIFIED WHETHER RHEUMATOID FACTOR PRESENT: ICD-10-CM

## 2022-03-24 DIAGNOSIS — D64.9 ANEMIA, UNSPECIFIED TYPE: ICD-10-CM

## 2022-03-24 DIAGNOSIS — D64.9 ANEMIA, UNSPECIFIED TYPE: Primary | ICD-10-CM

## 2022-03-24 LAB
FERRITIN SERPL-MCNC: 273 NG/ML (ref 20–300)
FOLATE SERPL-MCNC: 13.9 NG/ML (ref 4–24)
IRON SERPL-MCNC: 67 UG/DL (ref 30–160)
SATURATED IRON: 21 % (ref 20–50)
TOTAL IRON BINDING CAPACITY: 323 UG/DL (ref 250–450)
TRANSFERRIN SERPL-MCNC: 218 MG/DL (ref 200–375)
VIT B12 SERPL-MCNC: 1014 PG/ML (ref 210–950)

## 2022-03-24 PROCEDURE — 99999 PR PBB SHADOW E&M-EST. PATIENT-LVL V: CPT | Mod: PBBFAC,,, | Performed by: INTERNAL MEDICINE

## 2022-03-24 PROCEDURE — 84238 ASSAY NONENDOCRINE RECEPTOR: CPT | Performed by: INTERNAL MEDICINE

## 2022-03-24 PROCEDURE — 3288F FALL RISK ASSESSMENT DOCD: CPT | Mod: CPTII,S$GLB,, | Performed by: INTERNAL MEDICINE

## 2022-03-24 PROCEDURE — 82607 VITAMIN B-12: CPT | Performed by: INTERNAL MEDICINE

## 2022-03-24 PROCEDURE — 84466 ASSAY OF TRANSFERRIN: CPT | Performed by: INTERNAL MEDICINE

## 2022-03-24 PROCEDURE — 99213 OFFICE O/P EST LOW 20 MIN: CPT | Mod: S$GLB,,, | Performed by: INTERNAL MEDICINE

## 2022-03-24 PROCEDURE — 1159F PR MEDICATION LIST DOCUMENTED IN MEDICAL RECORD: ICD-10-PCS | Mod: CPTII,S$GLB,, | Performed by: INTERNAL MEDICINE

## 2022-03-24 PROCEDURE — 3074F SYST BP LT 130 MM HG: CPT | Mod: CPTII,S$GLB,, | Performed by: INTERNAL MEDICINE

## 2022-03-24 PROCEDURE — 1126F AMNT PAIN NOTED NONE PRSNT: CPT | Mod: CPTII,S$GLB,, | Performed by: INTERNAL MEDICINE

## 2022-03-24 PROCEDURE — 3078F DIAST BP <80 MM HG: CPT | Mod: CPTII,S$GLB,, | Performed by: INTERNAL MEDICINE

## 2022-03-24 PROCEDURE — 99999 PR PBB SHADOW E&M-EST. PATIENT-LVL V: ICD-10-PCS | Mod: PBBFAC,,, | Performed by: INTERNAL MEDICINE

## 2022-03-24 PROCEDURE — 99213 PR OFFICE/OUTPT VISIT, EST, LEVL III, 20-29 MIN: ICD-10-PCS | Mod: S$GLB,,, | Performed by: INTERNAL MEDICINE

## 2022-03-24 PROCEDURE — 82728 ASSAY OF FERRITIN: CPT | Performed by: INTERNAL MEDICINE

## 2022-03-24 PROCEDURE — 1126F PR PAIN SEVERITY QUANTIFIED, NO PAIN PRESENT: ICD-10-PCS | Mod: CPTII,S$GLB,, | Performed by: INTERNAL MEDICINE

## 2022-03-24 PROCEDURE — 3074F PR MOST RECENT SYSTOLIC BLOOD PRESSURE < 130 MM HG: ICD-10-PCS | Mod: CPTII,S$GLB,, | Performed by: INTERNAL MEDICINE

## 2022-03-24 PROCEDURE — 1101F PR PT FALLS ASSESS DOC 0-1 FALLS W/OUT INJ PAST YR: ICD-10-PCS | Mod: CPTII,S$GLB,, | Performed by: INTERNAL MEDICINE

## 2022-03-24 PROCEDURE — 82746 ASSAY OF FOLIC ACID SERUM: CPT | Performed by: INTERNAL MEDICINE

## 2022-03-24 PROCEDURE — 1159F MED LIST DOCD IN RCRD: CPT | Mod: CPTII,S$GLB,, | Performed by: INTERNAL MEDICINE

## 2022-03-24 PROCEDURE — 3078F PR MOST RECENT DIASTOLIC BLOOD PRESSURE < 80 MM HG: ICD-10-PCS | Mod: CPTII,S$GLB,, | Performed by: INTERNAL MEDICINE

## 2022-03-24 PROCEDURE — 3288F PR FALLS RISK ASSESSMENT DOCUMENTED: ICD-10-PCS | Mod: CPTII,S$GLB,, | Performed by: INTERNAL MEDICINE

## 2022-03-24 PROCEDURE — 1101F PT FALLS ASSESS-DOCD LE1/YR: CPT | Mod: CPTII,S$GLB,, | Performed by: INTERNAL MEDICINE

## 2022-03-24 NOTE — PROGRESS NOTES
"Subjective:       Patient ID: Rizwana Block is a 76 y.o. female.    Chief Complaint: RA RF- ACPA- CATHIE-    LCV on 11/23/21 she reported that she felt good and was walking daily. She had recently fallen and was recovering from anterior chest bruising and right proximal clavicle fracture. She was continued on abatacept 750mg IV q 28 days and leflunomide 20mg daily.     Today she reports that her arthritis is doing well on her current regimen. She had a mild flare in her hands after her 's death last month which she attributes to stress. The swelling has largely resolved but she still has some tenderness to palpation but the pain is not restricting her function. She reports she has no morning stiffness and she is still walking 2.5-3 miles daily without issue. Her previous clavicle fx has completely healed. She does report she will likely move to [place] soon to be closer to her two daughters.     Review of Systems   Constitutional: Negative for chills and fever.   HENT: Negative for mouth sores, nosebleeds, sore throat and trouble swallowing.    Eyes: Negative for photophobia, redness and visual disturbance.   Respiratory: Negative for cough, chest tightness and wheezing.    Cardiovascular: Positive for palpitations. Negative for chest pain.   Gastrointestinal: Negative for abdominal pain, blood in stool, nausea and vomiting.   Genitourinary: Negative for dysuria, genital sores and hematuria.   Musculoskeletal: Negative for gait problem and myalgias.   Skin: Negative for rash.   Neurological: Negative for syncope, weakness, numbness and headaches.         Objective:   /77   Pulse 65   Ht 5' 7.2" (1.707 m)   Wt 59.9 kg (132 lb)   LMP  (LMP Unknown)   BMI 20.55 kg/m²         Physical Exam   Constitutional: She is oriented to person, place, and time. No distress.   HENT:   Head: Normocephalic and atraumatic.   Eyes: Pupils are equal, round, and reactive to light. Conjunctivae are normal. "   Cardiovascular: Normal rate and normal pulses. Exam reveals no friction rub.   No murmur heard.  Pulmonary/Chest: Effort normal and breath sounds normal. No respiratory distress.   Abdominal: Soft. Normal appearance. She exhibits no distension. There is no abdominal tenderness.   Musculoskeletal:      Cervical back: Normal range of motion.   Neurological: She is alert and oriented to person, place, and time.   Skin: No rash noted. No erythema.   Psychiatric: Her behavior is normal. Thought content normal.       Right Side Rheumatological Exam   Examination finds the 1st PIP, 2nd PIP, 3rd PIP, 4th PIP, 4th MCP, 5th PIP and 5th MCP normal.  The patient is tender to palpation of the 2nd MCP and 3rd MCP  She has swelling of the 2nd MCP and 3rd MCP  The patient has an enlarged 1st MCP  Muscle Strength (0-5 scale):  Deltoid:  5  Biceps: 5/5   Triceps:  5  : 5/5   Iliopsoas: 4  Quadriceps:  5   Distal Lower Extremity: 5    Left Side Rheumatological Exam   Examination finds the 1st PIP, 3rd PIP, 3rd MCP, 4th PIP, 4th MCP and 5th MCP normal.  The patient is tender to palpation of the 2nd PIP, 2nd MCP and 5th PIP.  She has swelling of the 2nd MCP and Knee  The patient has an enlarged 1st MCP.  Muscle Strength (0-5 scale):  Deltoid:  5  Biceps: 5/5   Triceps:  5  :  5/5   Iliopsoas: 4  Quadriceps:  5   Distal Lower Extremity: 5      Component      Latest Ref Rng & Units 3/21/2022 11/18/2021   WBC      3.90 - 12.70 K/uL 5.42 6.31   RBC      4.00 - 5.40 M/uL 3.76 (L) 3.79 (L)   Hemoglobin      12.0 - 16.0 g/dL 11.7 (L) 11.8 (L)   Hematocrit      37.0 - 48.5 % 39.3 38.3   MCV      82 - 98 fL 105 (H) 101 (H)   MCH      27.0 - 31.0 pg 31.1 (H) 31.1 (H)   MCHC      32.0 - 36.0 g/dL 29.8 (L) 30.8 (L)   RDW      11.5 - 14.5 % 14.0 13.5   Platelets      150 - 450 K/uL 211 217   MPV      9.2 - 12.9 fL 12.5 13.1 (H)   Immature Granulocytes      0.0 - 0.5 % 0.2 0.2   Gran # (ANC)      1.8 - 7.7 K/uL 3.0 3.0   Immature Grans  (Abs)      0.00 - 0.04 K/uL 0.01 0.01   Lymph #      1.0 - 4.8 K/uL 1.8 2.6   Mono #      0.3 - 1.0 K/uL 0.6 0.6   Eos #      0.0 - 0.5 K/uL 0.1 0.1   Baso #      0.00 - 0.20 K/uL 0.03 0.05   nRBC      0 /100 WBC 0 0   Gran %      38.0 - 73.0 % 55.0 46.8   Lymph %      18.0 - 48.0 % 32.8 40.4   Mono %      4.0 - 15.0 % 10.1 9.7   Eosinophil %      0.0 - 8.0 % 1.3 2.1   Basophil %      0.0 - 1.9 % 0.6 0.8   Differential Method       Automated Automated   Sodium      136 - 145 mmol/L 143 137   Potassium      3.5 - 5.1 mmol/L 4.4 4.1   Chloride      95 - 110 mmol/L 103 100   CO2      23 - 29 mmol/L 32 (H) 30 (H)   Glucose      70 - 110 mg/dL 98 85   BUN      8 - 23 mg/dL 17 15   Creatinine      0.5 - 1.4 mg/dL 0.9 0.8   Calcium      8.7 - 10.5 mg/dL 9.0 9.3   PROTEIN TOTAL      6.0 - 8.4 g/dL 6.4 6.6   Albumin      3.5 - 5.2 g/dL 3.7 3.8   BILIRUBIN TOTAL      0.1 - 1.0 mg/dL 0.4 0.4   Alkaline Phosphatase      55 - 135 U/L 49 (L) 52 (L)   AST      10 - 40 U/L 22 20   ALT      10 - 44 U/L 15 13   Anion Gap      8 - 16 mmol/L 8 7 (L)   eGFR if African American      >60 mL/min/1.73 m:2 >60.0 >60.0   eGFR if non African American      >60 mL/min/1.73 m:2 >60.0 >60.0   Sed Rate      0 - 36 mm/Hr <2 4   CRP      0.0 - 8.2 mg/L <0.3 1.0     Imaging:  DXA Scan (11/23/21):  FINDINGS:  Lumbar spine (L1-L4):              BMD is 0.901 g/cm2, T-score is -1.3, and Z-score is 1.1.     Total hip:                                BMD is 0.648 g/cm2, T-score is -2.4, and Z-score is -0.6.     Femoral neck:                          BMD is 0.575 g/cm2, T-score is -2.5, and Z-score is -0.4.     Distal 1/3 radius:                      Not applicable     FRAX:     27% risk of a major osteoporotic fracture in the next 10 years.     9.3% risk of hip fracture in the next 10 years.     Impression:  OSTEOPOROSIS WITH A SIGNIFICANT DECREASE OF 4.2% IN THE LUMBAR SPINE COMPARED TO THE PRIOR STUDY.  *Osteoporosis  **Compared with previous DXA, BMD at  the lumbar spine has decreased 4.2%, and the BMD at the total hip has remained stable.    Assessment/Plan   75 yo female with stable RA on abatacept 750mg IV q 28 days and leflunomide 20mg daily. Mild flare 2/2 stress mostly resolved. Recent DXA demonstrated worsening osteoporosis and she was started on Prolia. She had her first dose on 3/3/22 and next dose scheduled for 9/6/22. Will give name and number of former rheumatology fellow currently practicing in [place] to schedule appointment now so there is no lapse in care.    - Anemia labs today as listed below  - Continue abatacept 750mg IV q 28 days and leflunomide 20mg daily.  - Continue Prolia   - Given name and number for Demi Rosales MD in [place]. Number for office is: [phone]      Anemia, unspecified type  -     Ferritin; Future; Expected date: 03/24/2022  -     Iron and TIBC; Future; Expected date: 03/24/2022  -     Vitamin B12; Future; Expected date: 03/24/2022  -     Folate; Future; Expected date: 03/24/2022  -     SOLUBLE TRANSFERRIN RECEPTOR; Future; Expected date: 03/24/2022    Rheumatoid arthritis, involving unspecified site, unspecified whether rheumatoid factor present  -     Ferritin; Future; Expected date: 03/24/2022  -     Iron and TIBC; Future; Expected date: 03/24/2022  -     Vitamin B12; Future; Expected date: 03/24/2022  -     Folate; Future; Expected date: 03/24/2022    B12 deficiency  -     Vitamin B12; Future; Expected date: 03/24/2022  -     Folate; Future; Expected date: 03/24/2022       Problem List Items Addressed This Visit        Oncology    Anemia - Primary    Overview     Results for AMY MANLEY (MRN [id number]) as of 5/4/2021 10:24   Ref. Range 4/27/2021 09:56   Iron Latest Ref Range: 30 - 160 ug/dL 88   TIBC Latest Ref Range: 250 - 450 ug/dL 299   Saturated Iron Latest Ref Range: 20 - 50 % 29   Transferrin Latest Ref Range: 200 - 375 mg/dL 202   Sol. Transferrin Receptor Latest Ref Range: 1.8 - 4.6 mg/L 3.6   Ferritin  Latest Ref Range: 20.0 - 300.0 ng/mL 241   Folate Latest Ref Range: 4.0 - 24.0 ng/mL 14.6   Vitamin B-12 Latest Ref Range: 210 - 950 pg/mL 617              Relevant Orders    Ferritin    Iron and TIBC    Vitamin B12    Folate    SOLUBLE TRANSFERRIN RECEPTOR      Other Visit Diagnoses     Rheumatoid arthritis, involving unspecified site, unspecified whether rheumatoid factor present        Relevant Orders    Ferritin    Iron and TIBC    Vitamin B12    Folate    B12 deficiency        Relevant Orders    Vitamin B12    Folate          Ran Lindsey MD  [facility] PM&R PGY1  Pt was seen and discussed with Dr. Javier who is in agreement with the plan.

## 2022-03-24 NOTE — PATIENT INSTRUCTIONS
- Anemia labs today    - Continue Orencia and Leflunomide as prescribed    - Continue Prolia as scheduled    - Schedule an appointment with Demi Rosales in [place]. State you are a patient of Dr. Javier and transferring care. Number for office is: [phone]

## 2022-03-24 NOTE — PROGRESS NOTES
I have personally taken the history and examined the patient and agree with the resident,s note as stated above                  RA RF- ACPA- CATHIE-  TJ 0 SJ 5 Pt global 10 ESR <2 CRP <0.3 DAS28 0.77  [id number] 1.82(both remission)  CDAI 8(LDA)  Osteopenia with elevated FRAX on DXA 11/1/19, s/p zoledronic acid 12/2/19, fractured,  Now denosumab 60mg started 3/3/22  S/p clavicular fracture       Cont abatacept 500 mg IV q 28 days  Cont leflunomide 20mg daily  Cont denosumab 60mg sc q 6 months next dose 9/22/22  Moving to [place] in May given 's recent passing to stay closer to family. Gave name of Demi Rosales former fellow to establish care phone # provided.   Asked pt to make appt in  3 months with standing labs Dr. Rosales     Answers for HPI/ROS submitted by the patient on 3/24/2022  fever: No  eye redness: No  mouth sores: No  headaches: No  shortness of breath: No  chest pain: No  trouble swallowing: No  diarrhea: No  constipation: No  unexpected weight change: No  genital sore: No  dysuria: No  During the last 3 days, have you had a skin rash?: No  Bruises or bleeds easily: No  cough: No

## 2022-03-25 ENCOUNTER — PATIENT MESSAGE (OUTPATIENT)
Dept: RHEUMATOLOGY | Facility: CLINIC | Age: 77
End: 2022-03-25
Payer: MEDICARE

## 2022-03-25 LAB — STFR SERPL-MCNC: 2.9 MG/L (ref 1.8–4.6)

## 2022-03-31 ENCOUNTER — IMMUNIZATION (OUTPATIENT)
Dept: INTERNAL MEDICINE | Facility: CLINIC | Age: 77
End: 2022-03-31
Payer: MEDICARE

## 2022-03-31 DIAGNOSIS — Z23 NEED FOR VACCINATION: Primary | ICD-10-CM

## 2022-03-31 PROCEDURE — 91305 COVID-19, MRNA, LNP-S, PF, 30 MCG/0.3 ML DOSE VACCINE (PFIZER): CPT | Mod: PBBFAC | Performed by: INTERNAL MEDICINE

## 2022-04-01 ENCOUNTER — HOSPITAL ENCOUNTER (OUTPATIENT)
Dept: PULMONOLOGY | Facility: CLINIC | Age: 77
Discharge: HOME | End: 2022-04-01
Attending: INTERNAL MEDICINE
Payer: MEDICARE

## 2022-04-01 DIAGNOSIS — J84.9 ILD (INTERSTITIAL LUNG DISEASE): Primary | ICD-10-CM

## 2022-04-01 DIAGNOSIS — I42.8 OTHER CARDIOMYOPATHY: ICD-10-CM

## 2022-04-01 DIAGNOSIS — Z79.899 ENCOUNTER FOR LONG-TERM (CURRENT) USE OF HIGH-RISK MEDICATION: ICD-10-CM

## 2022-04-01 LAB
DLCO ADJ PRE: 18.65 ML/(MIN*MMHG) (ref 16.68–28.15)
DLCO SINGLE BREATH LLN: 16.68
DLCO SINGLE BREATH PRE REF: 78.5 %
DLCO SINGLE BREATH REF: 22.41
DLCOC SBVA LLN: 2.82
DLCOC SBVA PRE REF: 99.4 %
DLCOC SBVA REF: 4.12
DLCOC SINGLE BREATH LLN: 16.68
DLCOC SINGLE BREATH PRE REF: 83.2 %
DLCOC SINGLE BREATH REF: 22.41
DLCOCSBVAULN: 5.41
DLCOCSINGLEBREATHULN: 28.15
DLCOSINGLEBREATHULN: 28.15
DLCOVA LLN: 2.82
DLCOVA PRE REF: 93.8 %
DLCOVA PRE: 3.86 ML/(MIN*MMHG*L) (ref 2.82–5.41)
DLCOVA REF: 4.12
DLCOVAULN: 5.41
DLVAADJ PRE: 4.09 ML/(MIN*MMHG*L) (ref 2.82–5.41)
FEF 25 75 LLN: 0.78
FEF 25 75 PRE REF: 112.4 %
FEF 25 75 REF: 1.79
FEV05 LLN: 0.92
FEV05 REF: 1.78
FEV1 FVC LLN: 63
FEV1 FVC PRE REF: 100.9 %
FEV1 FVC REF: 77
FEV1 LLN: 1.6
FEV1 PRE REF: 99.5 %
FEV1 REF: 2.25
FVC LLN: 2.11
FVC PRE REF: 97.4 %
FVC REF: 2.96
IVC PRE: 2.8 L (ref 2.11–3.85)
IVC SINGLE BREATH LLN: 2.11
IVC SINGLE BREATH PRE REF: 94.6 %
IVC SINGLE BREATH REF: 2.96
IVCSINGLEBREATHULN: 3.85
PEF LLN: 3.84
PEF PRE REF: 105.3 %
PEF REF: 5.72
PHYSICIAN COMMENT: ABNORMAL
PRE DLCO: 17.6 ML/(MIN*MMHG) (ref 16.68–28.15)
PRE FEF 25 75: 2.01 L/S (ref 0.78–3.28)
PRE FET 100: 6.98 SEC
PRE FEV05 REF: 104.3 %
PRE FEV1 FVC: 77.79 % (ref 63.08–89.17)
PRE FEV1: 2.24 L (ref 1.6–2.87)
PRE FEV5: 1.85 L (ref 0.92–2.63)
PRE FVC: 2.88 L (ref 2.11–3.85)
PRE PEF: 6.02 L/S (ref 3.84–7.59)
VA PRE: 4.56 L (ref 5.29–5.29)
VA SINGLE BREATH PRE REF: 86.1 %
VA SINGLE BREATH REF: 5.29

## 2022-04-01 PROCEDURE — 94729 PR C02/MEMBANE DIFFUSE CAPACITY: ICD-10-PCS | Mod: S$GLB,,, | Performed by: INTERNAL MEDICINE

## 2022-04-01 PROCEDURE — 94729 DIFFUSING CAPACITY: CPT | Mod: S$GLB,,, | Performed by: INTERNAL MEDICINE

## 2022-04-01 PROCEDURE — 94010 BREATHING CAPACITY TEST: ICD-10-PCS | Mod: S$GLB,,, | Performed by: INTERNAL MEDICINE

## 2022-04-01 PROCEDURE — 94010 BREATHING CAPACITY TEST: CPT | Mod: S$GLB,,, | Performed by: INTERNAL MEDICINE

## 2022-04-02 ENCOUNTER — PATIENT MESSAGE (OUTPATIENT)
Dept: INTERNAL MEDICINE | Facility: CLINIC | Age: 77
End: 2022-04-02
Payer: MEDICARE

## 2022-04-04 ENCOUNTER — PATIENT MESSAGE (OUTPATIENT)
Dept: CARDIOLOGY | Facility: CLINIC | Age: 77
End: 2022-04-04
Payer: MEDICARE

## 2022-04-04 ENCOUNTER — OFFICE VISIT (OUTPATIENT)
Dept: INTERNAL MEDICINE | Facility: CLINIC | Age: 77
End: 2022-04-04
Payer: MEDICARE

## 2022-04-04 VITALS
DIASTOLIC BLOOD PRESSURE: 70 MMHG | WEIGHT: 130.31 LBS | HEIGHT: 67 IN | SYSTOLIC BLOOD PRESSURE: 100 MMHG | OXYGEN SATURATION: 100 % | HEART RATE: 51 BPM | BODY MASS INDEX: 20.45 KG/M2

## 2022-04-04 DIAGNOSIS — R91.8 PULMONARY NODULES: Primary | ICD-10-CM

## 2022-04-04 DIAGNOSIS — E53.8 VITAMIN B12 DEFICIENCY: ICD-10-CM

## 2022-04-04 DIAGNOSIS — I48.0 PAF (PAROXYSMAL ATRIAL FIBRILLATION): ICD-10-CM

## 2022-04-04 DIAGNOSIS — M06.041 RHEUMATOID ARTHRITIS INVOLVING BOTH HANDS WITH NEGATIVE RHEUMATOID FACTOR: ICD-10-CM

## 2022-04-04 DIAGNOSIS — Z12.11 SCREENING FOR MALIGNANT NEOPLASM OF COLON: ICD-10-CM

## 2022-04-04 DIAGNOSIS — M06.042 RHEUMATOID ARTHRITIS INVOLVING BOTH HANDS WITH NEGATIVE RHEUMATOID FACTOR: ICD-10-CM

## 2022-04-04 DIAGNOSIS — I10 ESSENTIAL HYPERTENSION: ICD-10-CM

## 2022-04-04 DIAGNOSIS — D64.9 ANEMIA, UNSPECIFIED TYPE: ICD-10-CM

## 2022-04-04 DIAGNOSIS — M81.0 OSTEOPOROSIS, UNSPECIFIED OSTEOPOROSIS TYPE, UNSPECIFIED PATHOLOGICAL FRACTURE PRESENCE: ICD-10-CM

## 2022-04-04 PROCEDURE — 1159F PR MEDICATION LIST DOCUMENTED IN MEDICAL RECORD: ICD-10-PCS | Mod: CPTII,S$GLB,, | Performed by: INTERNAL MEDICINE

## 2022-04-04 PROCEDURE — 1159F MED LIST DOCD IN RCRD: CPT | Mod: CPTII,S$GLB,, | Performed by: INTERNAL MEDICINE

## 2022-04-04 PROCEDURE — 3074F SYST BP LT 130 MM HG: CPT | Mod: CPTII,S$GLB,, | Performed by: INTERNAL MEDICINE

## 2022-04-04 PROCEDURE — 1126F PR PAIN SEVERITY QUANTIFIED, NO PAIN PRESENT: ICD-10-PCS | Mod: CPTII,S$GLB,, | Performed by: INTERNAL MEDICINE

## 2022-04-04 PROCEDURE — 99999 PR PBB SHADOW E&M-EST. PATIENT-LVL V: ICD-10-PCS | Mod: PBBFAC,,, | Performed by: INTERNAL MEDICINE

## 2022-04-04 PROCEDURE — 99999 PR PBB SHADOW E&M-EST. PATIENT-LVL V: CPT | Mod: PBBFAC,,, | Performed by: INTERNAL MEDICINE

## 2022-04-04 PROCEDURE — 99214 OFFICE O/P EST MOD 30 MIN: CPT | Mod: S$GLB,,, | Performed by: INTERNAL MEDICINE

## 2022-04-04 PROCEDURE — 3288F PR FALLS RISK ASSESSMENT DOCUMENTED: ICD-10-PCS | Mod: CPTII,S$GLB,, | Performed by: INTERNAL MEDICINE

## 2022-04-04 PROCEDURE — 3288F FALL RISK ASSESSMENT DOCD: CPT | Mod: CPTII,S$GLB,, | Performed by: INTERNAL MEDICINE

## 2022-04-04 PROCEDURE — 3078F PR MOST RECENT DIASTOLIC BLOOD PRESSURE < 80 MM HG: ICD-10-PCS | Mod: CPTII,S$GLB,, | Performed by: INTERNAL MEDICINE

## 2022-04-04 PROCEDURE — 1100F PTFALLS ASSESS-DOCD GE2>/YR: CPT | Mod: CPTII,S$GLB,, | Performed by: INTERNAL MEDICINE

## 2022-04-04 PROCEDURE — 1100F PR PT FALLS ASSESS DOC 2+ FALLS/FALL W/INJURY/YR: ICD-10-PCS | Mod: CPTII,S$GLB,, | Performed by: INTERNAL MEDICINE

## 2022-04-04 PROCEDURE — 3074F PR MOST RECENT SYSTOLIC BLOOD PRESSURE < 130 MM HG: ICD-10-PCS | Mod: CPTII,S$GLB,, | Performed by: INTERNAL MEDICINE

## 2022-04-04 PROCEDURE — 3078F DIAST BP <80 MM HG: CPT | Mod: CPTII,S$GLB,, | Performed by: INTERNAL MEDICINE

## 2022-04-04 PROCEDURE — 99499 UNLISTED E&M SERVICE: CPT | Mod: S$GLB,,, | Performed by: INTERNAL MEDICINE

## 2022-04-04 PROCEDURE — 1126F AMNT PAIN NOTED NONE PRSNT: CPT | Mod: CPTII,S$GLB,, | Performed by: INTERNAL MEDICINE

## 2022-04-04 PROCEDURE — 99214 PR OFFICE/OUTPT VISIT, EST, LEVL IV, 30-39 MIN: ICD-10-PCS | Mod: S$GLB,,, | Performed by: INTERNAL MEDICINE

## 2022-04-04 PROCEDURE — 99499 RISK ADDL DX/OHS AUDIT: ICD-10-PCS | Mod: S$GLB,,, | Performed by: INTERNAL MEDICINE

## 2022-04-04 RX ORDER — FOLIC ACID 0.8 MG
800 TABLET ORAL DAILY
Refills: 0 | COMMUNITY
Start: 2022-04-04 | End: 2023-04-04

## 2022-04-04 RX ORDER — CYANOCOBALAMIN 1000 UG/ML
1000 INJECTION, SOLUTION INTRAMUSCULAR; SUBCUTANEOUS
Qty: 10 ML | Refills: 3 | Status: SHIPPED | OUTPATIENT
Start: 2022-04-04

## 2022-04-04 RX ORDER — CYANOCOBALAMIN 1000 UG/ML
1000 INJECTION, SOLUTION INTRAMUSCULAR; SUBCUTANEOUS ONCE
Status: COMPLETED | OUTPATIENT
Start: 2022-04-04 | End: 2022-04-18

## 2022-04-04 RX ORDER — ALPRAZOLAM 0.25 MG/1
0.25 TABLET ORAL NIGHTLY PRN
Qty: 90 TABLET | Refills: 1 | Status: SHIPPED | OUTPATIENT
Start: 2022-04-04 | End: 2022-05-09 | Stop reason: SDUPTHER

## 2022-04-04 NOTE — PROGRESS NOTES
Chief Complaint: Follow up of multiple problems     HPI: this is a 76 year old woman who presents for follow up of multiple problems    Her   suddenly on 2022.  He had an acute intracranial hemorrhage.  He  at home in hospice 4 days after the stroke.   She is grieving his death.  Her 's daughter opened the succession the day after his death.   Her daughters live in [place]. One daughter has delusions that there is something in her body. She has had multiple psychiatric hospitalizations.  She is close with her other daughter. She will be moving to [place] at the end of May.      She saw Dr Umanzor in [facility]e Onc for her anemia - felt to be due to chronic disease.     She exercises for 30  Minutes daily by walking 2 miles on the treadmill.      She has rheumatoid arthritis.  She is is getting Orencia infusions monthly. She also takes Arava 20 mg once daily. Joints are much improved since starting on the Orencia.    She is followed by Dr Lemons..  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 She seldom feels an irregular heart rate. She is taking coreg 3.125 .mg 1 tablet daily in the morning and entresto  twice daily for her heart.. She now takes lasix 40 mg 1 times a week.  She takes potassium chloride 8 meq daily. .  No chest pain, shortness of breath.  She has some mild CHF with an EF of 40-45% on echo 2018 and diastolic dysfunction.  She takes lipitor 10 mg daily for cholesterol. No muscle spasms.  She will have an echo and holter monitor soon.      She takes vitamin D 1000 units daily.       She gets a vitamin B12 injection monthly       She has been diagnosed with sleep apnea in the past. She has not been using  APAP machine since she has lost weight.  Repeat sleep study was done 2020 - extremely mild sleep apnea.              Past Medical History:   Diagnosis Date    Arrhythmia      Chest pain  "5/27/2016     3/2016: Began experience chest discomfort.    CHF (congestive heart failure)      Hypertension      Osteopenia       Reclast infusion 10/5/2010 and 10/20/2011                   Past Surgical History:   Procedure Laterality Date    ANKLE FUSION         right    bladder surgery         with mesh    BLEPHAROPLASTY W/ LASER        HAND SURGERY         benign cyst on left    HYSTERECTOMY         heavy bleeding    PATELLA FRACTURE SURGERY         right    TONSILLECTOMY          Meds and allergies: updated on Epic     REVIEW OF SYSTEMS: She denies fevers, chills, night sweats, fatigue, visual change, hearing loss, sinus congestion, sore throat, chest pain, shortness of breath, nausea, vomiting, constipation, diarrhea, dysuria, hematuria, polydipsia, polyuria,            Physical exam:   /70 (BP Location: Right arm, Patient Position: Sitting)   Pulse (!) 51   Ht 5' 7" (1.702 m)   Wt 59.1 kg (130 lb 4.7 oz)   LMP  (LMP Unknown)   SpO2 100%   BMI 20.41 kg/m²     General: alert, oriented x 3, no apparent distress.  Affect normal  HEENT: Conjunctivae: anicteric, PERRL, EOMI, TM clear, Oralpharynx clear  Neck: supple, no thyroid enlargement, no cervical lymphadenopathy  Resp: effort normal, lungs clear bilaterally  CV: iregular rate and irrhythm without murmurs, gallops or rubs, no lower extremity edema,       Spirometry normal.    Labs reviewed       Assessment/Plan:      RA - stable  Possible kidney cyst- REpeat US kidneys 8/2021 stable  A fib - holter monitor and ECHO in several weeks.   Pulmonary nodule -  ct chest 8/7/21 - ordered.   chf - controlled  HTN - controlled  Sleep apnea - stable off meds  Osteoporosis - BMD 11/21 - on Prolia. 3/2/22 was first dose.   Anemia -add  folic acid 800 mcg daily.   MMG 3/3/22  Colonoscoy normal 4/2009 due 4/2019 - [employer] negative 12/29/2020.  Fit kit     Follow up in 12 months, sooner if issues.       "

## 2022-04-05 ENCOUNTER — PATIENT MESSAGE (OUTPATIENT)
Dept: INTERNAL MEDICINE | Facility: CLINIC | Age: 77
End: 2022-04-05
Payer: MEDICARE

## 2022-04-05 ENCOUNTER — TELEPHONE (OUTPATIENT)
Dept: INTERNAL MEDICINE | Facility: CLINIC | Age: 77
End: 2022-04-05
Payer: MEDICARE

## 2022-04-05 NOTE — TELEPHONE ENCOUNTER
Pt states that she has a bump on the opposite side of her nose. Pt is concerned because she just had a cancer removed from the other side. Pt would like pcp to examine it and give her, her advice. Pt does have a follow up with Dermatology for the opposite side, however she wanted pcp to be aware.

## 2022-04-05 NOTE — TELEPHONE ENCOUNTER
Spoke with pt, pt stated she has a  bump on her nose that does not move that has been there for a few months ...    Please call back AFTER 11:00am....

## 2022-04-05 NOTE — TELEPHONE ENCOUNTER
----- Message from Bhumika Trejo sent at 4/5/2022  9:20 AM CDT -----  Contact: [phone]  Patient would like to get medical advice.  Symptoms :  something on her nose  How long have you had these symptoms:   Would you like a call back, or a response through your [facility] portal?:  call back  Comments:

## 2022-04-06 ENCOUNTER — PATIENT MESSAGE (OUTPATIENT)
Dept: CARDIOLOGY | Facility: CLINIC | Age: 77
End: 2022-04-06
Payer: MEDICARE

## 2022-04-10 DIAGNOSIS — I10 ESSENTIAL HYPERTENSION: Primary | ICD-10-CM

## 2022-04-13 ENCOUNTER — HOSPITAL ENCOUNTER (OUTPATIENT)
Dept: RADIOLOGY | Facility: HOSPITAL | Age: 77
Discharge: HOME | End: 2022-04-13
Attending: INTERNAL MEDICINE
Payer: MEDICARE

## 2022-04-13 PROCEDURE — 71250 CT THORAX DX C-: CPT | Mod: TC

## 2022-04-13 PROCEDURE — 71250 CT THORAX DX C-: CPT | Mod: 26,,, | Performed by: RADIOLOGY

## 2022-04-13 PROCEDURE — 71250 CT CHEST WITHOUT CONTRAST: ICD-10-PCS | Mod: 26,,, | Performed by: RADIOLOGY

## 2022-04-14 ENCOUNTER — PATIENT MESSAGE (OUTPATIENT)
Dept: INTERNAL MEDICINE | Facility: CLINIC | Age: 77
End: 2022-04-14
Payer: MEDICARE

## 2022-04-15 ENCOUNTER — PATIENT MESSAGE (OUTPATIENT)
Dept: INTERNAL MEDICINE | Facility: CLINIC | Age: 77
End: 2022-04-15
Payer: MEDICARE

## 2022-04-16 ENCOUNTER — PATIENT MESSAGE (OUTPATIENT)
Dept: INTERNAL MEDICINE | Facility: CLINIC | Age: 77
End: 2022-04-16
Payer: MEDICARE

## 2022-04-16 NOTE — PROGRESS NOTES
See comments below and call patient to discuss.   Please close encounter when done -- no need to route back to me.  Thanks  PFT/DLCO - alll OK   Keep same repeat study in 1 year

## 2022-04-18 ENCOUNTER — CLINICAL SUPPORT (OUTPATIENT)
Dept: INTERNAL MEDICINE | Facility: CLINIC | Age: 77
End: 2022-04-18
Payer: MEDICARE

## 2022-04-18 ENCOUNTER — INFUSION (OUTPATIENT)
Dept: INFECTIOUS DISEASES | Facility: HOSPITAL | Age: 77
End: 2022-04-18
Attending: INTERNAL MEDICINE
Payer: MEDICARE

## 2022-04-18 ENCOUNTER — PATIENT MESSAGE (OUTPATIENT)
Dept: CARDIOLOGY | Facility: CLINIC | Age: 77
End: 2022-04-18
Payer: MEDICARE

## 2022-04-18 ENCOUNTER — TELEPHONE (OUTPATIENT)
Dept: CARDIOLOGY | Facility: CLINIC | Age: 77
End: 2022-04-18
Payer: MEDICARE

## 2022-04-18 VITALS
OXYGEN SATURATION: 95 % | DIASTOLIC BLOOD PRESSURE: 89 MMHG | WEIGHT: 128.31 LBS | SYSTOLIC BLOOD PRESSURE: 138 MMHG | TEMPERATURE: 97 F | HEART RATE: 64 BPM | BODY MASS INDEX: 20.1 KG/M2

## 2022-04-18 DIAGNOSIS — Z79.899 AT RISK FOR AMIODARONE TOXICITY WITH LONG TERM USE: ICD-10-CM

## 2022-04-18 DIAGNOSIS — M06.041 RHEUMATOID ARTHRITIS INVOLVING BOTH HANDS WITH NEGATIVE RHEUMATOID FACTOR: Primary | ICD-10-CM

## 2022-04-18 DIAGNOSIS — M06.042 RHEUMATOID ARTHRITIS INVOLVING BOTH HANDS WITH NEGATIVE RHEUMATOID FACTOR: Primary | ICD-10-CM

## 2022-04-18 DIAGNOSIS — Z91.89 AT RISK FOR AMIODARONE TOXICITY WITH LONG TERM USE: ICD-10-CM

## 2022-04-18 DIAGNOSIS — G47.33 OSA (OBSTRUCTIVE SLEEP APNEA): Primary | ICD-10-CM

## 2022-04-18 PROCEDURE — 25000003 PHARM REV CODE 250: Performed by: INTERNAL MEDICINE

## 2022-04-18 PROCEDURE — 63600175 PHARM REV CODE 636 W HCPCS: Performed by: INTERNAL MEDICINE

## 2022-04-18 PROCEDURE — 96375 TX/PRO/DX INJ NEW DRUG ADDON: CPT

## 2022-04-18 PROCEDURE — 63600175 PHARM REV CODE 636 W HCPCS: Mod: JA,JG | Performed by: INTERNAL MEDICINE

## 2022-04-18 PROCEDURE — 96372 PR INJECTION,THERAP/PROPH/DIAG2ST, IM OR SUBCUT: ICD-10-PCS | Mod: S$GLB,,, | Performed by: INTERNAL MEDICINE

## 2022-04-18 PROCEDURE — 96365 THER/PROPH/DIAG IV INF INIT: CPT

## 2022-04-18 PROCEDURE — 96372 THER/PROPH/DIAG INJ SC/IM: CPT | Mod: S$GLB,,, | Performed by: INTERNAL MEDICINE

## 2022-04-18 RX ORDER — ACETAMINOPHEN 325 MG/1
650 TABLET ORAL
Status: CANCELLED
Start: 2022-05-02

## 2022-04-18 RX ORDER — DIPHENHYDRAMINE HYDROCHLORIDE 50 MG/ML
25 INJECTION INTRAMUSCULAR; INTRAVENOUS
Status: COMPLETED | OUTPATIENT
Start: 2022-04-18 | End: 2022-04-18

## 2022-04-18 RX ORDER — ACETAMINOPHEN 325 MG/1
650 TABLET ORAL
Status: COMPLETED | OUTPATIENT
Start: 2022-04-18 | End: 2022-04-18

## 2022-04-18 RX ADMIN — DIPHENHYDRAMINE HYDROCHLORIDE 25 MG: 50 INJECTION INTRAMUSCULAR; INTRAVENOUS at 01:04

## 2022-04-18 RX ADMIN — SODIUM CHLORIDE 500 MG: 9 INJECTION, SOLUTION INTRAVENOUS at 02:04

## 2022-04-18 RX ADMIN — ACETAMINOPHEN 650 MG: 325 TABLET ORAL at 01:04

## 2022-04-18 RX ADMIN — CYANOCOBALAMIN 1000 MCG: 1000 INJECTION, SOLUTION INTRAMUSCULAR; SUBCUTANEOUS at 01:04

## 2022-04-18 NOTE — TELEPHONE ENCOUNTER
Pt needs his pft dlco set up. Been ordered just needs to be scheduled pb      ----- Message from Norbert Lemons MD sent at 4/16/2022 11:46 AM CDT -----  See comments below and call patient to discuss.   Please close encounter when done -- no need to route back to me.  Thanks  PFT/DLCO - alll OK   Keep same repeat study in 1 year

## 2022-04-18 NOTE — PROGRESS NOTES
Arrived for Orencia infusion. Pt received Tylenol 650mg and Benadryl 25mg IVP 30mins prior to infusion. Pt tolerated infusion well and left in NAD. Return appt provided.

## 2022-04-19 ENCOUNTER — CLINICAL SUPPORT (OUTPATIENT)
Dept: CARDIOLOGY | Facility: HOSPITAL | Age: 77
End: 2022-04-19
Attending: INTERNAL MEDICINE
Payer: MEDICARE

## 2022-04-19 ENCOUNTER — HOSPITAL ENCOUNTER (OUTPATIENT)
Dept: CARDIOLOGY | Facility: HOSPITAL | Age: 77
Discharge: HOME | End: 2022-04-19
Attending: INTERNAL MEDICINE
Payer: MEDICARE

## 2022-04-19 ENCOUNTER — PATIENT MESSAGE (OUTPATIENT)
Dept: CARDIOLOGY | Facility: CLINIC | Age: 77
End: 2022-04-19
Payer: MEDICARE

## 2022-04-19 VITALS
HEIGHT: 67 IN | WEIGHT: 130 LBS | DIASTOLIC BLOOD PRESSURE: 89 MMHG | BODY MASS INDEX: 20.4 KG/M2 | SYSTOLIC BLOOD PRESSURE: 138 MMHG | HEART RATE: 68 BPM

## 2022-04-19 DIAGNOSIS — Z79.899 ENCOUNTER FOR LONG-TERM (CURRENT) USE OF HIGH-RISK MEDICATION: ICD-10-CM

## 2022-04-19 DIAGNOSIS — I42.8 OTHER CARDIOMYOPATHY: ICD-10-CM

## 2022-04-19 LAB
ASCENDING AORTA: 3.47 CM
AV INDEX (PROSTH): 0.67
AV MEAN GRADIENT: 6 MMHG
AV PEAK GRADIENT: 13 MMHG
AV VALVE AREA: 2.55 CM2
AV VELOCITY RATIO: 0.57
BSA FOR ECHO PROCEDURE: 1.67 M2
CV ECHO LV RWT: 0.26 CM
DOP CALC AO PEAK VEL: 1.77 M/S
DOP CALC AO VTI: 33.87 CM
DOP CALC LVOT AREA: 3.8 CM2
DOP CALC LVOT DIAMETER: 2.2 CM
DOP CALC LVOT PEAK VEL: 1.01 M/S
DOP CALC LVOT STROKE VOLUME: 86.44 CM3
DOP CALCLVOT PEAK VEL VTI: 22.75 CM
E/E' RATIO: 4.18 M/S
ECHO LV POSTERIOR WALL: 0.65 CM (ref 0.6–1.1)
EJECTION FRACTION: 45 %
FRACTIONAL SHORTENING: 34 % (ref 28–44)
INTERVENTRICULAR SEPTUM: 0.96 CM (ref 0.6–1.1)
IVRT: 128.45 MSEC
LA MAJOR: 4.09 CM
LA MINOR: 4.79 CM
LA WIDTH: 4.52 CM
LEFT ATRIUM SIZE: 2.6 CM
LEFT ATRIUM VOLUME INDEX MOD: 30.5 ML/M2
LEFT ATRIUM VOLUME INDEX: 26.2 ML/M2
LEFT ATRIUM VOLUME MOD: 51.2 CM3
LEFT ATRIUM VOLUME: 44.08 CM3
LEFT INTERNAL DIMENSION IN SYSTOLE: 3.34 CM (ref 2.1–4)
LEFT VENTRICLE DIASTOLIC VOLUME INDEX: 73.18 ML/M2
LEFT VENTRICLE DIASTOLIC VOLUME: 122.95 ML
LEFT VENTRICLE MASS INDEX: 84 G/M2
LEFT VENTRICLE SYSTOLIC VOLUME INDEX: 27 ML/M2
LEFT VENTRICLE SYSTOLIC VOLUME: 45.39 ML
LEFT VENTRICULAR INTERNAL DIMENSION IN DIASTOLE: 5.08 CM (ref 3.5–6)
LEFT VENTRICULAR MASS: 140.64 G
LV LATERAL E/E' RATIO: 3.54 M/S
LV SEPTAL E/E' RATIO: 5.11 M/S
MV A" WAVE DURATION": 23.12 MSEC
MV PEAK E VEL: 0.46 M/S
PISA TR MAX VEL: 2.25 M/S
PULM VEIN S/D RATIO: 1.33
PV PEAK D VEL: 0.39 M/S
PV PEAK S VEL: 0.52 M/S
RA MAJOR: 3.48 CM
RA PRESSURE: 3 MMHG
RA WIDTH: 3.78 CM
RIGHT VENTRICULAR END-DIASTOLIC DIMENSION: 3.22 CM
RV TISSUE DOPPLER FREE WALL SYSTOLIC VELOCITY 1 (APICAL 4 CHAMBER VIEW): 9.97 CM/S
SINUS: 3.71 CM
STJ: 3.21 CM
TDI LATERAL: 0.13 M/S
TDI SEPTAL: 0.09 M/S
TDI: 0.11 M/S
TR MAX PG: 20 MMHG
TRICUSPID ANNULAR PLANE SYSTOLIC EXCURSION: 1.68 CM
TV REST PULMONARY ARTERY PRESSURE: 23 MMHG

## 2022-04-19 PROCEDURE — 93227 XTRNL ECG REC<48 HR R&I: CPT | Mod: ,,, | Performed by: INTERNAL MEDICINE

## 2022-04-19 PROCEDURE — 93306 TTE W/DOPPLER COMPLETE: CPT

## 2022-04-19 PROCEDURE — 93306 ECHO (CUPID ONLY): ICD-10-PCS | Mod: 26,,, | Performed by: INTERNAL MEDICINE

## 2022-04-19 PROCEDURE — 93227 HOLTER MONITOR - 48 HOUR (CUPID ONLY): ICD-10-PCS | Mod: ,,, | Performed by: INTERNAL MEDICINE

## 2022-04-19 PROCEDURE — 93306 TTE W/DOPPLER COMPLETE: CPT | Mod: 26,,, | Performed by: INTERNAL MEDICINE

## 2022-04-19 PROCEDURE — 93225 XTRNL ECG REC<48 HRS REC: CPT

## 2022-04-21 ENCOUNTER — PATIENT MESSAGE (OUTPATIENT)
Dept: CARDIOLOGY | Facility: CLINIC | Age: 77
End: 2022-04-21
Payer: MEDICARE

## 2022-04-21 ENCOUNTER — OFFICE VISIT (OUTPATIENT)
Dept: PODIATRY | Facility: CLINIC | Age: 77
End: 2022-04-21
Payer: MEDICARE

## 2022-04-21 VITALS
DIASTOLIC BLOOD PRESSURE: 65 MMHG | BODY MASS INDEX: 20.4 KG/M2 | HEIGHT: 67 IN | WEIGHT: 130 LBS | SYSTOLIC BLOOD PRESSURE: 132 MMHG | HEART RATE: 60 BPM

## 2022-04-21 DIAGNOSIS — L60.2 LONG TOENAIL: ICD-10-CM

## 2022-04-21 DIAGNOSIS — L84 HELOMA MOLLE: Primary | ICD-10-CM

## 2022-04-21 DIAGNOSIS — Z98.890 HISTORY OF FOOT SURGERY: ICD-10-CM

## 2022-04-21 DIAGNOSIS — L60.3 DYSTROPHIC NAIL: ICD-10-CM

## 2022-04-21 PROCEDURE — 99999 PR PBB SHADOW E&M-EST. PATIENT-LVL III: ICD-10-PCS | Mod: PBBFAC,,, | Performed by: PODIATRIST

## 2022-04-21 PROCEDURE — 1159F PR MEDICATION LIST DOCUMENTED IN MEDICAL RECORD: ICD-10-PCS | Mod: CPTII,S$GLB,, | Performed by: PODIATRIST

## 2022-04-21 PROCEDURE — 99499 UNLISTED E&M SERVICE: CPT | Mod: S$GLB,,, | Performed by: PODIATRIST

## 2022-04-21 PROCEDURE — 1160F RVW MEDS BY RX/DR IN RCRD: CPT | Mod: CPTII,S$GLB,, | Performed by: PODIATRIST

## 2022-04-21 PROCEDURE — 3075F PR MOST RECENT SYSTOLIC BLOOD PRESS GE 130-139MM HG: ICD-10-PCS | Mod: CPTII,S$GLB,, | Performed by: PODIATRIST

## 2022-04-21 PROCEDURE — 99999 PR PBB SHADOW E&M-EST. PATIENT-LVL III: CPT | Mod: PBBFAC,,, | Performed by: PODIATRIST

## 2022-04-21 PROCEDURE — 1160F PR REVIEW ALL MEDS BY PRESCRIBER/CLIN PHARMACIST DOCUMENTED: ICD-10-PCS | Mod: CPTII,S$GLB,, | Performed by: PODIATRIST

## 2022-04-21 PROCEDURE — 3078F PR MOST RECENT DIASTOLIC BLOOD PRESSURE < 80 MM HG: ICD-10-PCS | Mod: CPTII,S$GLB,, | Performed by: PODIATRIST

## 2022-04-21 PROCEDURE — 1126F PR PAIN SEVERITY QUANTIFIED, NO PAIN PRESENT: ICD-10-PCS | Mod: CPTII,S$GLB,, | Performed by: PODIATRIST

## 2022-04-21 PROCEDURE — 3078F DIAST BP <80 MM HG: CPT | Mod: CPTII,S$GLB,, | Performed by: PODIATRIST

## 2022-04-21 PROCEDURE — 1159F MED LIST DOCD IN RCRD: CPT | Mod: CPTII,S$GLB,, | Performed by: PODIATRIST

## 2022-04-21 PROCEDURE — 1126F AMNT PAIN NOTED NONE PRSNT: CPT | Mod: CPTII,S$GLB,, | Performed by: PODIATRIST

## 2022-04-21 PROCEDURE — 3075F SYST BP GE 130 - 139MM HG: CPT | Mod: CPTII,S$GLB,, | Performed by: PODIATRIST

## 2022-04-21 PROCEDURE — 99499 NO LOS: ICD-10-PCS | Mod: S$GLB,,, | Performed by: PODIATRIST

## 2022-04-21 RX ORDER — UREA 200 MG/G
1 CREAM TOPICAL DAILY PRN
Qty: 75 G | Refills: 10 | Status: SHIPPED | OUTPATIENT
Start: 2022-04-21

## 2022-04-21 NOTE — PROGRESS NOTES
"  Chief Concern:  Proc B (Proc B $42)     Vitals:    04/21/22 1343   BP: 132/65   Pulse: 60   Weight: 59 kg (130 lb)   Height: 5' 7" (1.702 m)       Jass martin - Right Foot    Long toenail    History of foot surgery        Patient presents to the clinic for non-covered routine foot care.  Patient understands this is not typically a covered service every 4 weeks and patient is aware of responsibility of payment. Pedal pulses are palpable. No known risk factors requiring routine foot care.  Nails and soft corn  were reduced and trimmed. Patient tolerated well.               "

## 2022-04-25 ENCOUNTER — PATIENT MESSAGE (OUTPATIENT)
Dept: INTERNAL MEDICINE | Facility: CLINIC | Age: 77
End: 2022-04-25
Payer: MEDICARE

## 2022-04-25 LAB
OHS CV EVENT MONITOR DAY: 0
OHS CV HOLTER LENGTH DECIMAL HOURS: 48
OHS CV HOLTER LENGTH HOURS: 48
OHS CV HOLTER LENGTH MINUTES: 0
OHS CV HOLTER SINUS AVERAGE HR: 70
OHS CV HOLTER SINUS MAX HR: 112
OHS CV HOLTER SINUS MIN HR: 52

## 2022-04-25 RX ORDER — POTASSIUM CHLORIDE 600 MG/1
8 TABLET, FILM COATED, EXTENDED RELEASE ORAL DAILY
Qty: 90 TABLET | Refills: 4 | Status: SHIPPED | OUTPATIENT
Start: 2022-04-25 | End: 2022-04-26

## 2022-04-25 RX ORDER — POTASSIUM CHLORIDE 600 MG/1
8 TABLET, FILM COATED, EXTENDED RELEASE ORAL DAILY
Qty: 30 TABLET | Refills: 6 | Status: SHIPPED | OUTPATIENT
Start: 2022-04-25 | End: 2022-04-25 | Stop reason: SDUPTHER

## 2022-04-27 DIAGNOSIS — M06.9 RHEUMATOID ARTHRITIS: ICD-10-CM

## 2022-04-27 RX ORDER — LEFLUNOMIDE 20 MG/1
20 TABLET ORAL DAILY
Qty: 90 TABLET | Refills: 0 | Status: SHIPPED | OUTPATIENT
Start: 2022-04-27 | End: 2022-05-04 | Stop reason: SDUPTHER

## 2022-04-28 ENCOUNTER — PATIENT MESSAGE (OUTPATIENT)
Dept: CARDIOLOGY | Facility: CLINIC | Age: 77
End: 2022-04-28
Payer: MEDICARE

## 2022-04-28 ENCOUNTER — PATIENT MESSAGE (OUTPATIENT)
Dept: RHEUMATOLOGY | Facility: CLINIC | Age: 77
End: 2022-04-28
Payer: MEDICARE

## 2022-04-28 ENCOUNTER — TELEPHONE (OUTPATIENT)
Dept: CARDIOLOGY | Facility: CLINIC | Age: 77
End: 2022-04-28
Payer: MEDICARE

## 2022-04-28 ENCOUNTER — TELEPHONE (OUTPATIENT)
Dept: CARDIOLOGY | Facility: HOSPITAL | Age: 77
End: 2022-04-28
Payer: MEDICARE

## 2022-04-28 NOTE — TELEPHONE ENCOUNTER
----- Message from JIMBO Perez sent at 4/28/2022  4:19 PM CDT -----  Please notify patient  Holter revealed:    Sinus rhythm with average HR 70 bpm.  - PVC burden 10.9%.  - No sustained arrhythmias.  - Patient symptoms correlating with normal sinus rhythm and sinus rhythm with PVCs     We can discuss further tomorrow at her visit    Mana

## 2022-04-28 NOTE — TELEPHONE ENCOUNTER
----- Message from Norbert Lemons MD sent at 4/28/2022  2:13 PM CDT -----  See comments below and call patient to discuss.   Please close encounter when done -- no need to route back to me.  Thanks  EF is stable and near normal and we should keep things as they are

## 2022-04-28 NOTE — PROGRESS NOTES
Subjective:   Patient ID:  Rizwana Block is a 76 y.o. female who presents for evaluation of PAF (paroxysmal atrial fibrillation)      HPI    Rizwana Block is a 76 year old female who presents to Arrhythmia clinic for follow up. His current medical conditions include PVCs s/p RFA, Bradycardia, CMPY, HFrEF of 40-45%, MR, HTN. She was to obtain holter, echo and PFT/DLCO prior to this appt.    Denies chest pain or anginal equivalents. No shortness of breath, STALLWORTH or palpitations. Denies orthopnea, PND or abdominal bloating. Reports regular walking without any issues lately. NO leg swelling or claudications. No recent falls, syncope or near syncopal events. Reports compliance with medications and dietary restrictions. NO CNS complaints to suggest TIA or CVA today. No signs of abnormal bleeding on ASA daily.     She has palpitations that affect her functional ability on a daily basis. Will increase her Coreg to 6.25 mg in AM and 3.125 mg in PM. Plan phone review in 2 weeks to assess response and further up-titrate meds prior to her moving to [place].     Conclusion    - Sinus rhythm with average HR 70 bpm.  - PVC burden 10.9%.  - No sustained arrhythmias.  - Patient symptoms correlating with normal sinus rhythm and sinus rhythm with PVCs (see below).        Results for orders placed during the hospital encounter of 04/19/22    Echo Saline Bubble? No    Interpretation Summary  · The left ventricle is normal in size with mildly decreased systolic function.  · The estimated ejection fraction is 45-50%.  · There are segmental left ventricular wall motion abnormalities.  · Left ventricular diastolic dysfunction.  · Normal right ventricular size with normal right ventricular systolic function.  · The estimated PA systolic pressure is 23 mmHg.  · Normal central venous pressure (3 mmHg).  · Interatrial septum bows to left, consider elevated right atrial pressure.      Past Medical History:   Diagnosis Date    Arrhythmia      Chest pain 2016    3/2016: Began experience chest discomfort.    CHF (congestive heart failure)     Hypertension     Osteopenia     Reclast infusion 10/5/2010 and 10/20/2011    Rheumatoid arthritis 2019    Sleep apnea        Past Surgical History:   Procedure Laterality Date    ANKLE FUSION      right    bladder surgery      with mesh    BLEPHAROPLASTY W/ LASER      CATARACT EXTRACTION, BILATERAL      HAND SURGERY      benign cyst on left    HYSTERECTOMY      heavy bleeding    PATELLA FRACTURE SURGERY      right- plate in tibial plateau    TONSILLECTOMY         Social History     Tobacco Use    Smoking status: Former Smoker     Packs/day: 0.25     Years: 24.00     Pack years: 6.00     Types: Cigarettes     Start date: 1961     Quit date: 1985     Years since quittin.3    Smokeless tobacco: Never Used   Substance Use Topics    Alcohol use: Not Currently     Alcohol/week: 5.0 standard drinks     Types: 5 Glasses of wine per week     Comment: socially    Drug use: No       Family History   Problem Relation Age of Onset    Heart disease Mother 63    Stroke Father 49    Heart disease Father     Early death Father     Cancer Maternal Aunt         bone    Cancer Maternal Uncle         esophageal    Heart disease Paternal Uncle     SIDS Daughter     Breast cancer Neg Hx     Ovarian cancer Neg Hx     Cervical cancer Neg Hx     Endometrial cancer Neg Hx     Vaginal cancer Neg Hx      Wt Readings from Last 3 Encounters:   22 57.7 kg (127 lb 3.3 oz)   22 59 kg (130 lb)   22 59 kg (130 lb)     Temp Readings from Last 3 Encounters:   22 97.4 °F (36.3 °C)   22 98.3 °F (36.8 °C)   22 97.3 °F (36.3 °C)     BP Readings from Last 3 Encounters:   22 118/68   22 132/65   22 138/89     Pulse Readings from Last 3 Encounters:   22 77   22 60   22 68     Current Outpatient Medications on File Prior to Visit   Medication  Sig Dispense Refill    [DISCONTINUED] amiodarone (PACERONE) 200 MG Tab TAKE ONE TABLET BY MOUTH ONCE DAILY 90 tablet 3    [DISCONTINUED] aspirin (ECOTRIN) 81 MG EC tablet Take 81 mg by mouth once daily.      [DISCONTINUED] atorvastatin (LIPITOR) 10 MG tablet Take 1 tablet (10 mg total) by mouth once daily. 30 tablet 6    [DISCONTINUED] carvediloL (COREG) 3.125 MG tablet Take 1 tab twice a day. Take 1 extra tab if BP greater then 140  mmHG as needed. 180 tablet 0    [DISCONTINUED] furosemide (LASIX) 40 MG tablet TAKE ONE TABLET BY MOUTH ONCE DAILY AS NEEDED 30 tablet 6    [DISCONTINUED] potassium chloride (KLOR-CON) 8 MEQ TbSR TAKE ONE TABLET BY MOUTH ONCE DAILY 90 tablet 3    [DISCONTINUED] sacubitriL-valsartan (ENTRESTO) 24-26 mg per tablet Take 1 tablet by mouth 2 (two) times daily. Patient needs to schedule an appointment in the consult cardiology clinic. 60 tablet 1    ALPRAZolam (XANAX) 0.25 MG tablet Take 1 tablet (0.25 mg total) by mouth nightly as needed for Insomnia. 90 tablet 1    cholecalciferol, vitamin D3, (VITAMIN D3) 25 mcg (1,000 unit) capsule Take 1,000 Units by mouth once daily.      co-enzyme Q-10 30 mg capsule Take 10 mg by mouth once daily.       cyanocobalamin 1,000 mcg/mL injection       cyanocobalamin 1,000 mcg/mL injection Inject 1 mL (1,000 mcg total) into the muscle every 30 days. One ml IM weekly for 4 weeks then one ml IM monthly.  Dipense #10 25 gague 1 inch needles and #10 one ml syringes 10 mL 3    FLUAD QUAD 2021-22,65Y UP,,PF, 60 mcg (15 mcg x 4)/0.5 mL Syrg       folic acid (FOLVITE) 800 MCG Tab Take 1 tablet (800 mcg total) by mouth once daily.  0    leflunomide (ARAVA) 20 MG Tab Take 1 tablet (20 mg total) by mouth once daily. 90 tablet 0    magnesium glycinate 100 mg Tab Take 1 tablet by mouth once daily at 6am.      MULTIVITAMIN WITH MINERALS (HAIR,SKIN AND NAILS ORAL) Take by mouth once daily.      ORENCIA, WITH MALTOSE, 250 mg SolR injection       TURMERIC  ORAL Take by mouth.      UNABLE TO FIND 2 capsules 2 (two) times a day. Nutrafol      urea 20 % Crea Apply 1 application topically daily as needed. To toenails 75 g 10     Current Facility-Administered Medications on File Prior to Visit   Medication Dose Route Frequency Provider Last Rate Last Admin    acetaminophen tablet 650 mg  650 mg Oral Once PRN Gurjit Zaidi MD        albuterol inhaler 2 puff  2 puff Inhalation Q20 Min PRN Gurjit Zaidi MD        cyanocobalamin injection 1,000 mcg  1,000 mcg Intramuscular Q30 Days Sri Gutierrez MD   1,000 mcg at 03/21/22 1249    diphenhydrAMINE capsule 25 mg  25 mg Oral Q6H PRN Darryl Javier MD        diphenhydrAMINE injection 25 mg  25 mg Intravenous Once PRN Gurjit Zaidi MD        EPINEPHrine (EPIPEN) 0.3 mg/0.3 mL pen injection 0.3 mg  0.3 mg Intramuscular PRN Gurjit Zaidi MD        methylPREDNISolone sodium succinate injection 40 mg  40 mg Intravenous Once PRN Gurjit Zaidi MD        ondansetron disintegrating tablet 4 mg  4 mg Oral Once PRN Gurjit Zaidi MD        sodium chloride 0.9% 500 mL flush bag   Intravenous PRN Gurjit Zaidi MD        sodium chloride 0.9% flush 10 mL  10 mL Intravenous PRN Gurjit Zaidi MD           Review of Systems   Constitutional: Negative for malaise/fatigue.   HENT: Negative for hearing loss and hoarse voice.    Eyes: Negative for blurred vision and visual disturbance.   Cardiovascular: Positive for irregular heartbeat and palpitations. Negative for chest pain, claudication, dyspnea on exertion, leg swelling, near-syncope, orthopnea, paroxysmal nocturnal dyspnea and syncope.   Respiratory: Negative for cough, hemoptysis, shortness of breath, sleep disturbances due to breathing, snoring and wheezing.    Endocrine: Negative for cold intolerance and heat intolerance.   Hematologic/Lymphatic: Does not bruise/bleed easily.   Skin: Negative for color change, dry skin and nail changes.  "  Musculoskeletal: Positive for arthritis and back pain. Negative for joint pain and myalgias.   Gastrointestinal: Negative for bloating, abdominal pain, constipation, nausea and vomiting.   Genitourinary: Negative for dysuria, flank pain, hematuria and hesitancy.   Neurological: Negative for headaches, light-headedness, loss of balance, numbness, paresthesias and weakness.   Psychiatric/Behavioral: Negative for altered mental status.   Allergic/Immunologic: Negative for environmental allergies.     /68 (BP Location: Left arm, Patient Position: Sitting)   Pulse 77   Ht 5' 7" (1.702 m)   Wt 57.7 kg (127 lb 3.3 oz)   LMP  (LMP Unknown)   SpO2 95%   BMI 19.92 kg/m²     Objective:   Physical Exam  Vitals and nursing note reviewed.   Constitutional:       General: She is not in acute distress.     Appearance: Normal appearance. She is well-developed. She is not ill-appearing.   HENT:      Head: Normocephalic and atraumatic.      Nose: Nose normal.      Mouth/Throat:      Mouth: Mucous membranes are moist.   Eyes:      Pupils: Pupils are equal, round, and reactive to light.   Neck:      Thyroid: No thyromegaly.      Vascular: No JVD.      Trachea: No tracheal deviation.   Cardiovascular:      Rate and Rhythm: Normal rate and regular rhythm. Frequent extrasystoles are present.     Chest Wall: PMI is not displaced.      Pulses: Intact distal pulses.           Radial pulses are 2+ on the right side and 2+ on the left side.        Dorsalis pedis pulses are 2+ on the right side and 2+ on the left side.      Heart sounds: S1 normal and S2 normal. Heart sounds not distant. No murmur heard.  Pulmonary:      Effort: Pulmonary effort is normal. No respiratory distress.      Breath sounds: Normal breath sounds. No wheezing.   Abdominal:      General: Bowel sounds are normal. There is no distension.      Palpations: Abdomen is soft.      Tenderness: There is no abdominal tenderness.   Musculoskeletal:         General: No " swelling. Normal range of motion.      Cervical back: Full passive range of motion without pain, normal range of motion and neck supple.      Right ankle: No swelling.      Left ankle: No swelling.   Skin:     General: Skin is warm and dry.      Capillary Refill: Capillary refill takes less than 2 seconds.      Nails: There is no clubbing.   Neurological:      General: No focal deficit present.      Mental Status: She is alert and oriented to person, place, and time. Mental status is at baseline.   Psychiatric:         Mood and Affect: Mood normal.         Speech: Speech normal.         Behavior: Behavior normal.         Thought Content: Thought content normal.         Judgment: Judgment normal.         Lab Results   Component Value Date    CHOL 139 07/29/2021    CHOL 168 05/03/2019    CHOL 198 01/11/2019     Lab Results   Component Value Date    HDL 66 07/29/2021    HDL 84 (H) 05/03/2019    HDL 81 (H) 01/11/2019     Lab Results   Component Value Date    LDLCALC 61.2 (L) 07/29/2021    LDLCALC 72.2 05/03/2019    LDLCALC 102.2 01/11/2019     Lab Results   Component Value Date    TRIG 59 07/29/2021    TRIG 59 05/03/2019    TRIG 74 01/11/2019     Lab Results   Component Value Date    CHOLHDL 47.5 07/29/2021    CHOLHDL 50.0 05/03/2019    CHOLHDL 40.9 01/11/2019       Chemistry        Component Value Date/Time     03/21/2022 1224    K 4.4 03/21/2022 1224     03/21/2022 1224    CO2 32 (H) 03/21/2022 1224    BUN 17 03/21/2022 1224    CREATININE 0.9 03/21/2022 1224    GLU 98 03/21/2022 1224        Component Value Date/Time    CALCIUM 9.0 03/21/2022 1224    ALKPHOS 49 (L) 03/21/2022 1224    AST 22 03/21/2022 1224    ALT 15 03/21/2022 1224    BILITOT 0.4 03/21/2022 1224    ESTGFRAFRICA >60.0 03/21/2022 1224    EGFRNONAA >60.0 03/21/2022 1224          Lab Results   Component Value Date    TSH 2.288 04/27/2021     Lab Results   Component Value Date    INR 0.9 09/16/2016    INR 1.0 06/01/2016    INR 0.9 12/31/2004      Lab Results   Component Value Date    WBC 5.42 03/21/2022    HGB 11.7 (L) 03/21/2022    HCT 39.3 03/21/2022     (H) 03/21/2022     03/21/2022        Assessment:      1. Non-rheumatic mitral regurgitation    2. Ventricular premature beats    3. Chronic systolic heart failure    4. Other cardiomyopathy    5. Essential hypertension    6. PAF (paroxysmal atrial fibrillation)    7. ILD (interstitial lung disease)    8. At risk for amiodarone toxicity with long term use    9. GAURAV (obstructive sleep apnea)        Plan:     Increase Coreg to 6.25 mg in AM and 3.125 mg in PM  Continue all other meds for now  Phone review in 2 weeks   Plan to continue to uptitrate BB prior to her moving to [place]  Dash diet 2 gm sodium restriction  Encourage daily walking    Nicole May, FNP-C [facility].

## 2022-04-29 ENCOUNTER — OFFICE VISIT (OUTPATIENT)
Dept: CARDIOLOGY | Facility: CLINIC | Age: 77
End: 2022-04-29
Payer: MEDICARE

## 2022-04-29 ENCOUNTER — PATIENT MESSAGE (OUTPATIENT)
Dept: RHEUMATOLOGY | Facility: CLINIC | Age: 77
End: 2022-04-29
Payer: MEDICARE

## 2022-04-29 VITALS
WEIGHT: 127.19 LBS | OXYGEN SATURATION: 95 % | HEIGHT: 67 IN | DIASTOLIC BLOOD PRESSURE: 68 MMHG | BODY MASS INDEX: 19.96 KG/M2 | SYSTOLIC BLOOD PRESSURE: 118 MMHG | HEART RATE: 77 BPM

## 2022-04-29 DIAGNOSIS — I34.0 NON-RHEUMATIC MITRAL REGURGITATION: Primary | ICD-10-CM

## 2022-04-29 DIAGNOSIS — I10 ESSENTIAL HYPERTENSION: ICD-10-CM

## 2022-04-29 DIAGNOSIS — J84.9 ILD (INTERSTITIAL LUNG DISEASE): ICD-10-CM

## 2022-04-29 DIAGNOSIS — Z79.899 AT RISK FOR AMIODARONE TOXICITY WITH LONG TERM USE: ICD-10-CM

## 2022-04-29 DIAGNOSIS — I49.3 VENTRICULAR PREMATURE BEATS: ICD-10-CM

## 2022-04-29 DIAGNOSIS — I50.22 CHRONIC SYSTOLIC HEART FAILURE: ICD-10-CM

## 2022-04-29 DIAGNOSIS — I42.8 OTHER CARDIOMYOPATHY: ICD-10-CM

## 2022-04-29 DIAGNOSIS — Z91.89 AT RISK FOR AMIODARONE TOXICITY WITH LONG TERM USE: ICD-10-CM

## 2022-04-29 DIAGNOSIS — I48.0 PAF (PAROXYSMAL ATRIAL FIBRILLATION): ICD-10-CM

## 2022-04-29 DIAGNOSIS — G47.33 OSA (OBSTRUCTIVE SLEEP APNEA): ICD-10-CM

## 2022-04-29 PROCEDURE — 1100F PTFALLS ASSESS-DOCD GE2>/YR: CPT | Mod: CPTII,S$GLB,, | Performed by: NURSE PRACTITIONER

## 2022-04-29 PROCEDURE — 3288F PR FALLS RISK ASSESSMENT DOCUMENTED: ICD-10-PCS | Mod: CPTII,S$GLB,, | Performed by: NURSE PRACTITIONER

## 2022-04-29 PROCEDURE — 99214 OFFICE O/P EST MOD 30 MIN: CPT | Mod: S$GLB,,, | Performed by: NURSE PRACTITIONER

## 2022-04-29 PROCEDURE — 1126F PR PAIN SEVERITY QUANTIFIED, NO PAIN PRESENT: ICD-10-PCS | Mod: CPTII,S$GLB,, | Performed by: NURSE PRACTITIONER

## 2022-04-29 PROCEDURE — 3078F PR MOST RECENT DIASTOLIC BLOOD PRESSURE < 80 MM HG: ICD-10-PCS | Mod: CPTII,S$GLB,, | Performed by: NURSE PRACTITIONER

## 2022-04-29 PROCEDURE — 3078F DIAST BP <80 MM HG: CPT | Mod: CPTII,S$GLB,, | Performed by: NURSE PRACTITIONER

## 2022-04-29 PROCEDURE — 1159F MED LIST DOCD IN RCRD: CPT | Mod: CPTII,S$GLB,, | Performed by: NURSE PRACTITIONER

## 2022-04-29 PROCEDURE — 99999 PR PBB SHADOW E&M-EST. PATIENT-LVL V: CPT | Mod: PBBFAC,,, | Performed by: NURSE PRACTITIONER

## 2022-04-29 PROCEDURE — 99499 UNLISTED E&M SERVICE: CPT | Mod: S$GLB,,, | Performed by: NURSE PRACTITIONER

## 2022-04-29 PROCEDURE — 99214 PR OFFICE/OUTPT VISIT, EST, LEVL IV, 30-39 MIN: ICD-10-PCS | Mod: S$GLB,,, | Performed by: NURSE PRACTITIONER

## 2022-04-29 PROCEDURE — 3074F SYST BP LT 130 MM HG: CPT | Mod: CPTII,S$GLB,, | Performed by: NURSE PRACTITIONER

## 2022-04-29 PROCEDURE — 99499 RISK ADDL DX/OHS AUDIT: ICD-10-PCS | Mod: S$GLB,,, | Performed by: NURSE PRACTITIONER

## 2022-04-29 PROCEDURE — 3074F PR MOST RECENT SYSTOLIC BLOOD PRESSURE < 130 MM HG: ICD-10-PCS | Mod: CPTII,S$GLB,, | Performed by: NURSE PRACTITIONER

## 2022-04-29 PROCEDURE — 99999 PR PBB SHADOW E&M-EST. PATIENT-LVL V: ICD-10-PCS | Mod: PBBFAC,,, | Performed by: NURSE PRACTITIONER

## 2022-04-29 PROCEDURE — 1126F AMNT PAIN NOTED NONE PRSNT: CPT | Mod: CPTII,S$GLB,, | Performed by: NURSE PRACTITIONER

## 2022-04-29 PROCEDURE — 1100F PR PT FALLS ASSESS DOC 2+ FALLS/FALL W/INJURY/YR: ICD-10-PCS | Mod: CPTII,S$GLB,, | Performed by: NURSE PRACTITIONER

## 2022-04-29 PROCEDURE — 3288F FALL RISK ASSESSMENT DOCD: CPT | Mod: CPTII,S$GLB,, | Performed by: NURSE PRACTITIONER

## 2022-04-29 PROCEDURE — 1159F PR MEDICATION LIST DOCUMENTED IN MEDICAL RECORD: ICD-10-PCS | Mod: CPTII,S$GLB,, | Performed by: NURSE PRACTITIONER

## 2022-04-29 RX ORDER — POTASSIUM CHLORIDE 600 MG/1
8 TABLET, FILM COATED, EXTENDED RELEASE ORAL DAILY
Qty: 90 TABLET | Refills: 3 | Status: SHIPPED | OUTPATIENT
Start: 2022-04-29

## 2022-04-29 RX ORDER — FUROSEMIDE 40 MG/1
40 TABLET ORAL DAILY
Qty: 30 TABLET | Refills: 11 | Status: SHIPPED | OUTPATIENT
Start: 2022-04-29 | End: 2023-01-17 | Stop reason: SDUPTHER

## 2022-04-29 RX ORDER — CARVEDILOL 3.12 MG/1
TABLET ORAL
Qty: 180 TABLET | Refills: 3 | Status: SHIPPED | OUTPATIENT
Start: 2022-04-29 | End: 2022-05-17

## 2022-04-29 RX ORDER — ASPIRIN 81 MG/1
81 TABLET ORAL DAILY
Qty: 90 TABLET | Refills: 3 | Status: SHIPPED | OUTPATIENT
Start: 2022-04-29

## 2022-04-29 RX ORDER — AMIODARONE HYDROCHLORIDE 200 MG/1
TABLET ORAL
Qty: 90 TABLET | Refills: 3 | Status: SHIPPED | OUTPATIENT
Start: 2022-04-29 | End: 2022-07-11 | Stop reason: SDUPTHER

## 2022-04-29 RX ORDER — ATORVASTATIN CALCIUM 10 MG/1
10 TABLET, FILM COATED ORAL DAILY
Qty: 30 TABLET | Refills: 11 | Status: SHIPPED | OUTPATIENT
Start: 2022-04-29 | End: 2022-05-09 | Stop reason: SDUPTHER

## 2022-04-29 RX ORDER — SACUBITRIL AND VALSARTAN 24; 26 MG/1; MG/1
1 TABLET, FILM COATED ORAL 2 TIMES DAILY
Qty: 60 TABLET | Refills: 11 | Status: SHIPPED | OUTPATIENT
Start: 2022-04-29

## 2022-05-02 ENCOUNTER — TELEPHONE (OUTPATIENT)
Dept: INTERNAL MEDICINE | Facility: CLINIC | Age: 77
End: 2022-05-02

## 2022-05-02 ENCOUNTER — PATIENT MESSAGE (OUTPATIENT)
Dept: CARDIOLOGY | Facility: CLINIC | Age: 77
End: 2022-05-02
Payer: MEDICARE

## 2022-05-02 NOTE — TELEPHONE ENCOUNTER
----- Message from Carol Scanlon sent at 5/2/2022 12:55 PM CDT -----  Contact: Pt Mobile  [phone]  Patient would like a call back in regards to her wanting to know what is the status on her referral for her to see a Electrophysiologist please.

## 2022-05-03 NOTE — TELEPHONE ENCOUNTER
Is she referring to seeing an EP cardiologist at [facility] before she moves or see someone in [place].   Please get more info

## 2022-05-04 ENCOUNTER — TELEPHONE (OUTPATIENT)
Dept: CARDIOLOGY | Facility: CLINIC | Age: 77
End: 2022-05-04
Payer: MEDICARE

## 2022-05-04 ENCOUNTER — PATIENT MESSAGE (OUTPATIENT)
Dept: RHEUMATOLOGY | Facility: CLINIC | Age: 77
End: 2022-05-04
Payer: MEDICARE

## 2022-05-04 NOTE — TELEPHONE ENCOUNTER
Pt wants to know if you know a ep physician in [place]????      ----- Message from Riddhi Vasquez sent at 5/4/2022  8:26 AM CDT -----  Contact: self  Rizwana Block would like a call back at [phone], in regards to questions about referral.

## 2022-05-06 ENCOUNTER — PATIENT MESSAGE (OUTPATIENT)
Dept: CARDIOLOGY | Facility: CLINIC | Age: 77
End: 2022-05-06
Payer: MEDICARE

## 2022-05-06 NOTE — PROGRESS NOTES
See comments below and call patient to discuss.   Please close encounter when done -- no need to route back to me.  Thanks  The Holter is reported with 10.7 % PVCs but morphology suggests that these are likely anterior fascicular ectopic beats. They also occur in very late diastole.  For now no change in Rx

## 2022-05-09 ENCOUNTER — PATIENT MESSAGE (OUTPATIENT)
Dept: RHEUMATOLOGY | Facility: CLINIC | Age: 77
End: 2022-05-09
Payer: MEDICARE

## 2022-05-09 ENCOUNTER — PATIENT MESSAGE (OUTPATIENT)
Dept: CARDIOLOGY | Facility: CLINIC | Age: 77
End: 2022-05-09
Payer: MEDICARE

## 2022-05-09 ENCOUNTER — OFFICE VISIT (OUTPATIENT)
Dept: INTERNAL MEDICINE | Facility: CLINIC | Age: 77
End: 2022-05-09
Payer: MEDICARE

## 2022-05-09 VITALS
SYSTOLIC BLOOD PRESSURE: 110 MMHG | BODY MASS INDEX: 20.38 KG/M2 | HEIGHT: 67 IN | HEART RATE: 55 BPM | OXYGEN SATURATION: 98 % | DIASTOLIC BLOOD PRESSURE: 60 MMHG | WEIGHT: 129.88 LBS

## 2022-05-09 DIAGNOSIS — M06.042 RHEUMATOID ARTHRITIS INVOLVING BOTH HANDS WITH NEGATIVE RHEUMATOID FACTOR: Primary | ICD-10-CM

## 2022-05-09 DIAGNOSIS — M06.9 RHEUMATOID ARTHRITIS: ICD-10-CM

## 2022-05-09 DIAGNOSIS — I42.8 OTHER CARDIOMYOPATHY: ICD-10-CM

## 2022-05-09 DIAGNOSIS — D64.9 ANEMIA, UNSPECIFIED TYPE: ICD-10-CM

## 2022-05-09 DIAGNOSIS — R91.1 PULMONARY NODULE: ICD-10-CM

## 2022-05-09 DIAGNOSIS — I10 ESSENTIAL HYPERTENSION: ICD-10-CM

## 2022-05-09 DIAGNOSIS — I49.3 VENTRICULAR PREMATURE BEATS: ICD-10-CM

## 2022-05-09 DIAGNOSIS — I48.0 PAF (PAROXYSMAL ATRIAL FIBRILLATION): ICD-10-CM

## 2022-05-09 DIAGNOSIS — M06.041 RHEUMATOID ARTHRITIS INVOLVING BOTH HANDS WITH NEGATIVE RHEUMATOID FACTOR: Primary | ICD-10-CM

## 2022-05-09 PROCEDURE — 3078F DIAST BP <80 MM HG: CPT | Mod: CPTII,S$GLB,, | Performed by: INTERNAL MEDICINE

## 2022-05-09 PROCEDURE — 1100F PTFALLS ASSESS-DOCD GE2>/YR: CPT | Mod: CPTII,S$GLB,, | Performed by: INTERNAL MEDICINE

## 2022-05-09 PROCEDURE — 99499 UNLISTED E&M SERVICE: CPT | Mod: S$GLB,,, | Performed by: INTERNAL MEDICINE

## 2022-05-09 PROCEDURE — 99999 PR PBB SHADOW E&M-EST. PATIENT-LVL II: CPT | Mod: PBBFAC,,, | Performed by: INTERNAL MEDICINE

## 2022-05-09 PROCEDURE — 1159F MED LIST DOCD IN RCRD: CPT | Mod: CPTII,S$GLB,, | Performed by: INTERNAL MEDICINE

## 2022-05-09 PROCEDURE — 3074F SYST BP LT 130 MM HG: CPT | Mod: CPTII,S$GLB,, | Performed by: INTERNAL MEDICINE

## 2022-05-09 PROCEDURE — 3074F PR MOST RECENT SYSTOLIC BLOOD PRESSURE < 130 MM HG: ICD-10-PCS | Mod: CPTII,S$GLB,, | Performed by: INTERNAL MEDICINE

## 2022-05-09 PROCEDURE — 3078F PR MOST RECENT DIASTOLIC BLOOD PRESSURE < 80 MM HG: ICD-10-PCS | Mod: CPTII,S$GLB,, | Performed by: INTERNAL MEDICINE

## 2022-05-09 PROCEDURE — 3288F PR FALLS RISK ASSESSMENT DOCUMENTED: ICD-10-PCS | Mod: CPTII,S$GLB,, | Performed by: INTERNAL MEDICINE

## 2022-05-09 PROCEDURE — 1126F AMNT PAIN NOTED NONE PRSNT: CPT | Mod: CPTII,S$GLB,, | Performed by: INTERNAL MEDICINE

## 2022-05-09 PROCEDURE — 1126F PR PAIN SEVERITY QUANTIFIED, NO PAIN PRESENT: ICD-10-PCS | Mod: CPTII,S$GLB,, | Performed by: INTERNAL MEDICINE

## 2022-05-09 PROCEDURE — 99214 PR OFFICE/OUTPT VISIT, EST, LEVL IV, 30-39 MIN: ICD-10-PCS | Mod: S$GLB,,, | Performed by: INTERNAL MEDICINE

## 2022-05-09 PROCEDURE — 99499 RISK ADDL DX/OHS AUDIT: ICD-10-PCS | Mod: S$GLB,,, | Performed by: INTERNAL MEDICINE

## 2022-05-09 PROCEDURE — 1159F PR MEDICATION LIST DOCUMENTED IN MEDICAL RECORD: ICD-10-PCS | Mod: CPTII,S$GLB,, | Performed by: INTERNAL MEDICINE

## 2022-05-09 PROCEDURE — 3288F FALL RISK ASSESSMENT DOCD: CPT | Mod: CPTII,S$GLB,, | Performed by: INTERNAL MEDICINE

## 2022-05-09 PROCEDURE — 99999 PR PBB SHADOW E&M-EST. PATIENT-LVL II: ICD-10-PCS | Mod: PBBFAC,,, | Performed by: INTERNAL MEDICINE

## 2022-05-09 PROCEDURE — 1100F PR PT FALLS ASSESS DOC 2+ FALLS/FALL W/INJURY/YR: ICD-10-PCS | Mod: CPTII,S$GLB,, | Performed by: INTERNAL MEDICINE

## 2022-05-09 PROCEDURE — 99214 OFFICE O/P EST MOD 30 MIN: CPT | Mod: S$GLB,,, | Performed by: INTERNAL MEDICINE

## 2022-05-09 RX ORDER — ATORVASTATIN CALCIUM 10 MG/1
10 TABLET, FILM COATED ORAL DAILY
Qty: 90 TABLET | Refills: 1 | Status: SHIPPED | OUTPATIENT
Start: 2022-05-09 | End: 2022-09-23 | Stop reason: SDUPTHER

## 2022-05-09 RX ORDER — ALPRAZOLAM 0.25 MG/1
0.25 TABLET ORAL 3 TIMES DAILY PRN
Qty: 90 TABLET | Refills: 0 | Status: SHIPPED | OUTPATIENT
Start: 2022-05-09 | End: 2022-08-05 | Stop reason: SDUPTHER

## 2022-05-09 RX ORDER — LEFLUNOMIDE 20 MG/1
20 TABLET ORAL DAILY
Qty: 90 TABLET | Refills: 0 | Status: SHIPPED | OUTPATIENT
Start: 2022-05-09 | End: 2022-07-11 | Stop reason: SDUPTHER

## 2022-05-09 NOTE — PROGRESS NOTES
Chief Complaint: Follow up of multiple problems     HPI: this is a 76 year old woman who presents for follow up of multiple problems     She is moving to [place] on May 22, 2022.  A very close friend is moving with her. She is looking forward to the move. She has been very busy preparing for the move. Her daughters live in [place]. One daughter has delusions that there is something in her body. She has had multiple psychiatric hospitalizations.  She is close with her other daughter.      Her   suddenly on 2022.  He had an acute intracranial hemorrhage.  He  at home in hospice 4 days after the stroke.   She is grieving his death. She is sleeping with the help of xanax 0.25 mg at bedtime and melatonin 10 mg 2 tablets. She sleeps from 8 pm to 4 am. SHe likes getting up early.      She saw Dr Umanzor in [facility] for her anemia - felt to be due to chronic disease.      She exercises for 30  Minutes daily by walking 2 miles on the treadmill.      She has rheumatoid arthritis.  She is is getting Orencia infusions monthly - to have on 2022 then  in [place]. . She also takes Arava 20 mg once daily. Joints are much improved since starting on the Orencia.     She is followed by Dr Lemons. She saw his nurse practioner on 22.   She is currently taking amiodarone 200 mg once daily for PVC (Inferior-posterior septal PVC morphology). She is now taking coreg 3.125 mg 2 in the morning and one in the evening (with plans to go to 6.25 mg twice daily) and entresto  twice daily for her heart. She now takes lasix 40 mg 1 times a week.  She takes potassium chloride 8 meq daily. . No chest pain, shortness of breath.  She has some mild CHF with an EF of 40-45% on echo 2018 and diastolic dysfunction.  She takes lipitor 10 mg daily for cholesterol. No muscle spasms.  She will have an echo and holter monitor soon.      She takes vitamin D 1000 units daily.       She gets a vitamin  "B12 injection monthly       She has been diagnosed with sleep apnea in the past. She has not been using  APAP machine since she has lost weight.  Repeat sleep study was done 11/28/2020 - extremely mild sleep apnea.              Past Medical History:   Diagnosis Date    Arrhythmia      Chest pain 5/27/2016     3/2016: Began experience chest discomfort.    CHF (congestive heart failure)      Hypertension      Osteopenia       Reclast infusion 10/5/2010 and 10/20/2011                   Past Surgical History:   Procedure Laterality Date    ANKLE FUSION         right    bladder surgery         with mesh    BLEPHAROPLASTY W/ LASER        HAND SURGERY         benign cyst on left    HYSTERECTOMY         heavy bleeding    PATELLA FRACTURE SURGERY         right    TONSILLECTOMY          Meds and allergies: updated on Epic,            Physical exam:   /60 (BP Location: Right arm, Patient Position: Sitting)   Pulse (!) 55   Ht 5' 7" (1.702 m)   Wt 58.9 kg (129 lb 13.6 oz)   LMP  (LMP Unknown)   SpO2 98%   BMI 20.34 kg/m²     General: alert, oriented x 3, no apparent distress.  Affect normal  HEENT: Conjunctivae: anicteric, PERRL, EOMI, TM clear, Oralpharynx clear  Neck: supple, no thyroid enlargement, no cervical lymphadenopathy  Resp: effort normal, lungs clear bilaterally  CV: iregular rate and irrhythm without murmurs, gallops or rubs, no lower extremity edema,      Labs reviewed        Assessment/Plan:      RA - stable  Possible kidney cyst- REpeat US kidneys 8/2021 stable  A fib - saw cardiology. Stable.   Pulmonary nodule -  ct chest 4/13/22 stable.   chf - controlled  HTN - controlled  Sleep apnea - stable off meds  Osteoporosis - BMD 11/21 - on Prolia. 3/2/22 was first dose.   Anemia -stable.   MMG 3/3/22  Colonoscoy normal 4/2009 due 4/2019 - [employer] negative 12/29/2020.  Fit kit     Follow up in 12 months, sooner if issues.     "

## 2022-05-10 ENCOUNTER — TELEPHONE (OUTPATIENT)
Dept: CARDIOLOGY | Facility: CLINIC | Age: 77
End: 2022-05-10
Payer: MEDICARE

## 2022-05-10 NOTE — TELEPHONE ENCOUNTER
----- Message from Shonna Edward LPN sent at 5/6/2022  4:46 PM CDT -----  Pt was sent a message on the portal of results I will check with her on Monday to make sure she understands pb  ----- Message -----  From: Norbert Lemons MD  Sent: 5/6/2022   3:42 PM CDT  To: JIMBO Perez, Shonna Edward LPN    See comments below and call patient to discuss.   Please close encounter when done -- no need to route back to me.  Thanks  The Holter is reported with 10.7 % PVCs but morphology suggests that these are likely anterior fascicular ectopic beats. They also occur in very late diastole.  For now no change in Rx

## 2022-05-10 NOTE — TELEPHONE ENCOUNTER
Pt contacted and she understood the echo report. pb      ----- Message from Skyla Diaz sent at 5/10/2022 10:48 AM CDT -----  Contact: Patient  Type:  Patient Returning Call    Who Called:Rizwana Block  Who Left Message for Patient:Bekah  Does the patient know what this is regarding?:not sure  Would the patient rather a call back or a response via [facility]? Call back  Best Call Back Number:[phone]  Additional Information:

## 2022-05-12 ENCOUNTER — PATIENT MESSAGE (OUTPATIENT)
Dept: CARDIOLOGY | Facility: CLINIC | Age: 77
End: 2022-05-12
Payer: MEDICARE

## 2022-05-16 ENCOUNTER — PATIENT MESSAGE (OUTPATIENT)
Dept: INTERNAL MEDICINE | Facility: CLINIC | Age: 77
End: 2022-05-16

## 2022-05-16 ENCOUNTER — CLINICAL SUPPORT (OUTPATIENT)
Dept: INTERNAL MEDICINE | Facility: CLINIC | Age: 77
End: 2022-05-16
Payer: MEDICARE

## 2022-05-16 ENCOUNTER — INFUSION (OUTPATIENT)
Dept: INFECTIOUS DISEASES | Facility: HOSPITAL | Age: 77
End: 2022-05-16
Attending: INTERNAL MEDICINE
Payer: MEDICARE

## 2022-05-16 VITALS
WEIGHT: 131.63 LBS | OXYGEN SATURATION: 97 % | TEMPERATURE: 98 F | HEART RATE: 68 BPM | SYSTOLIC BLOOD PRESSURE: 137 MMHG | DIASTOLIC BLOOD PRESSURE: 67 MMHG | RESPIRATION RATE: 18 BRPM | BODY MASS INDEX: 20.61 KG/M2

## 2022-05-16 DIAGNOSIS — M06.041 RHEUMATOID ARTHRITIS INVOLVING BOTH HANDS WITH NEGATIVE RHEUMATOID FACTOR: Primary | ICD-10-CM

## 2022-05-16 DIAGNOSIS — M06.042 RHEUMATOID ARTHRITIS INVOLVING BOTH HANDS WITH NEGATIVE RHEUMATOID FACTOR: Primary | ICD-10-CM

## 2022-05-16 PROCEDURE — 96372 THER/PROPH/DIAG INJ SC/IM: CPT | Mod: S$GLB,,, | Performed by: INTERNAL MEDICINE

## 2022-05-16 PROCEDURE — 96372 PR INJECTION,THERAP/PROPH/DIAG2ST, IM OR SUBCUT: ICD-10-PCS | Mod: S$GLB,,, | Performed by: INTERNAL MEDICINE

## 2022-05-16 PROCEDURE — 96375 TX/PRO/DX INJ NEW DRUG ADDON: CPT

## 2022-05-16 PROCEDURE — 63600175 PHARM REV CODE 636 W HCPCS: Mod: JA,JG | Performed by: INTERNAL MEDICINE

## 2022-05-16 PROCEDURE — 25000003 PHARM REV CODE 250: Performed by: INTERNAL MEDICINE

## 2022-05-16 PROCEDURE — 63600175 PHARM REV CODE 636 W HCPCS: Performed by: INTERNAL MEDICINE

## 2022-05-16 PROCEDURE — 96365 THER/PROPH/DIAG IV INF INIT: CPT

## 2022-05-16 RX ORDER — DIPHENHYDRAMINE HYDROCHLORIDE 50 MG/ML
25 INJECTION INTRAMUSCULAR; INTRAVENOUS
Status: COMPLETED | OUTPATIENT
Start: 2022-05-16 | End: 2022-05-16

## 2022-05-16 RX ORDER — ACETAMINOPHEN 325 MG/1
650 TABLET ORAL
Status: CANCELLED
Start: 2022-06-06

## 2022-05-16 RX ORDER — ACETAMINOPHEN 325 MG/1
650 TABLET ORAL
Status: COMPLETED | OUTPATIENT
Start: 2022-05-16 | End: 2022-05-16

## 2022-05-16 RX ADMIN — SODIUM CHLORIDE 500 MG: 9 INJECTION, SOLUTION INTRAVENOUS at 01:05

## 2022-05-16 RX ADMIN — DIPHENHYDRAMINE HYDROCHLORIDE 25 MG: 50 INJECTION INTRAMUSCULAR; INTRAVENOUS at 01:05

## 2022-05-16 RX ADMIN — ACETAMINOPHEN 650 MG: 325 TABLET ORAL at 01:05

## 2022-05-16 RX ADMIN — CYANOCOBALAMIN 1000 MCG: 1000 INJECTION, SOLUTION INTRAMUSCULAR at 12:05

## 2022-05-17 ENCOUNTER — TELEPHONE (OUTPATIENT)
Dept: CARDIOLOGY | Facility: CLINIC | Age: 77
End: 2022-05-17
Payer: MEDICARE

## 2022-05-17 ENCOUNTER — PATIENT MESSAGE (OUTPATIENT)
Dept: CARDIOLOGY | Facility: CLINIC | Age: 77
End: 2022-05-17
Payer: MEDICARE

## 2022-05-17 DIAGNOSIS — I50.20 SYSTOLIC HEART FAILURE, UNSPECIFIED HF CHRONICITY: Primary | ICD-10-CM

## 2022-05-17 DIAGNOSIS — R00.2 PALPITATIONS: Primary | ICD-10-CM

## 2022-05-17 RX ORDER — CARVEDILOL 6.25 MG/1
6.25 TABLET ORAL 2 TIMES DAILY WITH MEALS
Qty: 180 TABLET | Refills: 3 | Status: SHIPPED | OUTPATIENT
Start: 2022-05-17 | End: 2022-05-25

## 2022-05-17 RX ORDER — CARVEDILOL 6.25 MG/1
6.25 TABLET ORAL 2 TIMES DAILY WITH MEALS
COMMUNITY
End: 2022-05-17 | Stop reason: SDUPTHER

## 2022-05-17 NOTE — TELEPHONE ENCOUNTER
The patient would like to know if you know any electrophysiologist you would recommend in [place], [place]?    Please advise.

## 2022-05-19 ENCOUNTER — OFFICE VISIT (OUTPATIENT)
Dept: PODIATRY | Facility: CLINIC | Age: 77
End: 2022-05-19
Payer: MEDICARE

## 2022-05-19 VITALS — WEIGHT: 131 LBS | HEIGHT: 67 IN | BODY MASS INDEX: 20.56 KG/M2

## 2022-05-19 DIAGNOSIS — M06.041 RHEUMATOID ARTHRITIS INVOLVING BOTH HANDS WITH NEGATIVE RHEUMATOID FACTOR: Primary | ICD-10-CM

## 2022-05-19 DIAGNOSIS — Z98.890 HISTORY OF FOOT SURGERY: ICD-10-CM

## 2022-05-19 DIAGNOSIS — M06.042 RHEUMATOID ARTHRITIS INVOLVING BOTH HANDS WITH NEGATIVE RHEUMATOID FACTOR: Primary | ICD-10-CM

## 2022-05-19 DIAGNOSIS — L60.3 DYSTROPHIC NAIL: ICD-10-CM

## 2022-05-19 PROCEDURE — 1159F PR MEDICATION LIST DOCUMENTED IN MEDICAL RECORD: ICD-10-PCS | Mod: CPTII,S$GLB,, | Performed by: PODIATRIST

## 2022-05-19 PROCEDURE — 1160F PR REVIEW ALL MEDS BY PRESCRIBER/CLIN PHARMACIST DOCUMENTED: ICD-10-PCS | Mod: CPTII,S$GLB,, | Performed by: PODIATRIST

## 2022-05-19 PROCEDURE — 1126F PR PAIN SEVERITY QUANTIFIED, NO PAIN PRESENT: ICD-10-PCS | Mod: CPTII,S$GLB,, | Performed by: PODIATRIST

## 2022-05-19 PROCEDURE — 99999 PR PBB SHADOW E&M-EST. PATIENT-LVL II: CPT | Mod: PBBFAC,,, | Performed by: PODIATRIST

## 2022-05-19 PROCEDURE — 99999 PR PBB SHADOW E&M-EST. PATIENT-LVL II: ICD-10-PCS | Mod: PBBFAC,,, | Performed by: PODIATRIST

## 2022-05-19 PROCEDURE — 99213 OFFICE O/P EST LOW 20 MIN: CPT | Mod: S$GLB,,, | Performed by: PODIATRIST

## 2022-05-19 PROCEDURE — 1126F AMNT PAIN NOTED NONE PRSNT: CPT | Mod: CPTII,S$GLB,, | Performed by: PODIATRIST

## 2022-05-19 PROCEDURE — 99213 PR OFFICE/OUTPT VISIT, EST, LEVL III, 20-29 MIN: ICD-10-PCS | Mod: S$GLB,,, | Performed by: PODIATRIST

## 2022-05-19 PROCEDURE — 1159F MED LIST DOCD IN RCRD: CPT | Mod: CPTII,S$GLB,, | Performed by: PODIATRIST

## 2022-05-19 PROCEDURE — 1160F RVW MEDS BY RX/DR IN RCRD: CPT | Mod: CPTII,S$GLB,, | Performed by: PODIATRIST

## 2022-05-19 NOTE — PROGRESS NOTES
"  Chief Concern:  Toe pain    HPI: Rizwana is a 76 y.o. female who presents to the clinic for evaluation and treatment of feet.  She has been using mecurochrom to the right 4th interdigital space with relief.  Patient is using urea 20% on her toenails.  The patient has history of multiple right foot surgeries.    No acute trauma reported to the foot otherwise.   Has hx of R ankle "fusion" however still has motion in the ankle.    Current shoe gear:  Soft walking shoes with custom insoles.  She will be moving to [place] next week.        PCP: Sri Gutierrez MD              Patient Active Problem List   Diagnosis    Ventricular premature beats    Hammertoe    Palpitations    Costochondritis    Heart failure, systolic    Precordial pain    Bradycardia    Cardiomyopathy    Non-rheumatic mitral regurgitation    Essential hypertension    S/P colonoscopy    Osteopenia    Pure hypercholesterolemia    At risk for amiodarone toxicity with long term use    Dysuria    Vaginal atrophy    Vaginal burning    Open wound of right foot    GAURAV (obstructive sleep apnea)    Osteoarthritis of hand    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    Low back pain    Pulmonary nodule    PAF (paroxysmal atrial fibrillation)    High risk medication use    Periodic limb movement disorder (PLMD)    Raynaud's phenomenon    Anemia    Macrocytosis    ILD (interstitial lung disease)    OP (osteoporosis)    Junctional premature beats             Current Outpatient Medications   Medication Sig Dispense Refill    ALPRAZolam (XANAX) 0.25 MG tablet Take 1 tablet (0.25 mg total) by mouth 3 (three) times daily as needed for Anxiety. 90 tablet 0    amiodarone (PACERONE) 200 MG Tab TAKE ONE TABLET BY MOUTH ONCE DAILY 90 tablet 3    aspirin (ECOTRIN) 81 MG EC tablet Take 1 tablet (81 mg total) by mouth once daily. 90 tablet 3    atorvastatin (LIPITOR) 10 MG tablet Take 1 tablet (10 mg " total) by mouth once daily. 90 tablet 1    carvediloL (COREG) 6.25 MG tablet Take 1 tablet (6.25 mg total) by mouth 2 (two) times daily with meals. 180 tablet 3    cholecalciferol, vitamin D3, (VITAMIN D3) 25 mcg (1,000 unit) capsule Take 1,000 Units by mouth once daily.      co-enzyme Q-10 30 mg capsule Take 10 mg by mouth once daily.       cyanocobalamin 1,000 mcg/mL injection       cyanocobalamin 1,000 mcg/mL injection Inject 1 mL (1,000 mcg total) into the muscle every 30 days. One ml IM weekly for 4 weeks then one ml IM monthly.  Dipense #10 25 gague 1 inch needles and #10 one ml syringes 10 mL 3    FLUAD QUAD 2021-22,65Y UP,,PF, 60 mcg (15 mcg x 4)/0.5 mL Syrg       folic acid (FOLVITE) 800 MCG Tab Take 1 tablet (800 mcg total) by mouth once daily.  0    furosemide (LASIX) 40 MG tablet Take 1 tablet (40 mg total) by mouth once daily. 30 tablet 11    leflunomide (ARAVA) 20 MG Tab Take 1 tablet (20 mg total) by mouth once daily. 90 tablet 0    magnesium glycinate 100 mg Tab Take 1 tablet by mouth once daily at 6am.      MULTIVITAMIN WITH MINERALS (HAIR,SKIN AND NAILS ORAL) Take by mouth once daily.      ORENCIA, WITH MALTOSE, 250 mg SolR injection       potassium chloride (KLOR-CON) 8 MEQ TbSR Take 1 tablet (8 mEq total) by mouth once daily. 90 tablet 3    sacubitriL-valsartan (ENTRESTO) 24-26 mg per tablet Take 1 tablet by mouth 2 (two) times daily. Patient needs to schedule an appointment in the consult cardiology clinic. 60 tablet 11    TURMERIC ORAL Take by mouth.      UNABLE TO FIND 2 capsules 2 (two) times a day. Nutrafol      urea 20 % Crea Apply 1 application topically daily as needed. To toenails 75 g 10     Current Facility-Administered Medications   Medication Dose Route Frequency Provider Last Rate Last Admin    acetaminophen tablet 650 mg  650 mg Oral Once PRN Gurjit Zaidi MD        albuterol inhaler 2 puff  2 puff Inhalation Q20 Min PRN Gurjit Zaidi MD         "cyanocobalamin injection 1,000 mcg  1,000 mcg Intramuscular Q30 Days Sri Gutierrez MD   1,000 mcg at 05/16/22 1227    diphenhydrAMINE capsule 25 mg  25 mg Oral Q6H PRN Darryl Javier MD        diphenhydrAMINE injection 25 mg  25 mg Intravenous Once PRN Gurjit Zaidi MD        EPINEPHrine (EPIPEN) 0.3 mg/0.3 mL pen injection 0.3 mg  0.3 mg Intramuscular PRN Gurjit Zaidi MD        methylPREDNISolone sodium succinate injection 40 mg  40 mg Intravenous Once PRN Gurjit Zaidi MD        ondansetron disintegrating tablet 4 mg  4 mg Oral Once PRN Gurjit Zaidi MD        sodium chloride 0.9% 500 mL flush bag   Intravenous PRN Gurjit Zaidi MD        sodium chloride 0.9% flush 10 mL  10 mL Intravenous PRN Gurjit Zaidi MD           Review of patient's allergies indicates:   Allergen Reactions    Dilaudid [hydromorphone (bulk)]      Severe nausea/vomiting    Morphine      Severe nausea/vomiting       Review of Systems   Constitution: Negative for chills and fever.   Cardiovascular: Positive for leg swelling. Negative for chest pain and claudication.   Respiratory: Negative for cough and shortness of breath.    Skin: Positive for dry skin and nail changes.   Musculoskeletal: Positive for arthritis, joint pain and stiffness (ankle R).   Gastrointestinal: Negative for nausea and vomiting.   Neurological: Negative for numbness and paresthesias.   Psychiatric/Behavioral: Negative for altered mental status.           Objective:         Vitals:    05/19/22 1113   Weight: 59.4 kg (131 lb)   Height: 5' 7" (1.702 m)           Physical Exam   Constitutional: She is oriented to person, place, and time. She appears well-developed and well-nourished.     Cardiovascular: Intact distal pulses.     Dorsalis pedis pulses are 2+ on the right side, and 2+ on the left side.    Posterior tibial pulses are 2+ on the right side, and 2+ on the left side.   CRT < 3 sec to tips of toes. No edema noted to " b/l LE. No vericosities noted to b/l LEs.        Musculoskeletal:   Gastrocnemius equinus noted to b/l ankles with decreased DF noted on exam. MMT 5/5 in DF/PF/Inv/Ev resistance with no reproduction of pain in any direction. Passive range of motion of ankle and pedal joints is painless. Adequate pedal joint ROM.   Hypermobility noted to 1st ray b/l with near complete collapse of medial longitudinal arch b/l (R>L) with forefoot loading. Functional pes planus foot type b/l.   Semi-reducible hammertoe contractures noted to toes 2-4 b/l-asymptomatic.   Mild stiffness noted to R ankle compared to L however still gets 5-7 deg of DF in R ankle.   Palpable bony prominence noted to dorsal and dorsal medial 1st met cuneiform joint, mild, asymptomatic.      Neurological:   She is alert and oriented to person, place, and time. She has normal strength. No sensory deficit.   Light touch, proprioception, and sharp/dull sensation are all intact bilaterally.      Skin:   Skin is thin.  Interdigital maceration, slightly red at the right 4th interdigital space.  No active drainage.  No cellulitis.                  Assessment:         ICD-10-CM ICD-9-CM    1. 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  M06.041 714.0     M06.042     2. Dystrophic nail  L60.3 703.8    3. History of foot surgery  Z98.890 V15.29            Plan:         I counseled the patient on her conditions, their implications and medical management.    Continue custom foot orthotics Consider thicker soft cushioning over the navicular area.    Continue Thick soft sole shoes. No barefoot walking.     Check feet daily

## 2022-05-25 DIAGNOSIS — R00.2 PALPITATIONS: Primary | ICD-10-CM

## 2022-05-25 RX ORDER — CARVEDILOL 12.5 MG/1
12.5 TABLET ORAL 2 TIMES DAILY WITH MEALS
Qty: 180 TABLET | Refills: 3 | Status: SHIPPED | OUTPATIENT
Start: 2022-05-25 | End: 2022-07-11 | Stop reason: SDUPTHER

## 2022-05-25 RX ORDER — CARVEDILOL 12.5 MG/1
12.5 TABLET ORAL 2 TIMES DAILY WITH MEALS
Qty: 180 TABLET | Refills: 0 | Status: SHIPPED | OUTPATIENT
Start: 2022-05-25 | End: 2022-06-14

## 2022-05-25 RX ORDER — CARVEDILOL 12.5 MG/1
12.5 TABLET ORAL 2 TIMES DAILY WITH MEALS
COMMUNITY
End: 2022-05-25 | Stop reason: SDUPTHER

## 2022-06-01 ENCOUNTER — PATIENT MESSAGE (OUTPATIENT)
Dept: CARDIOLOGY | Facility: CLINIC | Age: 77
End: 2022-06-01
Payer: COMMERCIAL

## 2022-06-10 NOTE — PLAN OF CARE
ED Attending Attestation Note    This patient was seen by the advanced practice provider. I personally saw the patient and performed a substantive portion of the visit including all aspects of the medical decision making. Briefly, 62 y.o. female presents with bleeding from a tongue lesion. Seen at [facility] ED earlier today for same issue and had bleeding controlled. Focused exam:   Gen: 62 y.o. female, NAD  HEENT: NCAT. PERRL. EOMI. There is a lesion on the dorsum of the tongue with bleeding from the right side of the lesion. CV: RRR  Lungs: CTAB. No incr WOB. MDM:   This is a 71-year-old female who presents with a bleeding tongue lesion. The bleeding was controlled with direct pressure. This is the second visit today for the same issue. We will have patient follow closely with ENT for further evaluation of the lesion. For further details of the patient's emergency department visit, please see the advanced practice provider's documentation. Naina Contreras MD     This report has been produced using speech recognition software and may contain errors related to that system including errors in grammar, punctuation, and spelling, as well as words and phrases that may be inappropriate. If there are any questions or concerns please feel free to contact the dictating provider for clarification.        Naina Contreras MD  06/10/22 2486 Pt educated on handwashing and infection control.  Pt verbalized understanding.

## 2022-06-12 ENCOUNTER — PATIENT MESSAGE (OUTPATIENT)
Dept: CARDIOLOGY | Facility: CLINIC | Age: 77
End: 2022-06-12
Payer: COMMERCIAL

## 2022-06-14 ENCOUNTER — TELEPHONE (OUTPATIENT)
Dept: CARDIOLOGY | Facility: CLINIC | Age: 77
End: 2022-06-14
Payer: COMMERCIAL

## 2022-06-14 ENCOUNTER — PATIENT MESSAGE (OUTPATIENT)
Dept: CARDIOLOGY | Facility: CLINIC | Age: 77
End: 2022-06-14
Payer: COMMERCIAL

## 2022-06-14 DIAGNOSIS — R00.2 PALPITATIONS: ICD-10-CM

## 2022-06-14 NOTE — TELEPHONE ENCOUNTER
Pt is having nausea and diarrhea and light headed. coreg 12.5 mg bid she thinks its the coreg causing this? She states that her blood pressure today it was 97/70  Heart rate of 53. Yesterday was 93/69 then  112-63. She is having symptoms all day. Please advise.    ----- Message from Lianet Palacios MA sent at 6/14/2022  8:18 AM CDT -----    ----- Message -----  From: Galina Villanueva  Sent: 6/14/2022   8:04 AM CDT  To: [facility] Staff    Please call pt @ [phone], pt states she need to speak with Bekah.

## 2022-06-21 ENCOUNTER — PATIENT MESSAGE (OUTPATIENT)
Dept: CARDIOLOGY | Facility: CLINIC | Age: 77
End: 2022-06-21
Payer: COMMERCIAL

## 2022-06-27 ENCOUNTER — TELEPHONE (OUTPATIENT)
Dept: CARDIOLOGY | Facility: CLINIC | Age: 77
End: 2022-06-27
Payer: COMMERCIAL

## 2022-06-27 NOTE — TELEPHONE ENCOUNTER
Pt states she is doing ok. Pt has an appt with ep in her town. She wants an appt for the fall. pb  appt made pb    ----- Message from Lianet Palacios MA sent at 6/27/2022  8:18 AM CDT -----  Contact: pt [phone]    ----- Message -----  From: Mic Wood  Sent: 6/27/2022   8:16 AM CDT  To: [facility] Staff    Pt called in requesting to speak with Bekah she states she has moved out of town and she has some question on an yesenia she needs to schedule please advise

## 2022-07-13 ENCOUNTER — TELEPHONE (OUTPATIENT)
Dept: CARDIOLOGY | Facility: CLINIC | Age: 77
End: 2022-07-13
Payer: COMMERCIAL

## 2022-07-13 NOTE — TELEPHONE ENCOUNTER
I dont see where he is wanting anything ordered at this time. Reassured that if we need testing we will try to accommodate her pb     ----- Message from Kacie Vásquez sent at 7/13/2022  2:47 PM CDT -----  Contact: Rizwana Wagoner would like a call back at [phone], Regards to any test that may needs to have before the next appointment.    Thanks  Td

## 2022-07-13 NOTE — TELEPHONE ENCOUNTER
----- Message from Kacie Vásquez sent at 7/13/2022  2:47 PM CDT -----  Contact: Rizwana Wagoner would like a call back at [phone], Regards to any test that may needs to have before the next appointment.    Thanks  Td

## 2022-07-17 ENCOUNTER — PATIENT MESSAGE (OUTPATIENT)
Dept: INTERNAL MEDICINE | Facility: CLINIC | Age: 77
End: 2022-07-17
Payer: COMMERCIAL

## 2022-07-17 NOTE — TELEPHONE ENCOUNTER
Pt states she is going to [facility] and requesting Paxlovid to take with her in case she develops symptoms.   Pt is covid risk 4.

## 2022-07-18 ENCOUNTER — PATIENT MESSAGE (OUTPATIENT)
Dept: INTERNAL MEDICINE | Facility: CLINIC | Age: 77
End: 2022-07-18
Payer: COMMERCIAL

## 2022-08-05 RX ORDER — ALPRAZOLAM 0.25 MG/1
0.25 TABLET ORAL 3 TIMES DAILY PRN
Qty: 90 TABLET | Refills: 0 | Status: SHIPPED | OUTPATIENT
Start: 2022-08-05

## 2022-08-05 NOTE — TELEPHONE ENCOUNTER
Care Due:                  Date            Visit Type   Department     Provider  --------------------------------------------------------------------------------                                EP -                              PRIMARY      [facility] INTERNAL  Last Visit: 05-      CARE ([facility])   MEDICINE       KYLAH ALBERT                              EP -                              PRIMARY      [facility] INTERNAL  Next Visit: 10-      CARE ([facility])   MEDICINE       KYLAH ALBERT                                                            Last  Test          Frequency    Reason                     Performed    Due Date  --------------------------------------------------------------------------------    Lipid Panel.  12 months..  atorvastatin.............  07- 07-    [facility] Embedded Care Gaps. Reference number: [id number]. 8/05/2022   5:19:04 PM CDT

## 2022-09-21 ENCOUNTER — PATIENT MESSAGE (OUTPATIENT)
Dept: CARDIOLOGY | Facility: CLINIC | Age: 77
End: 2022-09-21
Payer: COMMERCIAL

## 2022-09-29 ENCOUNTER — PATIENT MESSAGE (OUTPATIENT)
Dept: LAB | Facility: HOSPITAL | Age: 77
End: 2022-09-29
Payer: COMMERCIAL

## 2024-01-03 DIAGNOSIS — Z78.0 MENOPAUSE: ICD-10-CM

## 2024-01-16 ENCOUNTER — PATIENT OUTREACH (OUTPATIENT)
Dept: ADMINISTRATIVE | Facility: HOSPITAL | Age: 79
End: 2024-01-16
Payer: COMMERCIAL

## 2024-03-01 NOTE — PROGRESS NOTES
Patient arrived for Orencia infusion. Pt received Tylenol 650mg and Benadryl 25mg IVPB 30mins prior to infusion. NS 100cc bag infusing at 25cc/hr. Pt tolerated infusion well and left in NAD.    No

## 2024-05-07 NOTE — PROGRESS NOTES
Patient presents to the clinic for non-covered routine foot care. Patient is not a high risk foot care patient. Patient understands this is not typically a covered service and patient is aware of responsibility of payment. Pedal pulses are palpable. No know risk factors requiring routine foot care. Nails are elongated and thickened on feet. Diagnosis is onychomycosis. Nails were reduced and trimmed bilaterally. Patient tolerated well.     RTC 5 weeks Proc B, sooner PRN           No

## 2025-01-30 NOTE — PROGRESS NOTES
Patient shingles vaccine is not covered in the clinic due to insurance. Advise patient to go to our pharmacy down the [address] to receive vaccine.    Physical Therapy   Date: 1/30/2025  Patient canceled late (per policy guidelines) today's (1/30/2025) scheduled appointment.  This is the patient's second no show/late cancel (both due to Employer/return to light duty work).         ----- Message -----   From: Reyes Austin   Sent: 1/30/2025   6:53 AM CST   To: [facility]   Subject: Appointment canceled                              Appointment canceled for Reyes Austin ([id number])   Visit type: PT/OT/ST FOLLOW-UP WORK COMP   1/30/2025 8:45 AM (45 minutes) with Carly RAMOS in [facility]      Reason for cancellation: Conflict with day or time      Patient comments: We have an emergency plumbing problem I need to repair at work before we open.  Apologies for the late notice.  I found out about it this morning at 0500.